# Patient Record
Sex: MALE | Race: WHITE | NOT HISPANIC OR LATINO | Employment: OTHER
[De-identification: names, ages, dates, MRNs, and addresses within clinical notes are randomized per-mention and may not be internally consistent; named-entity substitution may affect disease eponyms.]

---

## 2017-04-18 ENCOUNTER — RECORDS - HEALTHEAST (OUTPATIENT)
Dept: ADMINISTRATIVE | Facility: OTHER | Age: 72
End: 2017-04-18

## 2017-06-06 ENCOUNTER — OFFICE VISIT - HEALTHEAST (OUTPATIENT)
Dept: FAMILY MEDICINE | Facility: CLINIC | Age: 72
End: 2017-06-06

## 2017-06-06 DIAGNOSIS — E11.9 DIET-CONTROLLED TYPE 2 DIABETES MELLITUS (H): ICD-10-CM

## 2017-06-06 DIAGNOSIS — R47.01: ICD-10-CM

## 2017-06-06 DIAGNOSIS — N18.6 ESRD (END STAGE RENAL DISEASE) ON DIALYSIS (H): ICD-10-CM

## 2017-06-06 DIAGNOSIS — Z99.2 ESRD (END STAGE RENAL DISEASE) ON DIALYSIS (H): ICD-10-CM

## 2017-06-06 DIAGNOSIS — M17.32 POST-TRAUMATIC OSTEOARTHRITIS OF LEFT KNEE: ICD-10-CM

## 2017-06-06 DIAGNOSIS — Z01.818 PREOP GENERAL PHYSICAL EXAM: ICD-10-CM

## 2017-06-06 LAB
ATRIAL RATE - MUSE: 74 BPM
DIASTOLIC BLOOD PRESSURE - MUSE: NORMAL MMHG
HBA1C MFR BLD: 5.8 % (ref 3.5–6.1)
INTERPRETATION ECG - MUSE: NORMAL
P AXIS - MUSE: 68 DEGREES
PR INTERVAL - MUSE: 180 MS
QRS DURATION - MUSE: 92 MS
QT - MUSE: 430 MS
QTC - MUSE: 477 MS
R AXIS - MUSE: 27 DEGREES
SYSTOLIC BLOOD PRESSURE - MUSE: NORMAL MMHG
T AXIS - MUSE: 67 DEGREES
VENTRICULAR RATE- MUSE: 74 BPM

## 2017-06-06 ASSESSMENT — MIFFLIN-ST. JEOR: SCORE: 1653.46

## 2017-06-07 ENCOUNTER — COMMUNICATION - HEALTHEAST (OUTPATIENT)
Dept: FAMILY MEDICINE | Facility: CLINIC | Age: 72
End: 2017-06-07

## 2017-06-08 ENCOUNTER — HOSPITAL ENCOUNTER (OUTPATIENT)
Dept: CT IMAGING | Facility: CLINIC | Age: 72
Discharge: HOME | End: 2017-06-08
Attending: FAMILY MEDICINE

## 2017-06-08 ENCOUNTER — HOSPITAL ENCOUNTER (OUTPATIENT)
Dept: ULTRASOUND IMAGING | Facility: CLINIC | Age: 72
Discharge: HOME | End: 2017-06-08
Attending: FAMILY MEDICINE

## 2017-06-08 DIAGNOSIS — R47.01: ICD-10-CM

## 2017-06-13 ENCOUNTER — COMMUNICATION - HEALTHEAST (OUTPATIENT)
Dept: FAMILY MEDICINE | Facility: CLINIC | Age: 72
End: 2017-06-13

## 2017-06-14 ASSESSMENT — MIFFLIN-ST. JEOR: SCORE: 1651.64

## 2017-06-16 ENCOUNTER — OFFICE VISIT - HEALTHEAST (OUTPATIENT)
Dept: FAMILY MEDICINE | Facility: CLINIC | Age: 72
End: 2017-06-16

## 2017-06-16 ENCOUNTER — COMMUNICATION - HEALTHEAST (OUTPATIENT)
Dept: SCHEDULING | Facility: CLINIC | Age: 72
End: 2017-06-16

## 2017-06-16 DIAGNOSIS — R07.0 THROAT PAIN: ICD-10-CM

## 2017-06-28 ENCOUNTER — HOME CARE/HOSPICE - HEALTHEAST (OUTPATIENT)
Dept: HOME HEALTH SERVICES | Facility: HOME HEALTH | Age: 72
End: 2017-06-28

## 2017-06-28 ENCOUNTER — SURGERY - HEALTHEAST (OUTPATIENT)
Dept: SURGERY | Facility: CLINIC | Age: 72
End: 2017-06-28

## 2017-06-28 ENCOUNTER — ANESTHESIA - HEALTHEAST (OUTPATIENT)
Dept: SURGERY | Facility: CLINIC | Age: 72
End: 2017-06-28

## 2017-06-28 ASSESSMENT — MIFFLIN-ST. JEOR: SCORE: 1651.64

## 2017-07-01 ASSESSMENT — MIFFLIN-ST. JEOR
SCORE: 1657.13
SCORE: 1656.75

## 2017-07-02 ASSESSMENT — MIFFLIN-ST. JEOR: SCORE: 1645.29

## 2017-07-04 ENCOUNTER — HOME CARE/HOSPICE - HEALTHEAST (OUTPATIENT)
Dept: HOME HEALTH SERVICES | Facility: HOME HEALTH | Age: 72
End: 2017-07-04

## 2017-07-05 ENCOUNTER — HOME CARE/HOSPICE - HEALTHEAST (OUTPATIENT)
Dept: HOME HEALTH SERVICES | Facility: HOME HEALTH | Age: 72
End: 2017-07-05

## 2017-07-06 ENCOUNTER — COMMUNICATION - HEALTHEAST (OUTPATIENT)
Dept: SCHEDULING | Facility: CLINIC | Age: 72
End: 2017-07-06

## 2017-07-06 ENCOUNTER — HOME CARE/HOSPICE - HEALTHEAST (OUTPATIENT)
Dept: HOME HEALTH SERVICES | Facility: HOME HEALTH | Age: 72
End: 2017-07-06

## 2017-07-06 ENCOUNTER — OFFICE VISIT - HEALTHEAST (OUTPATIENT)
Dept: INTERNAL MEDICINE | Facility: CLINIC | Age: 72
End: 2017-07-06

## 2017-07-06 DIAGNOSIS — Z96.659 S/P KNEE REPLACEMENT: ICD-10-CM

## 2017-07-06 DIAGNOSIS — N18.6 ESRD (END STAGE RENAL DISEASE) (H): ICD-10-CM

## 2017-07-06 DIAGNOSIS — R11.0 NAUSEA: ICD-10-CM

## 2017-07-06 DIAGNOSIS — D64.9 POSTOPERATIVE ANEMIA: ICD-10-CM

## 2017-07-08 ENCOUNTER — HOME CARE/HOSPICE - HEALTHEAST (OUTPATIENT)
Dept: HOME HEALTH SERVICES | Facility: HOME HEALTH | Age: 72
End: 2017-07-08

## 2017-07-11 ENCOUNTER — RECORDS - HEALTHEAST (OUTPATIENT)
Dept: ADMINISTRATIVE | Facility: OTHER | Age: 72
End: 2017-07-11

## 2017-07-11 ENCOUNTER — HOME CARE/HOSPICE - HEALTHEAST (OUTPATIENT)
Dept: HOME HEALTH SERVICES | Facility: HOME HEALTH | Age: 72
End: 2017-07-11

## 2017-07-13 ENCOUNTER — HOME CARE/HOSPICE - HEALTHEAST (OUTPATIENT)
Dept: HOME HEALTH SERVICES | Facility: HOME HEALTH | Age: 72
End: 2017-07-13

## 2017-07-15 ENCOUNTER — HOME CARE/HOSPICE - HEALTHEAST (OUTPATIENT)
Dept: HOME HEALTH SERVICES | Facility: HOME HEALTH | Age: 72
End: 2017-07-15

## 2017-07-17 ENCOUNTER — RECORDS - HEALTHEAST (OUTPATIENT)
Dept: ADMINISTRATIVE | Facility: OTHER | Age: 72
End: 2017-07-17

## 2017-07-19 ENCOUNTER — INFUSION - HEALTHEAST (OUTPATIENT)
Dept: INFUSION THERAPY | Facility: CLINIC | Age: 72
End: 2017-07-19

## 2017-07-19 DIAGNOSIS — N18.4 CHRONIC KIDNEY DISEASE, STAGE IV (SEVERE) (H): ICD-10-CM

## 2017-07-19 DIAGNOSIS — D64.9 ANEMIA: ICD-10-CM

## 2017-07-20 ENCOUNTER — INFUSION - HEALTHEAST (OUTPATIENT)
Dept: INFUSION THERAPY | Facility: CLINIC | Age: 72
End: 2017-07-20

## 2017-07-20 DIAGNOSIS — D64.9 ANEMIA: ICD-10-CM

## 2017-07-20 DIAGNOSIS — N18.4 CHRONIC KIDNEY DISEASE, STAGE IV (SEVERE) (H): ICD-10-CM

## 2017-07-21 LAB
BLD PROD TYP BPU: NORMAL
BLOOD EXPIRATION DATE: NORMAL
BLOOD TYPE: 5100
CODING SYSTEM: NORMAL
COMPONENT (HISTORICAL CONVERSION): NORMAL
CROSSMATCH: NORMAL
ISSUE DATE AND TIME: NORMAL
STATUS (HISTORICAL CONVERSION): NORMAL
UNIT ABO/RH (HISTORICAL CONVERSION): NORMAL
UNIT NUMBER: NORMAL

## 2017-08-15 ENCOUNTER — RECORDS - HEALTHEAST (OUTPATIENT)
Dept: ADMINISTRATIVE | Facility: OTHER | Age: 72
End: 2017-08-15

## 2017-10-10 ENCOUNTER — RECORDS - HEALTHEAST (OUTPATIENT)
Dept: ADMINISTRATIVE | Facility: OTHER | Age: 72
End: 2017-10-10

## 2018-01-23 ENCOUNTER — RECORDS - HEALTHEAST (OUTPATIENT)
Dept: ADMINISTRATIVE | Facility: OTHER | Age: 73
End: 2018-01-23

## 2018-02-01 ENCOUNTER — RECORDS - HEALTHEAST (OUTPATIENT)
Dept: ADMINISTRATIVE | Facility: OTHER | Age: 73
End: 2018-02-01

## 2018-02-26 ENCOUNTER — OFFICE VISIT - HEALTHEAST (OUTPATIENT)
Dept: FAMILY MEDICINE | Facility: CLINIC | Age: 73
End: 2018-02-26

## 2018-02-26 DIAGNOSIS — J32.9 SINUSITIS: ICD-10-CM

## 2018-02-26 RX ORDER — CINACALCET 30 MG/1
60 TABLET, FILM COATED ORAL DAILY
Status: SHIPPED | COMMUNITY
Start: 2018-02-26 | End: 2023-07-27

## 2018-02-26 RX ORDER — SEVELAMER CARBONATE 800 MG/1
3200 TABLET, FILM COATED ORAL
Status: SHIPPED | COMMUNITY
Start: 2018-01-09

## 2018-03-14 ENCOUNTER — COMMUNICATION - HEALTHEAST (OUTPATIENT)
Dept: FAMILY MEDICINE | Facility: CLINIC | Age: 73
End: 2018-03-14

## 2018-03-14 DIAGNOSIS — Z00.00 HEALTHCARE MAINTENANCE: ICD-10-CM

## 2018-03-15 ENCOUNTER — AMBULATORY - HEALTHEAST (OUTPATIENT)
Dept: NURSING | Facility: CLINIC | Age: 73
End: 2018-03-15

## 2018-03-15 DIAGNOSIS — Z23 NEED FOR TD VACCINE: ICD-10-CM

## 2018-04-19 ENCOUNTER — RECORDS - HEALTHEAST (OUTPATIENT)
Dept: ADMINISTRATIVE | Facility: OTHER | Age: 73
End: 2018-04-19

## 2018-05-11 ENCOUNTER — OFFICE VISIT - HEALTHEAST (OUTPATIENT)
Dept: FAMILY MEDICINE | Facility: CLINIC | Age: 73
End: 2018-05-11

## 2018-05-11 ENCOUNTER — COMMUNICATION - HEALTHEAST (OUTPATIENT)
Dept: SCHEDULING | Facility: CLINIC | Age: 73
End: 2018-05-11

## 2018-05-11 DIAGNOSIS — J30.9 ALLERGIC RHINITIS: ICD-10-CM

## 2018-05-11 DIAGNOSIS — R05.9 COUGH: ICD-10-CM

## 2018-06-01 ENCOUNTER — OFFICE VISIT - HEALTHEAST (OUTPATIENT)
Dept: FAMILY MEDICINE | Facility: CLINIC | Age: 73
End: 2018-06-01

## 2018-06-01 ENCOUNTER — COMMUNICATION - HEALTHEAST (OUTPATIENT)
Dept: SCHEDULING | Facility: CLINIC | Age: 73
End: 2018-06-01

## 2018-06-01 DIAGNOSIS — R05.9 COUGH: ICD-10-CM

## 2018-06-05 ENCOUNTER — RECORDS - HEALTHEAST (OUTPATIENT)
Dept: ADMINISTRATIVE | Facility: OTHER | Age: 73
End: 2018-06-05

## 2018-09-11 ENCOUNTER — OFFICE VISIT - HEALTHEAST (OUTPATIENT)
Dept: FAMILY MEDICINE | Facility: CLINIC | Age: 73
End: 2018-09-11

## 2018-09-11 DIAGNOSIS — N18.6 END STAGE RENAL FAILURE ON DIALYSIS (H): ICD-10-CM

## 2018-09-11 DIAGNOSIS — E11.8 TYPE 2 DIABETES MELLITUS WITH COMPLICATION (H): ICD-10-CM

## 2018-09-11 DIAGNOSIS — R41.3 MEMORY DEFICITS: ICD-10-CM

## 2018-09-11 DIAGNOSIS — N25.81 HYPERPARATHYROIDISM, SECONDARY RENAL (H): ICD-10-CM

## 2018-09-11 DIAGNOSIS — D69.6 THROMBOCYTOPENIA (H): ICD-10-CM

## 2018-09-11 DIAGNOSIS — Z99.2 END STAGE RENAL FAILURE ON DIALYSIS (H): ICD-10-CM

## 2018-09-11 ASSESSMENT — MIFFLIN-ST. JEOR: SCORE: 1611.5

## 2018-09-25 ENCOUNTER — RECORDS - HEALTHEAST (OUTPATIENT)
Dept: ADMINISTRATIVE | Facility: OTHER | Age: 73
End: 2018-09-25

## 2018-11-06 ENCOUNTER — OFFICE VISIT - HEALTHEAST (OUTPATIENT)
Dept: FAMILY MEDICINE | Facility: CLINIC | Age: 73
End: 2018-11-06

## 2018-11-06 DIAGNOSIS — I45.9 SKIPPED HEART BEATS: ICD-10-CM

## 2018-11-06 DIAGNOSIS — R05.9 COUGH: ICD-10-CM

## 2019-01-03 ENCOUNTER — RECORDS - HEALTHEAST (OUTPATIENT)
Dept: ADMINISTRATIVE | Facility: OTHER | Age: 74
End: 2019-01-03

## 2019-03-12 ENCOUNTER — OFFICE VISIT - HEALTHEAST (OUTPATIENT)
Dept: FAMILY MEDICINE | Facility: CLINIC | Age: 74
End: 2019-03-12

## 2019-03-12 DIAGNOSIS — R05.9 COUGH: ICD-10-CM

## 2019-03-12 DIAGNOSIS — R06.2 WHEEZING: ICD-10-CM

## 2019-04-01 ENCOUNTER — COMMUNICATION - HEALTHEAST (OUTPATIENT)
Dept: FAMILY MEDICINE | Facility: CLINIC | Age: 74
End: 2019-04-01

## 2019-04-02 ENCOUNTER — OFFICE VISIT - HEALTHEAST (OUTPATIENT)
Dept: FAMILY MEDICINE | Facility: CLINIC | Age: 74
End: 2019-04-02

## 2019-04-02 DIAGNOSIS — R05.9 COUGH: ICD-10-CM

## 2019-04-02 DIAGNOSIS — N18.6 END STAGE RENAL FAILURE ON DIALYSIS (H): ICD-10-CM

## 2019-04-02 DIAGNOSIS — Z99.2 END STAGE RENAL FAILURE ON DIALYSIS (H): ICD-10-CM

## 2019-04-02 DIAGNOSIS — C64.9 RENAL CELL CARCINOMA, UNSPECIFIED LATERALITY (H): ICD-10-CM

## 2019-04-02 DIAGNOSIS — I77.0 A-V FISTULA (H): ICD-10-CM

## 2019-04-25 ENCOUNTER — RECORDS - HEALTHEAST (OUTPATIENT)
Dept: ADMINISTRATIVE | Facility: OTHER | Age: 74
End: 2019-04-25

## 2019-04-25 LAB — RETINOPATHY: NEGATIVE

## 2019-04-29 ENCOUNTER — COMMUNICATION - HEALTHEAST (OUTPATIENT)
Dept: SCHEDULING | Facility: CLINIC | Age: 74
End: 2019-04-29

## 2019-04-29 DIAGNOSIS — R07.9 CHEST PAIN, UNSPECIFIED TYPE: ICD-10-CM

## 2019-04-30 ENCOUNTER — OFFICE VISIT - HEALTHEAST (OUTPATIENT)
Dept: FAMILY MEDICINE | Facility: CLINIC | Age: 74
End: 2019-04-30

## 2019-04-30 DIAGNOSIS — R07.9 CHEST PAIN, UNSPECIFIED TYPE: ICD-10-CM

## 2019-04-30 DIAGNOSIS — Z00.00 ROUTINE GENERAL MEDICAL EXAMINATION AT A HEALTH CARE FACILITY: ICD-10-CM

## 2019-04-30 DIAGNOSIS — R91.8 PULMONARY NODULES: ICD-10-CM

## 2019-04-30 DIAGNOSIS — N18.6 END STAGE RENAL FAILURE ON DIALYSIS (H): ICD-10-CM

## 2019-04-30 DIAGNOSIS — E04.1 THYROID NODULE: ICD-10-CM

## 2019-04-30 DIAGNOSIS — N25.81 HYPERPARATHYROIDISM, SECONDARY RENAL (H): ICD-10-CM

## 2019-04-30 DIAGNOSIS — E21.3 HYPERPARATHYROIDISM (H): ICD-10-CM

## 2019-04-30 DIAGNOSIS — Z13.220 ENCOUNTER FOR SCREENING FOR LIPOID DISORDERS: ICD-10-CM

## 2019-04-30 DIAGNOSIS — Z12.5 SCREENING FOR MALIGNANT NEOPLASM OF PROSTATE: ICD-10-CM

## 2019-04-30 DIAGNOSIS — Z12.11 ENCOUNTER FOR SCREENING FOR MALIGNANT NEOPLASM OF COLON: ICD-10-CM

## 2019-04-30 DIAGNOSIS — Z99.2 END STAGE RENAL FAILURE ON DIALYSIS (H): ICD-10-CM

## 2019-04-30 DIAGNOSIS — E11.9 DIET-CONTROLLED TYPE 2 DIABETES MELLITUS (H): ICD-10-CM

## 2019-04-30 DIAGNOSIS — Z12.11 SCREEN FOR COLON CANCER: ICD-10-CM

## 2019-04-30 DIAGNOSIS — D69.6 THROMBOCYTOPENIA (H): ICD-10-CM

## 2019-04-30 LAB
ALBUMIN SERPL-MCNC: 3.6 G/DL (ref 3.5–5)
ALP SERPL-CCNC: 69 U/L (ref 45–120)
ALT SERPL W P-5'-P-CCNC: 17 U/L (ref 0–45)
ANION GAP SERPL CALCULATED.3IONS-SCNC: 12 MMOL/L (ref 5–18)
AST SERPL W P-5'-P-CCNC: 18 U/L (ref 0–40)
BILIRUB SERPL-MCNC: 0.7 MG/DL (ref 0–1)
BUN SERPL-MCNC: 34 MG/DL (ref 8–28)
CALCIUM SERPL-MCNC: 9.3 MG/DL (ref 8.5–10.5)
CHLORIDE BLD-SCNC: 105 MMOL/L (ref 98–107)
CHOLEST SERPL-MCNC: 89 MG/DL
CO2 SERPL-SCNC: 24 MMOL/L (ref 22–31)
CREAT SERPL-MCNC: 5.12 MG/DL (ref 0.7–1.3)
ERYTHROCYTE [DISTWIDTH] IN BLOOD BY AUTOMATED COUNT: 13.6 % (ref 11–14.5)
FASTING STATUS PATIENT QL REPORTED: YES
GFR SERPL CREATININE-BSD FRML MDRD: 11 ML/MIN/1.73M2
GLUCOSE BLD-MCNC: 100 MG/DL (ref 70–125)
HBA1C MFR BLD: 5.3 % (ref 3.5–6.1)
HCT VFR BLD AUTO: 34.9 % (ref 40–54)
HDLC SERPL-MCNC: 39 MG/DL
HGB BLD-MCNC: 11.6 G/DL (ref 14–18)
LDLC SERPL CALC-MCNC: 18 MG/DL
MCH RBC QN AUTO: 31.2 PG (ref 27–34)
MCHC RBC AUTO-ENTMCNC: 33.3 G/DL (ref 32–36)
MCV RBC AUTO: 94 FL (ref 80–100)
PLATELET # BLD AUTO: 126 THOU/UL (ref 140–440)
PMV BLD AUTO: 7.4 FL (ref 7–10)
POTASSIUM BLD-SCNC: 4.7 MMOL/L (ref 3.5–5)
PROT SERPL-MCNC: 6.2 G/DL (ref 6–8)
PSA SERPL-MCNC: 1.3 NG/ML (ref 0–6.5)
RBC # BLD AUTO: 3.72 MILL/UL (ref 4.4–6.2)
SODIUM SERPL-SCNC: 141 MMOL/L (ref 136–145)
TRIGL SERPL-MCNC: 159 MG/DL
WBC: 6 THOU/UL (ref 4–11)

## 2019-04-30 RX ORDER — NITROGLYCERIN 0.4 MG/1
0.4 TABLET SUBLINGUAL EVERY 5 MIN PRN
Qty: 50 TABLET | Refills: 3 | Status: SHIPPED | OUTPATIENT
Start: 2019-04-30 | End: 2022-07-29

## 2019-05-02 ENCOUNTER — HOSPITAL ENCOUNTER (OUTPATIENT)
Dept: CT IMAGING | Facility: CLINIC | Age: 74
Discharge: HOME | End: 2019-05-02
Attending: FAMILY MEDICINE

## 2019-05-02 ENCOUNTER — RECORDS - HEALTHEAST (OUTPATIENT)
Dept: BONE DENSITY | Facility: CLINIC | Age: 74
End: 2019-05-02

## 2019-05-02 ENCOUNTER — RECORDS - HEALTHEAST (OUTPATIENT)
Dept: ADMINISTRATIVE | Facility: OTHER | Age: 74
End: 2019-05-02

## 2019-05-02 ENCOUNTER — HOSPITAL ENCOUNTER (OUTPATIENT)
Dept: ULTRASOUND IMAGING | Facility: CLINIC | Age: 74
Discharge: HOME | End: 2019-05-02
Attending: FAMILY MEDICINE

## 2019-05-02 DIAGNOSIS — E21.3 HYPERPARATHYROIDISM, UNSPECIFIED (H): ICD-10-CM

## 2019-05-02 DIAGNOSIS — E04.1 THYROID NODULE: ICD-10-CM

## 2019-05-02 DIAGNOSIS — R91.8 PULMONARY NODULES: ICD-10-CM

## 2019-05-06 ENCOUNTER — COMMUNICATION - HEALTHEAST (OUTPATIENT)
Dept: FAMILY MEDICINE | Facility: CLINIC | Age: 74
End: 2019-05-06

## 2019-05-07 ENCOUNTER — COMMUNICATION - HEALTHEAST (OUTPATIENT)
Dept: FAMILY MEDICINE | Facility: CLINIC | Age: 74
End: 2019-05-07

## 2019-05-14 ENCOUNTER — OFFICE VISIT - HEALTHEAST (OUTPATIENT)
Dept: FAMILY MEDICINE | Facility: CLINIC | Age: 74
End: 2019-05-14

## 2019-05-14 DIAGNOSIS — Z99.2 END STAGE RENAL FAILURE ON DIALYSIS (H): ICD-10-CM

## 2019-05-14 DIAGNOSIS — M81.0 SENILE OSTEOPOROSIS: ICD-10-CM

## 2019-05-14 DIAGNOSIS — I25.10 CORONARY ARTERY CALCIFICATION SEEN ON CAT SCAN: ICD-10-CM

## 2019-05-14 DIAGNOSIS — R91.8 PULMONARY NODULES: ICD-10-CM

## 2019-05-14 DIAGNOSIS — N18.6 END STAGE RENAL FAILURE ON DIALYSIS (H): ICD-10-CM

## 2019-05-23 ENCOUNTER — OFFICE VISIT - HEALTHEAST (OUTPATIENT)
Dept: CARDIOLOGY | Facility: CLINIC | Age: 74
End: 2019-05-23

## 2019-05-23 DIAGNOSIS — Z99.2 ESRD (END STAGE RENAL DISEASE) ON DIALYSIS (H): ICD-10-CM

## 2019-05-23 DIAGNOSIS — R00.2 INTERMITTENT PALPITATIONS: ICD-10-CM

## 2019-05-23 DIAGNOSIS — I25.10 CORONARY ARTERY DISEASE INVOLVING NATIVE CORONARY ARTERY OF NATIVE HEART, ANGINA PRESENCE UNSPECIFIED: ICD-10-CM

## 2019-05-23 DIAGNOSIS — I10 BENIGN ESSENTIAL HYPERTENSION: ICD-10-CM

## 2019-05-23 DIAGNOSIS — N18.6 ESRD (END STAGE RENAL DISEASE) ON DIALYSIS (H): ICD-10-CM

## 2019-05-23 RX ORDER — POLYETHYLENE GLYCOL 3350 17 G/17G
17 POWDER, FOR SOLUTION ORAL DAILY PRN
Status: SHIPPED | COMMUNITY
Start: 2019-05-23

## 2019-05-23 ASSESSMENT — MIFFLIN-ST. JEOR: SCORE: 1625.56

## 2019-05-25 LAB
ATRIAL RATE - MUSE: 72 BPM
DIASTOLIC BLOOD PRESSURE - MUSE: NORMAL MMHG
INTERPRETATION ECG - MUSE: NORMAL
P AXIS - MUSE: 70 DEGREES
PR INTERVAL - MUSE: 184 MS
QRS DURATION - MUSE: 94 MS
QT - MUSE: 434 MS
QTC - MUSE: 475 MS
R AXIS - MUSE: 13 DEGREES
SYSTOLIC BLOOD PRESSURE - MUSE: NORMAL MMHG
T AXIS - MUSE: 72 DEGREES
VENTRICULAR RATE- MUSE: 72 BPM

## 2019-06-11 ENCOUNTER — HOSPITAL ENCOUNTER (OUTPATIENT)
Dept: CARDIOLOGY | Facility: CLINIC | Age: 74
Discharge: HOME | End: 2019-06-11
Attending: INTERNAL MEDICINE

## 2019-06-11 DIAGNOSIS — R00.2 INTERMITTENT PALPITATIONS: ICD-10-CM

## 2019-06-11 DIAGNOSIS — I25.10 CORONARY ARTERY DISEASE INVOLVING NATIVE CORONARY ARTERY OF NATIVE HEART, ANGINA PRESENCE UNSPECIFIED: ICD-10-CM

## 2019-06-11 LAB
AORTIC ROOT: 3.3 CM
AORTIC VALVE MEAN VELOCITY: 145 CM/S
AR DECEL SLOPE: 2770 MM/S2
AR PEAK VELOCITY: 471 CM/S
AV DIMENSIONLESS INDEX VTI: 0.6
AV MEAN GRADIENT: 9 MMHG
AV PEAK GRADIENT: 14.9 MMHG
AV REGURGITANT PEAK GRADIENT: 88.7 MMHG
AV REGURGITATION PRESSURE HALF TIME: 471 MS
AV VALVE AREA: 2.7 CM2
AV VELOCITY RATIO: 0.5
BSA FOR ECHO PROCEDURE: 2.07 M2
CV BLOOD PRESSURE: ABNORMAL MMHG
CV ECHO HEIGHT: 70.5 IN
CV ECHO WEIGHT: 191 LBS
DOP CALC AO PEAK VEL: 193 CM/S
DOP CALC AO VTI: 42.1 CM
DOP CALC LVOT AREA: 4.52 CM2
DOP CALC LVOT DIAMETER: 2.4 CM
DOP CALC LVOT PEAK VEL: 100 CM/S
DOP CALC LVOT STROKE VOLUME: 111.7 CM3
DOP CALCLVOT PEAK VEL VTI: 24.7 CM
EJECTION FRACTION: 59 % (ref 55–75)
FRACTIONAL SHORTENING: 36 % (ref 28–44)
INTERVENTRICULAR SEPTUM IN END DIASTOLE: 1.08 CM (ref 0.6–1)
IVS/PW RATIO: 1
LA AREA 1: 27.4 CM2
LA AREA 2: 24.9 CM2
LEFT ATRIUM LENGTH: 6.6 CM
LEFT ATRIUM SIZE: 5 CM
LEFT ATRIUM TO AORTIC ROOT RATIO: 1.52 NO UNITS
LEFT ATRIUM VOLUME INDEX: 42.4 ML/M2
LEFT ATRIUM VOLUME: 87.9 ML
LEFT VENTRICLE CARDIAC INDEX: 3.8 L/MIN/M2
LEFT VENTRICLE CARDIAC OUTPUT: 7.8 L/MIN
LEFT VENTRICLE DIASTOLIC VOLUME INDEX: 84.5 CM3/M2 (ref 34–74)
LEFT VENTRICLE DIASTOLIC VOLUME: 175 CM3 (ref 62–150)
LEFT VENTRICLE HEART RATE: 70 BPM
LEFT VENTRICLE MASS INDEX: 146.9 G/M2
LEFT VENTRICLE SYSTOLIC VOLUME INDEX: 34.8 CM3/M2 (ref 11–31)
LEFT VENTRICLE SYSTOLIC VOLUME: 72.1 CM3 (ref 21–61)
LEFT VENTRICULAR INTERNAL DIMENSION IN DIASTOLE: 6.42 CM (ref 4.2–5.8)
LEFT VENTRICULAR INTERNAL DIMENSION IN SYSTOLE: 4.11 CM (ref 2.5–4)
LEFT VENTRICULAR MASS: 304.1 G
LEFT VENTRICULAR OUTFLOW TRACT MEAN GRADIENT: 2 MMHG
LEFT VENTRICULAR OUTFLOW TRACT MEAN VELOCITY: 72.5 CM/S
LEFT VENTRICULAR OUTFLOW TRACT PEAK GRADIENT: 4 MMHG
LEFT VENTRICULAR POSTERIOR WALL IN END DIASTOLE: 1.07 CM (ref 0.6–1)
LV STROKE VOLUME INDEX: 54 ML/M2
MITRAL VALVE E/A RATIO: 0.9
MV AVERAGE E/E' RATIO: 10.3 CM/S
MV DECELERATION TIME: 204 MS
MV E'TISSUE VEL-LAT: 7.54 CM/S
MV E'TISSUE VEL-MED: 5.03 CM/S
MV LATERAL E/E' RATIO: 8.6
MV MEDIAL E/E' RATIO: 12.8
MV PEAK A VELOCITY: 74.2 CM/S
MV PEAK E VELOCITY: 64.5 CM/S
NUC REST DIASTOLIC VOLUME INDEX: 3056 LBS
NUC REST SYSTOLIC VOLUME INDEX: 70.5 IN
RIGHT VENTRICULAR INTERNAL DIMENSION IN DYSTOLE: 2.77 CM
TRICUSPID VALVE ANULAR PLANE SYSTOLIC EXCURSION: 2.2 CM

## 2019-06-11 ASSESSMENT — MIFFLIN-ST. JEOR: SCORE: 1625.56

## 2019-08-21 ENCOUNTER — COMMUNICATION - HEALTHEAST (OUTPATIENT)
Dept: FAMILY MEDICINE | Facility: CLINIC | Age: 74
End: 2019-08-21

## 2019-08-21 DIAGNOSIS — E11.9 DIET-CONTROLLED TYPE 2 DIABETES MELLITUS (H): ICD-10-CM

## 2019-08-27 ENCOUNTER — COMMUNICATION - HEALTHEAST (OUTPATIENT)
Dept: ADMINISTRATIVE | Facility: CLINIC | Age: 74
End: 2019-08-27

## 2019-09-10 ENCOUNTER — RECORDS - HEALTHEAST (OUTPATIENT)
Dept: ADMINISTRATIVE | Facility: OTHER | Age: 74
End: 2019-09-10

## 2019-11-21 ENCOUNTER — OFFICE VISIT - HEALTHEAST (OUTPATIENT)
Dept: CARDIOLOGY | Facility: CLINIC | Age: 74
End: 2019-11-21

## 2019-11-21 DIAGNOSIS — I10 BENIGN ESSENTIAL HYPERTENSION: ICD-10-CM

## 2019-11-21 DIAGNOSIS — R00.2 INTERMITTENT PALPITATIONS: ICD-10-CM

## 2019-11-21 DIAGNOSIS — I25.10 CORONARY ARTERY DISEASE INVOLVING NATIVE CORONARY ARTERY OF NATIVE HEART WITHOUT ANGINA PECTORIS: ICD-10-CM

## 2019-11-21 DIAGNOSIS — Z99.2 ESRD (END STAGE RENAL DISEASE) ON DIALYSIS (H): ICD-10-CM

## 2019-11-21 DIAGNOSIS — N18.6 ESRD (END STAGE RENAL DISEASE) ON DIALYSIS (H): ICD-10-CM

## 2019-11-21 ASSESSMENT — MIFFLIN-ST. JEOR: SCORE: 1611.95

## 2020-02-20 ENCOUNTER — RECORDS - HEALTHEAST (OUTPATIENT)
Dept: ADMINISTRATIVE | Facility: OTHER | Age: 75
End: 2020-02-20

## 2020-05-19 ENCOUNTER — OFFICE VISIT - HEALTHEAST (OUTPATIENT)
Dept: FAMILY MEDICINE | Facility: CLINIC | Age: 75
End: 2020-05-19

## 2020-05-19 DIAGNOSIS — H93.8X1 PLUGGED FEELING IN EAR, RIGHT: ICD-10-CM

## 2020-06-26 ENCOUNTER — COMMUNICATION - HEALTHEAST (OUTPATIENT)
Dept: CARDIOLOGY | Facility: CLINIC | Age: 75
End: 2020-06-26

## 2020-07-02 ENCOUNTER — RECORDS - HEALTHEAST (OUTPATIENT)
Dept: HEALTH INFORMATION MANAGEMENT | Facility: CLINIC | Age: 75
End: 2020-07-02

## 2020-08-26 ENCOUNTER — COMMUNICATION - HEALTHEAST (OUTPATIENT)
Dept: FAMILY MEDICINE | Facility: CLINIC | Age: 75
End: 2020-08-26

## 2020-08-26 DIAGNOSIS — E11.9 DIET-CONTROLLED TYPE 2 DIABETES MELLITUS (H): ICD-10-CM

## 2020-08-27 ENCOUNTER — RECORDS - HEALTHEAST (OUTPATIENT)
Dept: ADMINISTRATIVE | Facility: OTHER | Age: 75
End: 2020-08-27

## 2020-08-27 LAB — RETINOPATHY: NORMAL

## 2020-08-30 RX ORDER — SIMVASTATIN 40 MG
40 TABLET ORAL AT BEDTIME
Qty: 90 TABLET | Refills: 3 | Status: SHIPPED | OUTPATIENT
Start: 2020-08-30 | End: 2021-09-01

## 2020-09-02 ENCOUNTER — RECORDS - HEALTHEAST (OUTPATIENT)
Dept: HEALTH INFORMATION MANAGEMENT | Facility: CLINIC | Age: 75
End: 2020-09-02

## 2021-01-19 ENCOUNTER — OFFICE VISIT - HEALTHEAST (OUTPATIENT)
Dept: FAMILY MEDICINE | Facility: CLINIC | Age: 76
End: 2021-01-19

## 2021-01-19 DIAGNOSIS — H35.30 MACULAR DEGENERATION (SENILE) OF RETINA: ICD-10-CM

## 2021-01-19 DIAGNOSIS — B35.1 ONYCHOMYCOSIS DUE TO DERMATOPHYTE: ICD-10-CM

## 2021-01-19 DIAGNOSIS — L60.0 INGROWING NAIL: ICD-10-CM

## 2021-01-28 ENCOUNTER — OFFICE VISIT - HEALTHEAST (OUTPATIENT)
Dept: CARDIOLOGY | Facility: CLINIC | Age: 76
End: 2021-01-28

## 2021-01-28 DIAGNOSIS — I25.10 CORONARY ARTERY DISEASE INVOLVING NATIVE CORONARY ARTERY OF NATIVE HEART WITHOUT ANGINA PECTORIS: ICD-10-CM

## 2021-01-28 DIAGNOSIS — R00.2 INTERMITTENT PALPITATIONS: ICD-10-CM

## 2021-01-28 DIAGNOSIS — I10 BENIGN ESSENTIAL HYPERTENSION: ICD-10-CM

## 2021-01-28 DIAGNOSIS — N18.6 ESRD (END STAGE RENAL DISEASE) ON DIALYSIS (H): ICD-10-CM

## 2021-01-28 DIAGNOSIS — Z99.2 ESRD (END STAGE RENAL DISEASE) ON DIALYSIS (H): ICD-10-CM

## 2021-01-28 ASSESSMENT — MIFFLIN-ST. JEOR: SCORE: 1607.42

## 2021-03-04 ENCOUNTER — COMMUNICATION - HEALTHEAST (OUTPATIENT)
Dept: ADMINISTRATIVE | Facility: CLINIC | Age: 76
End: 2021-03-04

## 2021-03-04 DIAGNOSIS — L60.0 INGROWING TOENAIL OF LEFT FOOT: ICD-10-CM

## 2021-03-30 ENCOUNTER — OFFICE VISIT - HEALTHEAST (OUTPATIENT)
Dept: PODIATRY | Facility: CLINIC | Age: 76
End: 2021-03-30

## 2021-03-30 DIAGNOSIS — B35.3 TINEA PEDIS OF BOTH FEET: ICD-10-CM

## 2021-03-30 DIAGNOSIS — B35.1 NAIL FUNGUS: ICD-10-CM

## 2021-03-30 DIAGNOSIS — L60.2 ONYCHAUXIS: ICD-10-CM

## 2021-03-30 DIAGNOSIS — L60.0 INGROWN TOENAIL: ICD-10-CM

## 2021-03-30 RX ORDER — ASCORBIC ACID, THIAMINE, RIBOFLAVIN, NIACINAMIDE, PYRIDOXINE, FOLIC ACID, COBALAMIN, BIOTIN, PANTOTHENIC ACID 100; 1.5; 1.7; 20; 10; 1; 6; 300; 1 MG/1; MG/1; MG/1; MG/1; MG/1; MG/1; UG/1; UG/1; MG/1
1 TABLET, COATED ORAL DAILY
Status: SHIPPED | COMMUNITY
Start: 2021-02-17

## 2021-03-30 RX ORDER — CLOTRIMAZOLE AND BETAMETHASONE DIPROPIONATE 10; .64 MG/G; MG/G
CREAM TOPICAL 2 TIMES DAILY
Qty: 45 G | Refills: 0 | Status: SHIPPED | OUTPATIENT
Start: 2021-03-30 | End: 2022-05-03

## 2021-03-30 ASSESSMENT — MIFFLIN-ST. JEOR: SCORE: 1551.85

## 2021-04-20 ENCOUNTER — OFFICE VISIT - HEALTHEAST (OUTPATIENT)
Dept: FAMILY MEDICINE | Facility: CLINIC | Age: 76
End: 2021-04-20

## 2021-04-20 ENCOUNTER — COMMUNICATION - HEALTHEAST (OUTPATIENT)
Dept: SCHEDULING | Facility: CLINIC | Age: 76
End: 2021-04-20

## 2021-04-20 DIAGNOSIS — E11.8 TYPE 2 DIABETES MELLITUS WITH COMPLICATION (H): ICD-10-CM

## 2021-04-20 DIAGNOSIS — E04.1 NONTOXIC UNINODULAR GOITER: ICD-10-CM

## 2021-04-20 DIAGNOSIS — D69.6 THROMBOCYTOPENIA (H): ICD-10-CM

## 2021-04-20 DIAGNOSIS — N18.6 END STAGE RENAL FAILURE ON DIALYSIS (H): ICD-10-CM

## 2021-04-20 DIAGNOSIS — R63.0 LOSS OF APPETITE: ICD-10-CM

## 2021-04-20 DIAGNOSIS — N25.81 HYPERPARATHYROIDISM, SECONDARY RENAL (H): ICD-10-CM

## 2021-04-20 DIAGNOSIS — Z13.220 ENCOUNTER FOR SCREENING FOR LIPOID DISORDERS: ICD-10-CM

## 2021-04-20 DIAGNOSIS — Z99.2 END STAGE RENAL FAILURE ON DIALYSIS (H): ICD-10-CM

## 2021-04-20 DIAGNOSIS — Z00.00 ROUTINE GENERAL MEDICAL EXAMINATION AT A HEALTH CARE FACILITY: ICD-10-CM

## 2021-04-20 DIAGNOSIS — Z12.5 SCREENING FOR MALIGNANT NEOPLASM OF PROSTATE: ICD-10-CM

## 2021-04-20 DIAGNOSIS — I77.0 A-V FISTULA (H): ICD-10-CM

## 2021-04-20 DIAGNOSIS — E21.3 HYPERPARATHYROIDISM (H): ICD-10-CM

## 2021-04-20 DIAGNOSIS — C64.9 RENAL CELL CARCINOMA, UNSPECIFIED LATERALITY (H): ICD-10-CM

## 2021-04-20 LAB
ALBUMIN SERPL-MCNC: 3.5 G/DL (ref 3.5–5)
ALP SERPL-CCNC: 63 U/L (ref 45–120)
ALT SERPL W P-5'-P-CCNC: <9 U/L (ref 0–45)
ANION GAP SERPL CALCULATED.3IONS-SCNC: 15 MMOL/L (ref 5–18)
AST SERPL W P-5'-P-CCNC: 10 U/L (ref 0–40)
BILIRUB SERPL-MCNC: 0.7 MG/DL (ref 0–1)
BUN SERPL-MCNC: 41 MG/DL (ref 8–28)
CALCIUM SERPL-MCNC: 8.7 MG/DL (ref 8.5–10.5)
CHLORIDE BLD-SCNC: 109 MMOL/L (ref 98–107)
CHOLEST SERPL-MCNC: 89 MG/DL
CO2 SERPL-SCNC: 17 MMOL/L (ref 22–31)
CREAT SERPL-MCNC: 6.11 MG/DL (ref 0.7–1.3)
FASTING STATUS PATIENT QL REPORTED: YES
GFR SERPL CREATININE-BSD FRML MDRD: 9 ML/MIN/1.73M2
GLUCOSE BLD-MCNC: 97 MG/DL (ref 70–125)
HBA1C MFR BLD: 5.1 %
HDLC SERPL-MCNC: 36 MG/DL
LDLC SERPL CALC-MCNC: 25 MG/DL
POTASSIUM BLD-SCNC: 4.6 MMOL/L (ref 3.5–5)
PROT SERPL-MCNC: 6.1 G/DL (ref 6–8)
PSA SERPL-MCNC: 1.8 NG/ML (ref 0–6.5)
PTH-INTACT SERPL-MCNC: 391 PG/ML (ref 10–86)
SODIUM SERPL-SCNC: 141 MMOL/L (ref 136–145)
T4 FREE SERPL-MCNC: 0.8 NG/DL (ref 0.7–1.8)
TRIGL SERPL-MCNC: 139 MG/DL
TSH SERPL DL<=0.005 MIU/L-ACNC: 1.8 UIU/ML (ref 0.3–5)

## 2021-04-20 RX ORDER — MIRTAZAPINE 7.5 MG/1
7.5 TABLET, FILM COATED ORAL AT BEDTIME
Qty: 30 TABLET | Refills: 2 | Status: SHIPPED | OUTPATIENT
Start: 2021-04-20 | End: 2023-04-27

## 2021-04-20 ASSESSMENT — MIFFLIN-ST. JEOR: SCORE: 1573.17

## 2021-04-20 NOTE — ASSESSMENT & PLAN NOTE
He has had a history of diabetes mellitus type 2 and he continues to be normal last A1c was 5.4% in 2019.  We will continue to follow-up no medications at this point.

## 2021-04-21 ENCOUNTER — COMMUNICATION - HEALTHEAST (OUTPATIENT)
Dept: FAMILY MEDICINE | Facility: CLINIC | Age: 76
End: 2021-04-21

## 2021-05-06 ENCOUNTER — OFFICE VISIT - HEALTHEAST (OUTPATIENT)
Dept: PODIATRY | Facility: CLINIC | Age: 76
End: 2021-05-06

## 2021-05-06 DIAGNOSIS — B35.3 TINEA PEDIS OF BOTH FEET: ICD-10-CM

## 2021-05-06 ASSESSMENT — MIFFLIN-ST. JEOR: SCORE: 1570

## 2021-05-25 ENCOUNTER — RECORDS - HEALTHEAST (OUTPATIENT)
Dept: ADMINISTRATIVE | Facility: CLINIC | Age: 76
End: 2021-05-25

## 2021-05-27 VITALS
BODY MASS INDEX: 27.25 KG/M2 | OXYGEN SATURATION: 95 % | HEART RATE: 74 BPM | HEIGHT: 69 IN | DIASTOLIC BLOOD PRESSURE: 68 MMHG | WEIGHT: 184 LBS | SYSTOLIC BLOOD PRESSURE: 110 MMHG

## 2021-05-27 NOTE — PROGRESS NOTES
Assessment/Plan:        Diagnoses and all orders for this visit:    1. Cough  - predniSONE (DELTASONE) 20 MG tablet; Take 40 mg by mouth daily.  Dispense: 10 tablet; Refill: 0  - amoxicillin (AMOXIL) 875 MG tablet; Take 1 tablet (875 mg total) by mouth 2 (two) times a day for 10 days.  Dispense: 20 tablet; Refill: 0  - guaiFENesin ER (MUCINEX) 600 mg 12 hr tablet; Take 1 tablet (600 mg total) by mouth 2 (two) times a day.  Dispense: 30 tablet; Refill: 0    2. A-V fistula (H)    3. Renal cell carcinoma, unspecified laterality (H)    4. End stage renal failure on dialysis (H)  He continues with his dialysis.      Discussed with him and his wife the potential cause of these are prolonged cough.  He does not appear to have any major findings, I did review the chest x-ray with them again.  I do think that he does have some postnasal drip causing some irritation to the lungs.  On discussing some wheezing though minimal.  We discussed use of prednisone as well as continuing to do over-the-counter medication including guaifenesin.  They are given a prescription for amoxicillin to use if within the next 4 to 5 days there is no improvement or he gets worse.  Can also call to inform us if there is any other questions.    Subjective:    Patient ID: García Kitchen is a 73 y.o. male.    He has been seen in the past with concerns of cough.  He has also been treated with antibiotics for sinus infection.  He did get a chest x-ray on his last clinic visit the cough.  Chest x-ray was normal.  He comes in today with concerns of continuing coughing.  He noted that generally he feels that there has been some improvement.  But he does continue to cough especially in the evening also at night.  He was coughing to the extent that he started having pain to the lower chest wall and abdomen.  He noted that the nurses his dialysis unit when they were examining him with that he had some gurgling noise in his chest.  Because of that he is coming  to be evaluated.  He noted no chest pains, he has had no shortness of breath.  There is no swelling to his lower extremity.  He noted some wheeze but that is not consistent.  He has had no fevers and no chills.  He is also having some pain noted on the right abdomen which is not sure what is causing that.  Denied any nausea vomiting and he does have normal bowel movement.        The following portions of the patient's history were reviewed and updated as appropriate: allergies, current medications, past family history, past medical history, past social history, past surgical history and problem list.    Review of Systems   Constitutional: Negative for appetite change, chills and fever.   HENT: Positive for congestion and postnasal drip. Negative for ear pain, rhinorrhea, sinus pressure, sinus pain and sore throat.    Eyes: Positive for redness.   Respiratory: Positive for cough, chest tightness and wheezing.    Cardiovascular: Negative for chest pain.   Skin: Negative for rash.   Neurological: Positive for headaches. Negative for dizziness.     Vitals:    04/02/19 1059   BP: 134/60   Pulse: 76   Resp: 16   Weight: 192 lb 3 oz (87.2 kg)             Objective:    Physical Exam   Constitutional: He appears well-developed and well-nourished. No distress.   He is afebrile to touch and not pale.   HENT:   Right Ear: Tympanic membrane normal.   Left Ear: Tympanic membrane normal.   Nose: Rhinorrhea present. Right sinus exhibits no maxillary sinus tenderness and no frontal sinus tenderness. Left sinus exhibits no maxillary sinus tenderness and no frontal sinus tenderness.   Mouth/Throat: No posterior oropharyngeal edema or posterior oropharyngeal erythema.   There is some postnasal drip.   Cardiovascular: Normal rate and regular rhythm.   Pulmonary/Chest: Effort normal and breath sounds normal.   Lymphadenopathy:     He has no cervical adenopathy.   Neurological: He is alert.

## 2021-05-27 NOTE — TELEPHONE ENCOUNTER
Patient was seen in clinic on 3/12/19 with Dr. Tan and follow up plan was to return in 4 weeks for follow up.   I spoke with the patients wife and she states that the patient is having increase in upset stomach. Not having shortness of breath or fever. Temp has been taking at dialysis.   Patient did take all of the antibiotic prescribed on 3/12/19.   Appt scheduled with Dr. Tan on 4/2/2019 @11:00 a.m. To follow up.  Dr. Tan are you ok with this plan? Or should additional questions be asked?  Niharika Bertrand LPN

## 2021-05-27 NOTE — TELEPHONE ENCOUNTER
Who is calling:   Spouse  Reason for Call:   Patient still has the cough not as bad but is still producing phlegm clear to yellow, and when at Dialysis they are hearing gurgling in the lungs quite a bit  Date of last appointment with primary care:  3/12/2019  Okay to leave a detailed message: Yes

## 2021-05-28 NOTE — TELEPHONE ENCOUNTER
Reason for Disposition    All other patients with chest pain    Protocols used: CHEST PAIN-A-OH

## 2021-05-28 NOTE — TELEPHONE ENCOUNTER
Patient's wife was notified. Patient has not been seen by Cardiology in the past. The prescription for Nitro was written by a previous physician. Can you please place referral to Cardiology and I will have Specialty Scheduling work on getting the patient in as soon as possible.   Niharika Bertrand, DULCEN

## 2021-05-28 NOTE — TELEPHONE ENCOUNTER
----- Message from Trevin Tan MD sent at 5/6/2019  8:22 PM CDT -----  Please inform the patient that the ultrasound of the thyroid showed that did not do that he had on the left thyroid in the past is no longer seen.But he does have some cysts that is incidental finding meaning that are not causing any trouble but was seen because we did the ultrasound this will also watch at this time.  Also the pulmonary CT scan showed that the lung nodule is stable and otherwise he has not changed from previous.  But it also shows that there is coronary(heart blood vessel) calcifications.  This could be leading to the chest pains that he have been having, I do think that seeing the cardiologist is necessary.  The DEXA bone scan did show that there is osteoporosis which may cause bone breakage.  I think that coming into the clinic to discuss all of these will not be a better idea if you want to do that.  Let me know what you think.

## 2021-05-28 NOTE — TELEPHONE ENCOUNTER
Patient's wife(consent to communicate on file) notified of results.   Maribel Horan, [facility] CMT
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
## 2021-05-28 NOTE — TELEPHONE ENCOUNTER
Line busy x 2      Unable to leave message     Please relay results to pt  Results letters have also been mailed    Thank you  Pita Jane, CMA

## 2021-05-28 NOTE — TELEPHONE ENCOUNTER
Subjective:   Samantha Francis Wife calling.   dialysis and having frequent chest pains.  Nurse from dialysis say she needs to see pcp for nitro.  No current chest pain.  Sometimes quick and gone, and happens every 4-5 days.    Patient requesting Nitro to be called to pharmacy.    ALso transferred to scheduling for physical since it is due. IT was scheduled for tomorrow morning.    Pharmacy confirmed.      Shannan Macdonald, RN, [facility] Nurse Triage/Med Refills RN      "friction rub.    No murmur heard.   No systolic murmur is present    No diastolic murmur is present   Pulses:       Carotid pulses are 2+ on the right side, and 2+ on the left side.       Radial pulses are 2+ on the right side, and 2+ on the left side.        Dorsalis pedis pulses are 2+ on the right side, and 2+ on the left side.        Posterior tibial pulses are 2+ on the right side, and 2+ on the left side.   Pulmonary/Chest: Effort normal and breath sounds normal. No respiratory distress. She has no wheezes. She has no rales.   Abdominal: Soft. Bowel sounds are normal. She exhibits no distension and no mass. There is no tenderness. There is no rebound and no guarding.   Musculoskeletal: She exhibits no edema.   Neurological: She is alert and oriented to person, place, and time. No cranial nerve deficit.   Skin: Skin is warm and dry. She is not diaphoretic. No pallor.   Psychiatric: She has a normal mood and affect. Her behavior is normal. Judgment and thought content normal.   Nursing note and vitals reviewed.      Assessment:     1. ST elevation myocardial infarction (STEMI), unspecified artery (CMS-HCC)  EKG    [facility] Treadmill Stress   2. Obstructive sleep apnea syndrome  OVERNIGHT PULSE OXIMETRY   3. Coronary artery spasm (CMS-[facility])  Echocardiogram Comp w/o Cont   4. SOB (shortness of breath)  Echocardiogram Comp w/o Cont   5. Essential hypertension     6. Methamphetamine abuse     7. Hyperthyroidism     8. NSTEMI (non-ST elevated myocardial infarction) (CMS-[facility])     9. Obesity (BMI 30-39.9)     10. Ventricular fibrillation (CMS-[facility])     11. Tobacco abuse         Medical Decision Making:  Today's Assessment / Status / Plan:     47-year-old female with previous methamphetamine abuse now in remission for greater than one year on tobacco. I did advise her to please quit smoking tobacco. Additionally we will get an echocardiogram and a treadmill stress test to look for causes of her exertional shortness breath. We  

## 2021-05-28 NOTE — TELEPHONE ENCOUNTER
It does not appear that he has had a prescription for Nitro before.As such ,I think he will need to be seen and have a referral to see Cadiologist to determine the cause and if he needs to be on Nitro.
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
## 2021-05-29 ENCOUNTER — COMMUNICATION - HEALTHEAST (OUTPATIENT)
Dept: SCHEDULING | Facility: CLINIC | Age: 76
End: 2021-05-29

## 2021-05-29 NOTE — PATIENT INSTRUCTIONS - HE
Below is a list of instructions we discussed today in clinic:   1. Schedule an echocardiogram (sonogram of your heart)  2. Schedule a 30-day heart rhythm monitor to find the cause of your heart 'vibrating'.  3. Continue all medications as prescribed.  4. Follow up with me in about 6 months or sooner if needed.    You should receive a phone call from this office informing you of test or procedure results within 3 business days of the test being performed.  If you do not hear from our office with the test results within 1 week please do not hesitate to call asking for these results.     It was a pleasure to meet with you today in clinic.  Please do not hesitate to call the [facility] clinic with any questions or concerns at [phone].    Sincerely,     Jimmy Cross MD  Non-invasive Cardiology  [facility]

## 2021-05-30 ENCOUNTER — RECORDS - HEALTHEAST (OUTPATIENT)
Dept: ADMINISTRATIVE | Facility: CLINIC | Age: 76
End: 2021-05-30

## 2021-05-31 ENCOUNTER — RECORDS - HEALTHEAST (OUTPATIENT)
Dept: ADMINISTRATIVE | Facility: CLINIC | Age: 76
End: 2021-05-31

## 2021-05-31 VITALS — WEIGHT: 194.8 LBS | BODY MASS INDEX: 27.17 KG/M2

## 2021-05-31 VITALS — WEIGHT: 195 LBS | BODY MASS INDEX: 27.2 KG/M2

## 2021-05-31 VITALS — HEIGHT: 71 IN | BODY MASS INDEX: 27.1 KG/M2 | WEIGHT: 193.6 LBS

## 2021-05-31 VITALS — WEIGHT: 195.4 LBS | HEIGHT: 71 IN | BODY MASS INDEX: 27.35 KG/M2

## 2021-05-31 NOTE — TELEPHONE ENCOUNTER
Refill Request  Did you contact pharmacy: No  Medication name:   Requested Prescriptions     Pending Prescriptions Disp Refills     simvastatin (ZOCOR) 40 MG tablet  0     Sig: Take 1 tablet (40 mg total) by mouth at bedtime.     Who prescribed the medication: Provider Historical  Pharmacy Name and Location: [facility]   Is patient out of medication: Yes  Patient notified refills processed in 72 hours:  yes  Okay to leave a detailed message: no  Patient is requesting for 90 days supply.

## 2021-05-31 NOTE — TELEPHONE ENCOUNTER
RN cannot approve Refill Request    RN can NOT refill this medication historical medication requested.       Simran Bowden, [facility] Triage/Med Refill 8/22/2019    Requested Prescriptions   Pending Prescriptions Disp Refills     simvastatin (ZOCOR) 40 MG tablet  0     Sig: Take 1 tablet (40 mg total) by mouth at bedtime.       Statins Refill Protocol (Hmg CoA Reductase Inhibitors) Passed - 8/21/2019  3:42 PM        Passed - PCP or prescribing provider visit in past 12 months      Last office visit with prescriber/PCP: 5/14/2019 Trevni Tan MD OR same dept: 5/14/2019 Trevin Tan MD OR same specialty: 5/14/2019 Trevin Tan MD  Last physical: 4/30/2019 Last MTM visit: Visit date not found   Next visit within 3 mo: Visit date not found  Next physical within 3 mo: Visit date not found  Prescriber OR PCP: Trevin Tan MD  Last diagnosis associated with med order: There are no diagnoses linked to this encounter.  If protocol passes may refill for 12 months if within 3 months of last provider visit (or a total of 15 months).

## 2021-06-01 ENCOUNTER — RECORDS - HEALTHEAST (OUTPATIENT)
Dept: ADMINISTRATIVE | Facility: CLINIC | Age: 76
End: 2021-06-01

## 2021-06-01 VITALS — BODY MASS INDEX: 26.26 KG/M2 | WEIGHT: 188.3 LBS

## 2021-06-01 VITALS — WEIGHT: 189.5 LBS | BODY MASS INDEX: 26.43 KG/M2

## 2021-06-01 VITALS — WEIGHT: 188.9 LBS | BODY MASS INDEX: 26.35 KG/M2

## 2021-06-02 ENCOUNTER — COMMUNICATION - HEALTHEAST (OUTPATIENT)
Dept: PEDIATRICS | Facility: CLINIC | Age: 76
End: 2021-06-02

## 2021-06-02 ENCOUNTER — RECORDS - HEALTHEAST (OUTPATIENT)
Dept: ADMINISTRATIVE | Facility: CLINIC | Age: 76
End: 2021-06-02

## 2021-06-02 VITALS — WEIGHT: 194.31 LBS | BODY MASS INDEX: 27.51 KG/M2

## 2021-06-02 VITALS — WEIGHT: 192.19 LBS | BODY MASS INDEX: 27.21 KG/M2

## 2021-06-02 VITALS — HEIGHT: 70 IN | BODY MASS INDEX: 26.92 KG/M2 | WEIGHT: 188 LBS

## 2021-06-02 VITALS — WEIGHT: 194 LBS | BODY MASS INDEX: 27.46 KG/M2

## 2021-06-02 DIAGNOSIS — R31.0 GROSS HEMATURIA: ICD-10-CM

## 2021-06-03 ENCOUNTER — AMBULATORY - HEALTHEAST (OUTPATIENT)
Dept: LAB | Facility: CLINIC | Age: 76
End: 2021-06-03

## 2021-06-03 VITALS — BODY MASS INDEX: 26.74 KG/M2 | WEIGHT: 191 LBS | HEIGHT: 71 IN

## 2021-06-03 VITALS — BODY MASS INDEX: 26.8 KG/M2 | WEIGHT: 189.31 LBS

## 2021-06-03 VITALS — BODY MASS INDEX: 26.78 KG/M2 | WEIGHT: 189.19 LBS

## 2021-06-03 VITALS — BODY MASS INDEX: 26.74 KG/M2 | HEIGHT: 71 IN | WEIGHT: 191 LBS

## 2021-06-03 DIAGNOSIS — R31.0 GROSS HEMATURIA: ICD-10-CM

## 2021-06-03 LAB
ALBUMIN UR-MCNC: ABNORMAL G/DL
APPEARANCE UR: CLEAR
BILIRUB UR QL STRIP: NEGATIVE
COLOR UR AUTO: YELLOW
GLUCOSE UR STRIP-MCNC: NEGATIVE MG/DL
HGB UR QL STRIP: ABNORMAL
KETONES UR STRIP-MCNC: NEGATIVE MG/DL
LEUKOCYTE ESTERASE UR QL STRIP: ABNORMAL
NITRATE UR QL: NEGATIVE
PH UR STRIP: 7 [PH] (ref 5–8)
RBC #/AREA URNS AUTO: ABNORMAL HPF
SP GR UR STRIP: 1.02 (ref 1–1.03)
SQUAMOUS #/AREA URNS AUTO: ABNORMAL LPF
UROBILINOGEN UR STRIP-ACNC: ABNORMAL
WBC #/AREA URNS AUTO: ABNORMAL HPF

## 2021-06-03 NOTE — PATIENT INSTRUCTIONS - HE
Below is a list of instructions we discussed today in clinic:   1. Continue your medications as prescribed and without changes today.  2. Continue regular exercise. The more the better  3. If you develop any exertional chest discomfort or change in your tolerance to exercise, give me a call.  4. Follow up in 1 year or sooner if needed.       It was a pleasure to meet with you today in clinic.  Please do not hesitate to call the [facility] clinic with any questions or concerns at [phone].    Sincerely,     Jimmy Cross MD  Non-invasive Cardiology  [facility]

## 2021-06-04 ENCOUNTER — COMMUNICATION - HEALTHEAST (OUTPATIENT)
Dept: ADMINISTRATIVE | Facility: CLINIC | Age: 76
End: 2021-06-04

## 2021-06-04 VITALS
DIASTOLIC BLOOD PRESSURE: 72 MMHG | WEIGHT: 188.44 LBS | BODY MASS INDEX: 26.66 KG/M2 | OXYGEN SATURATION: 98 % | HEART RATE: 70 BPM | SYSTOLIC BLOOD PRESSURE: 124 MMHG

## 2021-06-04 VITALS
RESPIRATION RATE: 16 BRPM | DIASTOLIC BLOOD PRESSURE: 60 MMHG | SYSTOLIC BLOOD PRESSURE: 134 MMHG | HEART RATE: 72 BPM | WEIGHT: 188 LBS | BODY MASS INDEX: 26.32 KG/M2 | HEIGHT: 71 IN

## 2021-06-04 LAB — BACTERIA SPEC CULT: NO GROWTH

## 2021-06-05 VITALS
HEART RATE: 67 BPM | DIASTOLIC BLOOD PRESSURE: 58 MMHG | SYSTOLIC BLOOD PRESSURE: 130 MMHG | OXYGEN SATURATION: 100 % | TEMPERATURE: 97.9 F | HEIGHT: 69 IN | BODY MASS INDEX: 26.66 KG/M2 | WEIGHT: 180 LBS

## 2021-06-05 VITALS
DIASTOLIC BLOOD PRESSURE: 60 MMHG | HEART RATE: 72 BPM | HEIGHT: 69 IN | BODY MASS INDEX: 27.36 KG/M2 | WEIGHT: 184.7 LBS | SYSTOLIC BLOOD PRESSURE: 138 MMHG

## 2021-06-05 VITALS
HEIGHT: 71 IN | WEIGHT: 187 LBS | HEART RATE: 64 BPM | SYSTOLIC BLOOD PRESSURE: 136 MMHG | BODY MASS INDEX: 26.18 KG/M2 | DIASTOLIC BLOOD PRESSURE: 56 MMHG | RESPIRATION RATE: 16 BRPM

## 2021-06-05 VITALS
BODY MASS INDEX: 26.74 KG/M2 | DIASTOLIC BLOOD PRESSURE: 60 MMHG | SYSTOLIC BLOOD PRESSURE: 138 MMHG | WEIGHT: 189 LBS | HEART RATE: 72 BPM
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
## 2021-06-09 NOTE — TELEPHONE ENCOUNTER
PC to patient in follow-up. Patient has returned from dialysis. Patient was outside in the yard, and came in to take the call. No shortness of breath or wheezing upon answering the phone.  Pt denies feeling any skipped beats or fluttering in his chest today.  Has felt some fluttering at times during the week. Pt states that he feels his usual tiredness after HD, nothing more than usual. Denies chest pain or tightness. Pt denies LE edema. Denies any shortness of breath or breathing changes. Denies dizziness or lightheadedness.Pt does agree that if the nurse hadn't told him that his heart beat was skipping, he wouldn't have know. No new medications.   Discussion with patient to monitor. Monitor for any dizziness, lightheadedness or feeling faint. Call with any further concerns to cardiology. Pt verbalized understanding. Informed patient will forward to [facility]. Last OV 11/2019, with follow-up in 1 year. Return call will be made with any follow-up recommendations. Dr. Cross any recommendations? OSBALDO,RN

## 2021-06-09 NOTE — TELEPHONE ENCOUNTER
PC to HD nurse. Dialysis nurse reports that when she listened/auscultated his heart tones noticed that his heartbeat was skipping consistently every 6th beat. Pt denies feeling it, or being aware of it. He is not on a tele monitor/heart monitor @ HD. Oxygen/Crit level while on HD run 99.2%. HR and B/P stable. B/P during the run today, is a little higher since he is a little worried with her informing the patient of this finding. Pt has 1 hour left on the HD run. Denies any symptoms or issues. HD runs every M/W/F have been uneventful. Pt informed the nurse that he has been feeling fine, no issues. Informed nurse to reassure the patient. Will call after 3:00 today when he is home post-HD run.  Nurse appreciative of the return call. Will inform the patient, and make aware or return call. OSBALDORn

## 2021-06-09 NOTE — TELEPHONE ENCOUNTER
Notes reviewed. Skipped beats every sixth beat, likely PACs or PVCs from electrolyte shifts during HD. Pt apparently asymptomatic. No new recommendations. Agree with monitoring for new symptoms.  DASH

## 2021-06-09 NOTE — TELEPHONE ENCOUNTER
Spoke with both patient and wife via phone and review of discussion from provider. Both verbalized understanding and will return call to the clinic if new symptoms or concerns. OSBALDO,Rn

## 2021-06-09 NOTE — TELEPHONE ENCOUNTER
----- Message from Lillian Anaya sent at 6/26/2020 12:33 PM CDT -----  Regarding: DASH pt  Caller: Kirsten (spouse)    Primary cardiologist: Dr. Cross    Detailed reason for call: Kirsten states García is at kidney dialysis right now and the nurse there called her, she is concerned about missed / skipping heart beats when monitoring patient. You can call back Kirsten or call dialysis RN Cat at  nbr [phone] for further info.    Best phone number: [phone]     Best time to contact: Today    Ok to leave a detailed message? Yes    Device? No

## 2021-06-11 NOTE — PROGRESS NOTES
Assessment/Plan:        1. Postoperative anemia  HM2(CBC w/o Differential)    Comprehensive Metabolic Panel    Lipase    Amylase   2. S/P knee replacement  HM2(CBC w/o Differential)    Comprehensive Metabolic Panel    Lipase    Amylase   3. Nausea  HM2(CBC w/o Differential)    Comprehensive Metabolic Panel    Lipase    Amylase   4. ESRD (end stage renal disease)       1.  Multifactorial nausea and decreased appetite, secondary to recent surgery and end-stage renal disease.  Patient denies any other associated symptoms.  He did have significant blood loss during the surgery and I will repeat his blood work.  I will recheck liver and pancreas tests.  he will take ranitidine once a day only because of the renal disease, he will use Zofran more frequently to see if it can help with nausea.  he will discuss everything with dialysis center tomorrow to see if anything is related to dialysis treatment.  He will stay away from narcotics which can make him nauseous and try to manage the pain with Tylenol.  He will have a small frequent meals and able by some high-calorie drinks as Ensure.  He will check with the dialysis center if these high-calorie drinks are okay to be taken during dialysis treatment.  He will see his primary care provider next week for the close follow-up.    This note has been dictated using voice recognition software. Any grammatical or context distortions are unintentional and inherent to the software.      Return in about 1 week (around 7/13/2017) for Recheck.    There are no Patient Instructions on file for this visit.        Subjective:    García Kitchen is a 72 y.o. male  here for    Chief Complaint   Patient presents with     Nausea     hemoglobin check, post knee surgery. having trouble eating. dialysis pt     72-year-old male, patient of Dr. Tan, with a history of type 2 diabetes diet-controlled, hypertension and end-stage renal disease on dialysis, presenting today with symptoms of nausea and  decreased appetite since he had left knee replacement 1 week ago and acute blood loss anemia secondary to the surgery.  He is still very weak but denies any fever chills Reiger chest pain shortness of breath.  He is rating the pain in his knee as one when sitting and just slightly worse when he is walking and moving but not to the point that he took oxycodone in the last few days.  He mostly takes Tylenol as needed.  She did try to take Zofran few times for nausea but not on a regular basis.  He reports some gagging and belching with the small food intake but no vomiting, hematemesis, blood in the stool.  He denies any abdominal pain or any other associated symptoms except nausea and decreased appetite.    Social History     Social History     Marital status:      Spouse name: N/A     Number of children: N/A     Years of education: N/A     Occupational History     Not on file.     Social History Main Topics     Smoking status: Former Smoker     Smokeless tobacco: Never Used     Alcohol use No     Drug use: No     Sexual activity: Not on file     Other Topics Concern     Not on file     Social History Narrative       Family History   Problem Relation Age of Onset     Cancer Mother      Adenocarcinoma Of The Large Intestine      Cancer Father      Adenocarcinoma Of The Large Intestine      Review of Systems:     A 12 point comprehensive review of systems was negative except as noted in HPI.            Objective:    Physical Exam   /50  Pulse 70  Wt 194 lb 12.8 oz (88.4 kg)  BMI 27.17 kg/m2    Constitutional: oriented to person, place, and time, appears well-nourished. No distress.   HENT:   Head: Normocephalic.   Mouth/Throat: Oropharynx is clear and moist.   Neck: Neck supple.   Cardiovascular: Normal rate, regular rhythm and normal heart sounds.    Pulmonary/Chest: Effort normal and breath sounds normal.  Abdominal: Soft. No tenderness   Musculoskeletal:Left knee swollen, but not red, surgical wound  closed, no drainage, healing well.  Neurological: alert and oriented to person, place, and time.  Psychiatric:  normal mood and affect.    Patient Active Problem List   Diagnosis     Inguinal Hernia     Osteoarthritis Of The Hip     Nontoxic Solitary Thyroid Nodule     Hypokalemia     Microalbuminuria     Hyperparathyroidism, secondary renal     Skin Neoplasm Of Uncertain Behavior     Thrombocytopenia     Acute Gout     Normochromic, Normocytic Anemia     Allergic Rhinitis     Nephrolithiasis     Spleen Enlargement     Squamous Cell Carcinoma In Situ     Essential hypertension with goal blood pressure less than 140/90     Chronic Kidney Disease, Stage 4     End stage renal failure on dialysis     Diabetes mellitus type 2 in nonobese     Renal cell carcinoma     A-V fistula     Vitamin D deficiency     Nasal septal deviation     Inguinal hernia without mention of obstruction or gangrene, recurrent unilateral or unspecified     S/P total knee replacement using cement, left     Arthritis of knee     Neurocardiogenic syncope     Acute blood loss as cause of postoperative anemia       Current Outpatient Prescriptions on File Prior to Visit   Medication Sig Dispense Refill     acetaminophen (TYLENOL) 500 MG tablet Take 2 tablets (1,000 mg total) by mouth 3 (three) times a day for 10 days. 30 tablet 0     aspirin 325 MG EC tablet Take 1 tablet (325 mg total) by mouth 2 (two) times a day. 80 tablet 0     calcium acetate (PHOSLO) 667 mg capsule Take 2,001-2,668 mg by mouth 3 (three) times a day with meals. 3 to 4 tabs po tid with meals and snacks       carvedilol (COREG) 25 MG tablet Take 1 tablet by mouth 2 times a day at 6:00 am and 4:00 pm.       docusate sodium (COLACE) 100 MG capsule Take 1 capsule (100 mg total) by mouth 2 (two) times a day as needed for constipation. 20 capsule 1     EPOETIN JESUS (PROCRIT INJ) Inject as directed. With dialysis       lidocaine-prilocaine (EMLA) cream Apply 1 application topically as  needed. Use at dialysis 3x per week       oxyCODONE (ROXICODONE) 5 MG immediate release tablet Take 1-2 tablets (5-10 mg total) by mouth every 4 (four) hours as needed for pain. 80 tablet 0     simvastatin (ZOCOR) 40 MG tablet Take 40 mg by mouth bedtime.       No current facility-administered medications on file prior to visit.                Silvio Marcus  7/6/2017

## 2021-06-11 NOTE — ANESTHESIA PROCEDURE NOTES
Peripheral Block    Patient location during procedure: pre-op  Start time: 6/28/2017 9:41 AM  End time: 6/28/2017 9:45 AM  post-op analgesia per surgeon order as noted in medical record  Staffing:  Performing  Anesthesiologist: FIDEL JIN  Preanesthetic Checklist  Completed: patient identified, site marked, risks, benefits, and alternatives discussed, timeout performed, consent obtained, airway assessed, oxygen available, suction available, emergency drugs available and hand hygiene performed  Peripheral Block  Block type: saphenous, adductor canal block  Prep: ChloraPrep and x2, sterile gloves, hat and mask  Patient position: supine  Patient monitoring: cardiac monitor, continuous pulse oximetry, heart rate and blood pressure  Laterality: left  Injection technique: ultrasound guided    Ultrasound used to visualize needle placement in proximity to nerve being blocked: yes   Permanent ultrasound image captured for medical record    Needle  Needle type: short-bevel   Needle gauge: 20G  Needle length: 6 in  no peripheral nerve catheter placed  Assessment  Injection assessment: no difficulty with injection, negative aspiration for heme, no paresthesia on injection and incremental injection

## 2021-06-11 NOTE — ANESTHESIA CARE TRANSFER NOTE
Last vitals:   Vitals:    06/28/17 1258   BP: 138/63   Pulse: 78   Resp: 14   Temp: 36.4  C (97.6  F)   SpO2: 99%     Patient's level of consciousness is awake  Spontaneous respirations: yes  Maintains airway independently: yes  Dentition unchanged: yes  Oropharynx: oropharynx clear of all foreign objects    QCDR Measures:  ASA# 20 - Surgical Safety Checklist: ASA20A - Safety Checks Done  PQRS# 430 - Adult PONV Prevention: 4558F - Pt received => 2 anti-emetic agents (different classes) preop & intraop  ASA# 8 - Peds PONV Prevention: NA - Not pediatric patient, not GA or 2 or more risk factors NOT present  PQRS# 424 - Marisela-op Temp Management: 4559F - At least one body temp DOCUMENTED => 35.5C or 95.9F within required timeframe  PQRS# 426 - PACU Transfer Protocol: - Transfer of care checklist used  ASA# 14 - Acute Post-op Pain: ASA14B - Patient did NOT experience pain >= 7 out of 10.Report given.  Vital signs stable.  Denies pain/discomfort, a/o x 3.  Care transferred to RN
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
## 2021-06-11 NOTE — ANESTHESIA PROCEDURE NOTES
Peripheral Block    Patient location during procedure: pre-op  Start time: 6/28/2017 9:36 AM  End time: 6/28/2017 9:40 AM  post-op analgesia per surgeon order as noted in medical record  Staffing:  Performing  Anesthesiologist: FIDEL JIN  Preanesthetic Checklist  Completed: patient identified, site marked, risks, benefits, and alternatives discussed, timeout performed, consent obtained, airway assessed, oxygen available, suction available, emergency drugs available and hand hygiene performed  Peripheral Block  Block type: sciatic, anterior  Prep: ChloraPrep and x2, sterile gloves, hat and mask  Patient position: supine  Patient monitoring: cardiac monitor, continuous pulse oximetry, heart rate and blood pressure  Laterality: left  Injection technique: ultrasound guided    Ultrasound used to visualize needle placement in proximity to nerve being blocked: yes   Permanent ultrasound image captured for medical record    Needle  Needle type: short-bevel   Needle gauge: 20G  Needle length: 6 in  no peripheral nerve catheter placed  Assessment  Injection assessment: no difficulty with injection, negative aspiration for heme, no paresthesia on injection and incremental injection

## 2021-06-11 NOTE — ANESTHESIA PREPROCEDURE EVALUATION
Anesthesia Evaluation      Patient summary reviewed   No history of anesthetic complications     Airway   Mallampati: II  Neck ROM: full   Pulmonary - negative ROS and normal exam                          Cardiovascular - normal exam  (+) hypertension well controlled, ,     ECG reviewed  Rhythm: regular  Rate: normal,         Neuro/Psych - negative ROS     Endo/Other    (+) diabetes mellitus type 2 well controlled,      GI/Hepatic/Renal    (+)   chronic renal disease ESRD and dialysis, last dialysis date: 6/27/2017,           Dental - normal exam                        Anesthesia Plan  Planned anesthetic: spinal and peripheral nerve block    ASA 3     Anesthetic plan and risks discussed with: patient    Post-op plan: routine recovery

## 2021-06-11 NOTE — TELEPHONE ENCOUNTER
Refill Approved    Rx renewed per Medication Renewal Policy. Medication was last renewed on 8/22/19, last OV 5/19/20.    Yasmine Byrd, [facility] Triage/Med Refill 8/30/2020     Requested Prescriptions   Pending Prescriptions Disp Refills     simvastatin (ZOCOR) 40 MG tablet [Pharmacy Med Name: SIMVASTATIN TABS 40MG] 90 tablet 3     Sig: TAKE 1 TABLET AT BEDTIME       Statins Refill Protocol (Hmg CoA Reductase Inhibitors) Passed - 8/26/2020 11:10 AM        Passed - PCP or prescribing provider visit in past 12 months      Last office visit with prescriber/PCP: Visit date not found OR same dept: Visit date not found OR same specialty: 5/19/2020 Nataly Montemayor, Hamzah Morocho MD  Last physical: Visit date not found Last MTM visit: Visit date not found   Next visit within 3 mo: Visit date not found  Next physical within 3 mo: Visit date not found  Prescriber OR PCP: Viktor Carvajal MD  Last diagnosis associated with med order: 1. Diet-controlled type 2 diabetes mellitus (H)  - simvastatin (ZOCOR) 40 MG tablet [Pharmacy Med Name: SIMVASTATIN TABS 40MG]; TAKE 1 TABLET AT BEDTIME  Dispense: 90 tablet; Refill: 3    If protocol passes may refill for 12 months if within 3 months of last provider visit (or a total of 15 months).

## 2021-06-11 NOTE — ANESTHESIA PROCEDURE NOTES
Spinal Block    Patient location during procedure: OR  Start time: 6/28/2017 11:10 AM  End time: 6/28/2017 11:14 AM  Reason for block: primary anesthetic    Staffing:  Performing  Anesthesiologist: FIDEL JIN    Preanesthetic Checklist  Completed: patient identified, risks, benefits, and alternatives discussed, timeout performed, consent obtained, airway assessed, oxygen available, suction available, emergency drugs available and hand hygiene performed  Spinal Block  Patient position: sitting  Prep: ChloraPrep and x2, sterile gloves and drape, hat and mask  Patient monitoring: blood pressure, continuous pulse ox, cardiac monitor and heart rate  Approach: midline  Location: L4-5  Injection technique: single-shot  Needle type: pencil-tip   Needle gauge: 24 G

## 2021-06-11 NOTE — PROGRESS NOTES
Chief Complaint   Patient presents with     possible strep     2x days. Hurt when eating.        HPI    Patient is here for 2 days of mild sore throat, worsened with swallowing. No fever, cough, nasal symptoms, known sick contacts.     ROS: Pertinent ROS noted in HPI.     Allergies   Allergen Reactions     Codeine Nausea And Vomiting     Lisinopril Cough       Patient Active Problem List   Diagnosis     Inguinal Hernia     Osteoarthritis Of The Hip     Nontoxic Solitary Thyroid Nodule     Hypokalemia     Microalbuminuria     Hyperparathyroidism, secondary renal     Skin Neoplasm Of Uncertain Behavior     Thrombocytopenia     Acute Gout     Normochromic, Normocytic Anemia     Allergic Rhinitis     Nephrolithiasis     Spleen Enlargement     Squamous Cell Carcinoma In Situ     Essential Hypertension     Chronic Kidney Disease, Stage 4     ESRD (end stage renal disease) on dialysis     Diabetes mellitus, type II     Renal cell carcinoma     A-V fistula     Vitamin D deficiency     Nasal septal deviation     Inguinal hernia without mention of obstruction or gangrene, recurrent unilateral or unspecified       Family History   Problem Relation Age of Onset     Cancer Mother      Adenocarcinoma Of The Large Intestine      Cancer Father      Adenocarcinoma Of The Large Intestine        Social History     Social History     Marital status:      Spouse name: N/A     Number of children: N/A     Years of education: N/A     Occupational History     Not on file.     Social History Main Topics     Smoking status: Former Smoker     Smokeless tobacco: Never Used     Alcohol use No     Drug use: No     Sexual activity: Not on file     Other Topics Concern     Not on file     Social History Narrative         Objective:    Vitals:    06/16/17 1750   BP: 134/60   Pulse: 87   Resp: 20   Temp: 98.7  F (37.1  C)   SpO2: 98%       Gen:NAD  Throat:mild tonsillar erythema without edema nor exudates.  Ears: Normal TMs and canals  Neck:  No adenopathy   CV: RRR, no M, R, G  Pulm: CTAB    Recent Results (from the past 24 hour(s))   Rapid Strep A Screen-Throat   Result Value Ref Range    Rapid Strep A Antigen No Group A Strep detected, presumptive negative No Group A Strep detected, presumptive negative         Throat pain  -     Rapid Strep A Screen-Throat  -     Group A Strep, RNA Direct Detection, Throat      Supportive cares and f/u as directed.

## 2021-06-11 NOTE — ANESTHESIA POSTPROCEDURE EVALUATION
Patient: García Kitchen  LEFT TOTAL KNEE ARTHROPLASTY  Anesthesia type: spinal    Patient location: PACU  Last vitals:   Vitals:    06/28/17 1258   BP: 138/63   Pulse: 78   Resp: 14   Temp: 36.4  C (97.6  F)   SpO2: 99%     Post vital signs: stable  Level of consciousness: awake and responds to simple questions  Post-anesthesia pain: pain controlled  Post-anesthesia nausea and vomiting: no  Pulmonary: unassisted, return to baseline  Cardiovascular: stable and blood pressure at baseline  Hydration: adequate  Anesthetic events: no    QCDR Measures:  ASA# 11 - Marisela-op Cardiac Arrest: ASA11B - Patient did NOT experience unanticipated cardiac arrest  ASA# 12 - Marisela-op Mortality Rate: ASA12B - Patient did NOT die  ASA# 13 - PACU Re-Intubation Rate: ASA13B - Patient did NOT require a new airway mgmt  ASA# 10 - Composite Anes Safety: ASA10A - No serious adverse event  ASA# 38 - New Corneal Injury: ASA38A - No new exposure keratitis or corneal abrasion in PACU    Additional Notes:

## 2021-06-12 NOTE — PROGRESS NOTES
Pt admitted per pedis for a blood transfusion after his knee replacement. C/o sob extreme fatigue, weakness and loss of apetite. Is receiving epo at dialysis but states no relief or improvement in sx. IV started without difficulty and blood transfusing without reaction thus far. Pt received full unit of blood and pts color improved and already feeling a little more energetic Instructions given and pt then dcd home with wife.
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
## 2021-06-15 NOTE — TELEPHONE ENCOUNTER
Patient's wife Kirsten requesting a referral to Podiatry.  Kirsten states this was discussed at office visit on 1/19/2021     Ok to leave detailed message.      Myla Mena

## 2021-06-16 PROBLEM — H35.30 MACULAR DEGENERATION (SENILE) OF RETINA: Status: ACTIVE | Noted: 2021-01-19

## 2021-06-16 PROBLEM — Z96.652 S/P TOTAL KNEE REPLACEMENT USING CEMENT, LEFT: Status: ACTIVE | Noted: 2017-06-28

## 2021-06-16 NOTE — PROGRESS NOTES
Assessment/Plan:        Diagnoses and all orders for this visit:    Sinusitis  -     amoxicillin (AMOXIL) 875 MG tablet; Take 1 tablet (875 mg total) by mouth 2 (two) times a day for 10 days.  Dispense: 20 tablet; Refill: 0  -     benzonatate (TESSALON) 200 MG capsule; Take 1 capsule (200 mg total) by mouth 3 (three) times a day as needed for cough.  Dispense: 15 capsule; Refill: 0    Other orders  -     sevelamer carbonate (RENVELA) 800 mg tablet;   -     cinacalcet (SENSIPAR) 30 MG tablet; Take 30 mg by mouth daily.    Will treat him with antibiotics at this time.  Also given a prescription for benzonatate for cough.  I did explain to him and his wife that the possible cause of the cough is sinus drainage and infection.  And he does have postnasal drip.  He will also use some symptom control, and will let us know if there is any other concerns.    Subjective:    Patient ID: García Kitchen is a 72 y.o. male.    Cough   This is a new (Started with sore throat and congestion to the sinuses.Has lingered on .) problem. The current episode started 1 to 4 weeks ago. The problem occurs constantly (cough is said to be worse at night.as well as worsening  wheezing.). The problem has been gradually worsening. Associated symptoms include chills, congestion and coughing. Pertinent negatives include no abdominal pain, anorexia, chest pain, fatigue, fever, headaches, numbness or vomiting. The symptoms are aggravated by coughing and exertion. He has tried acetaminophen for the symptoms. The treatment provided mild relief.       The following portions of the patient's history were reviewed and updated as appropriate: allergies, current medications, past family history, past medical history, past social history, past surgical history and problem list.    Review of Systems   Constitutional: Positive for chills. Negative for activity change, appetite change, fatigue and fever.   HENT: Positive for congestion, postnasal drip,  rhinorrhea and voice change. Negative for ear pain, sinus pain and sinus pressure.    Eyes: Negative.    Respiratory: Positive for cough and wheezing. Negative for choking and chest tightness.    Cardiovascular: Negative for chest pain.   Gastrointestinal: Negative for abdominal pain, anorexia and vomiting.   Neurological: Negative for numbness and headaches.     Vitals:    02/26/18 1642   BP: 138/60   Patient Site: Left Arm   Patient Position: Sitting   Cuff Size: Adult Regular   Pulse: 80   Temp: 98.5  F (36.9  C)   TempSrc: Oral   SpO2: 98%   Weight: 188 lb 4.8 oz (85.4 kg)             Objective:    Physical Exam   Constitutional: He is oriented to person, place, and time. He appears well-developed and well-nourished.   HENT:   Right Ear: Tympanic membrane is not erythematous. A middle ear effusion is present.   Left Ear: Tympanic membrane normal. Tympanic membrane is not erythematous.  No middle ear effusion.   Nose: Rhinorrhea present. Right sinus exhibits no maxillary sinus tenderness and no frontal sinus tenderness. Left sinus exhibits no maxillary sinus tenderness and no frontal sinus tenderness.   Mouth/Throat: No posterior oropharyngeal edema or posterior oropharyngeal erythema.   Post nasal drip.   Eyes: Pupils are equal, round, and reactive to light.   Neck: Normal range of motion. Neck supple.   Cardiovascular: Normal rate and regular rhythm.    Pulmonary/Chest: Effort normal and breath sounds normal.   Neurological: He is alert and oriented to person, place, and time.   Psychiatric: He has a normal mood and affect.
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
## 2021-06-16 NOTE — TELEPHONE ENCOUNTER
Sravani from [facility] lab is calling.    Critical lab value:  Creatinine of 6.11. Last creatinine on file was 04/30/2019 and was 5.12.  Seen today for Physical. End stage renal failure on dialysis. Renal cell carcinoma. Type 2 diabetic.    6:41pm-Page to on call physician, Dr Amaral.  6:43pm-Call back from Dr Amaral. He is aware. Will forward to Dr Tan. No follow up needed as he is on dialysis and had treatment yesterday.      Reason for Disposition    Lab or radiology calling with CRITICAL test results    Additional Information    Negative: Lab calling with strep throat test results and triager can call in prescription    Negative: Lab calling with urinalysis test results and triager can call in prescription    Negative: Medication questions    Negative: ED call to PCP    Negative: Physician call to PCP    Negative: Call about patient who is currently hospitalized    Protocols used: PCP CALL - NO TRIAGE-A-    Sharon Miranda RN   [facility] Nurse Advisor  04/20/21 at 6:35 PM

## 2021-06-16 NOTE — TELEPHONE ENCOUNTER
"4-21-21  Wife called back ( payam gave me verbal permission) I relayed:  \"inform patient that his labs showed normal level of total cholesterol and LDL cholesterol.  There is high creatinine which appears not to be concerning because he does his dialysis.  The PTH level is slightly improved compared to a long time ago that he had it done.  The thyroid function was normal.  His PSA was also normal and his A1c level normal as well. \"    Wife would  Like the results mailed to pt to see the actual numbers:  [id number] [facility] [id number]  [email or web address]   "
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
## 2021-06-16 NOTE — TELEPHONE ENCOUNTER
----- Message from Trevin Tan MD sent at 4/20/2021  7:54 PM CDT -----  Please inform patient that his labs showed normal level of total cholesterol and LDL cholesterol.  There is high creatinine which appears not to be concerning because he does his dialysis.  The PTH level is slightly improved compared to a long time ago that he had it done.  The thyroid function was normal.  His PSA was also normal and his A1c level normal as well.  Please let me know if there is any questions.
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
## 2021-06-18 NOTE — PROGRESS NOTES
Assessment/Plan:        Diagnoses and all orders for this visit:    Cough  -     XR Chest 2 Views    Other orders  -     azithromycin (ZITHROMAX Z-GONZALEZ) 250 MG tablet; Take 2 tablets (500 mg) on  Day 1,  followed by 1 tablet (250 mg) once daily on Days 2 through 5.  Dispense: 6 tablet; Refill: 0        Chest x-ray was done.  Initial read by me was not showing any major concerning changes.  Will send that to radiology for final read.  Will initiate him on Z-Gonzalez.  Discussed current medication use.  Also consider him on Zantac 150 mg daily for 2 weeks.  Recheck with PCP if symptoms are persistent despite treatment.  They were agreeable with the plans.  Subjective:    Patient ID: García Kitchen is a 72 y.o. male.    HPI    García is here with his wife.  He has been dealing with cough for around 3-4 months.  Productive of clear to yellowish secretions.  More noticeable at night when he lays down.  Denies any fever, hemoptysis.  No shortness of breath.  No recent unexplained weight loss.  Tried loratadine, Flonase.  He was also treated with 2 courses of amoxicillin.  He noted benefit with the last treatment and was good for a few days.  Has CKD and does hemodialysis 3 days a week.  Denies any leg swellings.  Blood pressures have been good while he has been doing his dialysis.    He has occasional spicy foods.  Half cup of coffee in the morning.  Ice tea, diet soda.  Occasional tomato based products.  No alcohol.    Review of Systems  As above otherwise negative.          Objective:    Physical Exam  /60 (Patient Site: Right Arm, Patient Position: Sitting, Cuff Size: Adult Regular)  Pulse 75  Wt 189 lb 8 oz (86 kg)  SpO2 97%  BMI 26.43 kg/m2    Vital signs noted above. AAO ×3.  HEENT no nasal discharge, moist oral mucosa.Ears:TMs intact, no fluid collection. Neck: Supple neck, nonpalpable cervical lymph nodes.  Lungs: Clear to auscultation bilateral.  Heart: S1-S2 regular rate and rhythm, no murmurs were noted.   Abdomen: Flabby, soft with bowel sounds and nontender.  Extremities: No edema, pulses were full and equal.

## 2021-06-18 NOTE — PATIENT INSTRUCTIONS - HE
Patient Instructions by Trevin Tan MD at 4/20/2021 10:40 AM     Author: Trevin Tan MD Service: -- Author Type: Physician    Filed: 4/20/2021 11:10 AM Encounter Date: 4/20/2021 Status: Signed    : Trevin Tan MD (Physician)         Patient Education     Your Health Risk Assessment indicates you feel you are not in good physical health.    A healthy lifestyle helps keep the body fit and the mind alert. It helps protect you from disease, helps you fight disease, and helps prevent chronic disease (disease that doesn't go away) from getting worse. This is important as you get older and begin to notice twinges in muscles and joints and a decline in the strength and stamina you once took for granted. A healthy lifestyle includes good healthcare, good nutrition, weight control, recreation, and regular exercise. Avoid harmful substances and do what you can to keep safe. Another part of a healthy lifestyle is stay mentally active and socially involved.    Good healthcare     Have a wellness visit every year.     If you have new symptoms, let us know right away. Don't wait until the next checkup.     Take medicines exactly as prescribed and keep your medicines in a safe place. Tell us if your medicine causes problems.   Healthy diet and weight control     Eat 3 or 4 small, nutritious, low-fat, high-fiber meals a day. Include a variety of fruits, vegetables, and whole-grain foods.     Make sure you get enough calcium in your diet. Calcium, vitamin D, and exercise help prevent osteoporosis (bone thinning).     If you live alone, try eating with others when you can. That way you get a good meal and have company while you eat it.     Try to keep a healthy weight. If you eat more calories than your body uses for energy, it will be stored as fat and you will gain weight.     Recreation   Recreation is not limited to sports and team events. It includes any activity that  provides relaxation, interest, enjoyment, and exercise. Recreation provides an outlet for physical, mental, and social energy. It can give a sense of worth and achievement. It can help you stay healthy.       Patient Education     Exercise for a Healthier Heart  You may wonder how you can improve the health of your heart. If youre thinking about exercise, youre on the right track. You dont need to become an athlete, but you do need a certain amount of brisk exercise to help strengthen your heart. If you have been diagnosed with a heart condition, your doctor may recommend exercise to help stabilize your condition. To help make exercise a habit, choose safe, fun activities.       Be sure to check with your health care provider before starting an exercise program.    Why exercise?  Exercising regularly offers many healthy rewards. It can help you do all of the following:    Improve your blood cholesterol levels to help prevent further heart trouble    Lower your blood pressure to help prevent a stroke or heart attack    Control diabetes, or reduce your risk of getting this disease    Improve your heart and lung function    Reach and maintain a healthy weight    Make your muscles stronger and more limber so you can stay active    Prevent falls and fractures by slowing the loss of bone mass (osteoporosis)    Manage stress better  Exercise tips  Ease into your routine. Set small goals. Then build on them.  Exercise on most days. Aim for a total of 150 or more minutes of moderate to  vigorous intensity activity each week. Consider 40 minutes, 3 to 4 times a week. For best results, activity should last for 40 minutes on average. It is OK to work up to the 40 minute period over time. Examples of moderate-intensity activity is walking one mile in 15 minutes or 30 to 45 minutes of yard work.  Step up your daily activity level. Along with your exercise program, try being more active throughout the day. Walk instead of drive.  Do more household tasks or yard work.  Choose one or more activities you enjoy. Walking is one of the easiest things you can do. You can also try swimming, riding a bike, or taking an exercise class.  Stop exercising and call your doctor if you:    Have chest pain or feel dizzy or lightheaded    Feel burning, tightness, pressure, or heaviness in your chest, neck, shoulders, back, or arms    Have unusual shortness of breath    Have increased joint or muscle pain    Have palpitations or an irregular heartbeat      3569-3632 [employer]. [address] [place] [address]. All rights reserved. This information is not intended as a substitute for professional medical care. Always follow your healthcare professional's instructions.         Patient Education   Understanding [employer] MyPlate  The USDA (US Department of Agriculture) has guidelines to help you make healthy food choices. These are called MyPlate. MyPlate shows the food groups that make up healthy meals using the image of a place setting. Before you eat, think about the healthiest choices for what to put onto your plate or into your cup or bowl. To learn more about building a healthy plate, visit www.choosemyplate.gov.       The Food Groups    Fruits: Any fruit or 100% fruit juice counts as part of the Fruit Group. Fruits may be fresh, canned, frozen, or dried, and may be whole, cut-up, or pureed. Make half your plate fruits and vegetables.    Vegetables: Any vegetable or 100% vegetable juice counts as a member of the Vegetable Group. Vegetables may be fresh, frozen, canned, or dried. They can be served raw or cooked and may be whole, cut-up, or mashed. Make half your plate fruits and vegetables.     Grains: All foods made from grains are part of the Grains Group. These include wheat, rice, oats, cornmeal, and barley such as bread, pasta, oatmeal, cereal, tortillas, and grits. Grains should be no more than a quarter of your plate. At least  half of your grains should be whole grains.    Protein: This group includes meat, poultry, seafood, beans and peas, eggs, processed soy products (like tofu), nuts (including nut butters), and seeds. Make protein choices no more than a quarter of your plate. Meat and poultry choices should be lean or low fat.    Dairy: All fluid milk products and foods made from milk that contain calcium, like yogurt and cheese are part of the Dairy Group. (Foods that have little calcium, such as cream, butter, and cream cheese, are not part of the group.) Most dairy choices should be low-fat or fat-free.    Oils: These are fats that are liquid at room temperature. They include canola, corn, olive, soybean, and sunflower oil. Foods that are mainly oil include mayonnaise, certain salad dressings, and soft margarines. You should have only 5 to 7 teaspoons of oils a day. You probably already get this much from the food you eat.  Use [employer] to Help Build Your Meals  The SuperTracker can help you plan and track your meals and activity. You can look up individual foods to see or compare their nutritional value. You can get guidelines for what and how much you should eat. You can compare your food choices. And you can assess personal physical activities and see ways you can improve. Go to www.[employer].gov/supertracker/.    8423-9167 [employer]. [address] [place] [address], [place] [address]. All rights reserved. This information is not intended as a substitute for professional medical care. Always follow your healthcare professional's instructions.           Patient Education   Instrumental Activities of Daily Living  Your Health Risk Assessment indicates you have difficulties with instrumental activities of daily living which include laundry, housekeeping, banking, shopping, using the telephone, food preparation, transportation, or taking your own medications. Please make a follow up appointment for us to address this  issue in more detail.    [facility] has resources available on the following website: [email or web address]     Also, here is a local agency that provides help with meals and other assistance:   [facility] Line: 586.181.7953     Patient Education   Signs of Hearing Loss  Hearing loss is a problem shared by many people. In fact, it is one of the most common health conditions, particularly as people age. Most people over age 65 have some hearing loss, and by age 80, almost everyone does. Because hearing loss usually occurs slowly over the years, you may not realize your hearing ability has gotten worse.       Have your hearing checked  Contact your [facility] care provider if you:    Have to strain to hear normal conversation.    Have to watch other peoples faces very carefully to follow what theyre saying.    Need to ask people to repeat what theyve said.    Often misunderstand what people are saying.    Turn the volume of the television or radio up so high that others complain.    Feel that people are mumbling when theyre talking to you.    Find that the effort to hear leaves you feeling tired and irritated.    Notice, when using the phone, that you hear better with 1 ear than the other.    6862-4115 [employer]. [address] [place] [address], [place] [address]. All rights reserved. This information is not intended as a substitute for professional medical care. Always follow your healthcare professional's instructions.         Patient Education   Understanding Advance Care Planning  Advance care planning is the process of deciding ones own future medical care. It helps ensure that if you cant speak for yourself, your wishes can still be carried out. The plan is a series of legal documents that note a persons wishes. The documents vary by state. Advance care planning may be done when a person has a serious illness that is expected to get worse. It may be done before major surgery. And it  can help you and your family be prepared in case of a major illness or injury. Advance care planning helps with making decisions at these times.       A health care proxy is a person who acts as the voice of a patient when the patient cant speak for himself or herself. The name of this role varies by state. It may be called a Durable Medical Power of  or Durable Power of  for Healthcare. It may be called an agent, surrogate, or advocate. Or it may be called a representative or decision maker. It is an official duty that is identified by a legal document. The document also varies by state.    Why Is Advance Care Planning Important?  If a person communicates their healthcare wishes:    They will be given medical care that matches their values and goals.    Their family members will not be forced to make decisions in a crisis with no guidance.  Creating a Plan  Making an advance care plan is often done in 3 steps:    Thinking about ones wishes. To create an advance care plan, you should think about what kind of medical treatment you would want if you lose the ability to communicate. Are there any situations in which you would refuse or stop treatment? Are there therapies you would want or not want? And whom do you want to make decisions for you? There are many places to learn more about how to plan for your care. Ask your doctor or  for resources.    Picking a health care proxy. This means choosing a trusted person to speak for you only when you cant speak for yourself. When you cannot make medical decisions, your proxy makes sure the instructions in your advance care plan are followed. A proxy does not make decisions based on his or her own opinions. They must put aside those opinions and values if needed, and carry out your wishes.    Filling out the legal documents. There are several kinds of legal documents for advance care planning. Each one tells health care providers your wishes. The  documents may vary by state. They must be signed and may need to be witnessed or notarized. You can cancel or change them whenever you wish. Depending on your state, the documents may include a Healthcare Proxy form, Living Will, Durable Medical Power of , Advance Directive, or others.  The Familys Role  The best help a family can give is to support their loved ones wishes. Open and honest communication is vital. Family should express any concerns they have about the patients choices while the patient can still make decisions.    1230-4418 [employer]. [address] [place] [address]. All rights reserved. This information is not intended as a substitute for professional medical care. Always follow your healthcare professional's instructions.         Also, [facility] [employer] [place] offers a free, downloadable health care directive that allows you to share your treatment choices and personal preferences if you cannot communicate your wishes. It also allows you to appoint another person (called a health care agent) to make health care decisions if you are unable to do so. You can download an advance directive by going here: [email or web address]     Patient Education   Personalized Prevention Plan  You are due for the preventive services outlined below.  Your care team is available to assist you in scheduling these services.  If you have already completed any of these items, please share that information with your care team to update in your medical record.  Health Maintenance Due   Topic Date Due   ? HEPATITIS C SCREENING  Never done   ? DIABETIC FOOT EXAM  Never done   ? COVID-19 Vaccine (1) Never done   ? HYPERTENSION FOLLOW-UP  Never done   ? ZOSTER VACCINES (2 of 3) 08/24/2007   ? MICROALBUMIN  06/26/2013   ? A1C  10/30/2019   ? BMP  04/30/2020   ? LIPID  04/30/2020   ? INFLUENZA VACCINE RULE BASED (1) 08/01/2020

## 2021-06-18 NOTE — PATIENT INSTRUCTIONS - HE
Patient Instructions by Jimmy Cross MD at 1/28/2021 11:30 AM     Author: Jimmy Cross MD Service: -- Author Type: Physician    Filed: 1/28/2021 11:53 AM Encounter Date: 1/28/2021 Status: Signed    : Jimmy Cross MD (Physician)       Below is a list of instructions we discussed today in clinic:   1. Continue your medications as prescribed and without changes today.  2. Continue regular exercise. The more the better  3. If you develop any exertional chest discomfort or change in your tolerance to exercise, give me a call.  4. Follow up in 1 year or sooner if needed.       It was a pleasure to meet with you today in clinic.  Please do not hesitate to call the [facility] clinic with any questions or concerns at [phone].    Sincerely,

## 2021-06-18 NOTE — PROGRESS NOTES
Assessment/Plan:        Diagnoses and all orders for this visit:    Cough  -     amoxicillin (AMOXIL) 875 MG tablet; Take 1 tablet (875 mg total) by mouth 2 (two) times a day for 10 days.  Dispense: 20 tablet; Refill: 0    Allergic rhinitis   This appears to be a mixture of having sinus infection which is viral at this point as well as having allergic rhinitis.  We discussed management of this at this point and he is going to get over-the-counter Flonase as well as allergy medications.  He will use that consistently and if by 4 - 5 days there is no improvement he can then go ahead and feel the antibiotics to take it.  I encouraged him to call and let us know if there is no improvement.    Subjective:    Patient ID: García Kitchen is a 72 y.o. male.    Cough   This is a new (Incidious onset of cough that is productive going on for the past 2 weeks or more.Yellowish/green phlegm.,some gurgling /wheezing on laying down .) problem. The current episode started 1 to 4 weeks ago. The problem occurs constantly. Associated symptoms include chills, congestion and coughing. Pertinent negatives include no abdominal pain, anorexia, chest pain, fatigue, fever, headaches, nausea, sore throat or vomiting. The symptoms are aggravated by coughing. Treatments tried: Treated for Sinusitis about 2 months ago with antibiotic.Felt better but thinks he may no have gotten fully well. Has history of allergy.Not taking anything at this time.        The following portions of the patient's history were reviewed and updated as appropriate: allergies, current medications, past family history, past medical history, past social history, past surgical history and problem list.    Review of Systems   Constitutional: Positive for chills. Negative for fatigue and fever.   HENT: Positive for congestion and postnasal drip. Negative for rhinorrhea, sinus pain and sore throat.    Respiratory: Positive for cough. Negative for chest tightness.     Cardiovascular: Negative for chest pain.   Gastrointestinal: Negative for abdominal pain, anorexia, nausea and vomiting.   Neurological: Negative for headaches.     Vitals:    05/11/18 1602   BP: 130/52   Patient Site: Right Arm   Patient Position: Sitting   Cuff Size: Adult Large   Pulse: 76   Temp: 98.9  F (37.2  C)   TempSrc: Oral   SpO2: 98%   Weight: 188 lb 14.4 oz (85.7 kg)             Objective:    Physical Exam   Constitutional: He appears well-developed and well-nourished.   HENT:   Right Ear: A middle ear effusion is present.   Left Ear: Tympanic membrane normal.  No middle ear effusion.   Nose: Right sinus exhibits no maxillary sinus tenderness and no frontal sinus tenderness. Left sinus exhibits no maxillary sinus tenderness and no frontal sinus tenderness.   Mouth/Throat: No oropharyngeal exudate, posterior oropharyngeal edema or posterior oropharyngeal erythema.   Neck: Normal range of motion.   Cardiovascular: Normal rate and regular rhythm.    Pulmonary/Chest: Effort normal and breath sounds normal.   Neurological: He is alert.

## 2021-06-19 NOTE — LETTER
Letter by Trevin Tan MD at      Author: Trevin Tan MD Service: -- Author Type: --    Filed:  Encounter Date: 5/6/2019 Status: (Other)         García Kitchen  [address] [place]  [facility] [address]             May 6, 2019         Dear Mr. Kitchen,    Below are the results from your recent visit:    Resulted Orders   CT Chest Without Contrast    Narrative    EXAM: CT CHEST WO CONTRAST  LOCATION: [facility]  DATE/TIME: 5/2/2019 3:47 PM    INDICATION: Follow-up pulmonary nodule.  COMPARISON: 10/3/2014.  TECHNIQUE: Helical images were obtained through the chest. Multiplanar reformats were obtained. Dose reduction techniques were used.  CONTRAST: None.    FINDINGS:   LUNGS AND PLEURA: Stable tiny nodules along the anterior right lower lobe (series 2, image 59). No other nodularity. Minimal scarring. Negative pleural spaces.    MEDIASTINUM: No adenopathy. Pulmonary trunk enlargement measuring 3.7 cm. Nonaneurysmal aorta. Mild cardiomegaly. Severe LAD predominant coronary calcifications.    LIMITED UPPER ABDOMEN: Stable tiny hepatic hypodensity. Right nephrectomy. 13 mm hyperdensity in the left renal upper pole, previously 16 mm (series 2, image 32); this may represent a hyperdense cyst. Stable left adrenal thickening. Fluid and debris   distended stomach.    MUSCULOSKELETAL: Hypertrophic degenerative changes spine.      Impression    CONCLUSION:     1.  Stable small pulmonary nodules.    2.  Pulmonary trunk enlargement; correlate for possibility pulmonary hypertension.     3.  Severe coronary calcifications.    4.  Other findings in the report.               Please inform the patient that the ultrasound of the thyroid showed that did not do that he had on the left thyroid in the past is no longer seen.But he does have some cysts that is incidental finding meaning that are not causing any trouble but was seen because we did the ultrasound this will also watch at this time.  Also  the pulmonary CT scan showed that the lung nodule is stable and otherwise he has not changed from previous.  But it also shows that there is coronary(heart blood vessel) calcifications.  This could be leading to the chest pains that he have been having, I do think that seeing the cardiologist is necessary.  The DEXA bone scan did show that there is osteoporosis which may cause bone breakage.  I think that coming into the clinic to discuss all of these will not be a better idea if you want to do that.  Let me know what you think.      Please call with questions or contact us using [employer].    Sincerely,        Electronically signed by Trevin Tan MD

## 2021-06-19 NOTE — LETTER
Letter by Trevin Tan MD at      Author: Trevin Tan MD Service: -- Author Type: --    Filed:  Encounter Date: 5/6/2019 Status: (Other)         García Kitchen  [address] [place]  [facility] [address]             May 6, 2019         Dear Mr. Kitchen,    Below are the results from your recent visit:    Resulted Orders   US Thyroid    Narrative    EXAM: US THYROID  LOCATION: [facility]  DATE/TIME: 5/2/2019 3:45 PM    INDICATION: Thyroid nodule.  COMPARISON: 2/16/2016.  TECHNIQUE: Routine.    FINDINGS:    RIGHT thyroid lobe measures 3.1 x 1.7 x 1.1 cm. Stable 3 mm incidental right thyroid cystic nodule.      LEFT thyroid lobe measures 2.5 x 1.5 x 0.8 cm. Prior dominant left lower thyroid nodule not definitively seen. A couple incidental appearing 2 to 3 mm left thyroid low echogenicity nodules or cysts cysts are noted.     Isthmus measures 2 mm. No additional findings.    No cervical lymphadenopathy.      Impression    CONCLUSION:    1.  Prior dominant left lower thyroid nodule not definitively seen.     2.  A couple incidental appearing 2 to 3 mm thyroid cysts are noted.             Please inform the patient that the ultrasound of the thyroid showed that did not do that he had on the left thyroid in the past is no longer seen.But he does have some cysts that is incidental finding meaning that are not causing any trouble but was seen because we did the ultrasound this will also watch at this time.  Also the pulmonary CT scan showed that the lung nodule is stable and otherwise he has not changed from previous.  But it also shows that there is coronary(heart blood vessel) calcifications.  This could be leading to the chest pains that he have been having, I do think that seeing the cardiologist is necessary.  The DEXA bone scan did show that there is osteoporosis which may cause bone breakage.  I think that coming into the clinic to discuss all of these will not be a better idea if  you want to do that.  Let me know what you think.    Please call with questions or contact us using [employer].    Sincerely,        Electronically signed by Trevin Tan MD

## 2021-06-19 NOTE — LETTER
Letter by Jimmy Cross MD at      Author: Jimmy Cross MD Service: -- Author Type: --    Filed:  Encounter Date: 8/27/2019 Status: (Other)         García Kitchen  [address] [facility] [address]      August 27, 2019      Dear García,    This letter is to remind you that you will be due for your follow up appointment with Dr. Jimmy Cross  . To help ensure you are in the best health possible, a regular follow-up with your cardiologist is essential.     Please call our Patient Scheduling Line at [phone] to schedule your appointment at your earliest convenience.  If you have recently scheduled an appointment, please disregard this letter.    We look forward to seeing you again. As always, we are available at the number  above for any questions or concerns you may have.      Sincerely,     The Physicians and Staff of [facility]
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
## 2021-06-24 NOTE — PROGRESS NOTES
Assessment/Plan:        Diagnoses and all orders for this visit:    Cough  -     XR Chest 2 Views  -     azithromycin (ZITHROMAX) 500 MG tablet; Take 1 tablet (500 mg total) by mouth daily for 5 days.  Dispense: 5 tablet; Refill: 0  -     benzonatate (TESSALON PERLES) 100 MG capsule; Take 1 capsule (100 mg total) by mouth every 6 (six) hours as needed for cough.  Dispense: 30 capsule; Refill: 0  Mild improvement in reduced breath sounds on both sides with DuoNeb.  Because of the greater than right him get a chest x-ray.  To my read have a chest x-ray the not show any changes from the previous one that he has had.  We will await radiologist read.  In the meantime we will have him started on medication as noted above.  Wheezing  -     ipratropium-albuterol 0.5-2.5 mg/3 mL nebulizer solution 3 mL (DUO-NEB)  -     albuterol (PROAIR HFA;PROVENTIL HFA;VENTOLIN HFA) 90 mcg/actuation inhaler; Inhale 2 puffs every 6 (six) hours as needed for wheezing.  Dispense: 1 each; Refill: 0  2 I did not objectively hear any wheezing at this time but he did note wheezing when laying down.  I did give him prescription for albuterol which he can use consistently 4 times a day for the next 5 days and stop.  I have encouraged him to follow-up with me within the next 1month for reevaluation of the cough.        Subjective:    Patient ID: García Kitchen is a 73 y.o. male.    Cough has been going on for about 4 weeks now.  Noted no fevers or chills.  Noted a worsening cough especially in the evening.  There is phlegm production of whitish phlegm.  He has had intermittent cough about a year ago and has had to be seen on multiple occasions for cough.  He does not have any shortness of breath, noted some wheezing especially on laying down at night.  He is a patient with end-stage renal disease who is on dialysis.  Cough is been noted even on his dialysis run.      Cough   This is a new problem. The current episode started 1 to 4 weeks ago (3  weeks). The problem occurs constantly. The problem has been gradually worsening. Associated symptoms include coughing. Pertinent negatives include no abdominal pain, chest pain, chills, congestion, fever, headaches, nausea or sore throat. The symptoms are aggravated by coughing. He has tried nothing for the symptoms.       The following portions of the patient's history were reviewed and updated as appropriate: allergies, current medications, past family history, past medical history, past social history, past surgical history and problem list.    Review of Systems   Constitutional: Negative for chills and fever.   HENT: Positive for postnasal drip and rhinorrhea. Negative for congestion, ear pain, sinus pressure, sinus pain and sore throat.    Respiratory: Positive for cough and wheezing. Negative for chest tightness.    Cardiovascular: Negative for chest pain.   Gastrointestinal: Negative for abdominal pain and nausea.   Neurological: Negative for headaches.   Hematological: Negative for adenopathy.     Vitals:    03/12/19 1051   BP: 148/68   Pulse: 71   Temp: 97.7  F (36.5  C)   TempSrc: Oral   SpO2: 98%   Weight: 194 lb 5 oz (88.1 kg)             Objective:    Physical Exam   Constitutional: He appears well-developed and well-nourished. No distress.   He is afebrile to touch and not pale.  In no obvious distress.   HENT:   Right Ear: Tympanic membrane normal.   Left Ear: Tympanic membrane normal.   Nose: Right sinus exhibits no maxillary sinus tenderness and no frontal sinus tenderness. Left sinus exhibits no maxillary sinus tenderness and no frontal sinus tenderness.   Mouth/Throat: Posterior oropharyngeal edema present. No oropharyngeal exudate or posterior oropharyngeal erythema.   Neck: Neck supple.   Cardiovascular: Normal rate and regular rhythm.   Pulmonary/Chest: Effort normal. He has decreased breath sounds. He has no wheezes. He has no rhonchi.   Mildly reduced breath sounds noted on the lower lung  zones, was improved with DuoNeb.   Lymphadenopathy:     He has no cervical adenopathy.   Neurological: He is alert.

## 2021-06-25 NOTE — TELEPHONE ENCOUNTER
Reason for Call:  Request for results:    Name of test or procedure: Urinalysis    Date of test of procedure: 6/4/2021    Location of the test or procedure: [facility]    OK to leave the result message on voice mail or with a family member? Yes    Phone number Patient can be reached at:   Cell number on file:    Telephone Information:   Mobile [phone]       Call taken on 6/4/2021 at 4:01 PM by Myla Mena

## 2021-06-25 NOTE — TELEPHONE ENCOUNTER
Patient's wife returned call and was given providers message.  She would like to know if there was any blood in his urine.  She states that the blood in his urine was their main concern and wonders if there is anything they need to do about that if there is.      Ok to leave detailed message.      Myla Mena

## 2021-06-25 NOTE — TELEPHONE ENCOUNTER
Triage call.  Wife calling.  CTC on file.     Patient just voided and it appears if there appears to be blood in his urine. No clots.  Does not void a lot (dialysis patient).  Able to see blood in the toilet but the water in the toilet is not completely red.  No pain.      Per protocol, to see P[facility] within 24 hours[facility] so [facility] service at 1220. Answering service will page Dr. Pearce.      Received phone call from Dr. Pearce. Per Dr. Pearce: Patient should stay hydrated and monitor for any more blood, fever or chills or symptoms of a UTI. To call back or be seen at urgent care if any symptoms noted.    Phone call to patient. Spoke to patient's wife and informed her of provider's recommendation. Wife verbalized understanding and agrees to plan.  Wife stated that patient has dialysis on Monday, advised to inform dialysis staff of the hematuria at the appointment.     Avril Arreola RN 05/29/21 2:34 PM  [facility] Nurse Advisor    Reason for Disposition    Blood in urine  (Exception: could be normal menstrual bleeding)    Additional Information    Negative: Shock suspected (e.g., cold/pale/clammy skin, too weak to stand, low BP, rapid pulse)    Negative: Sounds like a life-threatening emergency to the triager    Negative: Recent back or abdominal injury    Negative: Recent genital injury    Negative: [1] Unable to urinate (or only a few drops) > 4 hours AND [2] bladder feels very full (e.g., palpable bladder or strong urge to urinate)    Negative: Passing pure blood or large blood clots (i.e., size > a dime) (Exception: yolanda or small strands)    Negative: Fever > 100.4 F (38.0 C)    Negative: Patient sounds very sick or weak to the triager    Negative: Known sickle cell disease    Negative: Taking Coumadin (warfarin) or other strong blood thinner, or known bleeding disorder (e.g., thrombocytopenia)    Negative: Side (flank) or back pain present    Negative: Pain or burning  with passing urine    Negative: [1] Pink or red-colored urine and likely from food (beets, rhubarb, red food dye) AND [2] lasts > 24 hours after stopping food    Protocols used: URINE - BLOOD IN-A-AH    COVID 19 Nurse Triage Plan/Patient Instructions    Please be aware that novel coronavirus (COVID-19) may be circulating in the community. If you develop symptoms such as fever, cough, or SOB or if you have concerns about the presence of another infection including coronavirus (COVID-19), please contact your health care provider or visit  [email or web address].    Disposition/Instructions    Home care recommended. Follow home care protocol based instructions.    Thank you for taking steps to prevent the spread of this virus.  o Limit your contact with others.  o Wear a simple mask to cover your cough.  o Wash your hands well and often.    Resources    [facility]: About COVID-19: [email or web address]    CDC: What to Do If You're Sick: www.cdc.gov/coronavirus/2019-ncov/about/steps-when-sick.html    CDC: Ending Home Isolation: www.cdc.gov/coronavirus/2019-ncov/hcp/disposition-in-home-patients.html     CDC: Caring for Someone: www.cdc.gov/coronavirus/2019-ncov/if-you-are-sick/care-for-someone.html     [facility]: Interim Guidance for Hospital Discharge to Home: www.health.[facility].[place]/diseases/coronavirus/hcp/hospdischarge.pdf    [facility] clinical trials (COVID-19 research studies): clinicalaffairs.[facility]-clinical-trials     Below are the COVID-19 hotlines at the [place] Department of Health ([facility]). Interpreters are available.   o For health questions: Call 806-668-9019 or 1-551.664.7882 (7 a.m. to 7 p.m.)  o For questions about schools and childcare: Call 297-350-0100 or 1-223.539.2478 (7 a.m. to 7 p.m.)

## 2021-06-25 NOTE — TELEPHONE ENCOUNTER
Please inform the patient that the urinalysis appeared to have some evidence of infection to the urine/bladder but the culture did not grow any bacteria.  So technically he does not really have any infection but if he is having symptoms like increased urination of some pain and difficulty, I will encourage him to let us know so that we can consider treating it with antibiotics.

## 2021-06-25 NOTE — TELEPHONE ENCOUNTER
If the wife is able to come down to the clinic to get a bottle for the sample that is fine.  But if she is unable to come into the clinic to  a bottle and I will encourage him to be seen for evaluation.  Please let me know if she is able to make it to the clinic so that I can put in an order for the container.

## 2021-06-25 NOTE — TELEPHONE ENCOUNTER
Patient wife calling and stated that García had blood in urine all day on Saturday. Subsided after Saturday and hasn't noticed any blood since. Patient doesn't have any pain or discomfort when going to the bathroom. Wife mentioned patient is on dialysis and doesn't void often. If patient needs to be seen she is wondering if they would get a container to try to collect a sample before hand. Please advise.    Julienne CORTÉS CMA ([facility])
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
## 2021-08-06 NOTE — PATIENT INSTRUCTIONS - HE
Patient Instructions by Trevin Tan MD at 4/30/2019  8:40 AM     Author: Trevin Tan MD Service: -- Author Type: Physician    Filed: 4/30/2019  9:11 AM Encounter Date: 4/30/2019 Status: Signed    : Trevin Tan MD (Physician)         Patient Education     Your Health Risk Assessment indicates you feel you are not in good physical health.    A healthy lifestyle helps keep the body fit and the mind alert. It helps protect you from disease, helps you fight disease, and helps prevent chronic disease (disease that doesn't go away) from getting worse. This is important as you get older and begin to notice twinges in muscles and joints and a decline in the strength and stamina you once took for granted. A healthy lifestyle includes good healthcare, good nutrition, weight control, recreation, and regular exercise. Avoid harmful substances and do what you can to keep safe. Another part of a healthy lifestyle is stay mentally active and socially involved.    Good healthcare     Have a wellness visit every year.     If you have new symptoms, let us know right away. Don't wait until the next checkup.     Take medicines exactly as prescribed and keep your medicines in a safe place. Tell us if your medicine causes problems.   Healthy diet and weight control     Eat 3 or 4 small, nutritious, low-fat, high-fiber meals a day. Include a variety of fruits, vegetables, and whole-grain foods.     Make sure you get enough calcium in your diet. Calcium, vitamin D, and exercise help prevent osteoporosis (bone thinning).     If you live alone, try eating with others when you can. That way you get a good meal and have company while you eat it.     Try to keep a healthy weight. If you eat more calories than your body uses for energy, it will be stored as fat and you will gain weight.     Recreation   Recreation is not limited to sports and team events. It includes any activity that  provides relaxation, interest, enjoyment, and exercise. Recreation provides an outlet for physical, mental, and social energy. It can give a sense of worth and achievement. It can help you stay healthy.       Patient Education   Understanding USDA MyPlate  The USDA (US Department of Agriculture) has guidelines to help you make healthy food choices. These are called MyPlate. MyPlate shows the food groups that make up healthy meals using the image of a place setting. Before you eat, think about the healthiest choices for what to put onto your plate or into your cup or bowl. To learn more about building a healthy plate, visit www.choosemyplate.gov.       The Food Groups    Fruits: Any fruit or 100% fruit juice counts as part of the Fruit Group. Fruits may be fresh, canned, frozen, or dried, and may be whole, cut-up, or pureed. Make half your plate fruits and vegetables.    Vegetables: Any vegetable or 100% vegetable juice counts as a member of the Vegetable Group. Vegetables may be fresh, frozen, canned, or dried. They can be served raw or cooked and may be whole, cut-up, or mashed. Make half your plate fruits and vegetables.     Grains: All foods made from grains are part of the Grains Group. These include wheat, rice, oats, cornmeal, and barley such as bread, pasta, oatmeal, cereal, tortillas, and grits. Grains should be no more than a quarter of your plate. At least half of your grains should be whole grains.    Protein: This group includes meat, poultry, seafood, beans and peas, eggs, processed soy products (like tofu), nuts (including nut butters), and seeds. Make protein choices no more than a quarter of your plate. Meat and poultry choices should be lean or low fat.    Dairy: All fluid milk products and foods made from milk that contain calcium, like yogurt and cheese are part of the Dairy Group. (Foods that have little calcium, such as cream, butter, and cream cheese, are not part of the group.) Most dairy  choices should be low-fat or fat-free.    Oils: These are fats that are liquid at room temperature. They include canola, corn, olive, soybean, and sunflower oil. Foods that are mainly oil include mayonnaise, certain salad dressings, and soft margarines. You should have only 5 to 7 teaspoons of oils a day. You probably already get this much from the food you eat.  Use U[employer] to Help Build Your Meals  The SuperTracker can help you plan and track your meals and activity. You can look up individual foods to see or compare their nutritional value. You can get guidelines for what and how much you should eat. You can compare your food choices. And you can assess personal physical activities and see ways you can improve. Go to www.12Return.gov/GreenCage Securitycker/.    4214-1687 The [employer]. [address] [place] [address]. All rights reserved. This information is not intended as a substitute for professional medical care. Always follow your healthcare professional's instructions.           Patient Education   Instrumental Activities of Daily Living  Your Health Risk Assessment indicates you have difficulties with instrumental activities of daily living which include laundry, housekeeping, banking, shopping, using the telephone, food preparation, transportation, or taking your own medications. Please make a follow up appointment for us to address this issue in more detail.    [place] has resources available on the following website: [email or web address]     Also, here is a local agency that provides help with meals and other assistance:   [facility] Line: [phone]     Patient Education   Signs of Hearing Loss  Hearing loss is a problem shared by many people. In fact, it is one of the most common health conditions, particularly as people age. Most people over age 65 have some hearing loss, and by age 80, almost everyone does. Because hearing loss usually occurs  slowly over the years, you may not realize your hearing ability has gotten worse.       Have your hearing checked  Contact your [facility] care provider if you:    Have to strain to hear normal conversation.    Have to watch other peoples faces very carefully to follow what theyre saying.    Need to ask people to repeat what theyve said.    Often misunderstand what people are saying.    Turn the volume of the television or radio up so high that others complain.    Feel that people are mumbling when theyre talking to you.    Find that the effort to hear leaves you feeling tired and irritated.    Notice, when using the phone, that you hear better with 1 ear than the other.    9090-5244 The [employer]. [address] [place] [address], [place] [address]. All rights reserved. This information is not intended as a substitute for professional medical care. Always follow your healthcare professional's instructions.         Patient Education   Your Health Risk Assessment indicates you feel you are not in good emotional health.    Recreation   Recreation is not limited to sports and team events. It includes any activity that provides relaxation, interest, enjoyment, and exercise. Recreation provides an outlet for physical, mental, and social energy. It can give a sense of worth and achievement. It can help you stay healthy.    Mental Exercise and Social Involvement  Mental and emotional health is as important as physical health. Keep in touch with friends and family. Stay as active as possible. Continue to learn and challenge yourself.   Things you can do to stay mentally active are:    Learn something new, like a foreign language or musical instrument.     Play SCRABBLE or do crossword puzzles. If you cannot find people to play these games with you at home, you can play them with others on your computer through the Internet.     Join a games club--anything from card games to chess or checkers or lawn bowling.     Start a new  hobby.     Go back to school.     Volunteer.     Read.     Keep up with world events.       Patient Education   Understanding Advance Care Planning  Advance care planning is the process of deciding ones own future medical care. It helps ensure that if you cant speak for yourself, your wishes can still be carried out. The plan is a series of legal documents that note a persons wishes. The documents vary by state. Advance care planning may be done when a person has a serious illness that is expected to get worse. It may be done before major surgery. And it can help you and your family be prepared in case of a major illness or injury. Advance care planning helps with making decisions at these times.       A health care proxy is a person who acts as the voice of a patient when the patient cant speak for himself or herself. The name of this role varies by state. It may be called a Durable Medical Power of  or Durable Power of  for Healthcare. It may be called an agent, surrogate, or advocate. Or it may be called a representative or decision maker. It is an official duty that is identified by a legal document. The document also varies by state.    Why Is Advance Care Planning Important?  If a person communicates their healthcare wishes:    They will be given medical care that matches their values and goals.    Their family members will not be forced to make decisions in a crisis with no guidance.  Creating a Plan  Making an advance care plan is often done in 3 steps:    Thinking about ones wishes. To create an advance care plan, you should think about what kind of medical treatment you would want if you lose the ability to communicate. Are there any situations in which you would refuse or stop treatment? Are there therapies you would want or not want? And whom do you want to make decisions for you? There are many places to learn more about how to plan for your care. Ask your doctor or  for  resources.    Picking a health care proxy. This means choosing a trusted person to speak for you only when you cant speak for yourself. When you cannot make medical decisions, your proxy makes sure the instructions in your advance care plan are followed. A proxy does not make decisions based on his or her own opinions. They must put aside those opinions and values if needed, and carry out your wishes.    Filling out the legal documents. There are several kinds of legal documents for advance care planning. Each one tells health care providers your wishes. The documents may vary by state. They must be signed and may need to be witnessed or notarized. You can cancel or change them whenever you wish. Depending on your state, the documents may include a Healthcare Proxy form, Living Will, Durable Medical Power of , Advance Directive, or others.  The Familys Role  The best help a family can give is to support their loved ones wishes. Open and honest communication is vital. Family should express any concerns they have about the patients choices while the patient can still make decisions.    7564-9220 The [employer]. [address] [place] [address]. All rights reserved. This information is not intended as a substitute for professional medical care. Always follow your healthcare professional's instructions.         Also, [facility] offers a free, downloadable health care directive that allows you to share your treatment choices and personal preferences if you cannot communicate your wishes. It also allows you to appoint another person (called a health care agent) to make health care decisions if you are unable to do so. You can download an advance directive by going here: [email or web address]     Patient Education   Personalized Prevention Plan  You are due for the preventive services outlined below.  Your care team is available to assist you in  scheduling these services.  If you have already completed any of these items, please share that information with your care team to update in your medical record.  Health Maintenance   Topic Date Due   ? DIABETES FOOT EXAM  06/19/1955   ? HYPERTENSION FOLLOW-UP  06/19/1965   ? ADVANCE DIRECTIVES DISCUSSED WITH PATIENT  06/19/1965   ? ZOSTER VACCINES (2 of 3) 08/24/2007   ? DIABETES URINE MICROALBUMIN  06/26/2013   ? DIABETES OPHTHALMOLOGY EXAM  12/19/2015   ? DIABETES FOLLOW-UP  12/23/2015   ? DIABETES HEMOGLOBIN A1C  12/06/2017   ? INFLUENZA VACCINE RULE BASED (1) 08/01/2018   ? FALL RISK ASSESSMENT  02/26/2019   ? COLONOSCOPY  06/30/2019   ? TD 18+ HE  03/15/2028   ? PNEUMOCOCCAL POLYSACCHARIDE VACCINE AGE 65 AND OVER  Completed   ? PNEUMOCOCCAL CONJUGATE VACCINE FOR ADULTS (PCV13 OR PREVNAR)  Completed

## 2021-08-31 DIAGNOSIS — E11.9 DIET-CONTROLLED TYPE 2 DIABETES MELLITUS (H): ICD-10-CM

## 2021-09-01 RX ORDER — SIMVASTATIN 40 MG
40 TABLET ORAL AT BEDTIME
Qty: 90 TABLET | Refills: 2 | Status: SHIPPED | OUTPATIENT
Start: 2021-09-01 | End: 2022-05-03

## 2021-09-01 NOTE — TELEPHONE ENCOUNTER
"Last Written Prescription Date:  8/30/20  Last Fill Quantity: 90,  # refills: 3   Last office visit provider:  4/20/21     Requested Prescriptions   Pending Prescriptions Disp Refills     simvastatin (ZOCOR) 40 MG tablet [Pharmacy Med Name: SIMVASTATIN TABS 40MG] 90 tablet 3     Sig: TAKE 1 TABLET AT BEDTIME       Statins Protocol Passed - 8/31/2021  5:31 PM        Passed - LDL on file in past 12 months     Recent Labs   Lab Test 04/20/21  1153   LDL 25             Passed - No abnormal creatine kinase in past 12 months     No lab results found.             Passed - Recent (12 mo) or future (30 days) visit within the authorizing provider's specialty     Patient has had an office visit with the authorizing provider or a provider within the authorizing providers department within the previous 12 mos or has a future within next 30 days. See \"Patient Info\" tab in inbasket, or \"Choose Columns\" in Meds & Orders section of the refill encounter.              Passed - Medication is active on med list        Passed - Patient is age 18 or older             Kirill Law RN 09/01/21 12:58 PM  "

## 2021-09-02 ENCOUNTER — TRANSFERRED RECORDS (OUTPATIENT)
Dept: HEALTH INFORMATION MANAGEMENT | Facility: CLINIC | Age: 76
End: 2021-09-02

## 2021-12-07 ENCOUNTER — OFFICE VISIT (OUTPATIENT)
Dept: PODIATRY | Facility: CLINIC | Age: 76
End: 2021-12-07
Payer: COMMERCIAL

## 2021-12-07 VITALS — HEART RATE: 76 BPM | HEIGHT: 69 IN | BODY MASS INDEX: 27.55 KG/M2 | OXYGEN SATURATION: 99 % | WEIGHT: 186 LBS

## 2021-12-07 DIAGNOSIS — L60.2 ONYCHAUXIS: ICD-10-CM

## 2021-12-07 DIAGNOSIS — B35.1 NAIL FUNGUS: Primary | ICD-10-CM

## 2021-12-07 PROCEDURE — 11721 DEBRIDE NAIL 6 OR MORE: CPT | Mod: Q8 | Performed by: PODIATRIST

## 2021-12-07 PROCEDURE — 99212 OFFICE O/P EST SF 10 MIN: CPT | Mod: 25 | Performed by: PODIATRIST

## 2021-12-07 ASSESSMENT — MIFFLIN-ST. JEOR: SCORE: 1574.07

## 2021-12-07 ASSESSMENT — PAIN SCALES - GENERAL: PAINLEVEL: MILD PAIN (2)
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
## 2022-02-09 NOTE — PATIENT INSTRUCTIONS
You may return after 9 weeks.  Please call closer to that time to schedule.    Call to schedule an appointment with the podiatrist if your in-grown nail becomes painful or there are signs of an infection such as redness, swelling, or drainage.

## 2022-02-10 ENCOUNTER — ALLIED HEALTH/NURSE VISIT (OUTPATIENT)
Dept: PODIATRY | Facility: CLINIC | Age: 77
End: 2022-02-10
Payer: COMMERCIAL

## 2022-02-10 VITALS
TEMPERATURE: 97.5 F | RESPIRATION RATE: 16 BRPM | SYSTOLIC BLOOD PRESSURE: 128 MMHG | DIASTOLIC BLOOD PRESSURE: 66 MMHG | HEART RATE: 76 BPM

## 2022-02-10 DIAGNOSIS — L60.2 ONYCHAUXIS: ICD-10-CM

## 2022-02-10 DIAGNOSIS — E11.8 TYPE 2 DIABETES MELLITUS WITH COMPLICATION (H): ICD-10-CM

## 2022-02-10 DIAGNOSIS — B35.1 NAIL FUNGUS: Primary | ICD-10-CM

## 2022-02-10 PROCEDURE — 99207 PR NO CHARGE NURSE ONLY: CPT

## 2022-02-10 ASSESSMENT — PAIN SCALES - GENERAL: PAINLEVEL: NO PAIN (0)

## 2022-02-10 NOTE — PROGRESS NOTES
[facility] Podiatry  -  Nurse Visit        Chief Complaint: Patient presents to clinic for assessment and debridement of their toenails.          ICD-10-CM    1. Nail fungus  B35.1    2. Onychauxis  L60.2    3. Type 2 diabetes mellitus with complication (H)  E11.8          Allergies:   Allergies   Allergen Reactions     Codeine Nausea and Vomiting     Lisinopril Cough       Medications:   Current Outpatient Medications:      aspirin 81 MG EC tablet, [ASPIRIN 81 MG EC TABLET] Take 81 mg by mouth., Disp: , Rfl:      B Complex-C-Folic Acid (DIALYVITE PO), Dialyvite 100 mg-1 mg tablet  TAKE 1 TABLET BY MOUTH ONCE A DAY, Disp: , Rfl:      carvedilol (COREG) 25 MG tablet, [CARVEDILOL (COREG) 25 MG TABLET] Take 1 tablet by mouth 2 times a day at 6:00 am and 4:00 pm., Disp: , Rfl:      cholecalciferol, vitamin D3, (VITAJOY DAILY D) 1,000 unit Chew, [CHOLECALCIFEROL, VITAMIN D3, (VITAJOY DAILY D) 1,000 UNIT CHEW] Chew 2,000 Units daily. , Disp: , Rfl:      cinacalcet (SENSIPAR) 30 MG tablet, [CINACALCET (SENSIPAR) 30 MG TABLET] Take 60 mg by mouth daily.       , Disp: , Rfl:      clotrimazole-betamethasone (LOTRISONE) cream, [CLOTRIMAZOLE-BETAMETHASONE (LOTRISONE) CREAM] Apply topically 2 (two) times a day., Disp: 45 g, Rfl: 0     DIALYVITE 100-1 mg Tab, Take 1 tablet by mouth daily , Disp: , Rfl:      EPOETIN JESUS (PROCRIT INJ), , Disp: , Rfl:      lidocaine-prilocaine (EMLA) cream, Apply 1 Application topically as needed , Disp: , Rfl:      mirtazapine (REMERON) 7.5 MG tablet, [MIRTAZAPINE (REMERON) 7.5 MG TABLET] Take 1 tablet (7.5 mg total) by mouth at bedtime., Disp: 30 tablet, Rfl: 2     nitroglycerin (NITROSTAT) 0.4 MG SL tablet, [NITROGLYCERIN (NITROSTAT) 0.4 MG SL TABLET] Place 1 tablet (0.4 mg total) under the tongue every 5 (five) minutes as needed for chest pain., Disp: 50 tablet, Rfl: 3     polyethylene glycol (MIRALAX) 17 gram packet, Take 17 g by mouth daily , Disp: , Rfl:      sevelamer carbonate  (RENVELA) 800 mg tablet, [SEVELAMER CARBONATE (RENVELA) 800 MG TABLET] Take 1,600-3,200 mg by mouth 3 (three) times a day with meals. , Disp: , Rfl:      simvastatin (ZOCOR) 40 MG tablet, Take 1 tablet (40 mg) by mouth At Bedtime, Disp: 90 tablet, Rfl: 2     terbinafine HCL (LAMISIL) 1 % cream, [TERBINAFINE HCL (LAMISIL) 1 % CREAM] Apply daily, Disp: 30 g, Rfl: 0     vit C/E/zinc ox/sloane/lut/zeax (ICAPS AREDS2 ORAL), Take 1 capsule by mouth 2 times daily , Disp: , Rfl:     Vital Signs: /66   Pulse 76   Temp 97.5  F (36.4  C)   Resp 16         Treatment:  -I discussed with García Kitchen   -I debrided nails 1-10 in length and thickness            Assessment:   Vascular:   Right lower extremity: Dorsalis pedis pulse is Palpable and Not palpable. Posterior tibial pulse is Palpable.  Skin temperature is cool to touch.  Edema is present  Left lower extremity: Dorsalis pedis pulse is Palpable and Not palpable. Posterior tibial pulse is Palpable.  Skin temperature is cool to touch. Edema is present  History of claudication: No    Dermatologic:  Nails 1-10  yellow, thick, loosening of nail plate and elongated. Trophic changes noted including absence of hair growth, nail thickening and shiny appearance of skin.     Neurologic: All epicritic and proprioceptive sensations are grossly intact bilaterally.  Paresthesia is present.  Burning sensation is not present.    Musculoskeletal: Contracted digits noted none.  History of toe/foot amputation: No.      OBJECTIVE:  Appearance: alert, well appearing, and in no distress, oriented to person, place, and time and normal appearing weight.        Plan:  Patient will follow up as needed for future nail and foot care needs.              Offloading applied: None on the bilateral .      Provider notified regarding concerns: No          Lindsay Valle RN, BSN, CFCN

## 2022-02-17 PROBLEM — I25.10 CORONARY ARTERY DISEASE INVOLVING NATIVE CORONARY ARTERY OF NATIVE HEART: Status: ACTIVE | Noted: 2019-05-23

## 2022-03-22 ENCOUNTER — OFFICE VISIT (OUTPATIENT)
Dept: CARDIOLOGY | Facility: CLINIC | Age: 77
End: 2022-03-22
Payer: COMMERCIAL

## 2022-03-22 VITALS
BODY MASS INDEX: 27.59 KG/M2 | HEIGHT: 69 IN | DIASTOLIC BLOOD PRESSURE: 60 MMHG | WEIGHT: 186.3 LBS | HEART RATE: 72 BPM | RESPIRATION RATE: 12 BRPM | SYSTOLIC BLOOD PRESSURE: 140 MMHG

## 2022-03-22 DIAGNOSIS — I25.10 CORONARY ARTERY DISEASE INVOLVING NATIVE CORONARY ARTERY OF NATIVE HEART WITHOUT ANGINA PECTORIS: Primary | ICD-10-CM

## 2022-03-22 DIAGNOSIS — N18.6 ESRD (END STAGE RENAL DISEASE) ON DIALYSIS (H): ICD-10-CM

## 2022-03-22 DIAGNOSIS — I10 BENIGN ESSENTIAL HYPERTENSION: ICD-10-CM

## 2022-03-22 DIAGNOSIS — Z99.2 ESRD (END STAGE RENAL DISEASE) ON DIALYSIS (H): ICD-10-CM

## 2022-03-22 DIAGNOSIS — R00.2 INTERMITTENT PALPITATIONS: ICD-10-CM

## 2022-03-22 PROCEDURE — 99214 OFFICE O/P EST MOD 30 MIN: CPT | Performed by: INTERNAL MEDICINE
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
## 2022-03-22 NOTE — PATIENT INSTRUCTIONS
It was a pleasure to meet with you today.      Below is a summary of your visit.   1. Continue your medications without changes.  2. Try to be active and get regular exercise  3. Follow up with me in 1 year or sooner if needed.       Please do not hesitate to call the [facility] clinic with any questions or concerns at [phone]. You can also reach my nurse, Kirstin, during normal business hours at [phone].    Sincerely,

## 2022-04-01 ENCOUNTER — TELEPHONE (OUTPATIENT)
Dept: FAMILY MEDICINE | Facility: CLINIC | Age: 77
End: 2022-04-01
Payer: COMMERCIAL

## 2022-04-01 DIAGNOSIS — R31.9 HEMATURIA, UNSPECIFIED TYPE: Primary | ICD-10-CM

## 2022-04-01 NOTE — TELEPHONE ENCOUNTER
Reason for Call: Request for an order or referral:    Order or referral being requested: Order    Date needed: as soon as possible    Has the patient been seen by the PCP for this problem? Not Applicable    Additional comments: Kirsten pt's wife calling to ask for a UTI order. Yesterday pt had blood in urine and pt cannot urinate on demand due to dialysis - wondering if she can get an order put in and come  a urine specimen container. Please advise.    Phone number Patient can be reached at:  Home number on file [phone] (home)    Best Time:  any    Can we leave a detailed message on this number?  YES    Call taken on 4/1/2022 at 10:53 AM by Montana Mauro

## 2022-04-02 LAB
ALBUMIN UR-MCNC: 200 MG/DL
APPEARANCE UR: ABNORMAL
BILIRUB UR QL STRIP: NEGATIVE
COLOR UR AUTO: YELLOW
GLUCOSE UR STRIP-MCNC: NEGATIVE MG/DL
HGB UR QL STRIP: ABNORMAL
KETONES UR STRIP-MCNC: NEGATIVE MG/DL
LEUKOCYTE ESTERASE UR QL STRIP: ABNORMAL
NITRATE UR QL: NEGATIVE
PH UR STRIP: 8 [PH] (ref 5–7)
RBC URINE: >182 /HPF
SP GR UR STRIP: 1.01 (ref 1–1.03)
UROBILINOGEN UR STRIP-MCNC: <2 MG/DL
WBC URINE: 73 /HPF

## 2022-04-02 PROCEDURE — 87086 URINE CULTURE/COLONY COUNT: CPT | Performed by: FAMILY MEDICINE

## 2022-04-02 PROCEDURE — 81001 URINALYSIS AUTO W/SCOPE: CPT | Performed by: FAMILY MEDICINE

## 2022-04-03 ENCOUNTER — NURSE TRIAGE (OUTPATIENT)
Dept: NURSING | Facility: CLINIC | Age: 77
End: 2022-04-03
Payer: COMMERCIAL

## 2022-04-03 NOTE — TELEPHONE ENCOUNTER
Patient is calling and states that he dropped off a urine sample yesterday and today is calling for lab results.  FNA relayed that urine culture is still in process.    COVID 19 Nurse Triage Plan/Patient Instructions    Please be aware that novel coronavirus (COVID-19) may be circulating in the community. If you develop symptoms such as fever, cough, or SOB or if you have concerns about the presence of another infection including coronavirus (COVID-19), please contact your health care provider or visit [email or web address].     Disposition/Instructions    Home care recommended. Follow home care protocol based instructions.    Thank you for taking steps to prevent the spread of this virus.  o Limit your contact with others.  o Wear a simple mask to cover your cough.  o Wash your hands well and often.    Resources    [facility]: About COVID-19: [email or web address]    CDC: What to Do If You're Sick: www.cdc.gov/coronavirus/2019-ncov/about/steps-when-sick.html    CDC: Ending Home Isolation: www.cdc.gov/coronavirus/2019-ncov/hcp/disposition-in-home-patients.html     CDC: Caring for Someone: www.cdc.gov/coronavirus/2019-ncov/if-you-are-sick/care-for-someone.html     [facility]: Interim Guidance for Hospital Discharge to Home: www.health.[facility].[place]/diseases/coronavirus/hcp/hospdischarge.pdf    [facility] clinical trials (COVID-19 research studies): clinicalaffairs.[facility]-clinical-trials     Below are the COVID-19 hotlines at the [facility] ([facility]). Interpreters are available.   o For health questions: Call 999-073-7941 or 1-615.518.8955 (7 a.m. to 7 p.m.)  o For questions about schools and childcare: Call 239-903-2494 or 1-739.806.6795 (7 a.m. to 7 p.m.)

## 2022-04-04 DIAGNOSIS — R31.9 HEMATURIA, UNSPECIFIED TYPE: Primary | ICD-10-CM

## 2022-04-04 LAB — BACTERIA UR CULT: NORMAL

## 2022-04-04 RX ORDER — CEPHALEXIN 500 MG/1
500 CAPSULE ORAL 2 TIMES DAILY
Qty: 20 CAPSULE | Refills: 0 | Status: SHIPPED | OUTPATIENT
Start: 2022-04-04 | End: 2022-05-03

## 2022-04-06 ENCOUNTER — TELEPHONE (OUTPATIENT)
Dept: CARDIOLOGY | Facility: CLINIC | Age: 77
End: 2022-04-06
Payer: COMMERCIAL

## 2022-04-06 NOTE — TELEPHONE ENCOUNTER
[place] Community [place] reviewed.  Patients recent blood pressure is outside of guideline parameters.  Called pt to review, no answer.  Left voicemail message asking patient to check their blood pressure using a home blood pressure cuff or by going to a [facility].  Patient instructed to then call [phone] ([facility]) and leave a message with their name, date of birth, and blood pressure reading that was completed within the last 24 hours and where it was completed.  Will await call back for further review.

## 2022-04-07 ENCOUNTER — TRANSFERRED RECORDS (OUTPATIENT)
Dept: HEALTH INFORMATION MANAGEMENT | Facility: CLINIC | Age: 77
End: 2022-04-07
Payer: COMMERCIAL

## 2022-04-13 ENCOUNTER — TELEPHONE (OUTPATIENT)
Dept: FAMILY MEDICINE | Facility: CLINIC | Age: 77
End: 2022-04-13
Payer: COMMERCIAL

## 2022-04-13 PROBLEM — E11.8 TYPE 2 DIABETES MELLITUS WITH COMPLICATION (H): Status: ACTIVE | Noted: 2021-04-20

## 2022-04-13 PROBLEM — N28.1 ACQUIRED CYSTIC KIDNEY DISEASE: Status: ACTIVE | Noted: 2018-02-01

## 2022-04-13 NOTE — TELEPHONE ENCOUNTER
Incoming call from wife asking if patient will need to leave a urine sample day of preop on 05/03/22? Difficult for patient to do this as he has a cath?     Patient recently seen for Hematuria and wife is dropping another sample off this Thursday or Friday to check on that.     If Cecille thinking one might be good to have, surgery is on 05/06/22 & pre op 05/03/22 so there is a couple days in between.     Cecille - if wanting patient to leave a urine sample day of preop please let [facility] staff know and we will call patient's wife Kirsten back.

## 2022-04-13 NOTE — TELEPHONE ENCOUNTER
Ok to drop the urine as schedule will follow up on result might not need UA at preop visit all depend on Follow up urine test    Anuja Oden MD 4/13/2022 2:37 PM   [facility].  [phone]

## 2022-04-13 NOTE — TELEPHONE ENCOUNTER
LM for Kirsten that Cecille said okay to drop off a urine for a follow up.  May need to do a repeat at the Preop depending on the results. MATHEUS MATTHEWS on 4/13/2022 at 4:45 PM

## 2022-04-18 ENCOUNTER — LAB (OUTPATIENT)
Dept: LAB | Facility: CLINIC | Age: 77
End: 2022-04-18
Payer: COMMERCIAL

## 2022-04-18 DIAGNOSIS — R31.9 HEMATURIA, UNSPECIFIED TYPE: ICD-10-CM

## 2022-04-18 LAB
ALBUMIN UR-MCNC: 100 MG/DL
APPEARANCE UR: CLEAR
BACTERIA #/AREA URNS HPF: ABNORMAL /HPF
BILIRUB UR QL STRIP: NEGATIVE
COLOR UR AUTO: YELLOW
GLUCOSE UR STRIP-MCNC: NEGATIVE MG/DL
HGB UR QL STRIP: ABNORMAL
KETONES UR STRIP-MCNC: NEGATIVE MG/DL
LEUKOCYTE ESTERASE UR QL STRIP: ABNORMAL
NITRATE UR QL: NEGATIVE
PH UR STRIP: 7 [PH] (ref 5–8)
RBC #/AREA URNS AUTO: ABNORMAL /HPF
SP GR UR STRIP: 1.02 (ref 1–1.03)
SQUAMOUS #/AREA URNS AUTO: ABNORMAL /LPF
UROBILINOGEN UR STRIP-ACNC: 0.2 E.U./DL
WBC #/AREA URNS AUTO: ABNORMAL /HPF

## 2022-04-18 PROCEDURE — 81001 URINALYSIS AUTO W/SCOPE: CPT

## 2022-04-18 NOTE — TELEPHONE ENCOUNTER
Patient returned call and left voicemail message with update blood pressure reading.      Last Blood Pressure: 140/60  Last Heart Rate: 72  Date: 3/22/22  Location: [facility]    Blood Pressure reported on 4/6/22: 138/63  Location: Home BP    Patient reported blood pressure updated in Epic. Blood pressure falls within [place] Community Measures guidelines.  Patient will follow up as previously advised.

## 2022-04-19 ENCOUNTER — TELEPHONE (OUTPATIENT)
Dept: FAMILY MEDICINE | Facility: CLINIC | Age: 77
End: 2022-04-19
Payer: COMMERCIAL

## 2022-04-19 NOTE — TELEPHONE ENCOUNTER
Reason for Call:  Request for results:    Name of test or procedure: UA    Date of test of procedure: 04/18/22    Location of the test or procedure: [facility]    OK to leave the result message on voice mail or with a family member? Not Applicable    Phone number Patient can be reached at:  Home number on file [phone] (home)    Additional comments: Pt has been having a sore throat th last couple of days. COVID test is negative. Should they see someone or go [facility]? Please advise.    Call taken on 4/19/2022 at 11:06 AM by [place] Mauro

## 2022-04-19 NOTE — TELEPHONE ENCOUNTER
NORTH for Kirsten to call back.  Please inform the patient that so far the urine that was done for possible infection infection as well as hematuria currently does not show a lot of blood in the urine compared to 2 weeks ago and there was just a few bacteria.  At this point it does not look like he has an infection in the bladder.  Regarding the sore throat I will have him seen at the urgent care for testing and evaluation for that.  Please let me know if there is any further questions.  MATHEUS MATTHEWS on 4/19/2022 at 4:55 PM

## 2022-04-21 ENCOUNTER — TRANSFERRED RECORDS (OUTPATIENT)
Dept: HEALTH INFORMATION MANAGEMENT | Facility: CLINIC | Age: 77
End: 2022-04-21
Payer: COMMERCIAL

## 2022-05-03 ENCOUNTER — TELEPHONE (OUTPATIENT)
Dept: NURSING | Facility: CLINIC | Age: 77
End: 2022-05-03

## 2022-05-03 ENCOUNTER — OFFICE VISIT (OUTPATIENT)
Dept: FAMILY MEDICINE | Facility: CLINIC | Age: 77
End: 2022-05-03
Payer: COMMERCIAL

## 2022-05-03 VITALS
SYSTOLIC BLOOD PRESSURE: 134 MMHG | TEMPERATURE: 97.7 F | BODY MASS INDEX: 27.39 KG/M2 | HEART RATE: 72 BPM | HEIGHT: 69 IN | WEIGHT: 184.9 LBS | RESPIRATION RATE: 16 BRPM | OXYGEN SATURATION: 98 % | DIASTOLIC BLOOD PRESSURE: 54 MMHG

## 2022-05-03 DIAGNOSIS — I25.10 CORONARY ARTERY DISEASE INVOLVING NATIVE CORONARY ARTERY OF NATIVE HEART, UNSPECIFIED WHETHER ANGINA PRESENT: ICD-10-CM

## 2022-05-03 DIAGNOSIS — Z87.898 HISTORY OF PREDIABETES: ICD-10-CM

## 2022-05-03 DIAGNOSIS — Z01.818 PREOP EXAMINATION: Primary | ICD-10-CM

## 2022-05-03 DIAGNOSIS — Z11.59 NEED FOR HEPATITIS C SCREENING TEST: ICD-10-CM

## 2022-05-03 DIAGNOSIS — I77.0 A-V FISTULA (H): Chronic | ICD-10-CM

## 2022-05-03 DIAGNOSIS — N18.4 CHRONIC KIDNEY DISEASE, STAGE IV (SEVERE) (H): ICD-10-CM

## 2022-05-03 DIAGNOSIS — Z13.220 SCREENING FOR HYPERLIPIDEMIA: ICD-10-CM

## 2022-05-03 DIAGNOSIS — I10 ESSENTIAL HYPERTENSION WITH GOAL BLOOD PRESSURE LESS THAN 140/90: Chronic | ICD-10-CM

## 2022-05-03 DIAGNOSIS — H61.23 EXCESSIVE CERUMEN IN BOTH EAR CANALS: ICD-10-CM

## 2022-05-03 DIAGNOSIS — Z99.2 END STAGE RENAL FAILURE ON DIALYSIS (H): Chronic | ICD-10-CM

## 2022-05-03 DIAGNOSIS — Z85.528 HISTORY OF PRIMARY MALIGNANT NEOPLASM OF KIDNEY: ICD-10-CM

## 2022-05-03 DIAGNOSIS — N18.6 END STAGE RENAL FAILURE ON DIALYSIS (H): Chronic | ICD-10-CM

## 2022-05-03 DIAGNOSIS — N25.81 HYPERPARATHYROIDISM, SECONDARY RENAL (H): Chronic | ICD-10-CM

## 2022-05-03 PROBLEM — E11.8 TYPE 2 DIABETES MELLITUS WITH COMPLICATION (H): Status: RESOLVED | Noted: 2021-04-20 | Resolved: 2022-05-03

## 2022-05-03 LAB
ANION GAP SERPL CALCULATED.3IONS-SCNC: 14 MMOL/L (ref 5–18)
ATRIAL RATE - MUSE: 71 BPM
BUN SERPL-MCNC: 37 MG/DL (ref 8–28)
CALCIUM SERPL-MCNC: 9.7 MG/DL (ref 8.5–10.5)
CHLORIDE BLD-SCNC: 104 MMOL/L (ref 98–107)
CHOLEST SERPL-MCNC: 87 MG/DL
CO2 SERPL-SCNC: 21 MMOL/L (ref 22–31)
CREAT SERPL-MCNC: 6.18 MG/DL (ref 0.7–1.3)
DIASTOLIC BLOOD PRESSURE - MUSE: NORMAL MMHG
FASTING STATUS PATIENT QL REPORTED: ABNORMAL
GFR SERPL CREATININE-BSD FRML MDRD: 9 ML/MIN/1.73M2
GLUCOSE BLD-MCNC: 139 MG/DL (ref 70–125)
HBA1C MFR BLD: 5.4 % (ref 0–5.6)
HDLC SERPL-MCNC: 34 MG/DL
HGB BLD-MCNC: 9.9 G/DL (ref 13.3–17.7)
INTERPRETATION ECG - MUSE: NORMAL
LDLC SERPL CALC-MCNC: <5 MG/DL
P AXIS - MUSE: 64 DEGREES
PHOSPHATE SERPL-MCNC: 4 MG/DL (ref 2.5–4.5)
POTASSIUM BLD-SCNC: 5.1 MMOL/L (ref 3.5–5)
PR INTERVAL - MUSE: 198 MS
QRS DURATION - MUSE: 92 MS
QT - MUSE: 416 MS
QTC - MUSE: 452 MS
R AXIS - MUSE: 0 DEGREES
SODIUM SERPL-SCNC: 139 MMOL/L (ref 136–145)
SYSTOLIC BLOOD PRESSURE - MUSE: NORMAL MMHG
T AXIS - MUSE: 66 DEGREES
TRIGL SERPL-MCNC: 245 MG/DL
VENTRICULAR RATE- MUSE: 71 BPM

## 2022-05-03 PROCEDURE — 85018 HEMOGLOBIN: CPT | Performed by: FAMILY MEDICINE

## 2022-05-03 PROCEDURE — 84100 ASSAY OF PHOSPHORUS: CPT | Performed by: FAMILY MEDICINE

## 2022-05-03 PROCEDURE — 99214 OFFICE O/P EST MOD 30 MIN: CPT | Mod: 25 | Performed by: FAMILY MEDICINE

## 2022-05-03 PROCEDURE — 36415 COLL VENOUS BLD VENIPUNCTURE: CPT | Performed by: FAMILY MEDICINE

## 2022-05-03 PROCEDURE — U0003 INFECTIOUS AGENT DETECTION BY NUCLEIC ACID (DNA OR RNA); SEVERE ACUTE RESPIRATORY SYNDROME CORONAVIRUS 2 (SARS-COV-2) (CORONAVIRUS DISEASE [COVID-19]), AMPLIFIED PROBE TECHNIQUE, MAKING USE OF HIGH THROUGHPUT TECHNOLOGIES AS DESCRIBED BY CMS-2020-01-R: HCPCS | Performed by: FAMILY MEDICINE

## 2022-05-03 PROCEDURE — U0005 INFEC AGEN DETEC AMPLI PROBE: HCPCS | Performed by: FAMILY MEDICINE

## 2022-05-03 PROCEDURE — 83036 HEMOGLOBIN GLYCOSYLATED A1C: CPT | Performed by: FAMILY MEDICINE

## 2022-05-03 PROCEDURE — 80061 LIPID PANEL: CPT | Performed by: FAMILY MEDICINE

## 2022-05-03 PROCEDURE — 90714 TD VACC NO PRESV 7 YRS+ IM: CPT | Performed by: FAMILY MEDICINE

## 2022-05-03 PROCEDURE — 90471 IMMUNIZATION ADMIN: CPT | Performed by: FAMILY MEDICINE

## 2022-05-03 PROCEDURE — 80048 BASIC METABOLIC PNL TOTAL CA: CPT | Performed by: FAMILY MEDICINE

## 2022-05-03 PROCEDURE — 93010 ELECTROCARDIOGRAM REPORT: CPT | Performed by: GENERAL ACUTE CARE HOSPITAL

## 2022-05-03 PROCEDURE — 93005 ELECTROCARDIOGRAM TRACING: CPT | Performed by: FAMILY MEDICINE

## 2022-05-03 PROCEDURE — 86803 HEPATITIS C AB TEST: CPT | Performed by: FAMILY MEDICINE

## 2022-05-03 RX ORDER — CHOLECALCIFEROL (VITAMIN D3) 50 MCG
1 TABLET ORAL DAILY
COMMUNITY

## 2022-05-03 RX ORDER — SIMVASTATIN 40 MG
40 TABLET ORAL AT BEDTIME
Qty: 90 TABLET | Refills: 3 | Status: SHIPPED | OUTPATIENT
Start: 2022-05-03 | End: 2023-05-17
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
## 2022-05-04 LAB
HCV AB SERPL QL IA: NONREACTIVE
SARS-COV-2 RNA RESP QL NAA+PROBE: NEGATIVE

## 2022-05-04 NOTE — TELEPHONE ENCOUNTER
Critical Lab: High creatine 6.18.      Reviewed chart and patient is on dialysis.  Was tested today for a pre-op evaluation for removal of Kidney stones.      Called to on-call Dr. Adhikari provider via answering service at 7:53 pm.     No action needed at this time.    Rosemarie Le RN on 5/3/2022 at 7:54 PM

## 2022-05-11 NOTE — TELEPHONE ENCOUNTER
A representative from [facility] called to confirm where the patient' is getting their dialysis. I provided KEON Sandoval [phone]) with the name, [facility] and their direct number

## 2022-05-26 ENCOUNTER — OFFICE VISIT (OUTPATIENT)
Dept: PODIATRY | Facility: CLINIC | Age: 77
End: 2022-05-26
Payer: COMMERCIAL

## 2022-05-26 VITALS — HEIGHT: 69 IN | OXYGEN SATURATION: 99 % | BODY MASS INDEX: 27.25 KG/M2 | WEIGHT: 184 LBS | HEART RATE: 78 BPM

## 2022-05-26 DIAGNOSIS — L60.2 ONYCHAUXIS: ICD-10-CM

## 2022-05-26 DIAGNOSIS — B35.1 NAIL FUNGUS: Primary | ICD-10-CM

## 2022-05-26 PROCEDURE — 11721 DEBRIDE NAIL 6 OR MORE: CPT | Mod: Q8 | Performed by: PODIATRIST

## 2022-05-26 ASSESSMENT — PAIN SCALES - GENERAL: PAINLEVEL: MODERATE PAIN (4)
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
## 2022-06-25 NOTE — TELEPHONE ENCOUNTER
Just get a busy signal when calling patient's number. Will try again later,   ex smoker    lives with the daughter

## 2022-07-28 ENCOUNTER — TELEPHONE (OUTPATIENT)
Dept: FAMILY MEDICINE | Facility: CLINIC | Age: 77
End: 2022-07-28

## 2022-07-28 DIAGNOSIS — R07.9 CHEST PAIN, UNSPECIFIED TYPE: ICD-10-CM

## 2022-07-28 NOTE — TELEPHONE ENCOUNTER
Reason for Call:  Medication or medication refill: nitroglycerin (NITROSTAT) 0.4 MG SL tablet    Do you use a [facility]? NO  Name of the pharmacy and phone number for the current request:  [facility] pharmacy [address] [place] [address] in [place], [phone]    Name of the medication requested:     nitroglycerin (NITROSTAT) 0.4 MG SL tablet 50 tablet 3 4/30/2019  No   Sig - Route: [NITROGLYCERIN (NITROSTAT) 0.4 MG SL TABLET] Place 1 tablet (0.4 mg total) under the tongue every 5 (five) minutes as needed for chest pain. - Sublingual     Can we leave a detailed message on this number? YES    Phone number patient can be reached at: Home number on file [phone] (home)    Best Time: ANY    Call taken on 7/28/2022 at 9:08 AM by Comfort Doran

## 2022-07-29 ENCOUNTER — TELEPHONE (OUTPATIENT)
Dept: FAMILY MEDICINE | Facility: CLINIC | Age: 77
End: 2022-07-29

## 2022-07-29 RX ORDER — NITROGLYCERIN 0.4 MG/1
0.4 TABLET SUBLINGUAL EVERY 5 MIN PRN
Qty: 50 TABLET | Refills: 3 | Status: SHIPPED | OUTPATIENT
Start: 2022-07-29 | End: 2023-07-27

## 2022-08-30 ENCOUNTER — APPOINTMENT (OUTPATIENT)
Dept: RADIOLOGY | Facility: CLINIC | Age: 77
End: 2022-08-30
Attending: EMERGENCY MEDICINE
Payer: COMMERCIAL

## 2022-08-30 ENCOUNTER — HOSPITAL ENCOUNTER (EMERGENCY)
Facility: CLINIC | Age: 77
Discharge: HOME | End: 2022-08-30
Attending: EMERGENCY MEDICINE | Admitting: EMERGENCY MEDICINE
Payer: COMMERCIAL

## 2022-08-30 VITALS
SYSTOLIC BLOOD PRESSURE: 120 MMHG | TEMPERATURE: 97.9 F | DIASTOLIC BLOOD PRESSURE: 56 MMHG | BODY MASS INDEX: 26.57 KG/M2 | HEART RATE: 74 BPM | RESPIRATION RATE: 17 BRPM | WEIGHT: 179.9 LBS | OXYGEN SATURATION: 100 %

## 2022-08-30 DIAGNOSIS — S93.601A FOOT SPRAIN, RIGHT, INITIAL ENCOUNTER: ICD-10-CM

## 2022-08-30 PROCEDURE — 73630 X-RAY EXAM OF FOOT: CPT | Mod: RT

## 2022-08-30 PROCEDURE — 250N000013 HC RX MED GY IP 250 OP 250 PS 637: Performed by: EMERGENCY MEDICINE

## 2022-08-30 PROCEDURE — 99283 EMERGENCY DEPT VISIT LOW MDM: CPT

## 2022-08-30 RX ORDER — HYDROMORPHONE HYDROCHLORIDE 2 MG/1
2 TABLET ORAL ONCE
Status: DISCONTINUED | OUTPATIENT
Start: 2022-08-30 | End: 2022-08-30

## 2022-08-30 RX ORDER — ACETAMINOPHEN 325 MG/1
975 TABLET ORAL ONCE
Status: COMPLETED | OUTPATIENT
Start: 2022-08-30 | End: 2022-08-30

## 2022-08-30 RX ADMIN — ACETAMINOPHEN 975 MG: 325 TABLET ORAL at 06:12

## 2022-08-30 ASSESSMENT — ACTIVITIES OF DAILY LIVING (ADL): ADLS_ACUITY_SCORE: 35

## 2022-08-30 NOTE — ED TRIAGE NOTES
pt was at dialyses yesterday got up upon completion of his run and fell. Denies hitting head, neck nor back. States only pain is in right foot. Uncertain if right foot is swelling. Denies dizziness currently.
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
## 2022-08-30 NOTE — DISCHARGE INSTRUCTIONS
Rest, ice, and elevate over the next couple days  Tylenol 650 mg every 4 hours as needed for pain  Can walk on your foot as tolerated  Return to the emergency department or clinic for worsening problems or concerns

## 2022-09-16 ENCOUNTER — TRANSFERRED RECORDS (OUTPATIENT)
Dept: HEALTH INFORMATION MANAGEMENT | Facility: CLINIC | Age: 77
End: 2022-09-16

## 2022-09-16 LAB — RETINOPATHY: NEGATIVE

## 2022-09-27 ENCOUNTER — OFFICE VISIT (OUTPATIENT)
Dept: FAMILY MEDICINE | Facility: CLINIC | Age: 77
End: 2022-09-27
Payer: COMMERCIAL

## 2022-09-27 VITALS
BODY MASS INDEX: 25.19 KG/M2 | WEIGHT: 186 LBS | HEART RATE: 76 BPM | DIASTOLIC BLOOD PRESSURE: 66 MMHG | OXYGEN SATURATION: 98 % | SYSTOLIC BLOOD PRESSURE: 113 MMHG | HEIGHT: 72 IN

## 2022-09-27 DIAGNOSIS — R55 NEUROCARDIOGENIC SYNCOPE: ICD-10-CM

## 2022-09-27 DIAGNOSIS — Z99.2 END STAGE RENAL FAILURE ON DIALYSIS (H): ICD-10-CM

## 2022-09-27 DIAGNOSIS — I10 ESSENTIAL HYPERTENSION WITH GOAL BLOOD PRESSURE LESS THAN 140/90: ICD-10-CM

## 2022-09-27 DIAGNOSIS — N18.6 END STAGE RENAL FAILURE ON DIALYSIS (H): ICD-10-CM

## 2022-09-27 DIAGNOSIS — R05.3 CHRONIC COUGH: ICD-10-CM

## 2022-09-27 DIAGNOSIS — Z85.528 HISTORY OF PRIMARY MALIGNANT NEOPLASM OF KIDNEY: ICD-10-CM

## 2022-09-27 DIAGNOSIS — Z00.00 MEDICARE ANNUAL WELLNESS VISIT, SUBSEQUENT: Primary | ICD-10-CM

## 2022-09-27 PROCEDURE — G0439 PPPS, SUBSEQ VISIT: HCPCS | Performed by: NURSE PRACTITIONER

## 2022-09-27 PROCEDURE — 99214 OFFICE O/P EST MOD 30 MIN: CPT | Mod: 25 | Performed by: NURSE PRACTITIONER

## 2022-09-27 RX ORDER — BENZONATATE 100 MG/1
100 CAPSULE ORAL 3 TIMES DAILY PRN
Qty: 30 CAPSULE | Refills: 1 | Status: SHIPPED | OUTPATIENT
Start: 2022-09-27 | End: 2023-04-27

## 2022-09-27 ASSESSMENT — ENCOUNTER SYMPTOMS
WEAKNESS: 1
MYALGIAS: 0
ARTHRALGIAS: 0
NERVOUS/ANXIOUS: 0
APNEA: 0
JOINT SWELLING: 0
DIARRHEA: 0
FREQUENCY: 0
SINUS PAIN: 0
ABDOMINAL PAIN: 0
HEADACHES: 0
PALPITATIONS: 0
DIZZINESS: 1
STRIDOR: 0
DYSURIA: 0
WHEEZING: 0
AGITATION: 0
SLEEP DISTURBANCE: 0
VOICE CHANGE: 0
CONSTIPATION: 0
SINUS PRESSURE: 0
CHEST TIGHTNESS: 0
UNEXPECTED WEIGHT CHANGE: 0
CHILLS: 0
SPEECH DIFFICULTY: 0
ACTIVITY CHANGE: 0
EYE PAIN: 0
PARESTHESIAS: 0
SHORTNESS OF BREATH: 0
POLYDIPSIA: 0
RHINORRHEA: 0
NAUSEA: 0
CHOKING: 0
SORE THROAT: 0
FEVER: 0
COUGH: 1
HEMATOCHEZIA: 0
HEMATURIA: 0
HEARTBURN: 0

## 2022-09-27 ASSESSMENT — ACTIVITIES OF DAILY LIVING (ADL): CURRENT_FUNCTION: MEDICATION ADMINISTRATION REQUIRES ASSISTANCE

## 2022-09-27 NOTE — PROGRESS NOTES
"SUBJECTIVE:   García is a 77 year old who presents for medicare wellness visit    -He is accompanied with his wife today.  His wife has been noticing some short-term memory loss but he still can drive short distances without any difficulty.  He fell this past summer but has had no recurrences.  It appears that he tripped over an object in his way.  His foot feels better.  He has chronic kidney disease and follows with nephrology.  He gets dialysis 3 times a week.  History of gout but no active flares for many years.  He used to smoke a pipe and quit over 20 years ago.  He sometimes has a cough with phlegm noted more in the morning but it can also occur sporadically throughout the day.  He has a history of allergies.  He denied any increase in sinus congestion or runny nose.  He denied any shortness of breath or acid reflux.  He denied coughing up any blood.  He had a CT scan prior for pulmonary nodules that appear to be stable in 2019.  He is taking Mucinex and that seems to be helping but would like to try something else.  He denied any ear pain or sore throat.  He denied that food is getting stuck in his throat.  He eats about twice a day and enjoys eating breakfast.  He is working on increasing protein in his diet.  He has a hard time with meat and tends to gag on it.  He denied any weight loss or abdominal pains.     Patient has been advised of split billing requirements and indicates understanding: Yes  Are you in the first 12 months of your Medicare coverage?  No    Healthy Habits:     In general, how would you rate your overall health?  Fair    Frequency of exercise:  1 day/week    Duration of exercise:  Less than 15 minutes    Do you usually eat at least 4 servings of fruit and vegetables a day, include whole grains    & fiber and avoid regularly eating high fat or \"junk\" foods?  No    Taking medications regularly:  Yes    Medication side effects:  None    Ability to successfully perform activities of daily " living:  Medication administration requires assistance    Home Safety:  Lack of grab bars in the bathroom    Hearing Impairment:  No hearing concerns    In the past 6 months, have you been bothered by leaking of urine?  No    In general, how would you rate your overall mental or emotional health?  Fair      PHQ-2 Total Score: 0    Additional concerns today:  Yes    Do you feel safe in your environment? Yes    Have you ever done Advance Care Planning? (For example, a Health Directive, POLST, or a discussion with a medical provider or your loved ones about your wishes): No, advance care planning information given to patient to review.  Patient declined advance care planning discussion at this time.      Fall risk  Fallen 2 or more times in the past year?: No  Any fall with injury in the past year?: No  click delete button to remove this line now  Cognitive Screening   1) Repeat 3 items (Leader, Season, Table)      2) Clock draw:   NORMAL  3) 3 item recall:   Recalls NO objects   Results: 0 items recalled: PROBABLE COGNITIVE IMPAIRMENT, **INFORM PROVIDER**    Do you have sleep apnea, excessive snoring or daytime drowsiness?: no    Reviewed and updated as needed this visit by clinical staff   Tobacco  Allergies  Meds                Reviewed and updated as needed this visit by Provider                   Social History     Tobacco Use     Smoking status: Former Smoker     Smokeless tobacco: Never Used   Substance Use Topics     Alcohol use: No     If you drink alcohol do you typically have >3 drinks per day or >7 drinks per week? No    Alcohol Use 9/27/2022   Prescreen: >3 drinks/day or >7 drinks/week? Not Applicable   Prescreen: >3 drinks/day or >7 drinks/week? -       Current providers sharing in care for this patient include: Patient Care Team:  Rosemarie Hooker APRN CNP as PCP - General (Family Medicine)  Jimmy Cross MD as Assigned Heart and Vascular Provider  Narayan Mix DPM as Assigned Musculoskeletal  Provider  Anuja Oden MD as Assigned PCP    The following health maintenance items are reviewed in Epic and correct as of today:  Health Maintenance   Topic Date Due     EYE EXAM  08/27/2021     BMP  08/03/2022     INFLUENZA VACCINE (1) 09/01/2022     ZOSTER IMMUNIZATION (2 of 2) 07/26/2022     HEMOGLOBIN  11/03/2022     A1C  11/03/2022     LIPID  05/03/2023     DIABETIC FOOT EXAM  05/03/2023     ANNUAL REVIEW OF HM ORDERS  05/03/2023     MEDICARE ANNUAL WELLNESS VISIT  09/27/2023     FALL RISK ASSESSMENT  09/27/2023     ADVANCE CARE PLANNING  09/27/2027     COLORECTAL CANCER SCREENING  09/10/2029     DTAP/TDAP/TD IMMUNIZATION (2 - Td or Tdap) 05/03/2032     PARATHYROID  Completed     PHOSPHORUS  Completed     HEPATITIS C SCREENING  Completed     PHQ-2 (once per calendar year)  Completed     Pneumococcal Vaccine: 65+ Years  Completed     URINALYSIS  Completed     ALK PHOS  Completed     COVID-19 Vaccine  Completed     IPV IMMUNIZATION  Aged Out     MENINGITIS IMMUNIZATION  Aged Out     MICROALBUMIN  Discontinued     LUNG CANCER SCREENING  Discontinued     AORTIC ANEURYSM SCREENING (SYSTEM ASSIGNED)  Discontinued     Labs reviewed in EPIC  Pneumonia Vaccine:For adults with an immunocompromising condition, cerebrospinal fluid leak, or cochlear implant, administer 1 dose of PCV13 first then give 1 dose of PPSV23 at least 1 year later. Anyone who received any doses of PPSV23 before age 65 should receive 1 final dose of the vaccine at age 65 or older. Administer this last dose at least 5 years after the prior PPSV23 dose.        Review of Systems   Constitutional: Negative for activity change, chills, fever and unexpected weight change.   HENT: Negative for congestion, ear pain, hearing loss, mouth sores, rhinorrhea, sinus pressure, sinus pain, sneezing, sore throat and voice change.    Eyes: Negative for pain and visual disturbance.   Respiratory: Positive for cough. Negative for apnea, choking, chest tightness,  "shortness of breath, wheezing and stridor.    Cardiovascular: Negative for chest pain, palpitations and peripheral edema.   Gastrointestinal: Negative for abdominal pain, constipation, diarrhea, heartburn, hematochezia and nausea.   Endocrine: Negative for polydipsia and polyuria.   Genitourinary: Negative for dysuria, frequency, genital sores, hematuria, penile discharge and urgency.   Musculoskeletal: Negative for arthralgias, joint swelling and myalgias.   Skin: Negative for rash.   Neurological: Positive for dizziness and weakness. Negative for syncope, speech difficulty, headaches and paresthesias.        Short memory lapses. Some days are better than others. Can drive short distances without getting loss, no car accidents. One fall this summer.    Psychiatric/Behavioral: Negative for agitation, mood changes, sleep disturbance and suicidal ideas. The patient is not nervous/anxious.        OBJECTIVE:   /66   Pulse 76   Ht 1.816 m (5' 11.5\")   Wt 84.4 kg (186 lb)   SpO2 98%   BMI 25.58 kg/m   Estimated body mass index is 25.58 kg/m  as calculated from the following:    Height as of this encounter: 1.816 m (5' 11.5\").    Weight as of this encounter: 84.4 kg (186 lb).  Physical Exam  Vitals and nursing note reviewed.   Constitutional:       Appearance: Normal appearance. He is normal weight.   HENT:      Right Ear: Tympanic membrane normal. There is no impacted cerumen.      Left Ear: Tympanic membrane normal. There is no impacted cerumen.      Mouth/Throat:      Mouth: Mucous membranes are moist.   Cardiovascular:      Rate and Rhythm: Normal rate and regular rhythm.      Pulses: Normal pulses.   Pulmonary:      Effort: Pulmonary effort is normal.      Breath sounds: Normal breath sounds.   Musculoskeletal:      Right lower leg: No edema.      Left lower leg: No edema.   Skin:     General: Skin is warm and dry.      Capillary Refill: Capillary refill takes less than 2 seconds.   Neurological:      " "Mental Status: He is alert and oriented to person, place, and time.      Motor: Motor function is intact.      Comments: Very Minor shuffling noted. Appropriate conversation, mild short memory loss noted when forgetting medicare words, or no confusion notd. Follows commands well.    Psychiatric:         Mood and Affect: Mood normal.         Behavior: Behavior normal.         Thought Content: Thought content normal.         Judgment: Judgment normal.       Labs reviewed in Epic    ASSESSMENT / PLAN:       ICD-10-CM    1. Medicare annual wellness visit, subsequent  Z00.00    2. Neurocardiogenic syncope  R55    3. Chronic cough  R05.3 benzonatate (TESSALON) 100 MG capsule   4. End stage renal failure on dialysis (H)  N18.6     Z99.2    5. Essential hypertension with goal blood pressure less than 140/90  I10    6. History of primary malignant neoplasm of kidney  Z85.528      -I reviewed his labs and his medical history with his wife today.  -He will continue to follow-up with nephrologist.  They have been managing his kidney function test and hemoglobins.   -Blood pressure is stable today.  I reviewed his weights with his wife and they appear stable.  They will continue to work on eating a healthy diet.  -No new falls since this past summer.  Fall prevention discussed.  -Chronic cough with phlegm noted can be worse in the morning.  No warning signs to indicate imaging at this time.  I reviewed prior imaging with family today.  Can try Tessalon Perles.     Patient has been advised of split billing requirements and indicates understanding: Yes    COUNSELING:  Reviewed preventive health counseling, as reflected in patient instructions    Estimated body mass index is 25.58 kg/m  as calculated from the following:    Height as of this encounter: 1.816 m (5' 11.5\").    Weight as of this encounter: 84.4 kg (186 lb).        He reports that he has quit smoking. He has never used smokeless tobacco.      Appropriate preventive " services were discussed with this patient, including applicable screening as appropriate for cardiovascular disease, diabetes, osteopenia/osteoporosis, and glaucoma.  As appropriate for age/gender, discussed screening for colorectal cancer, prostate cancer, breast cancer, and cervical cancer. Checklist reviewing preventive services available has been given to the patient.    Reviewed patients plan of care and provided an AVS. The Complex Care Plan (for patients with higher acuity and needing more deliberate coordination of services) for García meets the Care Plan requirement. This Care Plan has been established and reviewed with the Patient and spouse.    Counseling Resources:  ATP IV Guidelines  Pooled Cohorts Equation Calculator  Breast Cancer Risk Calculator  Breast Cancer: Medication to Reduce Risk  FRAX Risk Assessment  ICSI Preventive Guidelines  Dietary Guidelines for Americans, 2010  USDA's MyPlate  ASA Prophylaxis  Lung CA Screening    [facility] [place]    Identified Health Risks:

## 2022-12-29 ENCOUNTER — TRANSFERRED RECORDS (OUTPATIENT)
Dept: HEALTH INFORMATION MANAGEMENT | Facility: CLINIC | Age: 77
End: 2022-12-29

## 2023-01-03 ENCOUNTER — APPOINTMENT (OUTPATIENT)
Dept: ULTRASOUND IMAGING | Facility: CLINIC | Age: 78
End: 2023-01-03
Attending: EMERGENCY MEDICINE
Payer: COMMERCIAL

## 2023-01-03 ENCOUNTER — HOSPITAL ENCOUNTER (EMERGENCY)
Facility: CLINIC | Age: 78
Discharge: HOME | End: 2023-01-03
Attending: EMERGENCY MEDICINE | Admitting: EMERGENCY MEDICINE
Payer: COMMERCIAL

## 2023-01-03 VITALS
HEIGHT: 69 IN | HEART RATE: 72 BPM | WEIGHT: 186 LBS | RESPIRATION RATE: 18 BRPM | DIASTOLIC BLOOD PRESSURE: 65 MMHG | BODY MASS INDEX: 27.55 KG/M2 | OXYGEN SATURATION: 97 % | TEMPERATURE: 97.1 F | SYSTOLIC BLOOD PRESSURE: 146 MMHG

## 2023-01-03 DIAGNOSIS — M79.89 LEFT ARM SWELLING: ICD-10-CM

## 2023-01-03 PROCEDURE — 99284 EMERGENCY DEPT VISIT MOD MDM: CPT | Mod: 25

## 2023-01-03 PROCEDURE — 93990 DOPPLER FLOW TESTING: CPT

## 2023-01-03 PROCEDURE — 99285 EMERGENCY DEPT VISIT HI MDM: CPT | Mod: 25

## 2023-01-03 ASSESSMENT — ACTIVITIES OF DAILY LIVING (ADL)
ADLS_ACUITY_SCORE: 35
ADLS_ACUITY_SCORE: 35

## 2023-01-03 NOTE — ED TRIAGE NOTES
Patient had dialysis yesterday, last night onset of upper arm down to hand swelling on left from fistula site.  He states no problems with accessing/deacessing and no pain.       Triage Assessment     Row Name 01/03/23 1030       Triage Assessment (Adult)    Airway WDL WDL       Respiratory WDL    Respiratory WDL WDL       Skin Circulation/Temperature WDL    Skin Circulation/Temperature WDL WDL       Cardiac WDL    Cardiac WDL WDL       Peripheral/Neurovascular WDL    Peripheral Neurovascular WDL WDL       Cognitive/Neuro/Behavioral WDL    Cognitive/Neuro/Behavioral WDL WDL
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
## 2023-02-07 ENCOUNTER — HOSPITAL ENCOUNTER (OUTPATIENT)
Dept: CT IMAGING | Facility: HOSPITAL | Age: 78
Discharge: HOME | End: 2023-02-07
Attending: INTERNAL MEDICINE | Admitting: INTERNAL MEDICINE
Payer: COMMERCIAL

## 2023-02-07 DIAGNOSIS — R22.32 ARM MASS, LEFT: ICD-10-CM

## 2023-02-07 DIAGNOSIS — R22.2 CHEST MASS: ICD-10-CM

## 2023-02-07 PROCEDURE — 71260 CT THORAX DX C+: CPT

## 2023-02-07 PROCEDURE — 250N000011 HC RX IP 250 OP 636: Performed by: INTERNAL MEDICINE

## 2023-02-07 RX ORDER — IOPAMIDOL 755 MG/ML
75 INJECTION, SOLUTION INTRAVASCULAR ONCE
Status: COMPLETED | OUTPATIENT
Start: 2023-02-07 | End: 2023-02-07

## 2023-02-07 RX ADMIN — IOPAMIDOL 75 ML: 755 INJECTION, SOLUTION INTRAVENOUS at 19:31

## 2023-03-08 ENCOUNTER — HOSPITAL ENCOUNTER (EMERGENCY)
Facility: CLINIC | Age: 78
Discharge: HOME | End: 2023-03-08
Attending: STUDENT IN AN ORGANIZED HEALTH CARE EDUCATION/TRAINING PROGRAM | Admitting: STUDENT IN AN ORGANIZED HEALTH CARE EDUCATION/TRAINING PROGRAM
Payer: COMMERCIAL

## 2023-03-08 VITALS
HEART RATE: 78 BPM | SYSTOLIC BLOOD PRESSURE: 153 MMHG | TEMPERATURE: 98.2 F | RESPIRATION RATE: 18 BRPM | DIASTOLIC BLOOD PRESSURE: 77 MMHG | OXYGEN SATURATION: 97 %

## 2023-03-08 DIAGNOSIS — T82.838A: ICD-10-CM

## 2023-03-08 PROCEDURE — 99283 EMERGENCY DEPT VISIT LOW MDM: CPT

## 2023-03-08 ASSESSMENT — ACTIVITIES OF DAILY LIVING (ADL)
ADLS_ACUITY_SCORE: 35
ADLS_ACUITY_SCORE: 35

## 2023-03-08 NOTE — ED TRIAGE NOTES
Patient arrived via EMS. Patient was at dialysis finished his dialysis run without complication but when the nurse de-accessed the fistula she couldn't stop the bleeding with normal pressure gauze. Clamp applied and patient sent here to be evaluated.       Triage Assessment     Row Name 03/08/23 2474       Triage Assessment (Adult)    Airway WDL WDL       Respiratory WDL    Respiratory WDL WDL       Skin Circulation/Temperature WDL    Skin Circulation/Temperature WDL WDL       Cardiac WDL    Cardiac WDL WDL       Peripheral/Neurovascular WDL    Peripheral Neurovascular WDL WDL       Cognitive/Neuro/Behavioral WDL    Cognitive/Neuro/Behavioral WDL WDL
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
## 2023-03-09 NOTE — DISCHARGE INSTRUCTIONS
We spoke with your Nephrology team, they will contact you regarding timing of your fistulagram. Please return to the ED if your bleeding starts again, or any signs of infection.

## 2023-04-27 ENCOUNTER — ANCILLARY PROCEDURE (OUTPATIENT)
Dept: GENERAL RADIOLOGY | Facility: CLINIC | Age: 78
End: 2023-04-27
Payer: COMMERCIAL

## 2023-04-27 ENCOUNTER — OFFICE VISIT (OUTPATIENT)
Dept: FAMILY MEDICINE | Facility: CLINIC | Age: 78
End: 2023-04-27
Payer: COMMERCIAL

## 2023-04-27 VITALS
OXYGEN SATURATION: 100 % | DIASTOLIC BLOOD PRESSURE: 63 MMHG | HEART RATE: 77 BPM | BODY MASS INDEX: 27.03 KG/M2 | WEIGHT: 182.5 LBS | HEIGHT: 69 IN | SYSTOLIC BLOOD PRESSURE: 138 MMHG | RESPIRATION RATE: 16 BRPM

## 2023-04-27 DIAGNOSIS — R05.3 CHRONIC COUGH: Primary | ICD-10-CM

## 2023-04-27 DIAGNOSIS — R05.3 CHRONIC COUGH: ICD-10-CM

## 2023-04-27 DIAGNOSIS — H93.8X3 EAR FULLNESS, BILATERAL: ICD-10-CM

## 2023-04-27 PROCEDURE — 99213 OFFICE O/P EST LOW 20 MIN: CPT

## 2023-04-27 PROCEDURE — 71046 X-RAY EXAM CHEST 2 VIEWS: CPT | Mod: TC | Performed by: RADIOLOGY

## 2023-04-27 RX ORDER — FAMOTIDINE 10 MG
10 TABLET ORAL EVERY OTHER DAY
Qty: 30 TABLET | Refills: 0 | Status: SHIPPED | OUTPATIENT
Start: 2023-04-27 | End: 2023-06-26

## 2023-04-27 NOTE — PROGRESS NOTES
Assessment & Plan   Problem List Items Addressed This Visit        Nervous and Auditory    Ear fullness, bilateral     No evidence of excessive cerumen on exam.  Discused that with his at home remedies, it could be that he thinned out any cerumen to a point where it was able to be expelled from the ear naturally.  Additionally, it could be some underlying eustachian tube dysfunction.  They can try over-the-counter Flonase or for 1 to 2 weeks and monitor for improvement.            Respiratory    Chronic cough - Primary     Today, we discussed the various etiologies behind a chronic cough.  I did review the previous providers documentation, and I am in agreement that there are no red flag symptoms on exam today.  He has no blood in his sputum, no shortness of breath.  It is unchanged over the last couple of years.  He did review his recent echo as well.  In the absence of any shortness of breath or peripheral edema, I am less suspicious of a cardiac etiology, although was considered.  With no preceding viral infection, less likely a postinfectious cough.  A chest x-ray was obtained today to evaluate for any hyperinflation of the lungs potential COPD versus emphysema, but this was normal.  He also had a chest CT done with no significant findings, which is encouraging.  At this point, we will trial treatment for reflux as a potential cause behind his cough.  He is on dialysis with a GFR of 9, so we will dose his medication appropriately.  We will trial famotidine 10 mg every other day.  They will do this over the next 4 to 6 weeks and follow-up if not having improvement.  They are needing to establish care with a new provider at the [facility], as his previous primary care provider left, I discussed with them that they would need to schedule an establish care appointment with one of the providers.         Relevant Medications    famotidine (PEPCID) 10 MG tablet    Other Relevant Orders    XR Chest 2 Views  (Completed)      KARLI Leal CNP  [facility]    Brandee Mariano is a 77 year old, presenting for the following health issues:  Symptoms (Ear pain and wax build up more on the left than right, coughing out mucus)        4/27/2023     2:43 PM   Additional Questions   Roomed by Hernán Che MA   Accompanied by Self         4/27/2023     2:43 PM   Patient Reported Additional Medications   Patient reports taking the following new medications None     History is obtained from patient and his wife.  They are being seen today to discuss bilateral ear symptoms.  He reports a sensation of the ears being plugged.  Was initially having pain, the but this is no longer happening.  They initially tried over-the-counter drops to with no success.  She then tried olive oil to the ears, which he states relieved his pain, but they did not see any wax come out of the ears.  He will feel plugged today.    Veena, they would like to discuss García's chronic cough.  This has been present for a number of years.  Has been evaluated by couple different providers, with no real answers, which is why they are bringing it up today.  At 1 point, he was told to take Mucinex, which she does report is helpful.  They have also tried lemon juice which was not helpful.  The cough has not changed in severity or frequency.  Is productive with clear phlegm.  Denies any discolored phlegm.  He has no shortness of breath or difficulty breathing.  No swelling in his ankles, fevers or other infectious symptoms..  Does have a significant cardiac history, but had an echo in 2019 that was thought significant findings.  Unable to really identify any triggers, but believes that maybe it is worse after eating.  Reports that they did just have a chest CT done at [place]'s emergency room, although I cannot see these results.  Has a history of COPD and denies any history of significant viral infection.    History of Present Illness  "      Reason for visit:  Cough with phelgm and clocked up ears    He eats 2-3 servings of fruits and vegetables daily.He consumes 1 sweetened beverage(s) daily.He exercises with enough effort to increase his heart rate 9 or less minutes per day.  He exercises with enough effort to increase his heart rate 3 or less days per week.   He is taking medications regularly.     Review of Systems         Objective    /63 (BP Location: Right arm, Patient Position: Sitting, Cuff Size: Adult Regular)   Pulse 77   Resp 16   Ht 1.753 m (5' 9\")   Wt 82.8 kg (182 lb 8 oz)   SpO2 100%   BMI 26.95 kg/m    Body mass index is 26.95 kg/m .     Physical Exam  Vitals and nursing note reviewed.   Constitutional:       General: He is not in acute distress.     Appearance: Normal appearance.   HENT:      Right Ear: Tympanic membrane, ear canal and external ear normal.      Left Ear: Tympanic membrane, ear canal and external ear normal.      Ears:      Comments: Mild amount of cerumen bilaterally; non obsctructing  Cardiovascular:      Rate and Rhythm: Normal rate and regular rhythm.      Pulses: Normal pulses.      Heart sounds: Normal heart sounds.   Pulmonary:      Effort: Pulmonary effort is normal. No respiratory distress.      Breath sounds: No wheezing.      Comments: Intermittent cough appreciated  Neurological:      Mental Status: He is alert.      CXR - Reviewed and interpreted by me Normal- no infiltrates, effusions, pneumothoraces, cardiomegaly or masses              " 08-Feb-2017

## 2023-04-27 NOTE — ASSESSMENT & PLAN NOTE
No evidence of excessive cerumen on exam.  Discused that with his at home remedies, it could be that he thinned out any cerumen to a point where it was able to be expelled from the ear naturally.  Additionally, it could be some underlying eustachian tube dysfunction.  They can try over-the-counter Flonase or for 1 to 2 weeks and monitor for improvement.  
Today, we discussed the various etiologies behind a chronic cough.  I did review the previous providers documentation, and I am in agreement that there are no red flag symptoms on exam today.  He has no blood in his sputum, no shortness of breath.  It is unchanged over the last couple of years.  He did review his recent echo as well.  In the absence of any shortness of breath or peripheral edema, I am less suspicious of a cardiac etiology, although was considered.  With no preceding viral infection, less likely a postinfectious cough.  A chest x-ray was obtained today to evaluate for any hyperinflation of the lungs potential COPD versus emphysema, but this was normal.  He also had a chest CT done with no significant findings, which is encouraging.  At this point, we will trial treatment for reflux as a potential cause behind his cough.  He is on dialysis with a GFR of 9, so we will dose his medication appropriately.  We will trial famotidine 10 mg every other day.  They will do this over the next 4 to 6 weeks and follow-up if not having improvement.  They are needing to establish care with a new provider at the [facility], as his previous primary care provider left, I discussed with them that they would need to schedule an establish care appointment with one of the providers.  
Never smoker

## 2023-04-28 ENCOUNTER — TELEPHONE (OUTPATIENT)
Dept: FAMILY MEDICINE | Facility: CLINIC | Age: 78
End: 2023-04-28
Payer: COMMERCIAL

## 2023-04-28 NOTE — TELEPHONE ENCOUNTER
Message relayed to García.    ----- Message from KARLI Leal CNP sent at 4/27/2023  3:51 PM CDT -----  Please call patient and let him know that lungs look clear. Thank you!   KARLI Leal CNP on 4/27/2023 at 3:51 PM

## 2023-05-16 DIAGNOSIS — Z87.898 HISTORY OF PREDIABETES: ICD-10-CM

## 2023-05-17 RX ORDER — SIMVASTATIN 40 MG
40 TABLET ORAL AT BEDTIME
Qty: 90 TABLET | Refills: 0 | Status: SHIPPED | OUTPATIENT
Start: 2023-05-17 | End: 2023-07-27

## 2023-05-17 NOTE — TELEPHONE ENCOUNTER
"Routing refill request to provider for review/approval because:  Labs not current:  LDL    Last Written Prescription Date:  5/3/22  Last Fill Quantity: 90,  # refills: 3   Last office visit provider:  4/27/23     Requested Prescriptions   Pending Prescriptions Disp Refills     simvastatin (ZOCOR) 40 MG tablet [Pharmacy Med Name: SIMVASTATIN TABS 40MG] 90 tablet 3     Sig: TAKE 1 TABLET AT BEDTIME       Statins Protocol Failed - 5/17/2023  3:50 PM        Failed - LDL on file in past 12 months     Recent Labs   Lab Test 05/03/22  1243   LDL <5             Failed - Recent (12 mo) or future (30 days) visit within the authorizing provider's specialty     Patient has had an office visit with the authorizing provider or a provider within the authorizing providers department within the previous 12 mos or has a future within next 30 days. See \"Patient Info\" tab in inbasket, or \"Choose Columns\" in Meds & Orders section of the refill encounter.              Passed - No abnormal creatine kinase in past 12 months     No lab results found.             Passed - Medication is active on med list        Passed - Patient is age 18 or older             Kirill Law RN 05/17/23 3:51 PM  "

## 2023-06-14 ENCOUNTER — TELEPHONE (OUTPATIENT)
Dept: FAMILY MEDICINE | Facility: CLINIC | Age: 78
End: 2023-06-14
Payer: COMMERCIAL

## 2023-06-14 NOTE — TELEPHONE ENCOUNTER
Patient is going to go to walk-in care tomorrow for re-evaluation.  They will also be speaking to the [facility] to hopefully establish-care with a Doctor closer to home.

## 2023-06-14 NOTE — TELEPHONE ENCOUNTER
General Call    Reason for Call:  Chronic cough and Phlegm  Prescribed famotidine (PEPCID) 10 MG tablet      What are your questions or concerns:  Patient's phlegm has now turned yellow and the medication hasn't been helping to reduce phlegm or chronic cough like they were hoping. He is wondering if they can increase medication dosage if that will help or if there is an alt med he can take instead. These continuing issues are making his nights very sleepless.   They are also wondering if the yellow phlegm is a sign of infection if theres something else they should be doing about that as well.    Declined triage, would like a response asap. Please have covering address.    Please advise.    Date of last appointment with provider: 04/27/23    Okay to leave a detailed message?: Yes at Cell number on file:    Telephone Information:   Mobile [phone]

## 2023-06-14 NOTE — TELEPHONE ENCOUNTER
Per notes on 4/27  Patient should follow up if no improvement  Appointment is needed.  He needs est care appointment.  Please call and schedule

## 2023-06-15 ENCOUNTER — OFFICE VISIT (OUTPATIENT)
Dept: FAMILY MEDICINE | Facility: CLINIC | Age: 78
End: 2023-06-15
Payer: COMMERCIAL

## 2023-06-15 VITALS
HEART RATE: 73 BPM | WEIGHT: 180.5 LBS | TEMPERATURE: 97.4 F | HEIGHT: 69 IN | BODY MASS INDEX: 26.73 KG/M2 | DIASTOLIC BLOOD PRESSURE: 64 MMHG | OXYGEN SATURATION: 96 % | RESPIRATION RATE: 15 BRPM | SYSTOLIC BLOOD PRESSURE: 134 MMHG

## 2023-06-15 DIAGNOSIS — I25.10 CORONARY ARTERY DISEASE INVOLVING NATIVE CORONARY ARTERY OF NATIVE HEART WITHOUT ANGINA PECTORIS: ICD-10-CM

## 2023-06-15 DIAGNOSIS — N18.4 CHRONIC KIDNEY DISEASE, STAGE IV (SEVERE) (H): ICD-10-CM

## 2023-06-15 DIAGNOSIS — L60.8 TOENAIL DEFORMITY: ICD-10-CM

## 2023-06-15 DIAGNOSIS — E11.22 TYPE 2 DIABETES MELLITUS WITH CHRONIC KIDNEY DISEASE ON CHRONIC DIALYSIS, WITHOUT LONG-TERM CURRENT USE OF INSULIN (H): ICD-10-CM

## 2023-06-15 DIAGNOSIS — R05.3 CHRONIC COUGH: Primary | ICD-10-CM

## 2023-06-15 DIAGNOSIS — Z99.2 TYPE 2 DIABETES MELLITUS WITH CHRONIC KIDNEY DISEASE ON CHRONIC DIALYSIS, WITHOUT LONG-TERM CURRENT USE OF INSULIN (H): ICD-10-CM

## 2023-06-15 DIAGNOSIS — N18.6 TYPE 2 DIABETES MELLITUS WITH CHRONIC KIDNEY DISEASE ON CHRONIC DIALYSIS, WITHOUT LONG-TERM CURRENT USE OF INSULIN (H): ICD-10-CM

## 2023-06-15 LAB
ANION GAP SERPL CALCULATED.3IONS-SCNC: 15 MMOL/L (ref 7–15)
BUN SERPL-MCNC: 42.8 MG/DL (ref 8–23)
CALCIUM SERPL-MCNC: 9.9 MG/DL (ref 8.8–10.2)
CHLORIDE SERPL-SCNC: 101 MMOL/L (ref 98–107)
CHOLEST SERPL-MCNC: 94 MG/DL
CREAT SERPL-MCNC: 6.24 MG/DL (ref 0.67–1.17)
DEPRECATED HCO3 PLAS-SCNC: 24 MMOL/L (ref 22–29)
GFR SERPL CREATININE-BSD FRML MDRD: 9 ML/MIN/1.73M2
GLUCOSE SERPL-MCNC: 128 MG/DL (ref 70–99)
HBA1C MFR BLD: 5.4 % (ref 0–5.6)
HDLC SERPL-MCNC: 42 MG/DL
HGB BLD-MCNC: 10.3 G/DL (ref 13.3–17.7)
LDLC SERPL CALC-MCNC: 18 MG/DL
NONHDLC SERPL-MCNC: 52 MG/DL
POTASSIUM SERPL-SCNC: 5.2 MMOL/L (ref 3.4–5.3)
SODIUM SERPL-SCNC: 140 MMOL/L (ref 136–145)
TRIGL SERPL-MCNC: 169 MG/DL

## 2023-06-15 PROCEDURE — 85018 HEMOGLOBIN: CPT | Performed by: NURSE PRACTITIONER

## 2023-06-15 PROCEDURE — 80061 LIPID PANEL: CPT | Performed by: NURSE PRACTITIONER

## 2023-06-15 PROCEDURE — 99214 OFFICE O/P EST MOD 30 MIN: CPT | Performed by: NURSE PRACTITIONER

## 2023-06-15 PROCEDURE — 36415 COLL VENOUS BLD VENIPUNCTURE: CPT | Performed by: NURSE PRACTITIONER

## 2023-06-15 PROCEDURE — 83036 HEMOGLOBIN GLYCOSYLATED A1C: CPT | Performed by: NURSE PRACTITIONER

## 2023-06-15 PROCEDURE — 80048 BASIC METABOLIC PNL TOTAL CA: CPT | Performed by: NURSE PRACTITIONER

## 2023-06-15 RX ORDER — FAMOTIDINE 20 MG/1
20 TABLET, FILM COATED ORAL EVERY OTHER DAY
Qty: 15 TABLET | Refills: 1 | Status: SHIPPED | OUTPATIENT
Start: 2023-06-15 | End: 2023-08-14

## 2023-06-15 NOTE — PROGRESS NOTES
"  Assessment & Plan     Chronic cough  Patient on very low dose due to kidney function. Will increase dosage to max dose for patient's kidney function. Extra phlegm may still be related to reflux but inadequately treated due to necessity of such a low dose  If still not improving will place referral to ENT for further evaluation.   Does not seem to be infection   - famotidine (PEPCID) 20 MG tablet; Take 1 tablet (20 mg) by mouth every other day for 60 days    Toenail deformity  Patient has issues cutting nails on own. May benefit from routine visits from podiatry and having them look at feet. He did cut toe last time he was cutting nails-does not seem infected today, but could have poor outcome if gets infection from cutting skin.   - Orthopedic  Referral; Future    Chronic kidney disease, stage IV (severe) (H)  Routine monitoring  - BASIC METABOLIC PANEL; Future  - Hemoglobin; Future      Coronary artery disease involving native coronary artery of native heart without angina pectoris    - Lipid panel reflex to direct LDL Non-fasting; Future    Type 2 diabetes mellitus with chronic kidney disease on chronic dialysis, without long-term current use of insulin (H)  Patient reports has not been elevated for several years. Was able to get A1c under control with diet.  Lab Results   Component Value Date    A1C 5.4 06/15/2023    A1C 5.4 05/03/2022    A1C 5.1 04/20/2021    A1C 5.3 04/30/2019    A1C 5.8 06/06/2017       - HEMOGLOBIN A1C; Future               BMI:   Estimated body mass index is 26.64 kg/m  as calculated from the following:    Height as of this encounter: 1.753 m (5' 9.02\").    Weight as of this encounter: 81.9 kg (180 lb 8 oz).           KARLI LI CNP  [facility]    Brandee Mariano is a 77 year old, presenting for the following health issues:  Chronic Cough (Cough since April, pt states coughing up \"milky yellow\" mucus)        6/15/2023     1:07 PM " "  Additional Questions   Roomed by Lisa W   Accompanied by Wife, Kirsten     History of Present Illness       Reason for visit:  Long term cough lits of plemgh now milky yellow  and sometimes dry cough  Also quick look at toe that i cut while doing toenails    He eats 2-3 servings of fruits and vegetables daily.He consumes 0 sweetened beverage(s) daily.He exercises with enough effort to increase his heart rate 9 or less minutes per day.  He exercises with enough effort to increase his heart rate 3 or less days per week.   He is taking medications regularly.     Gets \"phlegm\" a lot has been ongoing for a year. Have not found anything to explain cause. Chest xray about 2 months ago was normal. Also had normal Chest CT. When seen about 2 months ago was started on famotidine 10 mg every other day due to kidney function. Patient has not seen much improvement with this.  Dialysis 3 days a week. Was taking mucus relief pill.             Review of Systems         Objective    /64 (BP Location: Right arm, Patient Position: Sitting, Cuff Size: Adult Regular)   Pulse 73   Temp 97.4  F (36.3  C) (Oral)   Resp 15   Ht 1.753 m (5' 9.02\")   Wt 81.9 kg (180 lb 8 oz)   SpO2 96%   BMI 26.64 kg/m    Body mass index is 26.64 kg/m .  Physical Exam  Constitutional:       Appearance: Normal appearance.   Cardiovascular:      Rate and Rhythm: Normal rate and regular rhythm.   Pulmonary:      Effort: Pulmonary effort is normal.      Breath sounds: Normal breath sounds.   Skin:     Comments: Left 4th toe-scab present- no warmth, drainage, or erythema. Non tender to touch.    Neurological:      General: No focal deficit present.      Mental Status: He is alert and oriented to person, place, and time.                            "

## 2023-06-16 ENCOUNTER — TELEPHONE (OUTPATIENT)
Dept: FAMILY MEDICINE | Facility: CLINIC | Age: 78
End: 2023-06-16
Payer: COMMERCIAL

## 2023-06-16 NOTE — TELEPHONE ENCOUNTER
----- Message from KARLI hKan CNP sent at 6/16/2023  9:57 AM CDT -----  Please call patient-Electrolytes are normal. Kidney function is elevated as expected. No significant changes.

## 2023-07-27 ENCOUNTER — OFFICE VISIT (OUTPATIENT)
Dept: FAMILY MEDICINE | Facility: CLINIC | Age: 78
End: 2023-07-27
Payer: COMMERCIAL

## 2023-07-27 VITALS
HEIGHT: 69 IN | OXYGEN SATURATION: 98 % | TEMPERATURE: 98 F | BODY MASS INDEX: 26.5 KG/M2 | HEART RATE: 77 BPM | SYSTOLIC BLOOD PRESSURE: 130 MMHG | DIASTOLIC BLOOD PRESSURE: 70 MMHG | WEIGHT: 178.9 LBS

## 2023-07-27 DIAGNOSIS — N25.81 HYPERPARATHYROIDISM, SECONDARY RENAL (H): ICD-10-CM

## 2023-07-27 DIAGNOSIS — E78.5 HYPERLIPIDEMIA LDL GOAL <70: ICD-10-CM

## 2023-07-27 DIAGNOSIS — I77.0 A-V FISTULA (H): ICD-10-CM

## 2023-07-27 DIAGNOSIS — M62.81 GENERALIZED MUSCLE WEAKNESS: ICD-10-CM

## 2023-07-27 DIAGNOSIS — C64.1 RENAL CELL CARCINOMA OF RIGHT KIDNEY (H): ICD-10-CM

## 2023-07-27 DIAGNOSIS — N18.6 END STAGE RENAL FAILURE ON DIALYSIS (H): Chronic | ICD-10-CM

## 2023-07-27 DIAGNOSIS — R41.3 MEMORY LOSS: ICD-10-CM

## 2023-07-27 DIAGNOSIS — Z99.2 END STAGE RENAL FAILURE ON DIALYSIS (H): Chronic | ICD-10-CM

## 2023-07-27 DIAGNOSIS — Z00.00 MEDICARE ANNUAL WELLNESS VISIT, SUBSEQUENT: Primary | ICD-10-CM

## 2023-07-27 DIAGNOSIS — D69.6 THROMBOCYTOPENIA (H): ICD-10-CM

## 2023-07-27 DIAGNOSIS — J30.1 SEASONAL ALLERGIC RHINITIS DUE TO POLLEN: ICD-10-CM

## 2023-07-27 LAB
CHOLEST SERPL-MCNC: 94 MG/DL
HDLC SERPL-MCNC: 32 MG/DL
LDLC SERPL CALC-MCNC: <4 MG/DL
NONHDLC SERPL-MCNC: 62 MG/DL
TRIGL SERPL-MCNC: 337 MG/DL

## 2023-07-27 PROCEDURE — 80061 LIPID PANEL: CPT | Performed by: FAMILY MEDICINE

## 2023-07-27 PROCEDURE — 99214 OFFICE O/P EST MOD 30 MIN: CPT | Mod: 25 | Performed by: FAMILY MEDICINE

## 2023-07-27 PROCEDURE — 36415 COLL VENOUS BLD VENIPUNCTURE: CPT | Performed by: FAMILY MEDICINE

## 2023-07-27 PROCEDURE — G0439 PPPS, SUBSEQ VISIT: HCPCS | Performed by: FAMILY MEDICINE

## 2023-07-27 RX ORDER — PREDNISONE 20 MG/1
TABLET ORAL
COMMUNITY
End: 2023-07-27

## 2023-07-27 RX ORDER — CINACALCET 60 MG/1
1 TABLET, FILM COATED ORAL DAILY
COMMUNITY
Start: 2023-07-07

## 2023-07-27 RX ORDER — SIMVASTATIN 40 MG
40 TABLET ORAL AT BEDTIME
Qty: 90 TABLET | Refills: 3 | Status: SHIPPED | OUTPATIENT
Start: 2023-07-27 | End: 2024-10-04

## 2023-07-27 ASSESSMENT — ENCOUNTER SYMPTOMS
NAUSEA: 0
FEVER: 0
HEMATOCHEZIA: 0
ABDOMINAL PAIN: 0
MYALGIAS: 0
PALPITATIONS: 0
HEARTBURN: 0
SHORTNESS OF BREATH: 0
SORE THROAT: 0
PARESTHESIAS: 0
EYE PAIN: 0
DYSURIA: 0
FREQUENCY: 0
DIARRHEA: 0
CONSTIPATION: 0
ARTHRALGIAS: 0
COUGH: 1
NERVOUS/ANXIOUS: 0
CHILLS: 0
HEADACHES: 0
DIZZINESS: 1
HEMATURIA: 0
JOINT SWELLING: 0
WEAKNESS: 1

## 2023-07-27 ASSESSMENT — PAIN SCALES - GENERAL: PAINLEVEL: NO PAIN (0)

## 2023-07-27 ASSESSMENT — ACTIVITIES OF DAILY LIVING (ADL): CURRENT_FUNCTION: MEDICATION ADMINISTRATION REQUIRES ASSISTANCE

## 2023-07-27 NOTE — PROGRESS NOTES
"SUBJECTIVE:   García is a 78 year old who presents for Preventive Visit.      7/27/2023     2:31 PM   Additional Questions   Accompanied by Wife-Kirsten       Are you in the first 12 months of your Medicare coverage?  No    Healthy Habits:     In general, how would you rate your overall health?  Fair    Frequency of exercise:  None    Do you usually eat at least 4 servings of fruit and vegetables a day, include whole grains    & fiber and avoid regularly eating high fat or \"junk\" foods?  No    Taking medications regularly:  Yes    Medication side effects:  Other    Ability to successfully perform activities of daily living:  Medication administration requires assistance    Home Safety:  Lack of grab bars in the bathroom    Hearing Impairment:  Need to ask people to speak up or repeat themselves    In the past 6 months, have you been bothered by leaking of urine?  No    In general, how would you rate your overall mental or emotional health?  Good    Additional concerns today:  No      End-stage renal failure: Patient has been on hemodialysis since about 2014.  He is being followed by nephrologist.    Hyperlipidemia: Patient is being prescribed simvastatin 40 mg, which has been managed by his primary care office in the past.    Seasonal allergies: Patient has phlegm in the back of his throat for last few months, initially was more significant and yellow in color.  He was recommended Pepcid every other day, which has been helpful.  He is asking if additional treatment can be done.    Memory loss: Patient has seen a neurologist in the past for memory loss.  His short-term memory is declining per wife.  Wife is helping him with ADLs and driving.    Generalized muscle weakness: Patient has difficulty sometimes with prolonged walking.  He was given a handicap placard previously and needs a renewal this fall.      Have you ever done Advance Care Planning? (For example, a Health Directive, POLST, or a discussion with a medical " provider or your loved ones about your wishes): Yes, advance care planning is on file.    Fall risk  Fallen 2 or more times in the past year?: No  Any fall with injury in the past year?: No  Cognitive Screening   1) Repeat 3 items (Leader, Season, Table)    2) Clock draw: NORMAL  3) 3 item recall: Recalls NO objects   Results: 0 items recalled: PROBABLE COGNITIVE IMPAIRMENT, **INFORM PROVIDER**    Mini-CogTM Copyright SINCERE Miller. Licensed by the author for use in [facility] [place]; reprinted with permission (ludwig@[facility]). All rights reserved.      Do you have sleep apnea, excessive snoring or daytime drowsiness? : yes    Reviewed and updated as needed this visit by clinical staff   Tobacco  Allergies  Meds              Reviewed and updated as needed this visit by Provider                 Social History     Tobacco Use    Smoking status: Former    Smokeless tobacco: Never    Tobacco comments:     6/15/23-Quit smoking over 30 years.    Substance Use Topics    Alcohol use: No             7/27/2023     2:26 PM   Alcohol Use   Prescreen: >3 drinks/day or >7 drinks/week? Not Applicable          No data to display              Do you have a current opioid prescription? No  Do you use any other controlled substances or medications that are not prescribed by a provider? None              Current providers sharing in care for this patient include:   Patient Care Team:  Riki Martinez MD as PCP - General (Family Medicine)  Jimmy Cross MD as Assigned Heart and Vascular Provider  Rosemarie Hooker APRN CNP as Assigned PCP  Narayan Mix DPM as Assigned Surgical Provider    The following health maintenance items are reviewed in Epic and correct as of today:  Health Maintenance   Topic Date Due    HEPATITIS B IMMUNIZATION (14 of 14 - Risk Dialysis 4-dose series) 06/19/2021    DIABETIC FOOT EXAM  05/03/2023    ANNUAL REVIEW OF HM ORDERS  05/03/2023    INFLUENZA VACCINE (1) 09/01/2023    BMP  09/15/2023    EYE EXAM   "09/16/2023    MEDICARE ANNUAL WELLNESS VISIT  09/27/2023    A1C  12/15/2023    HEMOGLOBIN  12/15/2023    LIPID  06/15/2024    FALL RISK ASSESSMENT  07/27/2024    ADVANCE CARE PLANNING  09/27/2027    COLORECTAL CANCER SCREENING  09/10/2029    DTAP/TDAP/TD IMMUNIZATION (6 - Td or Tdap) 05/03/2032    PARATHYROID  Completed    PHOSPHORUS  Completed    HEPATITIS C SCREENING  Completed    PHQ-2 (once per calendar year)  Completed    Pneumococcal Vaccine: 65+ Years  Completed    URINALYSIS  Completed    ALK PHOS  Completed    ZOSTER IMMUNIZATION  Completed    COVID-19 Vaccine  Completed    IPV IMMUNIZATION  Aged Out    MENINGITIS IMMUNIZATION  Aged Out    MICROALBUMIN  Discontinued    LUNG CANCER SCREENING  Discontinued               Review of Systems   Constitutional:  Negative for chills and fever.   HENT:  Negative for congestion, ear pain, hearing loss and sore throat.    Eyes:  Negative for pain and visual disturbance.   Respiratory:  Positive for cough. Negative for shortness of breath.    Cardiovascular:  Negative for chest pain, palpitations and peripheral edema.   Gastrointestinal:  Negative for abdominal pain, constipation, diarrhea, heartburn, hematochezia and nausea.   Genitourinary:  Negative for dysuria, frequency, genital sores, hematuria, impotence and urgency.   Musculoskeletal:  Negative for arthralgias, joint swelling and myalgias.   Skin:  Negative for rash.   Neurological:  Positive for dizziness and weakness. Negative for headaches and paresthesias.   Psychiatric/Behavioral:  Negative for mood changes. The patient is not nervous/anxious.          OBJECTIVE:   Ht 1.753 m (5' 9.02\")   Wt 81.1 kg (178 lb 14.4 oz)   BMI 26.41 kg/m   Estimated body mass index is 26.41 kg/m  as calculated from the following:    Height as of this encounter: 1.753 m (5' 9.02\").    Weight as of this encounter: 81.1 kg (178 lb 14.4 oz).  Physical Exam  GENERAL: healthy, alert and no distress  HENT: nose and mouth without " "ulcers or lesions, oropharynx clear, and oral mucous membranes moist  RESP: lungs clear to auscultation - no rales, rhonchi or wheezes  CV: regular rate and rhythm, normal S1 S2, no S3 or S4, no murmur, click or rub, no peripheral edema and peripheral pulses strong  MS: no gross musculoskeletal defects noted, no edema    Diagnostic Test Results:  Labs reviewed in Epic    ASSESSMENT / PLAN:   1. Medicare annual wellness visit, subsequent  Advised healthy lifestyle.    2. End stage renal failure on dialysis (H)  Started dialysis in 2014.  Continue following with nephrologist.    3. Renal cell carcinoma of right kidney (H)  Status post right nephrectomy in 2007.    4. A-V fistula (H)  Being used for dialysis without problems currently.  Continue following with nephrologist and vascular surgeon.    5. Hyperparathyroidism, secondary renal (H)  Stable.  Continue following with nephrologist.    6. Thrombocytopenia (H)  Stable, mild and secondary to renal failure.  Continue following with dialysis.    7. Hyperlipidemia LDL goal <70  Refill simvastatin 40 mg daily.  Check labs and notify with results.  - Lipid Profile; Future  - Lipid Profile    8. Seasonal allergic rhinitis due to pollen  New problem. Trial of Flonase.    9. Memory loss  Patient and wife declined referral to neurology at this time.  Wife will monitor patient and assist as needed.  Wife is doing most of the driving now.    10. Generalized muscle weakness  Secondary to dialysis.  Handicap placard form completed.          Patient has been advised of split billing requirements and indicates understanding: Yes      COUNSELING:  Reviewed preventive health counseling, as reflected in patient instructions       Regular exercise       Healthy diet/nutrition      BMI:   Estimated body mass index is 26.41 kg/m  as calculated from the following:    Height as of this encounter: 1.753 m (5' 9.02\").    Weight as of this encounter: 81.1 kg (178 lb 14.4 oz).         He " reports that he has quit smoking. He has never used smokeless tobacco.      Appropriate preventive services were discussed with this patient, including applicable screening as appropriate for cardiovascular disease, diabetes, osteopenia/osteoporosis, and glaucoma.  As appropriate for age/gender, discussed screening for colorectal cancer, prostate cancer, breast cancer, and cervical cancer. Checklist reviewing preventive services available has been given to the patient.    Reviewed patients plan of care and provided an AVS. The Complex Care Plan (for patients with higher acuity and needing more deliberate coordination of services) for García meets the Care Plan requirement. This Care Plan has been established and reviewed with the Patient and spouse.          Riki Martinez MD  [facility]    Identified Health Risks:  I have reviewed Opioid Use Disorder and Substance Use Disorder risk factors and made any needed referrals. The patient was provided with suggestions to help him develop a healthy physical lifestyle.  He is at risk for lack of exercise and has been provided with information to increase physical activity for the benefit of his well-being.  The patient was counseled and encouraged to consider modifying their diet and eating habits. He was provided with information on recommended healthy diet options.  The patient was provided with written information regarding signs of hearing loss.

## 2023-07-27 NOTE — PATIENT INSTRUCTIONS
Patient Education   Personalized Prevention Plan  You are due for the preventive services outlined below.  Your care team is available to assist you in scheduling these services.  If you have already completed any of these items, please share that information with your care team to update in your medical record.  Health Maintenance Due   Topic Date Due     Hepatitis B Vaccine (14 of 14 - Risk Dialysis 4-dose series) 06/19/2021     Diabetic Foot Exam  05/03/2023     Your Health Risk Assessment indicates you feel you are not in good health    A healthy lifestyle helps keep the body fit and the mind alert. It helps protect you from disease, helps you fight disease, and helps prevent chronic disease (disease that doesn't go away) from getting worse. This is important as you get older and begin to notice twinges in muscles and joints and a decline in the strength and stamina you once took for granted. A healthy lifestyle includes good healthcare, good nutrition, weight control, recreation, and regular exercise. Avoid harmful substances and do what you can to keep safe. Another part of a healthy lifestyle is stay mentally active and socially involved.    Good healthcare   Have a wellness visit every year.   If you have new symptoms, let us know right away. Don't wait until the next checkup.   Take medicines exactly as prescribed and keep your medicines in a safe place. Tell us if your medicine causes problems.   Healthy diet and weight control   Eat 3 or 4 small, nutritious, low-fat, high-fiber meals a day. Include a variety of fruits, vegetables, and whole-grain foods.   Make sure you get enough calcium in your diet. Calcium, vitamin D, and exercise help prevent osteoporosis (bone thinning).   If you live alone, try eating with others when you can. That way you get a good meal and have company while you eat it.   Try to keep a healthy weight. If you eat more calories than your body uses for energy, it will be stored as  "fat and you will gain weight.     Recreation   Recreation is not limited to sports and team events. It includes any activity that provides relaxation, interest, enjoyment, and exercise. Recreation provides an outlet for physical, mental, and social energy. It can give a sense of worth and achievement. It can help you stay healthy.    Mental Exercise and Social Involvement  Mental and emotional health is as important as physical health. Keep in touch with friends and family. Stay as active as possible. Continue to learn and challenge yourself.   Things you can do to stay mentally active are:  Learn something new, like a foreign language or musical instrument.   Play SCRABBLE or do crossword puzzles. If you cannot find people to play these games with you at home, you can play them with others on your computer through the Internet.   Join a games club--anything from card games to chess or checkers or lawn bowling.   Start a new hobby.   Go back to school.   Volunteer.   Read.   Keep up with world events.  Learning About Being Physically Active  What is physical activity?     Being physically active means doing any kind of activity that gets your body moving.  The types of physical activity that can help you get fit and stay healthy include:  Aerobic or \"cardio\" activities. These make your heart beat faster and make you breathe harder, such as brisk walking, riding a bike, or running. They strengthen your heart and lungs and build up your endurance.  Strength training activities. These make your muscles work against, or \"resist,\" something. Examples include lifting weights or doing push-ups. These activities help tone and strengthen your muscles and bones.  Stretches. These let you move your joints and muscles through their full range of motion. Stretching helps you be more flexible.  Reaching a balance between these three types of physical activity is important because each one contributes to your overall " "fitness.  What are the benefits of being active?  Being active is one of the best things you can do for your health. It helps you to:  Feel stronger and have more energy to do all the things you like to do.  Focus better at school or work.  Feel, think, and sleep better.  Reach and stay at a healthy weight.  Lose fat and build lean muscle.  Lower your risk for serious health problems, including diabetes, heart attack, high blood pressure, and some cancers.  Keep your heart, lungs, bones, muscles, and joints strong and healthy.  How can you make being active part of your life?  Start slowly. Make it your long-term goal to get at least 30 minutes of exercise on most days of the week. Walking is a good choice. You also may want to do other activities, such as running, swimming, cycling, or playing tennis or team sports.  Pick activities that you like--ones that make your heart beat faster, your muscles stronger, and your muscles and joints more flexible. If you find more than one thing you like doing, do them all. You don't have to do the same thing every day.  Get your heart pumping every day. Any activity that makes your heart beat faster and keeps it at that rate for a while counts.  Here are some great ways to get your heart beating faster:  Go for a brisk walk, run, or bike ride.  Go for a hike or swim.  Go in-line skating.  Play a game of touch football, basketball, or soccer.  Ride a bike.  Play tennis or racquetball.  Climb stairs.  Even some household chores can be aerobic--just do them at a faster pace. Vacuuming, raking or mowing the lawn, sweeping the garage, and washing and waxing the car all can help get your heart rate up.  Strengthen your muscles during the week. You don't have to lift heavy weights or grow big, bulky muscles to get stronger. Doing a few simple activities that make your muscles work against, or \"resist,\" something can help you get stronger.  For example, you can:  Do push-ups or " "sit-ups, which use your own body weight as resistance.  Lift weights or dumbbells or use stretch bands at home or in a gym or community center.  Stretch your muscles often. Stretching will help you as you become more active. It can help you stay flexible, loosen tight muscles, and avoid injury. It can also help improve your balance and posture and can be a great way to relax.  Be sure to stretch the muscles you'll be using when you work out. It's best to warm your muscles slightly before you stretch them. Walk or do some other light aerobic activity for a few minutes, and then start stretching.  When you stretch your muscles:  Do it slowly. Stretching is not about going fast or making sudden movements.  Don't push or bounce during a stretch.  Hold each stretch for at least 15 to 30 seconds, if you can. You should feel a stretch in the muscle, but not pain.  Breathe out as you do the stretch. Then breathe in as you hold the stretch. Don't hold your breath.  If you're worried about how more activity might affect your health, have a checkup before you start. Follow any special advice your doctor gives you for getting a smart start.  Where can you learn more?  Go to https://www.[employer].net/patiented  Enter W332 in the search box to learn more about \"Learning About Being Physically Active.\"  Current as of: October 10, 2022               Content Version: 13.7    2538-1940 [employer].   Care instructions adapted under license by your healthcare professional. If you have questions about a medical condition or this instruction, always ask your healthcare professional. [employer] disclaims any warranty or liability for your use of this information.      Learning About Dietary Guidelines  What are the Dietary Guidelines for Americans?     Dietary Guidelines for Americans provide tips for eating well and staying healthy. This helps reduce the risk for long-term (chronic) diseases.  These " guidelines recommend that you:  Eat and drink the right amount for you. The U.[place] government's food guide is called MyPlate. It can help you make your own well-balanced eating plan.  Try to balance your eating with your activity. This helps you stay at a healthy weight.  Drink alcohol in moderation, if at all.  Limit foods high in salt, saturated fat, trans fat, and added sugar.  These guidelines are from the U.S. Department of Agriculture and the U.S. Department of Health and Human Services. They are updated every 5 years.  What is MyPlate?  MyPlate is the U.[place] government's food guide. It can help you make your own well-balanced eating plan. A balanced eating plan means that you eat enough, but not too much, and that your food gives you the nutrients you need to stay healthy.  MyPlate focuses on eating plenty of whole grains, fruits, and vegetables, and on limiting fat and sugar. It is available online at www.ChooseMyPlate.gov.  How can you get started?  If you're trying to eat healthier, you can slowly change your eating habits over time. You don't have to make big changes all at once. Start by adding one or two healthy foods to your meals each day.  Grains  Choose whole-grain breads and cereals and whole-wheat pasta and whole-grain crackers.  Vegetables  Eat a variety of vegetables every day. They have lots of nutrients and are part of a heart-healthy diet.  Fruits  Eat a variety of fruits every day. Fruits contain lots of nutrients. Choose fresh fruit instead of fruit juice.  Protein foods  Choose fish and lean poultry more often. Eat red meat and fried meats less often. Dried beans, tofu, and nuts are also good sources of protein.  Dairy  Choose low-fat or fat-free products from this food group. If you have problems digesting milk, try eating cheese or yogurt instead.  Fats and oils  Limit fats and oils if you're trying to cut calories. Choose healthy fats when you cook. These include canola oil and olive  "oil.  Where can you learn more?  Go to https://www.[employer].net/patiented  Enter D676 in the search box to learn more about \"Learning About Dietary Guidelines.\"  Current as of: March 1, 2023               Content Version: 13.7    7119-1606 [employer].   Care instructions adapted under license by your healthcare professional. If you have questions about a medical condition or this instruction, always ask your healthcare professional. [employer] disclaims any warranty or liability for your use of this information.      Activities of Daily Living    Your Health Risk Assessment indicates you have difficulties with activities of daily living such as housework, bathing, preparing meals, taking medication, etc. Please make a follow up appointment for us to address this issue in more detail.  Hearing Loss: Care Instructions  Overview     Hearing loss is a sudden or slow decrease in how well you hear. It can range from slight to profound. Permanent hearing loss can occur with aging. It also can happen when you are exposed long-term to loud noise. Examples include listening to loud music, riding motorcycles, or being around other loud machines.  Hearing loss can affect your work and home life. It can make you feel lonely or depressed. You may feel that you have lost your independence. But hearing aids and other devices can help you hear better and feel connected to others.  Follow-up care is a key part of your treatment and safety. Be sure to make and go to all appointments, and call your doctor if you are having problems. It's also a good idea to know your test results and keep a list of the medicines you take.  How can you care for yourself at home?  Avoid loud noises whenever possible. This helps keep your hearing from getting worse.  Always wear hearing protection around loud noises.  Wear a hearing aid as directed.  A professional can help you pick a hearing aid that will work best for " "you.  You can also get hearing aids over the counter for mild to moderate hearing loss.  Have hearing tests as your doctor suggests. They can show whether your hearing has changed. Your hearing aid may need to be adjusted.  Use other devices as needed. These may include:  Telephone amplifiers and hearing aids that can connect to a television, stereo, radio, or microphone.  Devices that use lights or vibrations. These alert you to the doorbell, a ringing telephone, or a baby monitor.  Television closed-captioning. This shows the words at the bottom of the screen. Most new TVs can do this.  TTY (text telephone). This lets you type messages back and forth on the telephone instead of talking or listening. These devices are also called TDD. When messages are typed on the keyboard, they are sent over the phone line to a receiving TTY. The message is shown on a monitor.  Use text messaging, social media, and email if it is hard for you to communicate by telephone.  Try to learn a listening technique called speechreading. It is not lipreading. You pay attention to people's gestures, expressions, posture, and tone of voice. These clues can help you understand what a person is saying. Face the person you are talking to, and have them face you. Make sure the lighting is good. You need to see the other person's face clearly.  Think about counseling if you need help to adjust to your hearing loss.  When should you call for help?  Watch closely for changes in your health, and be sure to contact your doctor if:    You think your hearing is getting worse.     You have new symptoms, such as dizziness or nausea.   Where can you learn more?  Go to https://www.[employer].net/patiented  Enter R798 in the search box to learn more about \"Hearing Loss: Care Instructions.\"  Current as of: March 1, 2023               Content Version: 13.7    9190-7394 [employer].   Care instructions adapted under license by your healthcare " professional. If you have questions about a medical condition or this instruction, always ask your healthcare professional. [employer] disclaims any warranty or liability for your use of this information.

## 2023-07-28 ENCOUNTER — TELEPHONE (OUTPATIENT)
Dept: FAMILY MEDICINE | Facility: CLINIC | Age: 78
End: 2023-07-28
Payer: COMMERCIAL

## 2023-07-28 NOTE — TELEPHONE ENCOUNTER
RN received a call from [facility].  [facility] calling to confirm that muscle weakness is noted on chart.  RN verified that yesterday visit addresses muscle weakness, and provider is aware.

## 2023-07-28 NOTE — TELEPHONE ENCOUNTER
----- Message from Riki Martinez MD sent at 7/28/2023 11:01 AM CDT -----  Please notify patient/wife that LDL or bad cholesterol is well controlled.  His triglycerides are elevated, which could be elevated if he was not truly fasting for 10 hours.  Triglycerides are made up of fat and sugar, we would advise him to watch fatty food and sugary food and try to exercise as much as possible.    Riki Martinez MD

## 2023-07-28 NOTE — TELEPHONE ENCOUNTER
LM for patient stating we are trying to connect regarding lab results    Upon patient call back, OKAY to relay results/message per provider

## 2023-08-21 ENCOUNTER — TELEPHONE (OUTPATIENT)
Dept: CARDIOLOGY | Facility: CLINIC | Age: 78
End: 2023-08-21
Payer: COMMERCIAL

## 2023-08-21 ENCOUNTER — NURSE TRIAGE (OUTPATIENT)
Dept: NURSING | Facility: CLINIC | Age: 78
End: 2023-08-21
Payer: COMMERCIAL

## 2023-08-21 DIAGNOSIS — I25.10 CORONARY ARTERY DISEASE INVOLVING NATIVE CORONARY ARTERY OF NATIVE HEART WITHOUT ANGINA PECTORIS: Primary | ICD-10-CM

## 2023-08-21 DIAGNOSIS — R47.81 SLURRED SPEECH: Primary | ICD-10-CM

## 2023-08-21 NOTE — TELEPHONE ENCOUNTER
RN called García's wife Kirsten.  RN educated Kirsten on sign's and symptoms to be aware of for a STROKE as requested by Dr. Martinez.   RN informed Kirsten that Dr. Martinez was placing an order for neurology.    Kirsten verbalized understanding of the care instructions.  RN encouraged her that no matter how minor the symptoms could be she is one of the people who knows García best.  So it would be best for him to be seen and evaluated in a ER if this happens again.     VÍCTOR DiazN, RN, PHN, PED-BC, CPEN  [facility]  08/21/23

## 2023-08-21 NOTE — TELEPHONE ENCOUNTER
Health Call Center    Phone Message    May a detailed message be left on voicemail: yes     Reason for Call: Other: Kirsten called requesting to speak with Dr. Cross or a member of García's care team regarding some upcoming appts Kirsten would like García to have. She does not want to schedule them until she speaks with someone though. Please reach out to Kirsten to schedule. Thank you!     Action Taken: Other: Cardiology    Travel Screening: Not Applicable    Thank you!  [facility]

## 2023-08-21 NOTE — TELEPHONE ENCOUNTER
Kirsten states that for the last week García has been confused.  Thursday had dialysis and felt going in and out during the run.  Friday alright.  Kirsten states that García did not go to the hospital.  Kirsten states that Cardiology thought it was a neurology situation.  Today Kirsten denies symptoms currently. García is feeling fine today.   Kirsten does not feel that García needs to go to ER now.  Kirsten is requesting to speak with Riki Constantino regarding a referral to Neurology.  Clinic please phone wife Kirsten within one hour.  Kirsten is aware of message from Cardiology.      Nurse Triage SBAR    Is this a 2nd Level Triage? YES, LICENSED PRACTITIONER REVIEW IS REQUIRED    Situation: Last week change in condition, slurred speech, increased fatigue and confusion.    Background: Patient had dialysis on Thursday August 17 th and states that García was going in and out at dialysis.  Patient did not faint.    Assessment: Wife contacted Cardiology today and was instructed to go to call 911/ED.  Wife denies symptoms currently and does not feel that he needs to go to ER.  Kirsten is requesting to speak with PCP/Clinic about a referral.    Protocol Recommended Disposition:   Callback By PCP Within 1 Hour    Recommendation: Clinic please phone patient back in one hour.       Routed to provider    Does the patient meet one of the following criteria for ADS visit consideration? 16+ years old, with an [facility] PCP     TIP  Providers, please consider if this condition is appropriate for management at one of our Acute and Diagnostic Services sites.     If patient is a good candidate, please use dotphrase <dot>triageresponse and select Refer to ADS to document.        Reason for Disposition   Nursing judgment    Protocols used: Information Only Call - No Triage-A-OH

## 2023-08-21 NOTE — TELEPHONE ENCOUNTER
Reviewed RN note and cardiology note. Patient had slurred speech, fatigue and confusion when receiving dialysis 4 days ago, which resolved spontaneously and have not recurred.    Please notify wife that I have placed a neurology referral.  If he experiences similar symptoms in the future, he should go to the emergency department for possible stroke.  Strokes are time sensitive and can be potentially reversed if he received treatment within the first few hours of symptoms starting.    Riki Martinez MD

## 2023-08-21 NOTE — TELEPHONE ENCOUNTER
Spoke with spouse regarding call back request. Spouse described acute changes lat week during dialysis. Slurred speech, increase fatigue and confusion. Pt did not report these findings while at dialysis, did not present to ED/urgent care. Had a follow up recently with PCP but had not informed of the recent event. No symptoms currently , has been on dialysis for 9 years. Education provided to both spouse and Pt on signs/symptoms of a stroke or acute changes. To notify dialysis nurse while having a dialysis run of any changes in symptoms. Pt will contact PCP for follow up appt. Will contact 911/present to ED.  Due for follow up with Dr. Cross. Spouse and Pt expressed understanding and transferred to scheduling-EDY

## 2023-08-21 NOTE — TELEPHONE ENCOUNTER
Provider Response to 2nd Level Triage Request    I have {triage review response:[id number]}. My recommendation is:  {Triage visit type:[id number]}

## 2023-08-23 ENCOUNTER — TELEPHONE (OUTPATIENT)
Dept: FAMILY MEDICINE | Facility: CLINIC | Age: 78
End: 2023-08-23
Payer: COMMERCIAL

## 2023-08-23 DIAGNOSIS — I25.10 CORONARY ARTERY DISEASE INVOLVING NATIVE CORONARY ARTERY OF NATIVE HEART WITHOUT ANGINA PECTORIS: Primary | ICD-10-CM

## 2023-08-23 NOTE — TELEPHONE ENCOUNTER
Medication Question or Refill    Contacts         Type Contact Phone/Fax    08/23/2023 10:14 AM CDT Phone (Incoming) Yossi Kitchenise (Emergency Contact) [phone]            What medication are you calling about (include dose and sig)?: Nitroglycerin 0.4 mg tabs.     Disp Refills Start End GABRIELA   nitroGLYcerin (NITROSTAT) 0.4 MG sublingual tablet (Discontinued) 50 tablet 3 7/29/2022 7/27/2023 No   Sig - Route: Place 1 tablet (0.4 mg) under the tongue every 5 minutes as needed - Sublingual       Preferred Pharmacy:   [facility] [address] [place] [address]  [address] [facility] [address]  Phone: [phone] Fax: [phone]          Controlled Substance Agreement on file:   CSA -- Patient Level:    CSA: None found at the patient level.       Who prescribed the medication?: Dr. Davis Bowling    Do you need a refill? Yes- the bottle the patient has is near the expiration date    When did you use the medication last? Last night, 8.22.23      Patient offered an appointment? No    Do you have any questions or concerns?  No      Okay to leave a detailed message?: Yes at Home number on file [phone] (home)

## 2023-08-24 RX ORDER — NITROGLYCERIN 0.4 MG/1
TABLET SUBLINGUAL
Qty: 50 TABLET | Refills: 0 | Status: ON HOLD | OUTPATIENT
Start: 2023-08-24 | End: 2024-04-18

## 2023-10-30 ENCOUNTER — OFFICE VISIT (OUTPATIENT)
Dept: FAMILY MEDICINE | Facility: CLINIC | Age: 78
End: 2023-10-30
Payer: COMMERCIAL

## 2023-10-30 ENCOUNTER — ANCILLARY PROCEDURE (OUTPATIENT)
Dept: GENERAL RADIOLOGY | Facility: CLINIC | Age: 78
End: 2023-10-30
Attending: PHYSICIAN ASSISTANT
Payer: COMMERCIAL

## 2023-10-30 VITALS
WEIGHT: 179.1 LBS | SYSTOLIC BLOOD PRESSURE: 152 MMHG | RESPIRATION RATE: 16 BRPM | HEART RATE: 83 BPM | BODY MASS INDEX: 26.44 KG/M2 | TEMPERATURE: 98.7 F | DIASTOLIC BLOOD PRESSURE: 76 MMHG | OXYGEN SATURATION: 97 %

## 2023-10-30 DIAGNOSIS — R05.1 ACUTE COUGH: ICD-10-CM

## 2023-10-30 DIAGNOSIS — R05.3 CHRONIC COUGH: ICD-10-CM

## 2023-10-30 DIAGNOSIS — J18.9 PNEUMONIA OF RIGHT LOWER LOBE DUE TO INFECTIOUS ORGANISM: Primary | ICD-10-CM

## 2023-10-30 DIAGNOSIS — Z99.2 END STAGE RENAL FAILURE ON DIALYSIS (H): Chronic | ICD-10-CM

## 2023-10-30 DIAGNOSIS — N18.6 END STAGE RENAL FAILURE ON DIALYSIS (H): Chronic | ICD-10-CM

## 2023-10-30 PROCEDURE — 99215 OFFICE O/P EST HI 40 MIN: CPT | Performed by: PHYSICIAN ASSISTANT

## 2023-10-30 PROCEDURE — 71046 X-RAY EXAM CHEST 2 VIEWS: CPT | Mod: TC | Performed by: RADIOLOGY

## 2023-10-30 RX ORDER — DOXYCYCLINE 100 MG/1
100 CAPSULE ORAL 2 TIMES DAILY
Qty: 20 CAPSULE | Refills: 0 | Status: SHIPPED | OUTPATIENT
Start: 2023-10-30 | End: 2023-11-09

## 2023-10-30 RX ORDER — AMOXICILLIN AND CLAVULANATE POTASSIUM 500; 125 MG/1; MG/1
1 TABLET, FILM COATED ORAL 2 TIMES DAILY
Qty: 20 TABLET | Refills: 0 | Status: SHIPPED | OUTPATIENT
Start: 2023-10-30 | End: 2023-11-09

## 2023-10-30 ASSESSMENT — ENCOUNTER SYMPTOMS
APPETITE CHANGE: 1
SHORTNESS OF BREATH: 0
FEVER: 0
COUGH: 1

## 2023-10-30 NOTE — PROGRESS NOTES
Patient presents with:  Cough: Green sticky mucus, last couple of weeks cough has gotten worse, not eating much, having some dizziness, coughing mostly at night, no chest pain or SOB    CDM: With chronic cough experiencing exacerbation of decreased appetite and low energy.  He is also a dialysis patient.  Chest x-ray shows suspected community-acquired pneumonia.  Given patient's renal dysfunction he would require dual antibiotic management.  Doxycycline does not require any dose adjustment with dialysis, but amoxicillin is 30 to 40% dialyzed, so I recommend that he take the Augmentin shortly after completing dialysis.  This may not be the answer to his cough given the chronicity of it.  If he is continuing to cough I recommend that he have close follow-up with primary care.  Patient takes an iron supplement while he is at dialysis, and I recommend that he skip it for the next 10 days to not interfere with the doxycycline absorption.  Patient was informed that if his chronic cough is persisting over the next 2 weeks that a second chest x-ray may be helpful.  He will continue with Flonase and Pepcid every other day to help manage potential GERD.        ICD-10-CM    1. Pneumonia of right lower lobe due to infectious organism  J18.9 doxycycline hyclate (VIBRAMYCIN) 100 MG capsule     amoxicillin-clavulanate (AUGMENTIN) 500-125 MG tablet      2. Chronic cough  R05.3 XR Chest 2 Views      3. End stage renal failure on dialysis (H)  N18.6     Z99.2           Patient Instructions   Begin taking Augmentin and Doxycycline for treatment of suspected pneumonia. On days of dialysis try to take you Augmentin right after finishing dialysis. No need to adjust timing of Doxycycline dose.   Avoid taking any vitamins, magnesium, calcium, or iron supplements while you are taking doxycycline.  Please follow-up with your primary care provider if your having new or worsening symptoms.    HPI:  García Kitchen is a 78 year old male with  past medical history of type 2 diabetes on dialysis who presents today complaining of cough has been going on for multiple months, but worse over the past 2 weeks.  Patient's cough is productive with green mucus.  Having some dizziness, decreased appetite.  Patient is still able to tolerate p.o. fluids and has a fluid restriction due to his end-stage renal failure.  He has previously been evaluated for this chronic cough and had a normal chest x-ray at the end of April.  He has been on Pepcid and Flonase prescribed by his PCP.  He denies any recent fevers.    History obtained from the patient.    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  2017-06: S/P total knee replacement using cement, left  2016-01: History of primary malignant neoplasm of kidney  2015-06: Nasal septal deviation  2014-07: End stage renal failure on dialysis (H)  2014-07: Diet-controlled type 2 diabetes mellitus (H)  2014-07: Renal cell carcinoma (H)  2014-07: A-V fistula (H24)  2014-07: Vitamin D deficiency  Inguinal hernia  Osteoarthritis Of The Hip  Nontoxic Solitary Thyroid Nodule  Hypokalemia  Microalbuminuria  Hyperparathyroidism, secondary renal (H24)  Skin Neoplasm Of Uncertain Behavior  Thrombocytopenia (H24)  Acute Gout  Normochromic, Normocytic Anemia  Allergic Rhinitis  Nephrolithiasis  Spleen enlargement  Squamous Cell Carcinoma In Situ  Essential hypertension with goal blood pressure less than 140/90  Chronic Kidney Disease, Stage 4  Inguinal hernia without mention of obstruction or gangrene, recurrent   unilateral or unspecified  Arthritis of knee  Neurocardiogenic syncope  Acute blood  loss as cause of postoperative anemia      Past Medical History:   Diagnosis Date    A-V fistula (H24) 7/16/2014    Placed November 2013     Anemia, unspecified     Created by Conversion     Calculus of kidney     Created by Conversion     Cancer (H)     Renal Cell Carcinoma s/p resection    Carcinoma in situ, site unspecified     Created by Conversion     Diabetes mellitus (H)     ESRD (end stage renal disease) on dialysis (H) 7/16/2014    Currently in transplant work-up     History of anesthesia complications     urinary retention which required catheterization    History of transfusion     Hyperparathyroidism, secondary renal (H24)     Created by Conversion     Inguinal hernia     recurrent right     Nontoxic uninodular goiter     Created by Conversion     Renal cell carcinoma (H) 7/16/2014    S/p right nephrectomy 9/2007     Skin cancer     squamous    Splenomegaly     Created by Conversion [facility] Annotation: Jul 22 2008 12:19PM - Woody Shaffer: mild, noted on  CT     Thrombocytopenia, unspecified (H24)     Created by Conversion     Unspecified essential hypertension     Created by Conversion     Vertigo        Social History     Tobacco Use    Smoking status: Former    Smokeless tobacco: Never    Tobacco comments:     6/15/23-Quit smoking over 30 years.    Substance Use Topics    Alcohol use: No       Review of Systems   Constitutional:  Positive for appetite change (Decreased). Negative for fever.   HENT:  Positive for congestion.    Respiratory:  Positive for cough. Negative for shortness of breath.    Cardiovascular:  Negative for chest pain.       Vitals:    10/30/23 1743   BP: (!) 152/76   Pulse: 83   Resp: 16   Temp: 98.7  F (37.1  C)   TempSrc: Oral   SpO2: 97%   Weight: 81.2 kg (179 lb 1.6 oz)       Physical Exam  Vitals and nursing note reviewed.   Constitutional:       General: He is not in acute distress.     Appearance: He is not toxic-appearing or diaphoretic.   HENT:      Head: Normocephalic  and atraumatic.      Right Ear: External ear normal.      Left Ear: External ear normal.   Eyes:      Conjunctiva/sclera: Conjunctivae normal.   Cardiovascular:      Rate and Rhythm: Normal rate and regular rhythm.      Heart sounds: No murmur heard.  Pulmonary:      Effort: Pulmonary effort is normal. No respiratory distress.      Breath sounds: No stridor. No wheezing, rhonchi or rales.   Neurological:      Mental Status: He is alert.   Psychiatric:         Mood and Affect: Mood normal.         Behavior: Behavior normal.         Thought Content: Thought content normal.         Judgment: Judgment normal.         Results:  Results for orders placed or performed in visit on 10/30/23   XR Chest 2 Views     Status: None    Narrative    EXAM: XR CHEST 2 VIEWS  LOCATION: [facility]  DATE: 10/30/2023    INDICATION: chronic cough. Worse x 2 weeks. On dialysis.  COMPARISON: 04/27/2023      Impression    IMPRESSION: Patchy opacities in the right lung base may represent atelectasis or developing infection. Left lung is clear. No effusions. Heart size is normal. No acute osseous findings.       At the end of the encounter, I discussed results, diagnosis, medications. Discussed red flags for immediate return to clinic/ER, as well as indications for follow up if no improvement. Patient understood and agreed to plan. Patient was stable for discharge.    40 minutes spent on the date of the encounter doing chart review, history and examination, documentation, and further activities as noted.

## 2023-10-31 ENCOUNTER — OFFICE VISIT (OUTPATIENT)
Dept: CARDIOLOGY | Facility: CLINIC | Age: 78
End: 2023-10-31
Payer: COMMERCIAL

## 2023-10-31 VITALS
OXYGEN SATURATION: 100 % | DIASTOLIC BLOOD PRESSURE: 54 MMHG | WEIGHT: 180 LBS | SYSTOLIC BLOOD PRESSURE: 124 MMHG | RESPIRATION RATE: 16 BRPM | BODY MASS INDEX: 26.57 KG/M2 | HEART RATE: 73 BPM

## 2023-10-31 DIAGNOSIS — I25.10 CORONARY ARTERY DISEASE INVOLVING NATIVE CORONARY ARTERY OF NATIVE HEART WITHOUT ANGINA PECTORIS: Primary | ICD-10-CM

## 2023-10-31 DIAGNOSIS — R01.1 HEART MURMUR: ICD-10-CM

## 2023-10-31 DIAGNOSIS — I10 BENIGN ESSENTIAL HYPERTENSION: ICD-10-CM

## 2023-10-31 DIAGNOSIS — Z99.2 ESRD (END STAGE RENAL DISEASE) ON DIALYSIS (H): ICD-10-CM

## 2023-10-31 DIAGNOSIS — N18.6 ESRD (END STAGE RENAL DISEASE) ON DIALYSIS (H): ICD-10-CM

## 2023-10-31 PROCEDURE — 99214 OFFICE O/P EST MOD 30 MIN: CPT | Performed by: INTERNAL MEDICINE

## 2023-10-31 RX ORDER — KETOCONAZOLE 20 MG/G
CREAM TOPICAL 2 TIMES DAILY PRN
COMMUNITY
Start: 2023-09-12 | End: 2024-09-23

## 2023-10-31 NOTE — PATIENT INSTRUCTIONS
Begin taking Augmentin and Doxycycline for treatment of suspected pneumonia. On days of dialysis try to take you Augmentin right after finishing dialysis. No need to adjust timing of Doxycycline dose.   Avoid taking any vitamins, magnesium, calcium, or iron supplements while you are taking doxycycline.  Please follow-up with your primary care provider if your having new or worsening symptoms.
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
## 2023-10-31 NOTE — PATIENT INSTRUCTIONS
It was a pleasure to meet with you today.      Below is a summary of your visit.   Schedule an echocardiogram to assess your heart function.  Continue your medications without changes  Follow up in a year or sooner if needed.    We will call you to inform you of your test or procedure results within 3 business days of the test being performed.  If you do not hear from our office with the test results within 1 week please do not hesitate to call asking for these results.     Please do not hesitate to call the [facility] with any questions or concerns at [phone].     Sincerely,
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
## 2023-11-14 ENCOUNTER — HOSPITAL ENCOUNTER (OUTPATIENT)
Dept: CARDIOLOGY | Facility: CLINIC | Age: 78
Discharge: HOME | End: 2023-11-14
Attending: INTERNAL MEDICINE | Admitting: INTERNAL MEDICINE
Payer: COMMERCIAL

## 2023-11-14 DIAGNOSIS — I25.10 CORONARY ARTERY DISEASE INVOLVING NATIVE CORONARY ARTERY OF NATIVE HEART WITHOUT ANGINA PECTORIS: ICD-10-CM

## 2023-11-14 DIAGNOSIS — N18.6 ESRD (END STAGE RENAL DISEASE) ON DIALYSIS (H): ICD-10-CM

## 2023-11-14 DIAGNOSIS — R01.1 HEART MURMUR: ICD-10-CM

## 2023-11-14 DIAGNOSIS — Z99.2 ESRD (END STAGE RENAL DISEASE) ON DIALYSIS (H): ICD-10-CM

## 2023-11-14 LAB — LVEF ECHO: NORMAL

## 2023-11-14 PROCEDURE — 93306 TTE W/DOPPLER COMPLETE: CPT

## 2023-11-14 PROCEDURE — 93306 TTE W/DOPPLER COMPLETE: CPT | Mod: 26 | Performed by: INTERNAL MEDICINE

## 2023-11-15 NOTE — RESULT ENCOUNTER NOTE
I personally reviewed echo images. Overall echo shows mild/moderate aortic and mitral regurgitation. LVEF around 50% which is similar to prior. Incidentally he is noted to be in afib, which is a new diagnosis and could explain his reported fatigue.   Please arrange an appointment with Kathryn to discuss this new diagnosis and likely initiate on OAC.  Thank you  DASH

## 2023-11-21 DIAGNOSIS — I48.91 A-FIB (H): Primary | ICD-10-CM
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
## 2023-11-28 ENCOUNTER — OFFICE VISIT (OUTPATIENT)
Dept: CARDIOLOGY | Facility: CLINIC | Age: 78
End: 2023-11-28
Payer: COMMERCIAL

## 2023-11-28 VITALS
HEIGHT: 69 IN | HEART RATE: 69 BPM | DIASTOLIC BLOOD PRESSURE: 52 MMHG | SYSTOLIC BLOOD PRESSURE: 130 MMHG | BODY MASS INDEX: 26.51 KG/M2 | WEIGHT: 179 LBS | RESPIRATION RATE: 16 BRPM

## 2023-11-28 DIAGNOSIS — M62.81 GENERALIZED MUSCLE WEAKNESS: ICD-10-CM

## 2023-11-28 DIAGNOSIS — I25.119 CORONARY ARTERY DISEASE INVOLVING NATIVE CORONARY ARTERY OF NATIVE HEART WITH ANGINA PECTORIS (H): ICD-10-CM

## 2023-11-28 DIAGNOSIS — Z99.2 END STAGE RENAL FAILURE ON DIALYSIS (H): Chronic | ICD-10-CM

## 2023-11-28 DIAGNOSIS — E78.5 HYPERLIPIDEMIA LDL GOAL <70: ICD-10-CM

## 2023-11-28 DIAGNOSIS — I48.91 ATRIAL FIBRILLATION, UNSPECIFIED TYPE (H): Primary | ICD-10-CM

## 2023-11-28 DIAGNOSIS — I50.22 HEART FAILURE WITH MILDLY REDUCED EJECTION FRACTION (H): ICD-10-CM

## 2023-11-28 DIAGNOSIS — I10 ESSENTIAL HYPERTENSION WITH GOAL BLOOD PRESSURE LESS THAN 140/90: Chronic | ICD-10-CM

## 2023-11-28 DIAGNOSIS — N18.6 END STAGE RENAL FAILURE ON DIALYSIS (H): Chronic | ICD-10-CM

## 2023-11-28 DIAGNOSIS — R53.83 FATIGUE, UNSPECIFIED TYPE: ICD-10-CM

## 2023-11-28 PROCEDURE — 99214 OFFICE O/P EST MOD 30 MIN: CPT
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
## 2023-11-28 NOTE — PATIENT INSTRUCTIONS
It was a pleasure taking part in your care today:    - STOP daily aspirin  - Start Eliquis 5 mg twice daily  - If medication is too expensive, talk to your insurance about coverage for Xarelto  - Schedule stress test   - Schedule visit with EP    Please call the [facility] clinic with any questions or concerns at [phone].     Kathryn Marques PA-C

## 2023-12-05 ENCOUNTER — HOSPITAL ENCOUNTER (OUTPATIENT)
Dept: CARDIOLOGY | Facility: CLINIC | Age: 78
Discharge: HOME | End: 2023-12-05
Payer: COMMERCIAL

## 2023-12-05 ENCOUNTER — HOSPITAL ENCOUNTER (OUTPATIENT)
Dept: NUCLEAR MEDICINE | Facility: CLINIC | Age: 78
Discharge: HOME | End: 2023-12-05
Payer: COMMERCIAL

## 2023-12-05 DIAGNOSIS — I48.91 ATRIAL FIBRILLATION, UNSPECIFIED TYPE (H): ICD-10-CM

## 2023-12-05 DIAGNOSIS — M62.81 GENERALIZED MUSCLE WEAKNESS: ICD-10-CM

## 2023-12-05 DIAGNOSIS — R53.83 FATIGUE, UNSPECIFIED TYPE: ICD-10-CM

## 2023-12-05 LAB
CV BLOOD PRESSURE: 60 MMHG
CV STRESS CURRENT BP HE: NORMAL
CV STRESS CURRENT HR HE: 68
CV STRESS CURRENT HR HE: 70
CV STRESS CURRENT HR HE: 71
CV STRESS CURRENT HR HE: 72
CV STRESS CURRENT HR HE: 73
CV STRESS CURRENT HR HE: 74
CV STRESS CURRENT HR HE: 74
CV STRESS CURRENT HR HE: 75
CV STRESS CURRENT HR HE: 76
CV STRESS CURRENT HR HE: 77
CV STRESS CURRENT HR HE: 78
CV STRESS CURRENT HR HE: 79
CV STRESS CURRENT HR HE: 82
CV STRESS DEVIATION TIME HE: NORMAL
CV STRESS ECHO PERCENT HR HE: NORMAL
CV STRESS EXERCISE STAGE HE: NORMAL
CV STRESS FINAL RESTING BP HE: NORMAL
CV STRESS FINAL RESTING HR HE: 70
CV STRESS MAX HR HE: 82
CV STRESS MAX TREADMILL GRADE HE: 0
CV STRESS MAX TREADMILL SPEED HE: 0
CV STRESS PEAK DIA BP HE: NORMAL
CV STRESS PEAK SYS BP HE: NORMAL
CV STRESS PHASE HE: NORMAL
CV STRESS PROTOCOL HE: NORMAL
CV STRESS RESTING PT POSITION HE: NORMAL
CV STRESS ST DEVIATION AMOUNT HE: NORMAL
CV STRESS ST DEVIATION ELEVATION HE: NORMAL
CV STRESS ST EVELATION AMOUNT HE: NORMAL
CV STRESS TEST TYPE HE: NORMAL
CV STRESS TOTAL STAGE TIME MIN 1 HE: NORMAL
RATE PRESSURE PRODUCT: 9840
STRESS ECHO BASELINE DIASTOLIC HE: 57
STRESS ECHO BASELINE HR: 66
STRESS ECHO BASELINE SYSTOLIC BP: 129
STRESS ECHO CALCULATED PERCENT HR: 57 %
STRESS ECHO LAST STRESS DIASTOLIC BP: 50
STRESS ECHO LAST STRESS HR: 76
STRESS ECHO LAST STRESS SYSTOLIC BP: 120
STRESS ECHO TARGET HR: 144

## 2023-12-05 PROCEDURE — 93016 CV STRESS TEST SUPVJ ONLY: CPT | Performed by: INTERNAL MEDICINE

## 2023-12-05 PROCEDURE — 78452 HT MUSCLE IMAGE SPECT MULT: CPT | Mod: 26 | Performed by: INTERNAL MEDICINE

## 2023-12-05 PROCEDURE — 343N000001 HC RX 343

## 2023-12-05 PROCEDURE — 93018 CV STRESS TEST I&R ONLY: CPT | Performed by: INTERNAL MEDICINE

## 2023-12-05 PROCEDURE — 250N000011 HC RX IP 250 OP 636

## 2023-12-05 PROCEDURE — 93017 CV STRESS TEST TRACING ONLY: CPT

## 2023-12-05 PROCEDURE — A9500 TC99M SESTAMIBI: HCPCS

## 2023-12-05 PROCEDURE — 78452 HT MUSCLE IMAGE SPECT MULT: CPT

## 2023-12-05 RX ORDER — CAFFEINE 200 MG
200 TABLET ORAL
Status: DISCONTINUED | OUTPATIENT
Start: 2023-12-05 | End: 2023-12-05 | Stop reason: HOSPADM

## 2023-12-05 RX ORDER — REGADENOSON 0.08 MG/ML
0.4 INJECTION, SOLUTION INTRAVENOUS ONCE
Status: COMPLETED | OUTPATIENT
Start: 2023-12-05 | End: 2023-12-05

## 2023-12-05 RX ORDER — ALBUTEROL SULFATE 0.83 MG/ML
2.5 SOLUTION RESPIRATORY (INHALATION)
Status: DISCONTINUED | OUTPATIENT
Start: 2023-12-05 | End: 2023-12-05 | Stop reason: HOSPADM

## 2023-12-05 RX ORDER — CAFFEINE CITRATE 20 MG/ML
60 SOLUTION INTRAVENOUS
Status: DISCONTINUED | OUTPATIENT
Start: 2023-12-05 | End: 2023-12-05 | Stop reason: HOSPADM

## 2023-12-05 RX ORDER — AMINOPHYLLINE 25 MG/ML
50 INJECTION, SOLUTION INTRAVENOUS
Status: DISCONTINUED | OUTPATIENT
Start: 2023-12-05 | End: 2023-12-05 | Stop reason: HOSPADM

## 2023-12-05 RX ADMIN — Medication 32.7 MILLICURIE: at 08:45

## 2023-12-05 RX ADMIN — REGADENOSON 0.4 MG: 0.08 INJECTION, SOLUTION INTRAVENOUS at 08:38

## 2023-12-05 RX ADMIN — Medication 8.7 MILLICURIE: at 07:51

## 2023-12-05 RX ADMIN — AMINOPHYLLINE 50 MG: 25 INJECTION, SOLUTION INTRAVENOUS at 08:48

## 2023-12-08 ENCOUNTER — DOCUMENTATION ONLY (OUTPATIENT)
Dept: CARDIOLOGY | Facility: CLINIC | Age: 78
End: 2023-12-08
Payer: COMMERCIAL

## 2023-12-08 ENCOUNTER — TELEPHONE (OUTPATIENT)
Dept: CARDIOLOGY | Facility: CLINIC | Age: 78
End: 2023-12-08
Payer: COMMERCIAL

## 2023-12-08 ENCOUNTER — PREP FOR PROCEDURE (OUTPATIENT)
Dept: CARDIOLOGY | Facility: CLINIC | Age: 78
End: 2023-12-08
Payer: COMMERCIAL

## 2023-12-08 DIAGNOSIS — R06.00 DYSPNEA: ICD-10-CM

## 2023-12-08 DIAGNOSIS — R94.39 ABNORMAL STRESS TEST: ICD-10-CM

## 2023-12-08 DIAGNOSIS — R53.83 FATIGUE, UNSPECIFIED TYPE: ICD-10-CM

## 2023-12-08 DIAGNOSIS — I25.119 CORONARY ARTERY DISEASE INVOLVING NATIVE CORONARY ARTERY OF NATIVE HEART WITH ANGINA PECTORIS (H): Primary | ICD-10-CM

## 2023-12-08 DIAGNOSIS — R06.09 DYSPNEA ON EXERTION: ICD-10-CM

## 2023-12-08 RX ORDER — LIDOCAINE 40 MG/G
CREAM TOPICAL
Status: CANCELLED | OUTPATIENT
Start: 2023-12-08

## 2023-12-08 RX ORDER — FENTANYL CITRATE 50 UG/ML
25 INJECTION, SOLUTION INTRAMUSCULAR; INTRAVENOUS
Status: CANCELLED | OUTPATIENT
Start: 2023-12-19

## 2023-12-08 RX ORDER — ASPIRIN 325 MG
325 TABLET ORAL ONCE
Status: CANCELLED | OUTPATIENT
Start: 2023-12-19 | End: 2023-12-08

## 2023-12-08 RX ORDER — SODIUM CHLORIDE 9 MG/ML
INJECTION, SOLUTION INTRAVENOUS CONTINUOUS
Status: CANCELLED | OUTPATIENT
Start: 2023-12-19

## 2023-12-08 RX ORDER — ASPIRIN 81 MG/1
243 TABLET, CHEWABLE ORAL ONCE
Status: CANCELLED | OUTPATIENT
Start: 2023-12-19

## 2023-12-08 NOTE — PROGRESS NOTES
Spoke with spouse Kirsten regarding education and Shanda Gamest messages previously sent. Spouse indicated she and Pt would review and will contact the clinic with any additional concerns. Informed spouse writer will be out of the office next week_KC      From: Fady Mix  Sent: 12/8/2023   3:20 PM CST  To: Angy Jane  Subject: RE:  pt                                        Case type: CA POSS PCI    Procedure Physician(s): DAVID    Procedure Date and Patient Arrival Time: Tuesday 12/19, with arrival time of 700    H&P: Completed on 11/28    Pre-Procedure Lab Appt: ON ADMISSION  - Please place lab orders if you haven't already!    Alerts/Important Info: dialysis patient m,w,f. Can only go Tuesday/Thursday. On anticoag, renal protocol, independent but sometimes uses a walker.  hernia mesh and tkr.        THANKS!  SYLVIA   ----- Message -----  From: Angy Jane  Sent: 12/8/2023  10:43 AM CST  To: [facility] Procedure  Pool - [facility]  Subject:  pt                                            Case request: cor poss pci  Ordering provider: DARI  H/p: 11/28   Alerts: dialysis patient m,w,f. Can only go Tuesday/Thursday. On anticoag, renal protocol, independent but sometimes uses a walker.  hernia mesh and tkr.    Labs on admission, please call spouse Kirsten for scheduling    Thank you,    Angy Kitchen  [address] [address]  [phone] (home)     PCP:  Riki Martinez  H&P completed by:  DARI 11/28  Admit date 12/19 Arrival time:  0700  Anticoagulation:  apixaban (ELIQUIS)  Previous PCI: No  Bypass Grafts: No  Renal Issues: Yes  Diabetic?: No  Device?:   Type:    Ambulation status: independent but sometimes uses a walker    Reason for Visit:  Telephone call to discuss pre-procedure education in preparation for: Coronary Angiogram with Possible PCI    Procedure Prep:  EKG results obtained, dated: To be completed on day of cath lab procedure  Hemogram results obtained: To be completed on day of  cath lab procedure  Basic Metabolic Panel results obtained: To be completed on day of cath lab procedure  Pertinent cardiac test results:     Patient Education    Your arrival time is 0700.  Location is [facility] - [address] [place], [place] [id number] - Main Entrance of the Hospital  Please plan on being at the hospital all day.  At any time, emergencies and/or urgent cases may come up which could delay the start of your procedure.    COVID Testing Instructions  *Mandatory COVID Testing: ALL Patients will need to complete a COVID test no sooner than 4 days prior to their procedure (regardless of vaccination status).    To schedule COVID testing Please call [phone]  If you want to complete this at an outside facility please call them to find out if they will have the results within the appropriate time frame and their fax number.  You will need to provide us with that information so we can send the order.  The facility completing the test will need to fax the results to [phone]  If you are running into and issues please call us.     Pre-procedure instructions  Take your temperature in the morning prior to coming in.  If your temperature is 100 F please call [facility] [phone] and notify them.  If you do not have access to a thermometer at home, please come in for testing.  If you are running a temperature your procedure may be rescheduled.  Patient instructed to not Eat or Drink after midnight.  Patient instructed to shower the evening before or the morning of the procedure.  Patient instructed to arrange for transportation home following procedure from a responsible family member or friend. No driving for at least 24 hours.  Patient instructed to have a responsible adult with them for 24 hours post-procedure.  Post-procedure follow up process.  Conscious sedation discussed.      Pre-procedure medication instructions.  Continue medications as scheduled, with a small amount  of water on the day of the procedure unless indicated. (NO Diabetic Medications or Blood Thinners)  Pt instructed not to consume Alcohol, Tobacco, Caffeine, or Carbonated beverages 12 hours prior to procedure.  Patient instructed to take 325 mg of Aspirin the morning of procedure: Yes  Other medication: instructed to only take Carvedilol 25 mg a.m. of the procedure.                          Anticoagulation Medication Instructions     apixaban (ELIQUIS) - Hold 48 hours prior to procedure    Patient states understanding of procedure and agrees to proceed.    *PATIENTS RECORDS AVAILABLE IN [facility] UNLESS OTHERWISE INDICATED*      Patient Active Problem List   Diagnosis    Osteoarthritis Of The Hip    Nontoxic Solitary Thyroid Nodule    Microalbuminuria    Hyperparathyroidism, secondary renal (H24)    Skin Neoplasm Of Uncertain Behavior    Thrombocytopenia (H24)    Acute Gout    Normochromic, Normocytic Anemia    Allergic Rhinitis    Nephrolithiasis    Spleen enlargement    Squamous Cell Carcinoma In Situ    Essential hypertension with goal blood pressure less than 140/90    End stage renal failure on dialysis (H)    Renal cell carcinoma (H)    A-V fistula (H24)    Vitamin D deficiency    Nasal septal deviation    Inguinal hernia without mention of obstruction or gangrene, recurrent unilateral or unspecified    S/P total knee replacement using cement, left    Arthritis of knee    Neurocardiogenic syncope    Coronary artery disease involving native coronary artery of native heart, angina presence unspecified    Macular degeneration (senile) of retina    Acquired cystic kidney disease    History of primary malignant neoplasm of kidney    Chronic cough    Ear fullness, bilateral    Hyperlipidemia LDL goal <70    Memory loss       Current Outpatient Medications   Medication Sig Dispense Refill    apixaban ANTICOAGULANT (ELIQUIS) 5 MG tablet Take 1 tablet (5 mg) by mouth 2 times daily 60 tablet 11    carvedilol (COREG) 25 MG  tablet [CARVEDILOL (COREG) 25 MG TABLET] Take 1 tablet by mouth 2 times a day at 6:00 am and 4:00 pm.      cinacalcet (SENSIPAR) 60 MG tablet Take 1 tablet by mouth daily at 2 pm      DIALYVITE 100-1 mg Tab Take 1 tablet by mouth daily       EPOETIN JESUS (PROCRIT INJ)       ketoconazole (NIZORAL) 2 % external cream APPLY CREAM TOPICALLY TO AFFECTED AREA TWICE DAILY      lidocaine-prilocaine (EMLA) cream Apply 1 Application topically as needed       nitroGLYcerin (NITROSTAT) 0.4 MG sublingual tablet For chest pain place 1 tablet under the tongue every 5 minutes for 3 doses. If symptoms persist 5 minutes after 1st dose call 911. 50 tablet 0    polyethylene glycol (MIRALAX) 17 gram packet Take 17 g by mouth daily       sevelamer carbonate (RENVELA) 800 mg tablet [SEVELAMER CARBONATE (RENVELA) 800 MG TABLET] Take 1,600-3,200 mg by mouth 3 (three) times a day with meals.       simvastatin (ZOCOR) 40 MG tablet Take 1 tablet (40 mg) by mouth At Bedtime 90 tablet 3    vitamin D3 (CHOLECALCIFEROL) 50 mcg (2000 units) tablet Vitamin D3   2000iu 1/day         Allergies   Allergen Reactions    Codeine Nausea and Vomiting     Other Reaction(s): Not available    Lisinopril Cough     Other Reaction(s): Not available       Angy Jane on 12/8/2023 at 3:32 PM

## 2023-12-08 NOTE — TELEPHONE ENCOUNTER
[facility] Call Center    Phone Message    May a detailed message be left on voicemail: yes     Reason for Call: Other: Patient's spouse Kirsten called wanting to speak with nurse Angy. Kirsten wanted to know if coronary angiogram procedure patient is having will help with their afib or just for coronary artery disease. Please call Kirsten back to further discuss.     Action Taken: Other: Cardiology    Travel Screening: Not Applicable    Thank you!  [facility]

## 2023-12-14 ENCOUNTER — TRANSFERRED RECORDS (OUTPATIENT)
Dept: HEALTH INFORMATION MANAGEMENT | Facility: CLINIC | Age: 78
End: 2023-12-14
Payer: COMMERCIAL

## 2023-12-14 LAB — RETINOPATHY: NEGATIVE

## 2023-12-19 ENCOUNTER — HOSPITAL ENCOUNTER (OUTPATIENT)
Facility: HOSPITAL | Age: 78
Discharge: HOME | End: 2023-12-20
Attending: INTERNAL MEDICINE | Admitting: INTERNAL MEDICINE
Payer: COMMERCIAL

## 2023-12-19 DIAGNOSIS — Z95.5 STATUS POST INSERTION OF DRUG-ELUTING STENT INTO LEFT ANTERIOR DESCENDING (LAD) ARTERY FOR CORONARY ARTERY DISEASE: Primary | ICD-10-CM

## 2023-12-19 DIAGNOSIS — R06.00 DYSPNEA: ICD-10-CM

## 2023-12-19 DIAGNOSIS — I25.119 CORONARY ARTERY DISEASE INVOLVING NATIVE CORONARY ARTERY OF NATIVE HEART WITH ANGINA PECTORIS (H): ICD-10-CM

## 2023-12-19 DIAGNOSIS — R06.09 DYSPNEA ON EXERTION: ICD-10-CM

## 2023-12-19 DIAGNOSIS — R53.83 FATIGUE, UNSPECIFIED TYPE: ICD-10-CM

## 2023-12-19 DIAGNOSIS — R94.39 ABNORMAL STRESS TEST: ICD-10-CM

## 2023-12-19 PROBLEM — Z98.890 STATUS POST CORONARY ANGIOGRAM: Status: ACTIVE | Noted: 2023-12-19

## 2023-12-19 LAB
ABO/RH(D): NORMAL
ACT BLD: 255 SECONDS (ref 74–150)
ACT BLD: 255 SECONDS (ref 74–150)
ANION GAP SERPL CALCULATED.3IONS-SCNC: 14 MMOL/L (ref 7–15)
ANTIBODY SCREEN: NEGATIVE
ATRIAL RATE - MUSE: 326 BPM
ATRIAL RATE - MUSE: 81 BPM
BUN SERPL-MCNC: 47.8 MG/DL (ref 8–23)
CALCIUM SERPL-MCNC: 10 MG/DL (ref 8.8–10.2)
CHLORIDE SERPL-SCNC: 102 MMOL/L (ref 98–107)
CHOLEST SERPL-MCNC: 91 MG/DL
CREAT SERPL-MCNC: 5.94 MG/DL (ref 0.67–1.17)
DEPRECATED HCO3 PLAS-SCNC: 25 MMOL/L (ref 22–29)
DIASTOLIC BLOOD PRESSURE - MUSE: NORMAL MMHG
DIASTOLIC BLOOD PRESSURE - MUSE: NORMAL MMHG
EGFRCR SERPLBLD CKD-EPI 2021: 9 ML/MIN/1.73M2
ERYTHROCYTE [DISTWIDTH] IN BLOOD BY AUTOMATED COUNT: 15.7 % (ref 10–15)
FASTING STATUS PATIENT QL REPORTED: YES
GLUCOSE SERPL-MCNC: 107 MG/DL (ref 70–99)
HCT VFR BLD AUTO: 32.9 % (ref 40–53)
HDLC SERPL-MCNC: 40 MG/DL
HGB BLD-MCNC: 10.6 G/DL (ref 13.3–17.7)
HOLD SPECIMEN: NORMAL
INTERPRETATION ECG - MUSE: NORMAL
INTERPRETATION ECG - MUSE: NORMAL
LDLC SERPL CALC-MCNC: 26 MG/DL
MCH RBC QN AUTO: 33.9 PG (ref 26.5–33)
MCHC RBC AUTO-ENTMCNC: 32.2 G/DL (ref 31.5–36.5)
MCV RBC AUTO: 105 FL (ref 78–100)
NONHDLC SERPL-MCNC: 51 MG/DL
P AXIS - MUSE: NORMAL DEGREES
P AXIS - MUSE: NORMAL DEGREES
PLATELET # BLD AUTO: 122 10E3/UL (ref 150–450)
POTASSIUM SERPL-SCNC: 4.9 MMOL/L (ref 3.4–5.3)
PR INTERVAL - MUSE: NORMAL MS
PR INTERVAL - MUSE: NORMAL MS
QRS DURATION - MUSE: 94 MS
QRS DURATION - MUSE: 98 MS
QT - MUSE: 414 MS
QT - MUSE: 438 MS
QTC - MUSE: 468 MS
QTC - MUSE: 469 MS
R AXIS - MUSE: -5 DEGREES
R AXIS - MUSE: -5 DEGREES
RBC # BLD AUTO: 3.13 10E6/UL (ref 4.4–5.9)
SODIUM SERPL-SCNC: 141 MMOL/L (ref 135–145)
SPECIMEN EXPIRATION DATE: NORMAL
SYSTOLIC BLOOD PRESSURE - MUSE: NORMAL MMHG
SYSTOLIC BLOOD PRESSURE - MUSE: NORMAL MMHG
T AXIS - MUSE: 72 DEGREES
T AXIS - MUSE: 74 DEGREES
TRIGL SERPL-MCNC: 126 MG/DL
VENTRICULAR RATE- MUSE: 69 BPM
VENTRICULAR RATE- MUSE: 77 BPM
WBC # BLD AUTO: 5.9 10E3/UL (ref 4–11)

## 2023-12-19 PROCEDURE — 258N000003 HC RX IP 258 OP 636

## 2023-12-19 PROCEDURE — 93458 L HRT ARTERY/VENTRICLE ANGIO: CPT | Performed by: INTERNAL MEDICINE

## 2023-12-19 PROCEDURE — C1769 GUIDE WIRE: HCPCS | Performed by: INTERNAL MEDICINE

## 2023-12-19 PROCEDURE — 92978 ENDOLUMINL IVUS OCT C 1ST: CPT | Mod: LD | Performed by: INTERNAL MEDICINE

## 2023-12-19 PROCEDURE — 250N000009 HC RX 250: Performed by: INTERNAL MEDICINE

## 2023-12-19 PROCEDURE — 99152 MOD SED SAME PHYS/QHP 5/>YRS: CPT | Performed by: INTERNAL MEDICINE

## 2023-12-19 PROCEDURE — 99153 MOD SED SAME PHYS/QHP EA: CPT | Performed by: INTERNAL MEDICINE

## 2023-12-19 PROCEDURE — 85347 COAGULATION TIME ACTIVATED: CPT

## 2023-12-19 PROCEDURE — C1725 CATH, TRANSLUMIN NON-LASER: HCPCS | Performed by: INTERNAL MEDICINE

## 2023-12-19 PROCEDURE — C9602 PERC D-E COR STENT ATHER S: HCPCS | Performed by: INTERNAL MEDICINE

## 2023-12-19 PROCEDURE — 250N000013 HC RX MED GY IP 250 OP 250 PS 637: Performed by: INTERNAL MEDICINE

## 2023-12-19 PROCEDURE — C1753 CATH, INTRAVAS ULTRASOUND: HCPCS | Performed by: INTERNAL MEDICINE

## 2023-12-19 PROCEDURE — 92933 PRQ TRLML C ATHRC ST ANGIOP1: CPT | Mod: LD | Performed by: INTERNAL MEDICINE

## 2023-12-19 PROCEDURE — C1894 INTRO/SHEATH, NON-LASER: HCPCS | Performed by: INTERNAL MEDICINE

## 2023-12-19 PROCEDURE — 250N000011 HC RX IP 250 OP 636: Mod: JZ | Performed by: NURSE PRACTITIONER

## 2023-12-19 PROCEDURE — 255N000002 HC RX 255 OP 636: Performed by: INTERNAL MEDICINE

## 2023-12-19 PROCEDURE — 0715T HC PRQ TRLUML CORONARY LITHOTRIPSY: CPT | Performed by: INTERNAL MEDICINE

## 2023-12-19 PROCEDURE — 250N000011 HC RX IP 250 OP 636: Performed by: INTERNAL MEDICINE

## 2023-12-19 PROCEDURE — 0715T PR PERCUTANEOUS TRANSLUMINAL CORONARY LITHOTRIPSY: CPT | Performed by: INTERNAL MEDICINE

## 2023-12-19 PROCEDURE — 92979 ENDOLUMINL IVUS OCT C EA: CPT | Performed by: INTERNAL MEDICINE

## 2023-12-19 PROCEDURE — 93005 ELECTROCARDIOGRAM TRACING: CPT

## 2023-12-19 PROCEDURE — 250N000013 HC RX MED GY IP 250 OP 250 PS 637: Performed by: NURSE PRACTITIONER

## 2023-12-19 PROCEDURE — C1874 STENT, COATED/COV W/DEL SYS: HCPCS | Performed by: INTERNAL MEDICINE

## 2023-12-19 PROCEDURE — 86900 BLOOD TYPING SEROLOGIC ABO: CPT

## 2023-12-19 PROCEDURE — 36415 COLL VENOUS BLD VENIPUNCTURE: CPT

## 2023-12-19 PROCEDURE — 93010 ELECTROCARDIOGRAM REPORT: CPT | Performed by: INTERNAL MEDICINE

## 2023-12-19 PROCEDURE — 92978 ENDOLUMINL IVUS OCT C 1ST: CPT | Mod: 26 | Performed by: INTERNAL MEDICINE

## 2023-12-19 PROCEDURE — C1724 CATH, TRANS ATHEREC,ROTATION: HCPCS | Performed by: INTERNAL MEDICINE

## 2023-12-19 PROCEDURE — 93458 L HRT ARTERY/VENTRICLE ANGIO: CPT | Mod: 26 | Performed by: INTERNAL MEDICINE

## 2023-12-19 PROCEDURE — 272N000001 HC OR GENERAL SUPPLY STERILE: Performed by: INTERNAL MEDICINE

## 2023-12-19 PROCEDURE — C1887 CATHETER, GUIDING: HCPCS | Performed by: INTERNAL MEDICINE

## 2023-12-19 PROCEDURE — 85027 COMPLETE CBC AUTOMATED: CPT

## 2023-12-19 PROCEDURE — 250N000009 HC RX 250: Performed by: NURSE PRACTITIONER

## 2023-12-19 PROCEDURE — 80061 LIPID PANEL: CPT

## 2023-12-19 PROCEDURE — 99222 1ST HOSP IP/OBS MODERATE 55: CPT | Performed by: INTERNAL MEDICINE

## 2023-12-19 PROCEDURE — 999N000054 HC STATISTIC EKG NON-CHARGEABLE

## 2023-12-19 PROCEDURE — 82374 ASSAY BLOOD CARBON DIOXIDE: CPT

## 2023-12-19 DEVICE — STENT CORONARY DES SYNERGY XD MR US 3.50X24MM H7493941824350: Type: IMPLANTABLE DEVICE | Status: FUNCTIONAL

## 2023-12-19 RX ORDER — SEVELAMER HYDROCHLORIDE 800 MG/1
1600 TABLET, FILM COATED ORAL PRN
Status: ON HOLD | COMMUNITY
End: 2023-12-19

## 2023-12-19 RX ORDER — IODIXANOL 320 MG/ML
INJECTION, SOLUTION INTRAVASCULAR
Status: DISCONTINUED | OUTPATIENT
Start: 2023-12-19 | End: 2023-12-19 | Stop reason: HOSPADM

## 2023-12-19 RX ORDER — ONDANSETRON 2 MG/ML
4 INJECTION INTRAMUSCULAR; INTRAVENOUS EVERY 6 HOURS PRN
Status: DISCONTINUED | OUTPATIENT
Start: 2023-12-19 | End: 2023-12-20 | Stop reason: HOSPADM

## 2023-12-19 RX ORDER — SEVELAMER CARBONATE 800 MG/1
1600 TABLET, FILM COATED ORAL PRN
Status: ON HOLD | COMMUNITY
End: 2024-06-20

## 2023-12-19 RX ORDER — OXYCODONE HYDROCHLORIDE 5 MG/1
10 TABLET ORAL EVERY 4 HOURS PRN
Status: DISCONTINUED | OUTPATIENT
Start: 2023-12-19 | End: 2023-12-20 | Stop reason: HOSPADM

## 2023-12-19 RX ORDER — ACETAMINOPHEN 325 MG/1
650 TABLET ORAL EVERY 4 HOURS PRN
Status: DISCONTINUED | OUTPATIENT
Start: 2023-12-19 | End: 2023-12-20 | Stop reason: HOSPADM

## 2023-12-19 RX ORDER — ASPIRIN 81 MG/1
243 TABLET, CHEWABLE ORAL ONCE
Status: DISCONTINUED | OUTPATIENT
Start: 2023-12-19 | End: 2023-12-19 | Stop reason: HOSPADM

## 2023-12-19 RX ORDER — HEPARIN SODIUM 1000 [USP'U]/ML
INJECTION, SOLUTION INTRAVENOUS; SUBCUTANEOUS
Status: DISCONTINUED | OUTPATIENT
Start: 2023-12-19 | End: 2023-12-19 | Stop reason: HOSPADM

## 2023-12-19 RX ORDER — CLOPIDOGREL BISULFATE 75 MG/1
TABLET ORAL
Status: DISCONTINUED | OUTPATIENT
Start: 2023-12-19 | End: 2023-12-19 | Stop reason: HOSPADM

## 2023-12-19 RX ORDER — SIMVASTATIN 40 MG
40 TABLET ORAL AT BEDTIME
Status: DISCONTINUED | OUTPATIENT
Start: 2023-12-19 | End: 2023-12-20 | Stop reason: HOSPADM

## 2023-12-19 RX ORDER — CLOPIDOGREL BISULFATE 75 MG/1
75 TABLET ORAL DAILY
Status: DISCONTINUED | OUTPATIENT
Start: 2023-12-20 | End: 2023-12-20 | Stop reason: HOSPADM

## 2023-12-19 RX ORDER — CINACALCET 30 MG/1
60 TABLET, FILM COATED ORAL DAILY
Status: DISCONTINUED | OUTPATIENT
Start: 2023-12-19 | End: 2023-12-20 | Stop reason: HOSPADM

## 2023-12-19 RX ORDER — DIAZEPAM 5 MG
5 TABLET ORAL ONCE
Status: COMPLETED | OUTPATIENT
Start: 2023-12-19 | End: 2023-12-19

## 2023-12-19 RX ORDER — OXYCODONE HYDROCHLORIDE 5 MG/1
5 TABLET ORAL EVERY 4 HOURS PRN
Status: DISCONTINUED | OUTPATIENT
Start: 2023-12-19 | End: 2023-12-20 | Stop reason: HOSPADM

## 2023-12-19 RX ORDER — NALOXONE HYDROCHLORIDE 0.4 MG/ML
0.4 INJECTION, SOLUTION INTRAMUSCULAR; INTRAVENOUS; SUBCUTANEOUS
Status: ACTIVE | OUTPATIENT
Start: 2023-12-19 | End: 2023-12-19

## 2023-12-19 RX ORDER — CLOPIDOGREL BISULFATE 75 MG/1
75 TABLET ORAL DAILY
Qty: 90 TABLET | Refills: 3 | Status: SHIPPED | OUTPATIENT
Start: 2023-12-20

## 2023-12-19 RX ORDER — ASPIRIN 325 MG
325 TABLET ORAL ONCE
Status: DISCONTINUED | OUTPATIENT
Start: 2023-12-19 | End: 2023-12-19 | Stop reason: HOSPADM

## 2023-12-19 RX ORDER — CARVEDILOL 25 MG/1
25 TABLET ORAL 2 TIMES DAILY WITH MEALS
Status: DISCONTINUED | OUTPATIENT
Start: 2023-12-19 | End: 2023-12-20 | Stop reason: HOSPADM

## 2023-12-19 RX ORDER — NALOXONE HYDROCHLORIDE 0.4 MG/ML
0.2 INJECTION, SOLUTION INTRAMUSCULAR; INTRAVENOUS; SUBCUTANEOUS
Status: ACTIVE | OUTPATIENT
Start: 2023-12-19 | End: 2023-12-19

## 2023-12-19 RX ORDER — SEVELAMER CARBONATE 800 MG/1
3200 TABLET, FILM COATED ORAL
Status: DISCONTINUED | OUTPATIENT
Start: 2023-12-19 | End: 2023-12-20 | Stop reason: HOSPADM

## 2023-12-19 RX ORDER — FLUMAZENIL 0.1 MG/ML
0.2 INJECTION, SOLUTION INTRAVENOUS
Status: ACTIVE | OUTPATIENT
Start: 2023-12-19 | End: 2023-12-19

## 2023-12-19 RX ORDER — LIDOCAINE 40 MG/G
CREAM TOPICAL
Status: DISCONTINUED | OUTPATIENT
Start: 2023-12-19 | End: 2023-12-19 | Stop reason: HOSPADM

## 2023-12-19 RX ORDER — FENTANYL CITRATE 50 UG/ML
25 INJECTION, SOLUTION INTRAMUSCULAR; INTRAVENOUS
Status: DISCONTINUED | OUTPATIENT
Start: 2023-12-19 | End: 2023-12-19 | Stop reason: HOSPADM

## 2023-12-19 RX ORDER — ATROPINE SULFATE 0.1 MG/ML
0.5 INJECTION INTRAVENOUS
Status: ACTIVE | OUTPATIENT
Start: 2023-12-19 | End: 2023-12-19

## 2023-12-19 RX ORDER — SODIUM CHLORIDE 9 MG/ML
INJECTION, SOLUTION INTRAVENOUS CONTINUOUS
Status: ACTIVE | OUTPATIENT
Start: 2023-12-19 | End: 2023-12-19

## 2023-12-19 RX ORDER — FENTANYL CITRATE 50 UG/ML
25 INJECTION, SOLUTION INTRAMUSCULAR; INTRAVENOUS
Status: DISCONTINUED | OUTPATIENT
Start: 2023-12-19 | End: 2023-12-19

## 2023-12-19 RX ORDER — NITROGLYCERIN 0.4 MG/1
0.4 TABLET SUBLINGUAL EVERY 5 MIN PRN
Status: DISCONTINUED | OUTPATIENT
Start: 2023-12-19 | End: 2023-12-20 | Stop reason: HOSPADM

## 2023-12-19 RX ORDER — HYDRALAZINE HYDROCHLORIDE 20 MG/ML
10 INJECTION INTRAMUSCULAR; INTRAVENOUS EVERY 4 HOURS PRN
Status: DISCONTINUED | OUTPATIENT
Start: 2023-12-19 | End: 2023-12-20 | Stop reason: HOSPADM

## 2023-12-19 RX ORDER — POLYETHYLENE GLYCOL 3350 17 G/17G
17 POWDER, FOR SOLUTION ORAL DAILY
Status: DISCONTINUED | OUTPATIENT
Start: 2023-12-19 | End: 2023-12-20 | Stop reason: HOSPADM

## 2023-12-19 RX ORDER — FENTANYL CITRATE 50 UG/ML
INJECTION, SOLUTION INTRAMUSCULAR; INTRAVENOUS
Status: DISCONTINUED | OUTPATIENT
Start: 2023-12-19 | End: 2023-12-19 | Stop reason: HOSPADM

## 2023-12-19 RX ORDER — ONDANSETRON 4 MG/1
4 TABLET, ORALLY DISINTEGRATING ORAL EVERY 6 HOURS PRN
Status: DISCONTINUED | OUTPATIENT
Start: 2023-12-19 | End: 2023-12-20 | Stop reason: HOSPADM

## 2023-12-19 RX ORDER — METOPROLOL TARTRATE 1 MG/ML
5 INJECTION, SOLUTION INTRAVENOUS
Status: DISCONTINUED | OUTPATIENT
Start: 2023-12-19 | End: 2023-12-20 | Stop reason: HOSPADM

## 2023-12-19 RX ORDER — SODIUM CHLORIDE 9 MG/ML
INJECTION, SOLUTION INTRAVENOUS CONTINUOUS
Status: DISCONTINUED | OUTPATIENT
Start: 2023-12-19 | End: 2023-12-19 | Stop reason: HOSPADM

## 2023-12-19 RX ADMIN — SODIUM CHLORIDE: 9 INJECTION, SOLUTION INTRAVENOUS at 08:02

## 2023-12-19 RX ADMIN — DIAZEPAM 5 MG: 5 TABLET ORAL at 08:43

## 2023-12-19 RX ADMIN — CINACALCET 60 MG: 30 TABLET ORAL at 18:41

## 2023-12-19 RX ADMIN — METOPROLOL TARTRATE 5 MG: 5 INJECTION INTRAVENOUS at 14:30

## 2023-12-19 RX ADMIN — CARVEDILOL 25 MG: 25 TABLET, FILM COATED ORAL at 18:41

## 2023-12-19 RX ADMIN — HYDRALAZINE HYDROCHLORIDE 10 MG: 20 INJECTION INTRAMUSCULAR; INTRAVENOUS at 13:58

## 2023-12-19 RX ADMIN — SIMVASTATIN 40 MG: 40 TABLET, FILM COATED ORAL at 21:46

## 2023-12-19 RX ADMIN — SEVELAMER CARBONATE 3200 MG: 800 TABLET, FILM COATED ORAL at 18:35

## 2023-12-19 ASSESSMENT — ACTIVITIES OF DAILY LIVING (ADL)
ADLS_ACUITY_SCORE: 23
ADLS_ACUITY_SCORE: 36
ADLS_ACUITY_SCORE: 36
ADLS_ACUITY_SCORE: 35
ADLS_ACUITY_SCORE: 36
ADLS_ACUITY_SCORE: 23

## 2023-12-19 ASSESSMENT — EJECTION FRACTION: EF_VALUE: .24

## 2023-12-19 ASSESSMENT — COLUMBIA-SUICIDE SEVERITY RATING SCALE - C-SSRS
2. HAVE YOU ACTUALLY HAD ANY THOUGHTS OF KILLING YOURSELF IN THE PAST MONTH?: NO
1. IN THE PAST MONTH, HAVE YOU WISHED YOU WERE DEAD OR WISHED YOU COULD GO TO SLEEP AND NOT WAKE UP?: NO
6. HAVE YOU EVER DONE ANYTHING, STARTED TO DO ANYTHING, OR PREPARED TO DO ANYTHING TO END YOUR LIFE?: NO

## 2023-12-19 NOTE — PROGRESS NOTES
Brief CV Progress note:    Patient underwent LAD PCI with TEENA x1. Per Dr. Funes, continue Plavix, OK to resume NOAC, no ASA post PCI. OK to discharge later today if remains stable. BP transiently elevated in the immediate post operative period although BP readings are being taken from the lower extremities due to hx of ESRD with JANIA fistula and Rt TR band. Will continue to monitor. PRN Hydralazine ordered for continued sustained SBP >150mmHg within 3 hours of sheath removal.

## 2023-12-19 NOTE — PROGRESS NOTES
Pt had hematoma and Tr band displacement.  Cath Lab RN Maria SORIANO called and adjusted the TR Band. Reverse barbeau done and was good. Cms intact. TR band has 15 ml of air. TR band deflation will restart at 1530.

## 2023-12-19 NOTE — PRE-PROCEDURE
GENERAL PRE-PROCEDURE:   Procedure:  Coronary angiogram with possible PCI  Date/Time:  12/19/2023 7:50 AM    Written consent obtained?: Yes    Risks and benefits: Risks, benefits and alternatives were discussed    Consent given by:  Patient  Patient states understanding of procedure being performed: Yes    Patient's understanding of procedure matches consent: Yes    Procedure consent matches procedure scheduled: Yes    Expected level of sedation:  Moderate  Appropriately NPO:  Yes  ASA Class:  3 (weakness/fatigue, CAD on CT scan, newly detected AF; on NOAC, valvular heart disease, HFmrEF, ESRD; on HD, DM Type II)  Mallampati  :  Grade 4- soft palate obscured by base of tongue  Lungs:  Lungs clear with good breath sounds bilaterally  Heart:  Systolic murmur  History & Physical reviewed:  History and physical reviewed and updates made (see comment)  H&P Comments:  Clinically Significant Risk Factors Present on Admission    Cardiovascular : weakness/fatigue, CAD on CT scan, newly detected AF; on NOAC, HFmrEF, valvular heart disease; moderate-severe mitral insufficiency, mild-moderate aortic insufficiency, DM Type II    Fluid & Electrolyte Disorders : Not present on admission    Gastroenterology : Not present on admission    Hematology/Oncology : Not present on admission    Nephrology : ESRD; on HD    Neurology : Not present on admission    Pulmonology : Not present on admission    Systemic : Memory impairment, advanced age        Statement of review:  I have reviewed the lab findings, diagnostic data, medications, and the plan for sedation

## 2023-12-19 NOTE — Clinical Note
Max pressure = 12 wallace. Total duration = 14 seconds.     Max pressure = 14 wallace. Total duration = 14 seconds.    Balloon reinflated a second time: Max pressure = 14 wallace. Total duration = 14 seconds.  Balloon reinflated a third time: Max pressure = 14 wallace. Total duration = 10 seconds.

## 2023-12-19 NOTE — DISCHARGE INSTRUCTIONS

## 2023-12-19 NOTE — PHARMACY-ADMISSION MEDICATION HISTORY
Pharmacist Admission Medication History    Admission medication history is complete. The information provided in this note is only as accurate as the sources available at the time of the update.    Information Source(s): Hospital records and CareEverywhere/SureScripts via N/A     Allergies reviewed with patient and updates made in EHR: no    Medication History Completed By: Lisa Davis [facility] 12/19/2023 5:02 PM    PTA Med List   Medication Sig Last Dose    apixaban ANTICOAGULANT (ELIQUIS) 5 MG tablet Take 1 tablet (5 mg) by mouth 2 times daily 12/16/2023 at pm    carvedilol (COREG) 25 MG tablet [CARVEDILOL (COREG) 25 MG TABLET] Take 1 tablet by mouth 2 times a day at 6:00 am and 4:00 pm. 12/19/2023 at am    cinacalcet (SENSIPAR) 60 MG tablet Take 1 tablet by mouth daily at 2 pm 12/18/2023 at pm    [START ON 12/20/2023] clopidogrel (PLAVIX) 75 MG tablet Take 1 tablet (75 mg) by mouth daily     DIALYVITE 100-1 mg Tab Take 1 tablet by mouth daily  12/18/2023 at am    ketoconazole (NIZORAL) 2 % external cream Apply topically 2 times daily as needed for itching Past Week at unknown    lidocaine-prilocaine (EMLA) cream Apply 1 Application topically as needed  Past Week at unknown    nitroGLYcerin (NITROSTAT) 0.4 MG sublingual tablet For chest pain place 1 tablet under the tongue every 5 minutes for 3 doses. If symptoms persist 5 minutes after 1st dose call 911. Unknown at unknown    polyethylene glycol (MIRALAX) 17 gram packet Take 17 g by mouth daily as needed for constipation Past Week at unknown    sevelamer carbonate (RENVELA) 800 MG tablet Take 1,600 mg by mouth as needed (with snacks) Past Week at unknown    sevelamer carbonate (RENVELA) 800 mg tablet Take 3,200 mg by mouth 3 times daily (with meals) 12/18/2023 at pm    simvastatin (ZOCOR) 40 MG tablet Take 1 tablet (40 mg) by mouth At Bedtime 12/18/2023 at pm    vitamin D3 (CHOLECALCIFEROL) 50 mcg (2000 units) tablet Take 1 tablet by mouth daily 12/18/2023 at am  59

## 2023-12-20 VITALS
OXYGEN SATURATION: 98 % | HEIGHT: 69 IN | DIASTOLIC BLOOD PRESSURE: 74 MMHG | BODY MASS INDEX: 26.22 KG/M2 | WEIGHT: 177 LBS | RESPIRATION RATE: 20 BRPM | HEART RATE: 74 BPM | SYSTOLIC BLOOD PRESSURE: 170 MMHG | TEMPERATURE: 97.6 F

## 2023-12-20 DIAGNOSIS — Z95.5 S/P CORONARY ARTERY STENT PLACEMENT: Primary | ICD-10-CM

## 2023-12-20 LAB
ATRIAL RATE - MUSE: 416 BPM
DIASTOLIC BLOOD PRESSURE - MUSE: NORMAL MMHG
INTERPRETATION ECG - MUSE: NORMAL
P AXIS - MUSE: NORMAL DEGREES
PR INTERVAL - MUSE: NORMAL MS
QRS DURATION - MUSE: 94 MS
QT - MUSE: 408 MS
QTC - MUSE: 459 MS
R AXIS - MUSE: -1 DEGREES
SYSTOLIC BLOOD PRESSURE - MUSE: NORMAL MMHG
T AXIS - MUSE: 86 DEGREES
VENTRICULAR RATE- MUSE: 76 BPM

## 2023-12-20 PROCEDURE — 999N000054 HC STATISTIC EKG NON-CHARGEABLE

## 2023-12-20 PROCEDURE — 93005 ELECTROCARDIOGRAM TRACING: CPT

## 2023-12-20 PROCEDURE — 90935 HEMODIALYSIS ONE EVALUATION: CPT

## 2023-12-20 PROCEDURE — 93010 ELECTROCARDIOGRAM REPORT: CPT | Performed by: INTERNAL MEDICINE

## 2023-12-20 PROCEDURE — 99214 OFFICE O/P EST MOD 30 MIN: CPT | Performed by: NURSE PRACTITIONER

## 2023-12-20 PROCEDURE — 99232 SBSQ HOSP IP/OBS MODERATE 35: CPT | Performed by: HOSPITALIST

## 2023-12-20 PROCEDURE — 250N000013 HC RX MED GY IP 250 OP 250 PS 637: Performed by: NURSE PRACTITIONER

## 2023-12-20 RX ADMIN — CARVEDILOL 25 MG: 25 TABLET, FILM COATED ORAL at 08:22

## 2023-12-20 RX ADMIN — SEVELAMER CARBONATE 3200 MG: 800 TABLET, FILM COATED ORAL at 08:22

## 2023-12-20 RX ADMIN — APIXABAN 5 MG: 5 TABLET, FILM COATED ORAL at 08:22

## 2023-12-20 RX ADMIN — POLYETHYLENE GLYCOL 3350 17 G: 17 POWDER, FOR SOLUTION ORAL at 08:21

## 2023-12-20 RX ADMIN — CLOPIDOGREL BISULFATE 75 MG: 75 TABLET ORAL at 08:21

## 2023-12-20 ASSESSMENT — ACTIVITIES OF DAILY LIVING (ADL)
DEPENDENT_IADLS:: CLEANING;COOKING;LAUNDRY;SHOPPING;MEAL PREPARATION;TRANSPORTATION
ADLS_ACUITY_SCORE: 23

## 2023-12-20 NOTE — PROGRESS NOTES
Discharge paperwork reviewed with Pt and family. No questions noted. They know how to contact clinic about upcoming appointments and where to  new meds. Tele and IV removed. All belongings returned to the patient

## 2023-12-20 NOTE — CONSULTS
NEPHROLOGY CONSULTATION - [facility]        REASON FOR CONSULTATION: ESRD    HISTORY OF PRESENT ILLNESS: 77 yo male with ESRD on dialysis MWF at [facility], HTN, DM2, CAD admit for cor angiogram, done yesterday with LAD  PCI and TEENA x 1.  He had bleeding from access site post procedure.  Denies any sob or chest pain today.  Is awaiting dialysis here.    REVIEW OF SYSTEMS:Comprehensive review of systems obtained and otherwise negative.    Past Medical History:   Diagnosis Date    A-V fistula (H24) 7/16/2014    Placed November 2013     Anemia, unspecified     Created by Conversion     Calculus of kidney     Created by Conversion     Cancer (H)     Renal Cell Carcinoma s/p resection    Carcinoma in situ, site unspecified     Created by Conversion     Diabetes mellitus (H)     ESRD (end stage renal disease) on dialysis (H) 7/16/2014    Currently in transplant work-up     History of anesthesia complications     urinary retention which required catheterization    History of transfusion     Hyperparathyroidism, secondary renal (H24)     Created by Conversion     Inguinal hernia     recurrent right     Nontoxic uninodular goiter     Created by Conversion     Renal cell carcinoma (H) 7/16/2014    S/p right nephrectomy 9/2007     Skin cancer     squamous    Splenomegaly     Created by Conversion [facility] Annotation: Jul 22 2008 12:19PM - Woody Shaffer: mild, noted on  CT     Thrombocytopenia, unspecified (H24)     Created by Conversion     Unspecified essential hypertension     Created by Conversion     Vertigo        Social History     Socioeconomic History    Marital status:      Spouse name: Not on file    Number of children: Not on file    Years of education: Not on file    Highest education level: Not on file   Occupational History    Not on file   Tobacco Use    Smoking status: Former    Smokeless tobacco: Never    Tobacco comments:     6/15/23-Quit smoking over 30 years.    Vaping Use  "   Vaping Use: Never used   Substance and Sexual Activity    Alcohol use: No    Drug use: No    Sexual activity: Not Currently     Partners: Female   Other Topics Concern    Not on file   Social History Narrative    Not on file     Social Determinants of Health     Financial Resource Strain: Not on file   Food Insecurity: Not on file   Transportation Needs: Not on file   Physical Activity: Not on file   Stress: Not on file   Social Connections: Not on file   Interpersonal Safety: Not on file   Housing Stability: Not on file       Family History   Problem Relation Age of Onset    Cancer Mother         Adenocarcinoma Of The Large Intestine     Cancer Father         Adenocarcinoma Of The Large Intestine        Allergies   Allergen Reactions    Codeine Nausea and Vomiting     Other Reaction(s): Not available    Lisinopril Cough     Other Reaction(s): Not available       MEDICATIONS:   carvedilol  25 mg Oral BID w/meals    cinacalcet  60 mg Oral Daily    clopidogrel  75 mg Oral Daily    polyethylene glycol  17 g Oral Daily    sevelamer carbonate  3,200 mg Oral TID w/meals    simvastatin  40 mg Oral At Bedtime         PHYSICAL EXAM    BP (!) 144/65   Pulse 70   Temp 98.4  F (36.9  C) (Oral)   Resp 20   Ht 1.753 m (5' 9\")   Wt 80.3 kg (177 lb)   SpO2 96%   BMI 26.14 kg/m          Gen: NAD  HEENT: No icterus, NC/AT  CARDIOVASCULAR: RR, no rub,  trace edema  PULMONARY: CTA ant No cyanosis  GASTROINTESTINAL: soft, NT/ND  MSK: Diffuse muscle atrophy, warm  NEURO: Alert, no gross focal findings  PSYCHIATRIC: Normal affect, answers questions appropriately  SKIN: Pale, no jaundice, no rash.  Left arm avf aneurysmal  LABORATORIES  Lab Results   Component Value Date    WBC 5.9 12/19/2023    HGB 10.6 (L) 12/19/2023    HCT 32.9 (L) 12/19/2023     (L) 12/19/2023    CHOL 91 12/19/2023    TRIG 126 12/19/2023    HDL 40 12/19/2023    ALT <9 04/20/2021    AST 10 04/20/2021     12/19/2023    BUN 47.8 (H) 12/19/2023    " CO2 25 12/19/2023    TSH 1.80 04/20/2021    PSA 1.8 04/20/2021        ASSESSMENT:   77 yo male with ESRD on dialysis MWF at [facility], HTN, DM2, CAD admit for cor angiogram, done yesterday with LAD  PCI and TEENA x 1    PLAN:  ESRD - usual dialysis MWF at [facility] with left arm avf, see outpt orders below  -dialysis today  -ok for discharge after dialysis if otherwise stable  2. CAD - now s/p cor angio with PCI and TEENA to LAD on 12/19  3. HTN - ok control  -no change in meds    Dialyzer: 160NRe Optiflux   Na: 138 mEq/L   Bicarb: 27 mEq/L   Dialysate: 2.0 K, 2.5 Ca, 1.0 Mg, 100 Dextrose ()   Dialysate/Machine Temp (prescribed): 36 C   Dialysate/Machine Temp (actual): 36 C   BFR (prescribed): 450   BFR (actual): 450   Prescribed time: 03:15   EDW: 81.5 kg   Access Type: Active (In Use):AVFistula-Standard/Left Upper Arm     Cindy Jean MD  [facility]  [phone]

## 2023-12-20 NOTE — PLAN OF CARE
PRIMARY DIAGNOSIS: TR BAND RECOVERY   OUTPATIENT/OBSERVATION GOALS TO BE MET BEFORE DISCHARGE:  TR band status: removed  Radial pulse and CMS (circulation, motion, sensation) are WDL: Yes  Bleeding or hematoma present at site: Yes. Pressure Applied      Activity restriction education reviewed with patient: Yes. Completed bedrest  Stable vital signs:  Yes, BP elevated at first, scheduled meds given.  ADLs back to baseline:  Yes  Activity and level of assistance: Up with standby assistance.  Interpretation of rhythm per telemetry tech: Normal sinus  and Atrial fibrillation - controlled     Discharge Planner Nurse   Safe discharge environment identified: No  Barriers to discharge: Yes. Monitoring for Bleeding after Coronary Angiogram.           Entered by: Heike Rodrigez RN 12/19/2023 7:28 PM     Please review provider order for any additional goals.   Nurse to notify provider when observation goals have been met and patient is ready for discharge.

## 2023-12-20 NOTE — PROGRESS NOTES
RENAL   Pt of Dr Caballero.  Consult redirected to [facility].  Leslie Harvey MD  [facility]  [phone]

## 2023-12-20 NOTE — PLAN OF CARE
PRIMARY DIAGNOSIS: TR BAND RECOVERY   OUTPATIENT/OBSERVATION GOALS TO BE MET BEFORE DISCHARGE:  TR band status: removed  Radial pulse and CMS (circulation, motion, sensation) are WDL: Yes  Bleeding or hematoma present at site: No      Activity restriction education reviewed with patient: Yes. Arm board for an hour after TR band removal. Pt verbalized understanding of right wrist restrictions.  Stable vital signs:  Yes. BP was elevated when BP cuff was placed on right calf. Recent -140's when taken on right upper Arm.   ADLs back to baseline:  Yes  Activity and level of assistance: Up with standby assistance.  Interpretation of rhythm per telemetry tech: Normal sinus  and Atrial fibrillation - controlled, BBB. PVC's.     Discharge Planner Nurse   Safe discharge environment identified: No  Barriers to discharge: Yes, monitoring post Coronary Angiogram with stent placed.       Entered by: Heike Rodrigez RN 12/19/2023 10:32 PM     Please review provider order for any additional goals.   Nurse to notify provider when observation goals have been met and patient is ready for discharge.Goal Outcome Evaluation:    Pt had skin tear over a bruise on his right wrist closed to the TR band which happened in [facility]. Pt has minor oozing upon arrival to the room, wound was dressed with non adherent dressings/dry gauze. Bleeding stopped. Provider updated if pt will need WOC consult.    Right radial access site has no bleeding, hematoma resolved with pressure application. Bruising is present from thumb/palm of right hand up to wrist and forearm. Surrounding area is soft.     Pt is A/O x3, disoriented to time. Anuric, ambulated to the bathroom when bedrest is over. Pt had a BM.     Left Arm AVF intact. Nephrology consulted as requested by pt and wife regarding concern for Hemodialysis.

## 2023-12-20 NOTE — PROGRESS NOTES
"[facility]    Medicine Progress Note - Hospitalist Service    Date of Admission:  12/19/2023    Assessment & Plan     García Kitchen is a 78 year old male with a medical history significant for several medical comorbidities including a history of coronary artery disease, renal cell carcinoma status post resection, type II DM, end-stage renal disease on dialysis, hyperparathyroidism, chronic anemia and other medical comorbidities who was admitted for LAD PCI with TEENA x 1.     Coronary artery disease-status post LAD PCI with TEENA x 1         Continue Plavix, statins. Also on DOAC. No asa.       Hypertension-elevated        Continue coreg,     3.   History of renal cell carcinoma status post right kidney nephrectomy     4.   End-stage renal disease-on hemodialysis - HD on MWF, will be dialyzed prior to discharge today       5.   Osteoarthritis of the hip-pain control as needed  Rest of patient's medical problems management remains unchanged        Diet: Low Saturated Fat Na <2400 mg  Diet    DVT Prophylaxis: DOAC  Modi Catheter: Not present  Lines: None     Cardiac Monitoring: ACTIVE order. Indication: Post- PCI/Angiogram (24 hours)      Clinically Significant Risk Factors Present on Admission               # Drug Induced Coagulation Defect: home medication list includes an anticoagulant medication  # Thrombocytopenia: Lowest platelets = 122 in last 2 days, will monitor for bleeding   # Hypertension: Noted on problem list      # Overweight: Estimated body mass index is 26.14 kg/m  as calculated from the following:    Height as of this encounter: 1.753 m (5' 9\").    Weight as of this encounter: 80.3 kg (177 lb).       # Financial/Environmental Concerns: none         Disposition Plan      Expected Discharge Date: 12/20/2023,  3:00 PM    Destination: home with family  Discharge Comments: After HD            Sofie Olivera MD  Hospitalist Service  [facility]  Securely " message with Abdiel (more info)  Text page via [facility] Paging/Directory   ______________________________________________________________________    Interval History   .  Patient is new to me. Chart reviewed and events noted.  Patient seen and examined.   No new symptoms. No chest pain, SOB, dizziness. Will be dialyzed today and potential home after.    Physical Exam   Vital Signs: Temp: 97.6  F (36.4  C) Temp src: Oral BP: (!) 170/74 Pulse: 74   Resp: 20 SpO2: 98 % O2 Device: None (Room air)    Weight: 177 lbs 0 oz    General: Pleasant elderly male in NAD  HEENT:EOMI, AT,NC  CVS:RRR, no edema  RS:CTAB  Abd: Soft, NT,ND  Neurology:Grossly normal  Psy:Approrpiate affect      Medical Decision Making       >35 MINUTES SPENT BY ME on the date of service doing chart review, history, exam, documentation & further activities per the note.      Plan d/w patient, cardiology team.    Data

## 2023-12-20 NOTE — PROGRESS NOTES
Care Management Discharge Note    Discharge Date: 12/20/2023       Discharge Disposition: Dialysis Services, Home    Discharge Services: None    Discharge DME: None    Discharge Transportation: family or friend will provide    Private pay costs discussed: Not applicable    Does the patient's insurance plan have a 3 day qualifying hospital stay waiver?  Yes     Which insurance plan 3 day waiver is available? Alternative insurance waiver    Will the waiver be used for post-acute placement? No    PAS Confirmation Code:  NA  Patient/family educated on Medicare website which has current facility and service quality ratings: no    Education Provided on the Discharge Plan: Yes per team  Persons Notified of Discharge Plans: Patient per team  Patient/Family in Agreement with the Plan: yes    Handoff Referral Completed: Yes    Additional Information:  Patient discharge home. Has outpatient dialysis at [facility]. Will leave after dialysis today. Family will transport.     Kayy Velarde RN

## 2023-12-20 NOTE — DISCHARGE SUMMARY
[facility]  CARDIOLOGY DISCHARGE SUMMARY     Primary Care Physician: Riki Martinez  Admission Date: 12/19/2023   Discharge Provider: Jennifer Ventura CNP Discharge Date: 12/20/2023   Diet: resume previous home diet   Code Status: No Order   Activity:     No forceful arm movements or driving for 24 hours    OK to remove dressing after 24 hours and leave open to air. If minor oozing, OK to apply a Band-Aid and remove after 12 hours.     Ok to shower day after procedure. Avoid tub baths, swimming pools, hot tubs or soaking the wrist for 3 days.     Do not operate a lawnmower, motorcycle, chainsaw or all-terrain vehicle for 48 hours    No lifting more than 5-10 pounds and avoid excessive bending (flexion/extension) wrist movement with affected arm for 5 days.    No vigorous exercise using the affected arm for 5 days after discharge.    OK to  return to work in 72 hours if no complications and no heavy lifting.        Condition at Discharge: Stable      BRIEF HPI:   García Kitchen is a 78 year old WM with past medical history of CAD by virtue of CT scan, newly detected atrial fibrillation; on NOAC, valvular heart disease, HTN, HFmrEF, dilated ascending Ao, ESRD; on HD and DM Type II who underwent coronary angiogram in evaluation of weakness and fatigue which revealed severe single vessel disease resulting in LAD PCI with TEENA x1. The patient was observed in [facility] following his procedure. He was hypertensive and noted to have continued oozing from his right radial vascular access site prompting overnight stay. His post operative course was otherwise uncomplicated. He will continue on Plavix for 12 months following PCI. He is also on NOAC for CVA prevention in AF and due to high risk of bleeding on triple therapy will not be prescribed ASA at this time.     García was stable at the time of discharge and his bleeding issues had resolved. Hospitalist medicine and Nephrology were consulted for  assistance with management of non-cardiac medical problems and hemodialysis during his stay.     PRINCIPAL & ACTIVE DISCHARGE DIAGNOSES     Principal Problem:    Dyspnea on exertion  Active Problems:    Dyspnea    Abnormal stress test    Status post coronary angiogram    Coronary artery disease involving native coronary artery of native heart with angina pectoris (H24)    Fatigue, unspecified type    Status post insertion of drug-eluting stent into left anterior descending (LAD) artery for coronary artery disease    SIGNIFICANT FINDINGS (Imaging, labs):     ECHOCARDIOGRAM 11/14/2023  Interpretation Summary     Left ventricular function is decreased. The ejection fraction is 45-50%  (mildly reduced).  There is mild global hypokinesia of the left ventricle.  Normal right ventricle size and systolic function.  The left atrium is severely dilated.  There is moderate to mod-severe (2-3+) mitral regurgitation.  Ascending Aorta dilatation is present.  IVC diameter and respiratory changes fall into an intermediate range  suggesting an RA pressure of 8 mmHg.  There is mild to moderate (1-2+) aortic regurgitation.    EKG 12/20/2023 @ 05:50:54 (personally reviewed and interpreted):  HR 76bpm, QRSd 94, QT 408ms, Qtc 459ms    LABS or IMAGING REQUIRING FOLLOW-UP AFTER DISCHARGE:     None    PROCEDURES ( this hospitalization only)      Procedure(s):  Coronary Angiogram  Percutaneous Coronary Intervention - Atherectomy Rotational  Percutaneous Coronary Intervention Stent  Percutaneous Coronary Intervention - Lithotripsy  Intravascular Ultrasound    RECOMMENDATIONS TO OUTPATIENT PROVIDER FOR F/U VISIT     Follow up with Kathryn Tomas PA-C (Post PCI) on 01/05/2024 at 1250  Follow up with Dr. Paz (Electrophysiology) on 01/02/2024 at 1350    Cardiac rehab as outpatient    DISPOSITION     Home    HOSPITAL COURSE BY DIAGNOSIS:      #Obstructive coronary artery disease  - s/p LAD PCI x1  - denies chest pain/pressure or  "significant dyspnea at discharge, right vascular access site is stable  - continue Plavix for a minimum of 12 months  - no ASA prescribed due to high risk of bleeding while on NOAC and history of thrombocytopenia  - Cardiac rehab as outpatient  - Cardiology follow up is arranged as above    #HFmrEF  - compensated/euvolemic  - on BB    #Valvular Heart Disease  - moderate-severe MR (3+)  - mild-moderate AI    #HTN   - controlled/stable  - continue previously prescribed BB    #Atrial Fibrillation  - rate controlled, on NOAC  - Follow up with ERIC SEARS as outpatient for long term management    #ESRD on HD  - management per Nephrology    #Advanced age/frailty  - patient has baseline memory deficit; stable    Clinically Significant Risk Factors Present on Admission               # Drug Induced Coagulation Defect: home medication list includes an anticoagulant medication  # Thrombocytopenia: Lowest platelets = 122 in last 2 days, will monitor for bleeding   # Hypertension: Noted on problem list      # Overweight: Estimated body mass index is 26.14 kg/m  as calculated from the following:    Height as of this encounter: 1.753 m (5' 9\").    Weight as of this encounter: 80.3 kg (177 lb).           # Anemia: based on hgb <11       Discharge Medications with Med changes:        Review of your medicines        START taking        Dose / Directions   clopidogrel 75 MG tablet  Commonly known as: PLAVIX      Dose: 75 mg  Take 1 tablet (75 mg) by mouth daily  Quantity: 90 tablet  Refills: 3            CONTINUE these medicines which have NOT CHANGED        Dose / Directions   apixaban ANTICOAGULANT 5 MG tablet  Commonly known as: ELIQUIS  Used for: Atrial fibrillation, unspecified type (H)      Dose: 5 mg  Take 1 tablet (5 mg) by mouth 2 times daily  Quantity: 60 tablet  Refills: 11     carvedilol 25 MG tablet  Commonly known as: COREG  Used for: DM (diabetes mellitus) (H)      Dose: 1 tablet  [CARVEDILOL (COREG) 25 MG TABLET] Take 1 " tablet by mouth 2 times a day at 6:00 am and 4:00 pm.  Refills: 0     cinacalcet 60 MG tablet  Commonly known as: SENSIPAR      Dose: 1 tablet  Take 1 tablet by mouth daily at 2 pm  Refills: 0     Dialyvite Tabs      Dose: 1 tablet  Take 1 tablet by mouth daily  Refills: 0     ketoconazole 2 % external cream  Commonly known as: NIZORAL      Apply topically 2 times daily as needed for itching  Refills: 0     lidocaine-prilocaine 2.5-2.5 % external cream  Commonly known as: EMLA  Used for: DM (diabetes mellitus) (H)      Dose: 1 Application.  Apply 1 Application topically as needed  Refills: 0     nitroGLYcerin 0.4 MG sublingual tablet  Commonly known as: NITROSTAT  Used for: Coronary artery disease involving native coronary artery of native heart without angina pectoris      For chest pain place 1 tablet under the tongue every 5 minutes for 3 doses. If symptoms persist 5 minutes after 1st dose call 911.  Quantity: 50 tablet  Refills: 0     polyethylene glycol 17 g packet  Commonly known as: MIRALAX      Dose: 17 g  Take 17 g by mouth daily as needed for constipation  Refills: 0     * sevelamer carbonate 800 MG tablet  Commonly known as: RENVELA      Dose: 3,200 mg  Take 3,200 mg by mouth 3 times daily (with meals)  Refills: 0     * sevelamer carbonate 800 MG tablet  Commonly known as: RENVELA      Dose: 1,600 mg  Take 1,600 mg by mouth as needed (with snacks)  Refills: 0     simvastatin 40 MG tablet  Commonly known as: ZOCOR      Dose: 40 mg  Take 1 tablet (40 mg) by mouth At Bedtime  Quantity: 90 tablet  Refills: 3     vitamin D3 50 mcg (2000 units) tablet  Commonly known as: CHOLECALCIFEROL      Dose: 1 tablet  Take 1 tablet by mouth daily  Refills: 0           * This list has 2 medication(s) that are the same as other medications prescribed for you. Read the directions carefully, and ask your doctor or other care provider to review them with you.                   Where to get your medicines        These  medications were sent to [facility] [address] [place] [id number] [facility]  [address] [facility] [address]      Phone: [phone]   clopidogrel 75 MG tablet           Rationale for medication changes:      No ASA due to high risk of bleeding while on NOAC and history of thrombocytopenia        Consults     Hospitalist Medicine    Nephrology       Pertinent Labs          Lab Results   Component Value Date     12/19/2023    Lab Results   Component Value Date    CHLORIDE 102 12/19/2023    CHLORIDE 104 05/03/2022    Lab Results   Component Value Date    BUN 47.8 12/19/2023    BUN 37 05/03/2022      Lab Results   Component Value Date    POTASSIUM 4.9 12/19/2023    POTASSIUM 5.1 05/03/2022    Lab Results   Component Value Date    CO2 25 12/19/2023    CO2 21 05/03/2022    Lab Results   Component Value Date    CR 5.94 12/19/2023        Lab Results   Component Value Date    WBC 5.9 12/19/2023    HGB 10.6 (L) 12/19/2023    HCT 32.9 (L) 12/19/2023     (H) 12/19/2023     (L) 12/19/2023     Discharge Orders     Discharge Procedure Orders   Ambulatory Cardiac Rehab Referral   Standing Status: Future   Referral Priority: Routine: Next available opening Referral Type: Rehab Therapy Cardiac Therapy   Number of Visits Requested: 1     Discharge Instructions - IF on Metformin (Glucophage or Glucovance) or Metformin containing medications on day of the procedure and for 48 hours after IV contrast given- Patients with acute kidney injury or severe chronic kidney disease (stage IV or stage V; i.e., eGFR less than 30)   Order Comments: IF on Metformin (Glucophage or Glucovance) or Metformin containing medications , schedule a Basic Metabolic Panel at [facility] in 48 - 72 hours post procedure and PRIOR TO resuming the Metformin or Metformin containing medications.  Hold Metformin (Glucophage or Glucovance) or Metformin containing medications until after the Basic Metabolic Panel on the  "2nd or 3rd day following the procedure.  May resume after blood draw is complete.     Discharge Instructions - Follow up with [facility] Nurse Practitioner    Order Comments: Follow up with [facility] Nurse Practitioner at [facility] of patient preference in 7-10 days.     Discharge Instructions - Follow up with [facility] Cardiologist   Order Comments: Follow -up with the [facility] of patient preference in 6 weeks     Reason aspirin not prescribed from this order set     Order Specific Question Answer Comments   Reason aspirin not prescribed from this order set Due to Triple Therapy (TT)      Reason Lipid Lowering Medications not prescribed from this order set   Order Comments: Already on statin therapy     Order Specific Question Answer Comments   Reason not prescribed Other (see comments)      Reason for your hospital stay   Order Comments: Coronary artery stenting     Follow-up and recommended labs and tests    Order Comments: Follow up with Cardiology Nurse Practitioner in 7-10 days, Follow up with Cardiologist in 6-8 weeks     Activity   Order Comments: Your activity upon discharge: no lifting or strenuous exercise for 3 days. No driving for 1 day.     Order Specific Question Answer Comments   Is discharge order? Yes      Diet   Order Comments: Follow this diet upon discharge: Heart Healthy; low fat, low cholesterol, low sodium     Order Specific Question Answer Comments   Is discharge order? Yes      Examination     Vital Signs in last 24 hours:   BP (!) 144/65   Pulse 70   Temp 98.4  F (36.9  C) (Oral)   Resp 20   Ht 1.753 m (5' 9\")   Wt 80.3 kg (177 lb)   SpO2 96%   BMI 26.14 kg/m      General Appearance:   Alert, cooperative and in no acute distress   ENT/Mouth: membranes moist, no oral lesions or bleeding gums.      EYES:  no scleral icterus, normal conjunctivae   Neck: Supple without lymphadenopathy.  Thyroid not visualized   Chest/Lungs:   Fine crackles in RLL, diminished bilateral " bases, no wheezing   Cardiovascular:   IRRegular. Normal first and second heart sounds with II/VI systolic murmur in the aortic position. No rubs, or gallops; the carotid, radial and posterior tibial pulses are intact, mild generalized edema.    Abdomen:  Soft and nontender. Bowel sounds are present in all quadrants   Extremities: no cyanosis or clubbing. Rt radial vascular access site with moderate soft ecchymosis present. Mild swelling noted bilateral upper extremities. Lt UE fistula present.    Skin: no xanthelasma, warm.    Neurologic: normal gait, normal  bilateral, no tremors   Psychiatric: Normal mood and affect       Please see EMR for more detailed significant labs, imaging, consultant notes etc.    25 MINUTES SPENT BY ME on the date of service doing chart review, history, exam, documentation & further activities per the note.
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
## 2023-12-20 NOTE — PLAN OF CARE
Problem: Cardiac Catheterization (Diagnostic/Interventional)  Goal: Absence of Vascular Access Complication  Outcome: Progressing  Intervention: Prevent and Manage Access Complications  Recent Flowsheet Documentation  Taken 12/20/2023 0442 by Letty Grove RN  Bleeding Precautions: monitored for signs of bleeding  Taken 12/20/2023 0405 by Letty Grove RN  Activity Management: activity adjusted per tolerance  Head of Bed (HOB) Positioning: HOB at 20-30 degrees  Taken 12/19/2023 2338 by Letty Grove RN  Bleeding Precautions: monitored for signs of bleeding  Activity Management:   ambulated to bathroom   back to bed     Problem: Cardiac Catheterization (Diagnostic/Interventional)  Goal: Stable Heart Rate and Rhythm  Outcome: Progressing         Goal Outcome Evaluation:  Patient is alert and oriented to self/situation. Patient denied pain. Has a-fib with controlled hr. Patient's sating adequately on room air. BP stable this shift. Patient's right radial has bruising, but remains soft. CMS intact. Dressing CDI over right wrist. Patient educated about arm restrictions. Patient is assist x1 with gait belt. Bed alarm on. Call-light within reach.

## 2023-12-20 NOTE — PLAN OF CARE
Goal Outcome Evaluation:      Plan of Care Reviewed With: patient    Patient denies chest pain. Right radial site soft, bruised. CMS intact. Dialysis today.

## 2023-12-20 NOTE — PROGRESS NOTES
Patient was kept comfortable during post-procedure stay.VSS. Denies pain. Right radial access site is currently stable and has tolerated TR band removal at a slow pace with band currently 11/14ml air in place. Skin tear is noted distally from band, but is difficult to dress as TR band is currently in the way. Off going RN was aware of this tear. Site is bruised but soft with no firm hematoma noted. Tolerated fluid. Pt is on bedrest for duration of TR band removal. Pt to stay overnight for monitoring on P3. Report called to Heike ASHER and site handoff completed at bedside. Pt was transported in cart with hovermat with floor RN to complete removal of TR band.   Rosemarie Alvarez, RN

## 2023-12-20 NOTE — PROCEDURES
"Potassium   Date Value Ref Range Status   12/19/2023 4.9 3.4 - 5.3 mmol/L Final   05/03/2022 5.1 (H) 3.5 - 5.0 mmol/L Final     Hemoglobin   Date Value Ref Range Status   12/19/2023 10.6 (L) 13.3 - 17.7 g/dL Final     Creatinine   Date Value Ref Range Status   12/19/2023 5.94 (H) 0.67 - 1.17 mg/dL Final     Urea Nitrogen   Date Value Ref Range Status   12/19/2023 47.8 (H) 8.0 - 23.0 mg/dL Final   05/03/2022 37 (H) 8 - 28 mg/dL Final     Sodium   Date Value Ref Range Status   12/19/2023 141 135 - 145 mmol/L Final     Comment:     Reference intervals for this test were updated on 09/26/2023 to more accurately reflect our healthy population. There may be differences in the flagging of prior results with similar values performed with this method. Interpretation of those prior results can be made in the context of the updated reference intervals.      No results found for: \"INR\"    DIALYSIS PROCEDURE NOTE  Hepatitis status of previous patient on machine log was checked and verified ok to use with this patients hepatitis status.  Patient dialyzed for 3 hrs. on a K2 bath with a net fluid removal of  2.6L.  A BFR of 400 ml/min was obtained via a LUE AVF using 15 gauge needles.         Needle cannulation sites held x 3 min.       Meds  given: None   Complications: UF off for bp support per patient. Stated he did not want BP to drop below 120, pt did state he felt warm      Person educated: Patient. Knowledge base substantial. Barriers to learning: none. Educated on procedure via oral mode.      ICEBOAT? Timeout performed pre-treatment  I: Patient was identified using 2 identifiers  C:  Consent Signed Yes  E: Equipment preventative maintenance is current and dialysis delivery system OK to use  B: Hepatitis B Surface Antigen: negative; Draw Date: 10/9/23      Hepatitis B Surface Antibody: immune ; Draw Date: 10/9/23  O: Dialysis orders present and complete prior to treatment  A: Vascular access verified and assessed prior to " treatment  T: Treatment was performed at a clinically appropriate time  ?: Patient was allowed to ask questions and address concerns prior to treatment  See Adult Hemodialysis flowsheet in EPIC for further details and post assessment.  Machine water alarm in place and functioning. Transducer pods intact and checked every 15min.     Patient repositioned every 2 hours during the treatment.  Post treatment report given to RJ An RN regarding 2.7L of fluid removed, last BP of 143/65, and patient pain rating of 0/10.

## 2023-12-23 ENCOUNTER — HEALTH MAINTENANCE LETTER (OUTPATIENT)
Age: 78
End: 2023-12-23

## 2023-12-26 NOTE — INTERVAL H&P NOTE
"I have reviewed the surgical (or preoperative) H&P that is linked to this encounter, and examined the patient. There are no significant changes    Clinical Conditions Present on Arrival:  Clinically Significant Risk Factors Present on Admission                # Drug Induced Coagulation Defect: home medication list includes an anticoagulant medication  # Thrombocytopenia: Lowest platelets = 122 in last 30 days, will monitor for bleeding  # Overweight: Estimated body mass index is 26.14 kg/m  as calculated from the following:    Height as of this encounter: 1.753 m (5' 9\").    Weight as of this encounter: 80.3 kg (177 lb).   Late entry    "

## 2024-01-04 ENCOUNTER — HOSPITAL ENCOUNTER (OUTPATIENT)
Dept: CARDIAC REHAB | Facility: CLINIC | Age: 79
Discharge: HOME | End: 2024-01-04
Attending: INTERNAL MEDICINE
Payer: COMMERCIAL

## 2024-01-04 DIAGNOSIS — Z95.5 S/P CORONARY ARTERY STENT PLACEMENT: ICD-10-CM

## 2024-01-04 PROCEDURE — 93798 PHYS/QHP OP CAR RHAB W/ECG: CPT

## 2024-01-04 PROCEDURE — 93797 PHYS/QHP OP CAR RHAB WO ECG: CPT
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
## 2024-01-09 ENCOUNTER — HOSPITAL ENCOUNTER (OUTPATIENT)
Dept: CARDIAC REHAB | Facility: CLINIC | Age: 79
Discharge: HOME | End: 2024-01-09
Attending: INTERNAL MEDICINE
Payer: COMMERCIAL

## 2024-01-09 ENCOUNTER — OFFICE VISIT (OUTPATIENT)
Dept: CARDIOLOGY | Facility: CLINIC | Age: 79
End: 2024-01-09
Payer: COMMERCIAL

## 2024-01-09 VITALS
OXYGEN SATURATION: 94 % | WEIGHT: 179.8 LBS | SYSTOLIC BLOOD PRESSURE: 130 MMHG | DIASTOLIC BLOOD PRESSURE: 64 MMHG | RESPIRATION RATE: 20 BRPM | BODY MASS INDEX: 26.55 KG/M2 | HEART RATE: 75 BPM

## 2024-01-09 DIAGNOSIS — I10 ESSENTIAL HYPERTENSION WITH GOAL BLOOD PRESSURE LESS THAN 140/90: Chronic | ICD-10-CM

## 2024-01-09 DIAGNOSIS — I50.22 HEART FAILURE WITH MILDLY REDUCED EJECTION FRACTION (H): ICD-10-CM

## 2024-01-09 DIAGNOSIS — R06.09 DYSPNEA ON EXERTION: ICD-10-CM

## 2024-01-09 DIAGNOSIS — N18.6 END STAGE RENAL FAILURE ON DIALYSIS (H): Chronic | ICD-10-CM

## 2024-01-09 DIAGNOSIS — I25.119 CORONARY ARTERY DISEASE INVOLVING NATIVE CORONARY ARTERY OF NATIVE HEART WITH ANGINA PECTORIS (H): Primary | ICD-10-CM

## 2024-01-09 DIAGNOSIS — E78.5 HYPERLIPIDEMIA LDL GOAL <70: ICD-10-CM

## 2024-01-09 DIAGNOSIS — R42 LIGHTHEADEDNESS: ICD-10-CM

## 2024-01-09 DIAGNOSIS — Z99.2 END STAGE RENAL FAILURE ON DIALYSIS (H): Chronic | ICD-10-CM

## 2024-01-09 DIAGNOSIS — I48.19 ATRIAL FIBRILLATION, PERSISTENT (H): ICD-10-CM

## 2024-01-09 PROCEDURE — 93798 PHYS/QHP OP CAR RHAB W/ECG: CPT

## 2024-01-09 PROCEDURE — 99214 OFFICE O/P EST MOD 30 MIN: CPT

## 2024-01-09 NOTE — PATIENT INSTRUCTIONS
García Kitchen,    It was a pleasure to see you today at the [facility].     My recommendations after this visit include:    - No medications changes made today    - Please seek medical attention if you develop recurrent chest pain or shortness of breath or similar symptoms you experienced prior to recent cardiac event    - Cardiac rehab as scheduled    - Follow up with Brandi Marquez 1/16/2024, Dr. Cross in 4-5 months    - Please call [phone], if you have any questions or concerns    Kathryn Marques PA-C    Medication     Take all your medications as prescribed  Do not stop any medications without talking with a healthcare provider    Exercise      Physical activity is important for overall health  Set a goal of 150 minutes of exercise each week  For example, 30 minutes of exercise 5 days each week.    These 30 minutes can be broken into shorter periods of 15 minutes twice daily or 10 minutes three times daily  Start any exercise program slowly and work towards the goal of 150 minutes each week  For example, you may start with 10 minutes and plan to add a few minutes each week as you get stronger   Examples of exercise include walking, swimming, or biking  Remember to stretch and stay hydrated with exercise    Diet     A heart healthy diet includes:  A variety of fruits and vegetables  Whole grains  Low-fat dairy (fat-free, 1% fat, and low-fat)  Lean meats and poultry without skin   Fish (eat fish 2 times each week)  Nuts  Limit saturated fat to about 13 grams each day (based on a 2000 calorie diet)  Limit red meat  Limit sugars (sweets and sugary beverages)  Limit your portion sizes  Do not add salt to your food when cooking or at the table  Limit alcohol intake (no more than 1 drink each day for women or 2 drinks each day for men)    Weight Loss     Work on losing weight with diet and exercise  You BMI (body mass index) should be between 18.5-24.9  This is a calculation of your  weight and height  Please ask your healthcare provider for your BMI    Manage Other Chronic Health Conditions     Control cholesterol  Eat a diet low in saturated fat  Exercise   Take a statin medication as prescribed  Manage blood pressure  Eat a diet low in sodium  Exercise  Reduce stress  Lose weight   Take blood pressure medications as prescribed  Control blood sugars if diabetic  Monitor sugars and carbohydrates in your diet  Lose weight   Take diabetes medications as prescribed  Follow-up with your primary care provider to make sure your blood sugars are well controlled    Stress Reduction     Find time each day to relax  Reading, listening to music, yoga, meditation, exercise, spending time with friends and family, volunteering   Get 6-8 hours of sleep each night    Smoking Cessation     Smoking causes numerous health problems including coronary artery disease  It is never too late to quit  Set realistic goals for quitting  Decrease the number of cigarettes used each week  Use nicotine gum or patches to help you quit    Information from the [employer].  Please visit their website at www.heart.org
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
## 2024-01-11 ENCOUNTER — HOSPITAL ENCOUNTER (OUTPATIENT)
Dept: CARDIAC REHAB | Facility: CLINIC | Age: 79
Discharge: HOME | End: 2024-01-11
Attending: INTERNAL MEDICINE
Payer: COMMERCIAL

## 2024-01-11 PROCEDURE — 93798 PHYS/QHP OP CAR RHAB W/ECG: CPT

## 2024-01-16 ENCOUNTER — OFFICE VISIT (OUTPATIENT)
Dept: CARDIOLOGY | Facility: CLINIC | Age: 79
End: 2024-01-16
Payer: COMMERCIAL

## 2024-01-16 ENCOUNTER — DOCUMENTATION ONLY (OUTPATIENT)
Dept: CARDIOLOGY | Facility: CLINIC | Age: 79
End: 2024-01-16

## 2024-01-16 ENCOUNTER — HOSPITAL ENCOUNTER (OUTPATIENT)
Dept: CARDIAC REHAB | Facility: CLINIC | Age: 79
Discharge: HOME | End: 2024-01-16
Attending: INTERNAL MEDICINE
Payer: COMMERCIAL

## 2024-01-16 VITALS
SYSTOLIC BLOOD PRESSURE: 124 MMHG | RESPIRATION RATE: 16 BRPM | DIASTOLIC BLOOD PRESSURE: 62 MMHG | BODY MASS INDEX: 26.73 KG/M2 | HEART RATE: 70 BPM | WEIGHT: 181 LBS

## 2024-01-16 DIAGNOSIS — I48.91 ATRIAL FIBRILLATION, UNSPECIFIED TYPE (H): ICD-10-CM

## 2024-01-16 DIAGNOSIS — I48.91 ATRIAL FIBRILLATION, UNSPECIFIED TYPE (H): Primary | ICD-10-CM

## 2024-01-16 PROCEDURE — 99205 OFFICE O/P NEW HI 60 MIN: CPT | Performed by: INTERNAL MEDICINE

## 2024-01-16 PROCEDURE — 93798 PHYS/QHP OP CAR RHAB W/ECG: CPT
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
## 2024-01-16 NOTE — PATIENT INSTRUCTIONS
[facility]  Cardiac Electrophysiology  [address]  [place] [address]   Office: [phone]  Fax: [phone]       Thank you for seeing us in clinic today - it is a pleasure to be a part of your care team.  Below is a summary of our plan from today's visit.       You have persistent atrial fibrillation, presently being managed with Coreg.  We reviewed physiology and management options including antiarrhythmic drug therapy, catheter ablation and lifestyle modification.  We will plan for the following:  - will schedule you for a cardioversion  - continue Coreg for now  - continue Eliquis.     Please do not hesitate to be in touch with our office at [phone] with any questions that may arise.       Thank you for trusting us with your care,    Israel Paz MD  Clinical Cardiac Electrophysiology  [facility]  [address]  [place] [address]   Office: [phone]  Fax: [phone]                 ATRIAL FIBRILLATION: Patient Information     What is atrial fibrillation?  Atrial fibrillation (AF, A-fib) is a common heart rhythm problem (arrhythmia) occurring within the upper chambers of the heart (the atria).  In normal rhythm, the upper and lower chambers of the heart are electrically driven to contract in a coordinated sequence.  In atrial fibrillation, the atria lose their ability to contract because of rapid and chaotic electrical activity.  The lower chambers of the heart (the ventricles) continue to pump blood throughout the body, though with irregular and often faster rate due to the chaotic activity within the atria.          How do I know if I have atrial fibrillation?   Some people may feel their heart beating faster, harder, or irregularly while in atrial fibrillation.  Others may be lightheaded, fatigued, feel weak or tired or become more short of breath especially with activities.  Some patients have no symptoms at  all.  Atrial fibrillation may be found due to an irregular pulse or on an electrocardiogram (ECG). Atrial fibrillation can start and stop on its own, and episodes can last from seconds to several months.       How common is atrial fibrillation?   An estimated 3-6 million people in the [place] have atrial fibrillation.  Atrial fibrillation is a common heart rhythm problem for older persons, affecting as estimated 12-15% of people over the age of 65 years of age.     What causes atrial fibrillation?   Age is the most important risk factor for atrial fibrillation.  Atrial fibrillation is more common in people with other heart disease, high blood pressure, diabetes, obesity, sleep apnea and in people who regularly consume alcohol.  Surgery, lung disease, or thyroid problems can lead to atrial fibrillation.  Atrial fibrillation has multiple possible causes, and in most cases a single cause cannot be found.  Atrial fibrillation is a progressive condition, usually starting with at an early stage with short and infrequent episodes.  In later stages of disease, more frequent and longer lasting episodes of atrial fibrillation occur, ultimately culminating in episodes which do not spontaneously terminate.  Generally, more enlargement and scarring within the upper chambers of the heart is observed as atrial fibrillation progresses from early to late-stage disease.     How is atrial fibrillation diagnosed and evaluated?    Because of its start-stop nature, atrial fibrillation can be challenging to diagnose.  Atrial fibrillation is most commonly diagnosed via cardiac rhythm recordings - either an ECG or wearable cardiac rhythm monitor.  For patients with pacemakers, defibrillators or implantable loop recorders, atrial fibrillation may be recorded via these devices.  Recently, commercially available devices (eg. Apple Watch, tibdit device, certain FitBit devices, others) can allow patients to take 30 second cardiac rhythm  recordings which may document atrial fibrillation.  Once atrial fibrillation is diagnosed, additional tests include blood tests and an echocardiogram.  The echocardiogram uses ultrasound to look at your heart to assess your cardiac function and evaluate for other heart disease.  Additional evaluation may include CT or MRI studies.     Is atrial fibrillation dangerous?   Atrial fibrillation is not usually a life-threatening arrhythmia.  The most serious consequences of atrial fibrillation including stroke and worsening of overall cardiac function.  While in atrial fibrillation, the upper cardiac chambers do not contract normally, resulting in slower blood flow and increased risk of clot formation.  If this blood clot becomes detached from the heart a stroke can occur.  Unfortunately, stroke can be the first sign of atrial fibrillation for some people.  With a stroke, you may notice abnormal sensation, weakness on one side of the body or face, changes in your vision or speech.  If you have any of these signs, you should contact EMS and be evaluated in an emergency room as soon as possible.       How is atrial fibrillation treated?     Several treatment options exist for suppressing atrial fibrillation - however, it is not an easily curable arrhythmia.  The first goal in managing atrial fibrillation is to minimize stroke risk.  The second goal is to improve symptoms associated with atrial fibrillation.  Finally, in patients with reduced cardiac function, maintaining normal rhythm can help improve cardiac function.       Blood thinners are used to reduce the risk of stroke in patients with high estimated stroke risk related to atrial fibrillation.  For patients at higher risk of bleeding related to blood thinner use, implantable devices may be an option to reduce stroke risk without the need for long term blood thinner use.       Atrial fibrillation can be managed via two strategies: rate control and rhythm control.  In  a rate control strategy, continued atrial fibrillation is accepted and medications (eg. beta-blockers or calcium channel blockers) are used to control the lower chamber rate.  In a rhythm control strategy, anti-arrhythmic medications or catheter ablation are used to maintain normal cardiac rhythm and slow disease progression by suppressing atrial fibrillation.  A procedure called a cardioversion, in which an electric shock is delivered through patches placed on the chest wall while under deep sedation, can be performed to temporarily restore normal cardiac rhythm, though does not address the chance of atrial fibrillation recurrence.  Treatments are more effective for earlier rather than later stage atrial fibrillation.  Lifestyle modifications (maintaining a healthy weight, aerobic exercise, diagnosing and treating sleep apnea, and minimizing alcohol intake) are important elements of atrial fibrillation rhythm control.      What is catheter ablation for atrial fibrillation?  Cardiac catheter ablation is a commonly performed, minimally invasive procedure performed by a cardiac electrophysiologist to treat many different cardiac rhythm abnormalities.  During catheter ablation, long, thin, flexible tubes are advanced into the heart via small sheaths inserted into the femoral veins and thermal energy (either heating or cooling) is applied within the heart to disrupt abnormal electrical activity.  Atrial fibrillation ablation is performed under general anesthesia, with procedures generally taking approximately 2-3 hours.  Patients are typically observed for 3-5 hours after the ablation, and in most cases can be discharged home the same day.  Atrial fibrillation ablation is associated with better outcomes (mortality, cardiovascular hospitalizations, atrial arrhythmia recurrences) compared to antiarrhythmic drug therapy.  However, atrial fibrillation recurrences are not uncommon, and repeat catheter ablation may be  offered.  Your electrophysiology team can review atrial fibrillation ablation, anticipated success rates, risks, and recovery expectations with you.     What are anti-arrhythmic medications?  Anti-arrhythmic medications are specialized drugs which alter cardiac electrical functioning to suppress arrhythmias.  There are several anti-arrhythmic medications available, each with its own success rate and side effects.  Some anti-arrhythmic medications are less effective though safer to use, others are more effective though have serious potential toxicities.  Atrial fibrillation recurrences are common and may require dose adjustment or change in antiarrhythmic therapy.  Your electrophysiology team will carefully consider which medication would be the best and safest for your particular case.       Can I live a normal life?    The goal of atrial fibrillation management is for patients to live normal lives without being limited by symptoms related to atrial fibrillation.     Are any additional educational resources available?  There are a number of excellent atrial fibrillation education resources available to you online.  A few options you may wish to review include:  hrsonline.org/guide-atrial-fibrillation  afibmatters.org  getsmartaboutafib.com  stopaf.com     What comes next?    Consider your management options and let us know how we can help in your decision process.  Please take medications as they have been prescribed.  You should also get any tests that may have been ordered for you.       When to Call Your Doctor or seek emergency care:  Call your doctor or seek emergency care if you have any significant changes with the following:  Weakness  Dizziness  Fainting  Fatigue  Shortness of breath  Chest pain with increased activity  If you are concerned that your heart rate is too fast or too slow  Bleeding that does not stop in 10 minutes  Coughing or throwing up blood  Bloody diarrhea or bleeding  hemorrhoids  Dark-colored urine or black stool  Allergic reactions:  Rash  Itching  Swelling  Trouble breathing or swallowing        Please call the [facility] at [phone] if you have concerns about your symptoms, your medicines, or your follow-up appointments.
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
## 2024-01-16 NOTE — PROGRESS NOTES
H&P  PMD: []  Received [] Card OV: [x]  Date: 1-16 Sent LM  []  Teach  []   Orders  I [x] P  [x]  Letter []   AC: Eliquis NP Med Review: NEEDS review  Coreg 25 mg bid  DM Meds: None  GLP-1:None       1945  Home:[phone] (home) Cell:[phone] (mobile)  Emergency Contact: Kirsten Kitchen [phone]  PCP: Riki Martinez, [phone]    +++Important patient information for CSC/Cath Lab staff :  Hemodialysis MWF +++    [facility] EP Cath Lab Procedure Order   Procedure: Cardioversion for AF  Ordering Provider: Dr Paz  Date Ordered and Prepped: 1/16/2024 Jany Zimmer RN  Anticipated Case Duration:  Standard  Scheduling Needs/Timeframe:  Next Available  Scheduling Contact: Please contact pt to schedule  Cardiology Follow Up Apt s/p: Standard- EP ALE @ 4-6 wks or previously scheduled General Card apt  Current Device/Device Co Needed for Procedure: None NoneNone  Pre-Procedural Testing needed: None  Anesthesia:  General    [facility] EP Cath Lab Prep   H&P:  Compled by cardiology on 1-16-24 if scheduled within 30 days, pt to schedule with PMD if procedure outside of this timeframe  Pre-op Labs: K if pt taking diuretic medication or hx of low Potassium, Beta HcG if appropriate, and INR if on Warfarin will be ordered AM of procedure and Review of most recent labs, WEL for procedure  T&S Pre-Procedure Review: T&S is not required for DCCV/DFT Testing  Medical Records Pertinent for Procedure:  None  Iodinated Contrast Dye Allergies (Does not include Shellfish, Egg, and/or Iodine Allergy): NA  GLP-1 Protocol: If on Dulaglutide (Trulicity) (weekly)- Injection hold 7 days prior to procedure  , Exenatide extended release (Bydureon bcise) (weekly)- Injection hold 7 days prior to procedure, Exenatide (Byetta) (twice daily)- Oral Tablet hold day prior and morning of procedure and for Injection hold 7 days prior to procedure, Semaglutide (Ozempic) (weekly)- Injection and Oral hold 7 days prior to procedure, Liraglutide  (Victoza, Saxenda) (daily)- Injection hold day prior and morning of procedure    Allergies   Allergen Reactions    Codeine Nausea and Vomiting     Other Reaction(s): Not available    Lisinopril Cough     Other Reaction(s): Not available       Current Outpatient Medications:     apixaban ANTICOAGULANT (ELIQUIS) 5 MG tablet, Take 1 tablet (5 mg) by mouth 2 times daily, Disp: 60 tablet, Rfl: 11    carvedilol (COREG) 25 MG tablet, [CARVEDILOL (COREG) 25 MG TABLET] Take 1 tablet by mouth 2 times a day at 6:00 am and 4:00 pm., Disp: , Rfl:     cinacalcet (SENSIPAR) 60 MG tablet, Take 1 tablet by mouth daily, Disp: , Rfl:     clopidogrel (PLAVIX) 75 MG tablet, Take 1 tablet (75 mg) by mouth daily, Disp: 90 tablet, Rfl: 3    DIALYVITE 100-1 mg Tab, Take 1 tablet by mouth daily , Disp: , Rfl:     ketoconazole (NIZORAL) 2 % external cream, Apply topically 2 times daily as needed for itching, Disp: , Rfl:     lidocaine-prilocaine (EMLA) cream, Apply 1 Application topically as needed , Disp: , Rfl:     nitroGLYcerin (NITROSTAT) 0.4 MG sublingual tablet, For chest pain place 1 tablet under the tongue every 5 minutes for 3 doses. If symptoms persist 5 minutes after 1st dose call 911., Disp: 50 tablet, Rfl: 0    polyethylene glycol (MIRALAX) 17 gram packet, Take 17 g by mouth daily as needed for constipation, Disp: , Rfl:     sevelamer carbonate (RENVELA) 800 MG tablet, Take 1,600 mg by mouth as needed (with snacks), Disp: , Rfl:     sevelamer carbonate (RENVELA) 800 mg tablet, Take 3,200 mg by mouth 3 times daily (with meals), Disp: , Rfl:     simvastatin (ZOCOR) 40 MG tablet, Take 1 tablet (40 mg) by mouth At Bedtime, Disp: 90 tablet, Rfl: 3    vitamin D3 (CHOLECALCIFEROL) 50 mcg (2000 units) tablet, Take 1 tablet by mouth daily, Disp: , Rfl:     Documentation Date:1/16/2024 11:50 AM  Jany Zimmer RN

## 2024-01-18 ENCOUNTER — HOSPITAL ENCOUNTER (OUTPATIENT)
Dept: CARDIAC REHAB | Facility: CLINIC | Age: 79
Discharge: HOME | End: 2024-01-18
Attending: INTERNAL MEDICINE
Payer: COMMERCIAL

## 2024-01-18 ENCOUNTER — TELEPHONE (OUTPATIENT)
Dept: CARDIOLOGY | Facility: CLINIC | Age: 79
End: 2024-01-18
Payer: COMMERCIAL

## 2024-01-18 PROCEDURE — 93798 PHYS/QHP OP CAR RHAB W/ECG: CPT

## 2024-01-18 NOTE — TELEPHONE ENCOUNTER
Pre-Procedure Education    Procedure: DCCV with Gissell Pitts NP on 2/1/2024 with arrival time 8:30 am      PT IS ON ELIQUIS AND NO MISSED DOSES AND REVIEWED TO CALL IN IF HE DOES    COVID: COVID policy- if pt develops COVID like symptoms prior to procedure, he/she would need to complete an at home with a rapid antigen COVID test 1-2 days prior to your procedure date. If COVID + pt is aware the procedure will need to be rescheduled, and to contact CV scheduling as soon as possible    Pre-Op H&P: Completed- Available in Epic    Education:   ALL INSTRUCTIONS WERE REVIEWED WITH WIFE AND PT    PT HAS A  FOR PROCEDURE THAT WILL STAY WITH HIM    PT INSTRUCTED TO HOLD ANY VITAMINS, MINERALS CALCIUM IRON OR SUPPLEMENTS THE MORNING OF CV  PT INSTRUCTED NO GUM CHEWING MINTS OR CANDY THE MORNING OF CV   PT INSTRUCTED TO LEAVE JEWELRY AT HOME  PT INSTRUCTED TO BATHE OR SHOWER BEFORE COMING IN  PT IS ON ELIQUIS  NO MEDICATION CHANGES WERE MADE  PT HAS A POST CV FOLLOW UP WITH ANITA 3/12    Contact:   Reviewed via phone with pt and spouse/caregiver    Pre-Procedure Instruction: NPO after midnight pre procedure, Defined NPO with pt, Remove all jewelry and leave all valuables at home, Shower prior to arrival, Sedation plan/orders, Transportation requirements and arrangements post procedure, Post-procedure follow up process, Post-procedure restrictions/expectations, and Pre-procedure letter sent- letter tab  Risks:      Medication:   Instructions regarding anticoagulants: Eliquis- To continue anticoagulation uninterrupted through their procedure    Instructions regarding antiarrhythmic medication: Beta Blocker; continue medication prior to procedure as prescribed    Instructions given to pt regarding diuretics medication: NA for DCCV    Instructions given to pt regarding DM/GLP-1 medication:   DM- None  GLP-1- None    Instructions for medication, other than anticoagulants and antiarrhythmics listed above, given to pt: Take  all medication AM of procedure with small sips of water       Important patient information for staff: None    1/18/2024 3:35 PM  Padmini Sanches LPN

## 2024-01-23 ENCOUNTER — HOSPITAL ENCOUNTER (OUTPATIENT)
Dept: CARDIAC REHAB | Facility: CLINIC | Age: 79
Discharge: HOME | End: 2024-01-23
Attending: INTERNAL MEDICINE
Payer: COMMERCIAL

## 2024-01-23 PROCEDURE — 93798 PHYS/QHP OP CAR RHAB W/ECG: CPT

## 2024-01-25 ENCOUNTER — HOSPITAL ENCOUNTER (OUTPATIENT)
Dept: CARDIAC REHAB | Facility: CLINIC | Age: 79
Discharge: HOME | End: 2024-01-25
Attending: INTERNAL MEDICINE
Payer: COMMERCIAL

## 2024-01-25 PROCEDURE — 93798 PHYS/QHP OP CAR RHAB W/ECG: CPT

## 2024-01-30 ENCOUNTER — HOSPITAL ENCOUNTER (OUTPATIENT)
Dept: CARDIAC REHAB | Facility: CLINIC | Age: 79
Discharge: HOME | End: 2024-01-30
Attending: INTERNAL MEDICINE
Payer: COMMERCIAL

## 2024-01-30 ENCOUNTER — TRANSFERRED RECORDS (OUTPATIENT)
Dept: HEALTH INFORMATION MANAGEMENT | Facility: CLINIC | Age: 79
End: 2024-01-30
Payer: COMMERCIAL

## 2024-01-30 PROCEDURE — 93798 PHYS/QHP OP CAR RHAB W/ECG: CPT

## 2024-01-31 ENCOUNTER — PREP FOR PROCEDURE (OUTPATIENT)
Dept: CARDIOLOGY | Facility: CLINIC | Age: 79
End: 2024-01-31
Payer: COMMERCIAL

## 2024-01-31 DIAGNOSIS — I48.91 ATRIAL FIBRILLATION, UNSPECIFIED TYPE (H): Primary | ICD-10-CM

## 2024-01-31 RX ORDER — LIDOCAINE 40 MG/G
CREAM TOPICAL
Status: CANCELLED | OUTPATIENT
Start: 2024-01-31

## 2024-02-01 ENCOUNTER — HOSPITAL ENCOUNTER (OUTPATIENT)
Dept: CARDIOLOGY | Facility: HOSPITAL | Age: 79
Discharge: HOME | End: 2024-02-01
Attending: INTERNAL MEDICINE | Admitting: INTERNAL MEDICINE
Payer: COMMERCIAL

## 2024-02-01 ENCOUNTER — ANESTHESIA EVENT (OUTPATIENT)
Dept: CARDIOLOGY | Facility: HOSPITAL | Age: 79
End: 2024-02-01
Payer: COMMERCIAL

## 2024-02-01 ENCOUNTER — ANESTHESIA (OUTPATIENT)
Dept: CARDIOLOGY | Facility: HOSPITAL | Age: 79
End: 2024-02-01
Payer: COMMERCIAL

## 2024-02-01 VITALS
OXYGEN SATURATION: 100 % | TEMPERATURE: 97.7 F | RESPIRATION RATE: 15 BRPM | HEART RATE: 73 BPM | SYSTOLIC BLOOD PRESSURE: 139 MMHG | DIASTOLIC BLOOD PRESSURE: 62 MMHG

## 2024-02-01 DIAGNOSIS — I48.91 ATRIAL FIBRILLATION, UNSPECIFIED TYPE (H): ICD-10-CM

## 2024-02-01 LAB
ATRIAL RATE - MUSE: 72 BPM
DIASTOLIC BLOOD PRESSURE - MUSE: NORMAL MMHG
INTERPRETATION ECG - MUSE: NORMAL
P AXIS - MUSE: -12 DEGREES
PR INTERVAL - MUSE: 222 MS
QRS DURATION - MUSE: 94 MS
QT - MUSE: 422 MS
QTC - MUSE: 462 MS
R AXIS - MUSE: -1 DEGREES
SYSTOLIC BLOOD PRESSURE - MUSE: NORMAL MMHG
T AXIS - MUSE: 70 DEGREES
VENTRICULAR RATE- MUSE: 72 BPM

## 2024-02-01 PROCEDURE — 999N000054 HC STATISTIC EKG NON-CHARGEABLE

## 2024-02-01 PROCEDURE — 92960 CARDIOVERSION ELECTRIC EXT: CPT

## 2024-02-01 PROCEDURE — 93005 ELECTROCARDIOGRAM TRACING: CPT

## 2024-02-01 PROCEDURE — 370N000017 HC ANESTHESIA TECHNICAL FEE, PER MIN

## 2024-02-01 PROCEDURE — 92960 CARDIOVERSION ELECTRIC EXT: CPT | Performed by: NURSE PRACTITIONER

## 2024-02-01 PROCEDURE — 250N000009 HC RX 250

## 2024-02-01 PROCEDURE — 93010 ELECTROCARDIOGRAM REPORT: CPT | Performed by: INTERNAL MEDICINE

## 2024-02-01 RX ORDER — LIDOCAINE 40 MG/G
CREAM TOPICAL
Status: DISCONTINUED | OUTPATIENT
Start: 2024-02-01 | End: 2024-02-01 | Stop reason: HOSPADM

## 2024-02-01 RX ADMIN — METHOHEXITAL SODIUM 40 MG: 500 INJECTION, POWDER, LYOPHILIZED, FOR SOLUTION INTRAMUSCULAR; INTRAVENOUS; RECTAL at 10:01

## 2024-02-01 RX ADMIN — METHOHEXITAL SODIUM 60 MG: 500 INJECTION, POWDER, LYOPHILIZED, FOR SOLUTION INTRAMUSCULAR; INTRAVENOUS; RECTAL at 09:50

## 2024-02-01 ASSESSMENT — ACTIVITIES OF DAILY LIVING (ADL)
ADLS_ACUITY_SCORE: 35
ADLS_ACUITY_SCORE: 35

## 2024-02-01 ASSESSMENT — ENCOUNTER SYMPTOMS: DYSRHYTHMIAS: 1

## 2024-02-01 NOTE — PROCEDURES
[facility]    Procedure: Cardioversion    Date/Time: 2/1/2024 10:24 AM    Performed by: Gissell Pitts APRN CNP  Authorized by: Israel Paz MD      UNIVERSAL PROTOCOL   Site Marked: NA  Prior Images Obtained and Reviewed:  Yes  Required items: Required blood products, implants, devices and special equipment available    Patient identity confirmed:  Verbally with patient, arm band and provided demographic data  Patient was reevaluated immediately before administering moderate or deep sedation or anesthesia  Confirmation Checklist:  Patient's identity using two indicators, relevant allergies, procedure was appropriate and matched the consent or emergent situation and correct equipment/implants were available  Time out: Immediately prior to the procedure a time out was called    Universal Protocol: the Joint Commission Universal Protocol was followed       ANESTHESIA    Anesthesia was administered and monitored by anesthesiology.  See anesthesia documentation for details.    PROCEDURE DETAILS  Pre-procedure rhythm: atrial fibrillation  Patient position: patient was placed in a supine position  Chest area: chest area exposed  Electrodes: pads  Electrodes placed: anterior-posterior  Number of attempts: 1    Details of Attempts:  At 1003, after ministration of IV Brevital by MDA and confirmation of adequate sedation, he received a single synchronous shock of 200 J with prompt restoration of sinus rhythm.  Post cardioversion EKG pending.  Post-procedure rhythm: normal sinus rhythm  Complications: no complications      PROCEDURE  Describe Procedure: Persistent atrial fibrillation diagnosed in December.  He also has a history of coronary artery disease with previous PCI, hypertension, ESRD on HD.  Symptoms may consist of fatigue and dyspnea on exertion.  He is undergoing cardioversion for symptom clarification.  He is on Eliquis for stroke prophylaxis and denies missing any doses in >3  weeks.  Successful cardioversion to sinus rhythm.  Continue Eliquis 5 mg twice daily for stroke prophylaxis  Follow-up with Josie Guzman CNP on 3/12/2024  Discharge home 1 hour after cardioversion if fully awake and stable  Patient Tolerance:  Patient tolerated the procedure well with no immediate complications  Length of time physician/provider present for 1:1 monitoring during sedation: 10
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
## 2024-02-01 NOTE — ANESTHESIA CARE TRANSFER NOTE
Patient: García Kitchen    Procedure: * No procedures listed *  Cardioversion External    Diagnosis: * No pre-op diagnosis entered *  Diagnosis Additional Information: No value filed.    Anesthesia Type:   General     Note:    Oropharynx: oropharynx clear of all foreign objects and spontaneously breathing  Level of Consciousness: drowsy  Oxygen Supplementation: nasal cannula  Level of Supplemental Oxygen (L/min / FiO2): 5  Independent Airway: airway patency satisfactory and stable  Dentition: dentition unchanged  Vital Signs Stable: post-procedure vital signs reviewed and stable  Report to RN Given: handoff report given  Patient transferred to: Cardiac Special Care      Vitals:  Vitals Value Taken Time   /55 02/01/24 1004   Temp 36.5  C (97.7  F) 02/01/24 1004   Pulse 82 02/01/24 1004   Resp 15 02/01/24 1004   SpO2 100 % 02/01/24 1004       Electronically Signed By: KARLI Rodrigues CRNA  February 1, 2024  10:05 AM

## 2024-02-01 NOTE — PLAN OF CARE
Pt had a successful cardioversion . No medication changes. Pt feeling good. Home with wife.

## 2024-02-01 NOTE — ANESTHESIA POSTPROCEDURE EVALUATION
Patient: García Kitchen    Procedure: * No procedures listed *  Cardioversion External    Anesthesia Type:  General    Note:  Disposition: Outpatient   Postop Pain Control: Uneventful            Sign Out: Well controlled pain   PONV: No   Neuro/Psych: Uneventful            Sign Out: Acceptable/Baseline neuro status   Airway/Respiratory: Uneventful            Sign Out: Acceptable/Baseline resp. status   CV/Hemodynamics: Uneventful            Sign Out: Acceptable CV status; No obvious hypovolemia; No obvious fluid overload   Other NRE: NONE   DID A NON-ROUTINE EVENT OCCUR? No           Last vitals:  Vitals:    02/01/24 1030 02/01/24 1045 02/01/24 1050   BP: (!) 141/65 124/60 (!) 144/63   Pulse: 74 71 78   Resp: 18 14 28   Temp:      SpO2:          Electronically Signed By: Trisha Alejo MD  February 1, 2024  11:54 AM

## 2024-02-01 NOTE — INTERVAL H&P NOTE
I have reviewed the surgical (or preoperative) H&P that is linked to this encounter, and examined the patient. There are no significant changes.  Pt denies missing any doses of Eliquis in > 3 weeks.    Clinical Conditions Present on Arrival:  Clinically Significant Risk Factors Present on Admission                # Drug Induced Coagulation Defect: home medication list includes an anticoagulant medication  # Drug Induced Platelet Defect: home medication list includes an antiplatelet medication

## 2024-02-06 ENCOUNTER — HOSPITAL ENCOUNTER (OUTPATIENT)
Dept: CARDIAC REHAB | Facility: CLINIC | Age: 79
Discharge: HOME | End: 2024-02-06
Attending: INTERNAL MEDICINE
Payer: COMMERCIAL

## 2024-02-06 PROCEDURE — 93798 PHYS/QHP OP CAR RHAB W/ECG: CPT

## 2024-02-08 ENCOUNTER — HOSPITAL ENCOUNTER (OUTPATIENT)
Dept: CARDIAC REHAB | Facility: CLINIC | Age: 79
Discharge: HOME | End: 2024-02-08
Attending: INTERNAL MEDICINE
Payer: COMMERCIAL

## 2024-02-08 PROCEDURE — 93798 PHYS/QHP OP CAR RHAB W/ECG: CPT

## 2024-02-12 ENCOUNTER — TRANSFERRED RECORDS (OUTPATIENT)
Dept: HEALTH INFORMATION MANAGEMENT | Facility: CLINIC | Age: 79
End: 2024-02-12
Payer: COMMERCIAL

## 2024-02-12 LAB — RETINOPATHY: NEGATIVE

## 2024-02-13 ENCOUNTER — HOSPITAL ENCOUNTER (OUTPATIENT)
Dept: CARDIAC REHAB | Facility: CLINIC | Age: 79
Discharge: HOME | End: 2024-02-13
Attending: INTERNAL MEDICINE
Payer: COMMERCIAL

## 2024-02-13 PROCEDURE — 93798 PHYS/QHP OP CAR RHAB W/ECG: CPT

## 2024-02-15 ENCOUNTER — HOSPITAL ENCOUNTER (OUTPATIENT)
Dept: CARDIAC REHAB | Facility: CLINIC | Age: 79
Discharge: HOME | End: 2024-02-15
Attending: INTERNAL MEDICINE
Payer: COMMERCIAL

## 2024-02-15 PROCEDURE — 93798 PHYS/QHP OP CAR RHAB W/ECG: CPT

## 2024-02-22 ENCOUNTER — HOSPITAL ENCOUNTER (OUTPATIENT)
Dept: CARDIAC REHAB | Facility: CLINIC | Age: 79
Discharge: HOME | End: 2024-02-22
Attending: INTERNAL MEDICINE
Payer: COMMERCIAL

## 2024-02-22 PROCEDURE — 93798 PHYS/QHP OP CAR RHAB W/ECG: CPT

## 2024-02-27 ENCOUNTER — HOSPITAL ENCOUNTER (OUTPATIENT)
Dept: CARDIAC REHAB | Facility: CLINIC | Age: 79
Discharge: HOME | End: 2024-02-27
Attending: INTERNAL MEDICINE
Payer: COMMERCIAL

## 2024-02-27 PROCEDURE — 93798 PHYS/QHP OP CAR RHAB W/ECG: CPT

## 2024-02-29 ENCOUNTER — HOSPITAL ENCOUNTER (OUTPATIENT)
Dept: CARDIAC REHAB | Facility: CLINIC | Age: 79
Discharge: HOME | End: 2024-02-29
Attending: INTERNAL MEDICINE
Payer: COMMERCIAL

## 2024-02-29 PROCEDURE — 93798 PHYS/QHP OP CAR RHAB W/ECG: CPT

## 2024-03-07 ENCOUNTER — HOSPITAL ENCOUNTER (OUTPATIENT)
Dept: CARDIAC REHAB | Facility: CLINIC | Age: 79
Discharge: HOME | End: 2024-03-07
Attending: INTERNAL MEDICINE
Payer: COMMERCIAL

## 2024-03-07 PROCEDURE — 93798 PHYS/QHP OP CAR RHAB W/ECG: CPT

## 2024-03-12 ENCOUNTER — HOSPITAL ENCOUNTER (OUTPATIENT)
Dept: CARDIAC REHAB | Facility: CLINIC | Age: 79
Discharge: HOME | End: 2024-03-12
Attending: INTERNAL MEDICINE
Payer: COMMERCIAL

## 2024-03-12 ENCOUNTER — TELEPHONE (OUTPATIENT)
Dept: CARDIOLOGY | Facility: CLINIC | Age: 79
End: 2024-03-12

## 2024-03-12 ENCOUNTER — OFFICE VISIT (OUTPATIENT)
Dept: CARDIOLOGY | Facility: CLINIC | Age: 79
End: 2024-03-12
Payer: COMMERCIAL

## 2024-03-12 VITALS
HEART RATE: 70 BPM | HEIGHT: 69 IN | SYSTOLIC BLOOD PRESSURE: 128 MMHG | RESPIRATION RATE: 16 BRPM | OXYGEN SATURATION: 99 % | DIASTOLIC BLOOD PRESSURE: 44 MMHG | BODY MASS INDEX: 26.73 KG/M2 | WEIGHT: 180.5 LBS

## 2024-03-12 DIAGNOSIS — I48.19 PERSISTENT ATRIAL FIBRILLATION (H): Primary | ICD-10-CM

## 2024-03-12 PROCEDURE — 93798 PHYS/QHP OP CAR RHAB W/ECG: CPT

## 2024-03-12 PROCEDURE — G2211 COMPLEX E/M VISIT ADD ON: HCPCS | Performed by: NURSE PRACTITIONER

## 2024-03-12 PROCEDURE — 99215 OFFICE O/P EST HI 40 MIN: CPT | Performed by: NURSE PRACTITIONER

## 2024-03-12 NOTE — PATIENT INSTRUCTIONS
Information on Atrial Fibrillation Ablations    What is Catheter Ablation?  A Catheter Ablation is a procedure that treats certain types of abnormal heart rhythms (arrhythmia). There are several components to the procedure, but the final purpose is target and destroy (ablate) small areas of your heart muscle that are causing the arrhythmia.     Why is an Ablations Done?  A catheter ablation is an effective way to treat some types of abnormal heart rhythms. An ablation is a relatively low risk procedure that may permanently cure your abnormal heart rhythm.  The ablation process damages the heart cells which results in scarring of that area. The scar is electrically inactive and can produce a permanent cure for the abnormal rhythm.  Ablation procedures can help avoid the need for rhythm medications and give patients the ability to return to their normal activity and live an active life. In patients that do not have symptoms, ablations are not typically done as there may still be an increased risk of stroke.    Why is Catheter Ablation Done?  Sometimes, the heart s electrical system does not work properly.  This can cause abnormal heart rhythms, called arrhythmias.  During an arrhythmia, the heart may beat too fast, too slowly, or irregularly.  Your doctor has recommended catheter ablation to treat a rapid (fast) heart rhythm, or tachycardia.      How Catheter Ablation Is Done  Catheter ablation uses thin, flexible wires called electrode catheters to find and destroy (ablate) problem cells.     Here is how the procedure is done:  The pulmonary veins will be treated first. There are currently two tools used to ablate around the pulmonary veins.  Radiofrequency catheter will heat the tissue.  Cryo-balloon catheter will freeze the tissue.   Testing will be done to confirm that effective treatment has been delivered.   Further testing may be performed to see if a fib is still present or if some other rhythm problem  such as atrial flutter is present. If an ongoing rhythm problem is discovered then further ablation can be done to isolate and destroy those areas responsible for the arrhythmia.     Your  Experience during Catheter Ablation    In most cases, catheter ablations are done in an electrophysiology (EP) lab.  The procedural area: You will be transferred back to the procedure room once you have been appropriately prepped by the nursing staff and you are ready for your ablation.   Sedation: You to be put completely asleep for your ablation using general anesthesia.  Inserting the catheters: You will have 3-4 catheters inserted into the veins. Catheter locations can include the shoulder, neck, and groins. Catheters are guided to the heart with the help of ultrasound and x-ray monitors.  Finishing up: When the procedure is finished, the catheters are taken out of your body. A special closure device or suture may be used to help seal these puncture sites. You re then taken to your room to rest, and will be cared for by a nurse during your recovery.    Risks and Complications  The risks of catheter ablation are fairly low compared to the benefits you receive. Possible risks and complications include:  Common (up to 10%)  Bleeding or bruising  Shortness of breath  Heartburn  Uncommon (< 1%)  Blood clots  A slow heart rhythm (requiring a permanent pacemaker)  Perforation of the heart muscle, blood vessel, or lung (may require an emergency procedure)  Stroke  Damage to a heart valve   Heart attack, also known as acute myocardial infarction, or AMI   Death (extremely rare)    Before your Catheter Ablation  Before your catheter ablation, you will meet with the Electrophysiologist (specially trained heart doctor) who will do the procedure. The provider or a registered nurse will provide you with detailed instructions on how to prepare for this procedure, some of these instructions are listed below.  You will likely be told to stop  or change your heart rhythm medications for a period of time before your ablation.   You may have testing done several days prior to your ablation or the morning of your ablation, such as an ECG, x-ray, blood tests, or echocardiogram.   You will not be allowed to eat or drink 8 hours before your ablation. You will be given further instructions by your physician or a registered nurse regarding the medication you will take the morning of your procedure.  You will need to arrange to have a  home from the hospital; you will not be permitted to drive after your procedure due to the sedation that you receive.   You are allowed to bring personal items and clothing to the hospital, but please refrain from bringing any valuables as the hospital is not responsible for any lost or stolen items.  You will need to bring a list of the names and dosages of the medication you are taking to the hospital.  It is important to mention to your doctor or registered nurse if you have any allergies, reactions to anesthesia, or have had history of bleeding problems.    Arriving at the Hospital the morning of your Catheter Ablation  Please check in at the time that was given to you by the , who scheduled your procedure. You do not need to arrive any earlier than the time that you were given.    When you arrive, you will be directed to the area where they will be performing your procedure. The doctor or registered nurse will meet with you prior to your ablation, this is a good time to ask questions and address any concerns you may have. You will then be asked to sign the consent form for your ablation, if this has not already been done.    The nursing staff will begin to prepare you for your procedure:  A nurse will shave and cleanse the area where the ablation catheters will be placed. These areas are most commonly the left and right groin sites (the fold between your thigh and abdomen), and in some cases the chest, arm, and  neck. This is done to reduce the risk of infection.    The nursing staff will start an intravenous (IV) line into a vein in your arm, which allows the staff to give you medication and sedatives to help you relax prior and during your ablation.  In some cases, the nursing staff will need to place a catheter that will drain urine from your bladder (Modi Catheter), which is required due to the length and complexity of the ablation you are having.    After Catheter Ablation  Recovery immediately after your ablation in the hospital  After your catheter ablation procedure, you will be taken to a recovery room. After your ablation, you may be required to lay flat or be on bed rest. During this time, a nurse will monitor you, and you will be given medication to make you comfortable.     Going Home  When it is time to go home, your will need to have an adult family member or friend drive you. Most people can walk, climb stairs, and perform light activity soon after catheter ablation. You can most likely return to your full routine within a few days. However, you may be told to avoid running, heavy lifting, and other strenuous activities for a short time. Please make sure to follow any specific activity restrictions provided by the medical staff at the time of your discharge from the hospital.  Doctor's typically advise that you not drive until your post procedure assessment visit.   Avoid heavy physical activity and heavy lifting for several days after the procedure to allow your body to heal.  Ask your doctor when you can expect to return to work.  Take your temperature and check your incision for signs of infection (redness, swelling, drainage, or warmth) every day for a week. It is normal to have a small bruise or lump where the catheter was inserted.  Take your medications exactly as directed. Do not skip doses or stop medication without consulting your physician prior.  Learn to take your own pulse and keep a record of  your results.    Follow-Up  After your ablations you will have a follow up visit to see how you are doing, to assess your rhythm after your ablation, and to address any medication changes if necessary. In many cases, one ablation is enough to treat an arrhythmia. However, sometimes the problem returns or another is found. If this happens, you may need a second catheter ablation. Tell your healthcare provider if you have any new or returning symptoms.    Common Symptoms after Catheter Ablation  In the first few weeks after catheter ablation, you may feel mild chest fullness or aching. You may also feel as if your heart is skipping beats or your heartbeat may feel faster than normal. You may think that your heart rhythm problem is about to return. These sensations are normal and usually go away with time. Talk to your healthcare provider if you are concerned.      When to Call Your Doctor  Increased bleeding, bruising, or pain at the insertion site  Episodes of atrial fibrillation are common post procedure, call the clinic if episodes are lasting longer than 4-6 hours  Difficulty with your speech or walking, or any visual disturbance  Lightheaded, dizziness, or feeling faint  Shortness of breath or chest pain  Coldness, swelling, or numbness of the arm or leg near the insertion site  A bruise or lump at the insertion site that is larger than a walnut  A fever over 100 F
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
## 2024-03-12 NOTE — TELEPHONE ENCOUNTER
PVI Referral Request    Dr Dr Paz-    Pt has Persistent AF    If agreeable will schedule as follows:  Consult: Pt will meet with you am of PVI, as pt has had previous consult with you prior  Imaging Prior: Per guidelines no imaging will be ordered, ONEIDA and CT of PV will not be ordered unless you indicate otherwise  Anticoagulation Status: Continue Eliquis    AAD: Only taking a BB- will continue per guidelines  PPI: start Protonix 40mg Daily 3 days prior, and continue s/p for 6 wks  Scheduling Info: Dr Paz preferance- 3:1 day  Mapping: Dr Paz preference- Carto      Ok to to order/schedule with you?  Please advise  Thank you  Nithya Slater, RN  3/12/2024 3:34 PM

## 2024-03-12 NOTE — Clinical Note
Hi, this gentleman had significant symptomatic improvement following DCCV and would like to proceed with ablation.  Let me know if I can help! Thank you

## 2024-03-12 NOTE — TELEPHONE ENCOUNTER
Josie Guzman APRN CNP  Israel Paz MD; P Hcc Ep Support [facility], this gentleman had significant symptomatic improvement following DCCV and would like to proceed with ablation.  Let me know if I can help! Thank you        50

## 2024-03-13 ENCOUNTER — DOCUMENTATION ONLY (OUTPATIENT)
Dept: CARDIOLOGY | Facility: CLINIC | Age: 79
End: 2024-03-13
Payer: COMMERCIAL

## 2024-03-13 ENCOUNTER — PREP FOR PROCEDURE (OUTPATIENT)
Dept: CARDIOLOGY | Facility: CLINIC | Age: 79
End: 2024-03-13
Payer: COMMERCIAL

## 2024-03-13 ENCOUNTER — TELEPHONE (OUTPATIENT)
Dept: CARDIOLOGY | Facility: CLINIC | Age: 79
End: 2024-03-13
Payer: COMMERCIAL

## 2024-03-13 DIAGNOSIS — I48.19 PERSISTENT ATRIAL FIBRILLATION (H): Primary | ICD-10-CM

## 2024-03-13 RX ORDER — SODIUM CHLORIDE 9 MG/ML
100 INJECTION, SOLUTION INTRAVENOUS CONTINUOUS
Status: CANCELLED | OUTPATIENT
Start: 2024-04-18

## 2024-03-13 RX ORDER — LIDOCAINE 40 MG/G
CREAM TOPICAL
Status: CANCELLED | OUTPATIENT
Start: 2024-03-13

## 2024-03-13 NOTE — PROGRESS NOTES
H&P:  Card OV  Date:  EP ALE [] PVI  Order Case Req Y  Order Set Y Lab/EKG   Orders [] Imaging Order None     AC Eliquis-CONTINUE   AAD None-NA   PPI/H2 Blocker Start Protonix 40mg Daily 3 days prior, 6wk post   Diuretics None   DM/GLP-1 DM Meds- None  GLP-1- None      Pt has Persistent AF     If agreeable will schedule as follows:  Consult: Pt will meet with you am of PVI, as pt has had previous consult with you prior  Imaging Prior: Per guidelines no imaging will be ordered, ONEIDA and CT of PV will not be ordered unless you indicate otherwise  Anticoagulation Status: Continue Eliquis  AAD: Only taking a BB- will continue per guidelines2  PPI: start Protonix 40mg Daily 3 days prior, and continue s/p for 6 wks  Scheduling Info: Dr Paz preferance- 3:1 day  Mapping: Dr Paz preference- Carto     García ROMERO Radhaednajaneth, 1945, [id number]  Home:[phone] (home) Cell:[phone] (mobile)  Emergency Contact: Kirsten Kitchen [phone]  PCP: Riki Martinez, [phone]    Important patient information for CSC/Cath Lab staff : Hemodialysis, left arm fistula, MWF    [facility] EP Cath Lab Procedure Order   Ablation Type:  PVI- Atrial Fibrillation  Ordering Provider: Dr Paz  Date Ordered and Prepped: 3/13/2024 Cristela Dockery, RN    Scheduling Information:  Anticipated Case Duration:  Standard ( Case per day SA 2:1, DW 4:1, KA 3:1)   Scheduling Timeframe:  Next Available  Scheduling Restrictions: None  Scheduling Contact: Please contact pt to schedule, if you are unable to schedule date within the next 24 hours please contact pt to update on scheduling process  EP RN Follow Up Apt: Schedule EP RN PC visit 3-4 days s/p PVI  MD Preference: Scheduling with ordering provider  Current Device/Device Co Needed for Procedure: None NoneNone  Pre-Procedural Testing needed: None  Mapping System Required:  Carto (Dr Proctor and Dr Paz)  ICE Needed:  Yes  Anesthesia:General Anesthesia    [facility] EP Cath Lab Prep   H&P:  Compled  by cardiology on 3/12/24 if scheduled within 30 days, pt will need RN education and Labs apt within 3 days of procedure. If pts PVI scheduled outside of 30 days, pt to schedule H&P with EP ALE, RN Teach, and Labs within 30 days of PVI  Pre-op Labs: CBC, BMP, Beta HcG if appropriate, and INR if on Warfarin will be ordered AM of procedure, if not completed at pre-op H&P within 7 days of procedure.  T&S Pre-Procedure Review: Does not need for PVI procedures  Medical Records Pertinent for Procedure:  Stress Test 12/5/23, Echo 11/14/23, and EKG 2/1/24  Iodinated Contrast Dye Allergies (Does not include Shellfish, Egg, and/or Iodine Allergy): NA  GLP-1 Protocol: If on Dulaglutide (Trulicity) (weekly)- Injection hold 7 days prior to procedure  , Exenatide extended release (Bydureon bcise) (weekly)- Injection hold 7 days prior to procedure, Exenatide (Byetta) (twice daily)- Oral Tablet hold day prior and morning of procedure and for Injection hold 7 days prior to procedure, Semaglutide (Ozempic) (weekly)- Injection and Oral hold 7 days prior to procedure, Liraglutide (Victoza, Saxenda) (daily)- Injection hold day prior and morning of procedure  Follow Up S/P: EP RN 3-4 PC Visit and EP ALE 6wk (To be scheduled at time of case scheduling)    Allergies   Allergen Reactions    Codeine Nausea and Vomiting     Other Reaction(s): Not available    Lisinopril Cough     Other Reaction(s): Not available       Current Outpatient Medications:     apixaban ANTICOAGULANT (ELIQUIS) 5 MG tablet, Take 1 tablet (5 mg) by mouth 2 times daily, Disp: 60 tablet, Rfl: 11    carvedilol (COREG) 25 MG tablet, [CARVEDILOL (COREG) 25 MG TABLET] Take 1 tablet by mouth 2 times a day at 6:00 am and 4:00 pm., Disp: , Rfl:     cinacalcet (SENSIPAR) 60 MG tablet, Take 1 tablet by mouth daily, Disp: , Rfl:     clopidogrel (PLAVIX) 75 MG tablet, Take 1 tablet (75 mg) by mouth daily, Disp: 90 tablet, Rfl: 3    DIALYVITE 100-1 mg Tab, Take 1 tablet by mouth  daily , Disp: , Rfl:     ketoconazole (NIZORAL) 2 % external cream, Apply topically 2 times daily as needed for itching, Disp: , Rfl:     lidocaine-prilocaine (EMLA) cream, Apply 1 Application topically as needed , Disp: , Rfl:     nitroGLYcerin (NITROSTAT) 0.4 MG sublingual tablet, For chest pain place 1 tablet under the tongue every 5 minutes for 3 doses. If symptoms persist 5 minutes after 1st dose call 911., Disp: 50 tablet, Rfl: 0    polyethylene glycol (MIRALAX) 17 gram packet, Take 17 g by mouth daily as needed for constipation, Disp: , Rfl:     sevelamer carbonate (RENVELA) 800 MG tablet, Take 1,600 mg by mouth as needed (with snacks), Disp: , Rfl:     sevelamer carbonate (RENVELA) 800 mg tablet, Take 3,200 mg by mouth 3 times daily (with meals), Disp: , Rfl:     simvastatin (ZOCOR) 40 MG tablet, Take 1 tablet (40 mg) by mouth At Bedtime, Disp: 90 tablet, Rfl: 3    vitamin D3 (CHOLECALCIFEROL) 50 mcg (2000 units) tablet, Take 1 tablet by mouth daily, Disp: , Rfl:     Documentation Date:3/13/2024 8:13 AM  Cristela Dockery RN

## 2024-03-13 NOTE — TELEPHONE ENCOUNTER
Return call to patient and his spouse, reviewed ablation process and inducing afib during the ablation.  He is not on any antiarrhythmic medications.  He does not need to be in afib the day of the procedure.  They state understanding will call with additional questions.

## 2024-03-13 NOTE — TELEPHONE ENCOUNTER
[facility] Call Center    Phone Message    May a detailed message be left on voicemail: yes     Reason for Call: Other: Patient wife called regarding procedure. Kirsten would like to know if patient is not in Afib can they tell where the ablation needs to happen? Or not? Please reach out to Kirsten. Thank you      Action Taken: Other: cardiology     Travel Screening: Not Applicable     Thank you!  [facility]

## 2024-03-14 ENCOUNTER — HOSPITAL ENCOUNTER (OUTPATIENT)
Dept: CARDIAC REHAB | Facility: CLINIC | Age: 79
Discharge: HOME | End: 2024-03-14
Attending: INTERNAL MEDICINE
Payer: COMMERCIAL

## 2024-03-14 PROCEDURE — 93798 PHYS/QHP OP CAR RHAB W/ECG: CPT

## 2024-03-26 ENCOUNTER — TELEPHONE (OUTPATIENT)
Dept: CARDIOLOGY | Facility: CLINIC | Age: 79
End: 2024-03-26
Payer: COMMERCIAL

## 2024-03-26 NOTE — TELEPHONE ENCOUNTER
----- Message from Mady Ruiz sent at 3/26/2024  7:46 AM CDT -----  Regarding: Missed Medication  Good morning,     I received a vm from pt wife stating that he missed his Sunday evening dose of Eliquis and now is wondering if his ablation scheduled for 4/18 w/ KA will now need to be rescheduled. She would like a nurse to call and confirm what next steps should be.    Thank you,  Mady

## 2024-03-26 NOTE — TELEPHONE ENCOUNTER
Spoke with wife, she confirms missed eliquis dose on 3/24.  More then 21 days prior to procedure.  Ok to proceed as scheduled.  Reinforced importance of missing no further doses, she verbalizes understanding.  JW

## 2024-04-11 ENCOUNTER — ALLIED HEALTH/NURSE VISIT (OUTPATIENT)
Dept: CARDIOLOGY | Facility: CLINIC | Age: 79
End: 2024-04-11
Payer: COMMERCIAL

## 2024-04-11 ENCOUNTER — LAB (OUTPATIENT)
Dept: CARDIOLOGY | Facility: CLINIC | Age: 79
End: 2024-04-11
Payer: COMMERCIAL

## 2024-04-11 ENCOUNTER — OFFICE VISIT (OUTPATIENT)
Dept: CARDIOLOGY | Facility: CLINIC | Age: 79
End: 2024-04-11
Payer: COMMERCIAL

## 2024-04-11 VITALS
WEIGHT: 180 LBS | BODY MASS INDEX: 26.66 KG/M2 | HEIGHT: 69 IN | HEART RATE: 71 BPM | DIASTOLIC BLOOD PRESSURE: 42 MMHG | RESPIRATION RATE: 16 BRPM | SYSTOLIC BLOOD PRESSURE: 128 MMHG

## 2024-04-11 DIAGNOSIS — I48.19 PERSISTENT ATRIAL FIBRILLATION (H): Primary | ICD-10-CM

## 2024-04-11 DIAGNOSIS — I25.119 CORONARY ARTERY DISEASE INVOLVING NATIVE CORONARY ARTERY OF NATIVE HEART WITH ANGINA PECTORIS (H): ICD-10-CM

## 2024-04-11 DIAGNOSIS — I48.19 PERSISTENT ATRIAL FIBRILLATION (H): ICD-10-CM

## 2024-04-11 DIAGNOSIS — I10 ESSENTIAL HYPERTENSION WITH GOAL BLOOD PRESSURE LESS THAN 140/90: Chronic | ICD-10-CM

## 2024-04-11 PROBLEM — R06.00 DYSPNEA: Status: RESOLVED | Noted: 2023-12-19 | Resolved: 2024-04-11

## 2024-04-11 PROBLEM — I25.10 CORONARY ARTERY DISEASE INVOLVING NATIVE CORONARY ARTERY OF NATIVE HEART: Status: RESOLVED | Noted: 2019-05-23 | Resolved: 2024-04-11

## 2024-04-11 PROBLEM — R94.39 ABNORMAL STRESS TEST: Status: RESOLVED | Noted: 2023-12-19 | Resolved: 2024-04-11

## 2024-04-11 LAB
ANION GAP SERPL CALCULATED.3IONS-SCNC: 12 MMOL/L (ref 7–15)
BUN SERPL-MCNC: 26.8 MG/DL (ref 8–23)
CALCIUM SERPL-MCNC: 9.3 MG/DL (ref 8.8–10.2)
CHLORIDE SERPL-SCNC: 101 MMOL/L (ref 98–107)
CREAT SERPL-MCNC: 5.02 MG/DL (ref 0.67–1.17)
DEPRECATED HCO3 PLAS-SCNC: 26 MMOL/L (ref 22–29)
EGFRCR SERPLBLD CKD-EPI 2021: 11 ML/MIN/1.73M2
ERYTHROCYTE [DISTWIDTH] IN BLOOD BY AUTOMATED COUNT: 17.5 % (ref 10–15)
GLUCOSE SERPL-MCNC: 94 MG/DL (ref 70–99)
HCT VFR BLD AUTO: 33.5 % (ref 40–53)
HGB BLD-MCNC: 10.2 G/DL (ref 13.3–17.7)
MCH RBC QN AUTO: 33 PG (ref 26.5–33)
MCHC RBC AUTO-ENTMCNC: 30.4 G/DL (ref 31.5–36.5)
MCV RBC AUTO: 108 FL (ref 78–100)
PLATELET # BLD AUTO: 145 10E3/UL (ref 150–450)
POTASSIUM SERPL-SCNC: 4.6 MMOL/L (ref 3.4–5.3)
RBC # BLD AUTO: 3.09 10E6/UL (ref 4.4–5.9)
SODIUM SERPL-SCNC: 139 MMOL/L (ref 135–145)
WBC # BLD AUTO: 6.3 10E3/UL (ref 4–11)

## 2024-04-11 PROCEDURE — 36415 COLL VENOUS BLD VENIPUNCTURE: CPT

## 2024-04-11 PROCEDURE — 99207 PR NO CHARGE NURSE ONLY: CPT

## 2024-04-11 PROCEDURE — 80048 BASIC METABOLIC PNL TOTAL CA: CPT

## 2024-04-11 PROCEDURE — 85027 COMPLETE CBC AUTOMATED: CPT

## 2024-04-11 PROCEDURE — 99214 OFFICE O/P EST MOD 30 MIN: CPT | Performed by: NURSE PRACTITIONER

## 2024-04-11 PROCEDURE — 93000 ELECTROCARDIOGRAM COMPLETE: CPT | Performed by: INTERNAL MEDICINE

## 2024-04-11 RX ORDER — PANTOPRAZOLE SODIUM 40 MG/1
40 TABLET, DELAYED RELEASE ORAL DAILY
Qty: 45 TABLET | Refills: 0 | Status: SHIPPED | OUTPATIENT
Start: 2024-04-15 | End: 2024-06-04
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
## 2024-04-11 NOTE — PROGRESS NOTES
Pre-Procedure Pulmonary Vein Ablation (AF) Education    Procedure: PVI with Dr Paz on 4-16-24 with arrival time 7:00 am    COVID: Pt denies COVID like symptoms, and is aware if he/she develops COVID like symptoms they would need to complete an at home with a rapid antigen COVID test 1-2 days prior to your procedure date. If COVID + pt is aware the procedure will need to be rescheduled, and to contact CV scheduling as soon as possible    Type & Screen: Is not required for PVI Ablation    Pre-Op H&P: Completed today with EP ALE- See record in Epic    Education:   Reviewed with pt in Clinic today  Pre-Procedure Instruction: NPO after midnight pre procedure, Defined NPO, Remove all jewelry and leave all valuables at home, Shower prior to arrival, Anesthesia and intubation plan/orders, Intra-procedure PVI process, Post- PVI procedure expectations/recovery, Transportation requirements and arrangements post procedure, Post-procedure follow up process, Letter sent to pt via "[employer] and mail with written instructions (Refer to letters tab), Lab results would be called to pt if abnormal  Risks:   Atrial Fibrillation Ablation/Left Atrial Ablation  Cardiac Ablation  <1% Hypotension, Hemorrhage, Thrombophlebitis, Systemic or pulmonic emboli, Cardiac perforation (tamponade), Infection, Pneumothorax, Arrhythmias, Proarrhythmic effects of drugs, Radiation exposure, Catheter entrapment  <1 % Vascular injury including perforation of vein, artery or heart  1-2% Tamponade and Aortic puncture with left sided transeptal approach  1% CVA   <1% MI  <0.1% death  If external defibrillation or CV is needed, 25% risk for superficial burn  Risks associated with general anesthesia will be addressed by the Anesthesiology Department  Radiofrequency Risks:  In addition to standard risks for Radiofrequency Ablation, there is:  <2% Significant pulmonary vein stenosis  <2% Embolic events  <1% Esophageal fistula  <1% Phrenic nerve paralysis    Cryoablation Risks:  In addition to standard risks for Cryoablation Ablation, there is:  <1% Phrenic nerve paralysis  <1% Pulmonary vein stenosis  <1% Esophageal fistula    Medication:   Instructions regarding anticoagulants: Eliquis- Continue anticoagulation uninterrupted through their procedure, do not miss any doses of AC prior to procedure, importance of taking AC for stroke prevention, taking AC as prescribed, to call prior to PVI if missed a dose of AC, and if upon arrival pt reports missing a dose of AC PVI will potentially be cnx/postponed.  Pt states no missed doses in the last 3 weeks.  Instructions given to pt regarding antiarrhythmic medication: None- N/A  Instructions given to pt regarding PPI medication: Start Protonix 40mg Daily 3 days prior, 6wk post  Instructions given to pt regarding diuretics medication: None  Instructions given to pt regarding DM/GLP-1 medication:   DM- None  GLP-1- None  Instructions for medication, other than anticoagulants and antiarrhythmics listed above, given to pt: Take all medication AM of procedure with small sips of water     Important patient information for staff:  Hemodialysis, left arm fistula, [facility]    4/11/2024 10:12 AM  Jany Zimmer RN
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
## 2024-04-11 NOTE — PATIENT INSTRUCTIONS
García Kitchen,    It was a pleasure to see you today at the [facility].     My recommendations after this visit include:    You are scheduled for pulmonary vein isolation ablation with Dr. Paz on 4/18/2024    On 4/15/2024, start pantoprazole 40 mg daily, to continue for 6 weeks after ablation    Telephone follow up with EP RN on 4/22/2024  Follow-up with Josie Guzman CNP on 6/4/2024    Gissell Pitts CNP  [facility], Electrophysiology  [phone]  EP nurses [phone]
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
## 2024-04-12 LAB
ATRIAL RATE - MUSE: 71 BPM
DIASTOLIC BLOOD PRESSURE - MUSE: NORMAL MMHG
INTERPRETATION ECG - MUSE: NORMAL
P AXIS - MUSE: 73 DEGREES
PR INTERVAL - MUSE: 202 MS
QRS DURATION - MUSE: 92 MS
QT - MUSE: 432 MS
QTC - MUSE: 469 MS
R AXIS - MUSE: 7 DEGREES
SYSTOLIC BLOOD PRESSURE - MUSE: NORMAL MMHG
T AXIS - MUSE: 68 DEGREES
VENTRICULAR RATE- MUSE: 71 BPM

## 2024-04-15 DIAGNOSIS — I48.19 PERSISTENT ATRIAL FIBRILLATION (H): ICD-10-CM

## 2024-04-15 RX ORDER — PANTOPRAZOLE SODIUM 40 MG/1
TABLET, DELAYED RELEASE ORAL
Qty: 45 TABLET | Refills: 0 | OUTPATIENT
Start: 2024-04-15

## 2024-04-18 ENCOUNTER — HOSPITAL ENCOUNTER (OUTPATIENT)
Facility: HOSPITAL | Age: 79
Discharge: HOME | End: 2024-04-18
Attending: INTERNAL MEDICINE | Admitting: INTERNAL MEDICINE
Payer: COMMERCIAL

## 2024-04-18 ENCOUNTER — ANESTHESIA (OUTPATIENT)
Dept: CARDIOLOGY | Facility: HOSPITAL | Age: 79
End: 2024-04-18
Payer: COMMERCIAL

## 2024-04-18 ENCOUNTER — ANESTHESIA EVENT (OUTPATIENT)
Dept: CARDIOLOGY | Facility: HOSPITAL | Age: 79
End: 2024-04-18
Payer: COMMERCIAL

## 2024-04-18 VITALS
HEART RATE: 79 BPM | RESPIRATION RATE: 16 BRPM | SYSTOLIC BLOOD PRESSURE: 136 MMHG | WEIGHT: 174.3 LBS | BODY MASS INDEX: 25.82 KG/M2 | OXYGEN SATURATION: 99 % | HEIGHT: 69 IN | TEMPERATURE: 98 F | DIASTOLIC BLOOD PRESSURE: 62 MMHG

## 2024-04-18 DIAGNOSIS — I48.19 PERSISTENT ATRIAL FIBRILLATION (H): ICD-10-CM

## 2024-04-18 LAB
ACT BLD: 302 SECONDS (ref 74–150)
ACT BLD: 321 SECONDS (ref 74–150)
ACT BLD: 325 SECONDS (ref 74–150)
ACT BLD: 333 SECONDS (ref 74–150)
ANION GAP SERPL CALCULATED.3IONS-SCNC: 13 MMOL/L (ref 7–15)
BUN SERPL-MCNC: 35 MG/DL (ref 8–23)
CALCIUM SERPL-MCNC: 9 MG/DL (ref 8.8–10.2)
CHLORIDE SERPL-SCNC: 104 MMOL/L (ref 98–107)
CREAT SERPL-MCNC: 5.31 MG/DL (ref 0.67–1.17)
DEPRECATED HCO3 PLAS-SCNC: 24 MMOL/L (ref 22–29)
EGFRCR SERPLBLD CKD-EPI 2021: 11 ML/MIN/1.73M2
ERYTHROCYTE [DISTWIDTH] IN BLOOD BY AUTOMATED COUNT: 17.9 % (ref 10–15)
GLUCOSE SERPL-MCNC: 100 MG/DL (ref 70–99)
HCT VFR BLD AUTO: 29.9 % (ref 40–53)
HGB BLD-MCNC: 9.3 G/DL (ref 13.3–17.7)
MCH RBC QN AUTO: 33 PG (ref 26.5–33)
MCHC RBC AUTO-ENTMCNC: 31.1 G/DL (ref 31.5–36.5)
MCV RBC AUTO: 106 FL (ref 78–100)
PLATELET # BLD AUTO: 95 10E3/UL (ref 150–450)
POTASSIUM SERPL-SCNC: 4.3 MMOL/L (ref 3.4–5.3)
RBC # BLD AUTO: 2.82 10E6/UL (ref 4.4–5.9)
SODIUM SERPL-SCNC: 141 MMOL/L (ref 135–145)
WBC # BLD AUTO: 5.2 10E3/UL (ref 4–11)

## 2024-04-18 PROCEDURE — C1730 CATH, EP, 19 OR FEW ELECT: HCPCS | Performed by: INTERNAL MEDICINE

## 2024-04-18 PROCEDURE — 93657 TX L/R ATRIAL FIB ADDL: CPT | Performed by: INTERNAL MEDICINE

## 2024-04-18 PROCEDURE — 85027 COMPLETE CBC AUTOMATED: CPT | Performed by: INTERNAL MEDICINE

## 2024-04-18 PROCEDURE — 250N000011 HC RX IP 250 OP 636: Performed by: INTERNAL MEDICINE

## 2024-04-18 PROCEDURE — 999N000054 HC STATISTIC EKG NON-CHARGEABLE

## 2024-04-18 PROCEDURE — 250N000011 HC RX IP 250 OP 636: Performed by: NURSE ANESTHETIST, CERTIFIED REGISTERED

## 2024-04-18 PROCEDURE — 93615 ESOPHAGEAL RECORDING: CPT | Mod: 26 | Performed by: INTERNAL MEDICINE

## 2024-04-18 PROCEDURE — C1887 CATHETER, GUIDING: HCPCS | Performed by: INTERNAL MEDICINE

## 2024-04-18 PROCEDURE — C1732 CATH, EP, DIAG/ABL, 3D/VECT: HCPCS | Performed by: INTERNAL MEDICINE

## 2024-04-18 PROCEDURE — 710N000010 HC RECOVERY PHASE 1, LEVEL 2, PER MIN

## 2024-04-18 PROCEDURE — 93615 ESOPHAGEAL RECORDING: CPT | Performed by: INTERNAL MEDICINE

## 2024-04-18 PROCEDURE — 36415 COLL VENOUS BLD VENIPUNCTURE: CPT | Performed by: INTERNAL MEDICINE

## 2024-04-18 PROCEDURE — 93005 ELECTROCARDIOGRAM TRACING: CPT

## 2024-04-18 PROCEDURE — C1733 CATH, EP, OTHR THAN COOL-TIP: HCPCS | Performed by: INTERNAL MEDICINE

## 2024-04-18 PROCEDURE — C1766 INTRO/SHEATH,STRBLE,NON-PEEL: HCPCS | Performed by: INTERNAL MEDICINE

## 2024-04-18 PROCEDURE — 85347 COAGULATION TIME ACTIVATED: CPT

## 2024-04-18 PROCEDURE — 93656 COMPRE EP EVAL ABLTJ ATR FIB: CPT | Performed by: INTERNAL MEDICINE

## 2024-04-18 PROCEDURE — 93010 ELECTROCARDIOGRAM REPORT: CPT | Performed by: INTERNAL MEDICINE

## 2024-04-18 PROCEDURE — 250N000009 HC RX 250: Performed by: NURSE ANESTHETIST, CERTIFIED REGISTERED

## 2024-04-18 PROCEDURE — 999N000248 HC STATISTIC IV INSERT WITH US BY RN

## 2024-04-18 PROCEDURE — 250N000009 HC RX 250: Performed by: INTERNAL MEDICINE

## 2024-04-18 PROCEDURE — C1759 CATH, INTRA ECHOCARDIOGRAPHY: HCPCS | Performed by: INTERNAL MEDICINE

## 2024-04-18 PROCEDURE — 370N000017 HC ANESTHESIA TECHNICAL FEE, PER MIN: Performed by: INTERNAL MEDICINE

## 2024-04-18 PROCEDURE — C1894 INTRO/SHEATH, NON-LASER: HCPCS | Performed by: INTERNAL MEDICINE

## 2024-04-18 PROCEDURE — 272N000001 HC OR GENERAL SUPPLY STERILE: Performed by: INTERNAL MEDICINE

## 2024-04-18 PROCEDURE — 258N000003 HC RX IP 258 OP 636: Performed by: NURSE ANESTHETIST, CERTIFIED REGISTERED

## 2024-04-18 PROCEDURE — 258N000003 HC RX IP 258 OP 636: Performed by: INTERNAL MEDICINE

## 2024-04-18 PROCEDURE — 80048 BASIC METABOLIC PNL TOTAL CA: CPT | Performed by: INTERNAL MEDICINE

## 2024-04-18 RX ORDER — ACETAMINOPHEN 325 MG/1
650 TABLET ORAL EVERY 4 HOURS PRN
Status: DISCONTINUED | OUTPATIENT
Start: 2024-04-18 | End: 2024-04-18 | Stop reason: HOSPADM

## 2024-04-18 RX ORDER — FENTANYL CITRATE 50 UG/ML
25 INJECTION, SOLUTION INTRAMUSCULAR; INTRAVENOUS
Status: DISCONTINUED | OUTPATIENT
Start: 2024-04-18 | End: 2024-04-18 | Stop reason: HOSPADM

## 2024-04-18 RX ORDER — HEPARIN SODIUM 1000 [USP'U]/ML
INJECTION, SOLUTION INTRAVENOUS; SUBCUTANEOUS
Status: DISCONTINUED | OUTPATIENT
Start: 2024-04-18 | End: 2024-04-18 | Stop reason: HOSPADM

## 2024-04-18 RX ORDER — SODIUM CHLORIDE 9 MG/ML
100 INJECTION, SOLUTION INTRAVENOUS CONTINUOUS
Status: DISCONTINUED | OUTPATIENT
Start: 2024-04-18 | End: 2024-04-18 | Stop reason: HOSPADM

## 2024-04-18 RX ORDER — IBUPROFEN 600 MG/1
600 TABLET, FILM COATED ORAL EVERY 6 HOURS PRN
Status: DISCONTINUED | OUTPATIENT
Start: 2024-04-18 | End: 2024-04-18 | Stop reason: HOSPADM

## 2024-04-18 RX ORDER — HYDROMORPHONE HYDROCHLORIDE 1 MG/ML
0.2 INJECTION, SOLUTION INTRAMUSCULAR; INTRAVENOUS; SUBCUTANEOUS EVERY 5 MIN PRN
Status: DISCONTINUED | OUTPATIENT
Start: 2024-04-18 | End: 2024-04-18 | Stop reason: HOSPADM

## 2024-04-18 RX ORDER — ONDANSETRON 4 MG/1
4 TABLET, ORALLY DISINTEGRATING ORAL EVERY 6 HOURS PRN
Status: DISCONTINUED | OUTPATIENT
Start: 2024-04-18 | End: 2024-04-18 | Stop reason: HOSPADM

## 2024-04-18 RX ORDER — OXYCODONE HYDROCHLORIDE 5 MG/1
10 TABLET ORAL EVERY 4 HOURS PRN
Status: DISCONTINUED | OUTPATIENT
Start: 2024-04-18 | End: 2024-04-18 | Stop reason: HOSPADM

## 2024-04-18 RX ORDER — FENTANYL CITRATE 50 UG/ML
25 INJECTION, SOLUTION INTRAMUSCULAR; INTRAVENOUS EVERY 5 MIN PRN
Status: DISCONTINUED | OUTPATIENT
Start: 2024-04-18 | End: 2024-04-18 | Stop reason: HOSPADM

## 2024-04-18 RX ORDER — HEPARIN SODIUM 10000 [USP'U]/100ML
INJECTION, SOLUTION INTRAVENOUS CONTINUOUS PRN
Status: DISCONTINUED | OUTPATIENT
Start: 2024-04-18 | End: 2024-04-18 | Stop reason: HOSPADM

## 2024-04-18 RX ORDER — ACETAMINOPHEN 325 MG/1
975 TABLET ORAL
Status: DISCONTINUED | OUTPATIENT
Start: 2024-04-18 | End: 2024-04-18 | Stop reason: HOSPADM

## 2024-04-18 RX ORDER — ONDANSETRON 2 MG/ML
4 INJECTION INTRAMUSCULAR; INTRAVENOUS EVERY 30 MIN PRN
Status: DISCONTINUED | OUTPATIENT
Start: 2024-04-18 | End: 2024-04-18 | Stop reason: HOSPADM

## 2024-04-18 RX ORDER — MEPERIDINE HYDROCHLORIDE 25 MG/ML
12.5 INJECTION INTRAMUSCULAR; INTRAVENOUS; SUBCUTANEOUS EVERY 5 MIN PRN
Status: DISCONTINUED | OUTPATIENT
Start: 2024-04-18 | End: 2024-04-18 | Stop reason: HOSPADM

## 2024-04-18 RX ORDER — VASOPRESSIN IN 0.9 % NACL 2 UNIT/2ML
SYRINGE (ML) INTRAVENOUS PRN
Status: DISCONTINUED | OUTPATIENT
Start: 2024-04-18 | End: 2024-04-18

## 2024-04-18 RX ORDER — FENTANYL CITRATE 50 UG/ML
50 INJECTION, SOLUTION INTRAMUSCULAR; INTRAVENOUS EVERY 5 MIN PRN
Status: DISCONTINUED | OUTPATIENT
Start: 2024-04-18 | End: 2024-04-18 | Stop reason: HOSPADM

## 2024-04-18 RX ORDER — PROTAMINE SULFATE 10 MG/ML
INJECTION, SOLUTION INTRAVENOUS PRN
Status: DISCONTINUED | OUTPATIENT
Start: 2024-04-18 | End: 2024-04-18

## 2024-04-18 RX ORDER — SODIUM CHLORIDE, SODIUM LACTATE, POTASSIUM CHLORIDE, CALCIUM CHLORIDE 600; 310; 30; 20 MG/100ML; MG/100ML; MG/100ML; MG/100ML
INJECTION, SOLUTION INTRAVENOUS CONTINUOUS
Status: DISCONTINUED | OUTPATIENT
Start: 2024-04-18 | End: 2024-04-18 | Stop reason: HOSPADM

## 2024-04-18 RX ORDER — PROPOFOL 10 MG/ML
INJECTION, EMULSION INTRAVENOUS PRN
Status: DISCONTINUED | OUTPATIENT
Start: 2024-04-18 | End: 2024-04-18

## 2024-04-18 RX ORDER — LIDOCAINE 40 MG/G
CREAM TOPICAL
Status: DISCONTINUED | OUTPATIENT
Start: 2024-04-18 | End: 2024-04-18 | Stop reason: HOSPADM

## 2024-04-18 RX ORDER — LIDOCAINE HYDROCHLORIDE AND EPINEPHRINE 10; 10 MG/ML; UG/ML
INJECTION, SOLUTION INFILTRATION; PERINEURAL
Status: DISCONTINUED | OUTPATIENT
Start: 2024-04-18 | End: 2024-04-18 | Stop reason: HOSPADM

## 2024-04-18 RX ORDER — DEXAMETHASONE SODIUM PHOSPHATE 10 MG/ML
INJECTION, SOLUTION INTRAMUSCULAR; INTRAVENOUS PRN
Status: DISCONTINUED | OUTPATIENT
Start: 2024-04-18 | End: 2024-04-18

## 2024-04-18 RX ORDER — ONDANSETRON 4 MG/1
4 TABLET, ORALLY DISINTEGRATING ORAL EVERY 30 MIN PRN
Status: DISCONTINUED | OUTPATIENT
Start: 2024-04-18 | End: 2024-04-18 | Stop reason: HOSPADM

## 2024-04-18 RX ORDER — NALOXONE HYDROCHLORIDE 0.4 MG/ML
0.1 INJECTION, SOLUTION INTRAMUSCULAR; INTRAVENOUS; SUBCUTANEOUS
Status: DISCONTINUED | OUTPATIENT
Start: 2024-04-18 | End: 2024-04-18 | Stop reason: HOSPADM

## 2024-04-18 RX ORDER — FENTANYL CITRATE 50 UG/ML
INJECTION, SOLUTION INTRAMUSCULAR; INTRAVENOUS PRN
Status: DISCONTINUED | OUTPATIENT
Start: 2024-04-18 | End: 2024-04-18

## 2024-04-18 RX ORDER — HALOPERIDOL 5 MG/ML
1 INJECTION INTRAMUSCULAR
Status: DISCONTINUED | OUTPATIENT
Start: 2024-04-18 | End: 2024-04-18 | Stop reason: HOSPADM

## 2024-04-18 RX ORDER — ONDANSETRON 2 MG/ML
INJECTION INTRAMUSCULAR; INTRAVENOUS PRN
Status: DISCONTINUED | OUTPATIENT
Start: 2024-04-18 | End: 2024-04-18

## 2024-04-18 RX ORDER — ONDANSETRON 2 MG/ML
4 INJECTION INTRAMUSCULAR; INTRAVENOUS EVERY 6 HOURS PRN
Status: DISCONTINUED | OUTPATIENT
Start: 2024-04-18 | End: 2024-04-18 | Stop reason: HOSPADM

## 2024-04-18 RX ORDER — SODIUM CHLORIDE 9 MG/ML
INJECTION, SOLUTION INTRAVENOUS CONTINUOUS PRN
Status: DISCONTINUED | OUTPATIENT
Start: 2024-04-18 | End: 2024-04-18

## 2024-04-18 RX ORDER — HYDROMORPHONE HYDROCHLORIDE 1 MG/ML
0.4 INJECTION, SOLUTION INTRAMUSCULAR; INTRAVENOUS; SUBCUTANEOUS EVERY 5 MIN PRN
Status: DISCONTINUED | OUTPATIENT
Start: 2024-04-18 | End: 2024-04-18 | Stop reason: HOSPADM

## 2024-04-18 RX ORDER — LORAZEPAM 2 MG/ML
.5-1 INJECTION INTRAMUSCULAR
Status: DISCONTINUED | OUTPATIENT
Start: 2024-04-18 | End: 2024-04-18 | Stop reason: HOSPADM

## 2024-04-18 RX ORDER — OXYCODONE HYDROCHLORIDE 5 MG/1
5 TABLET ORAL EVERY 4 HOURS PRN
Status: DISCONTINUED | OUTPATIENT
Start: 2024-04-18 | End: 2024-04-18 | Stop reason: HOSPADM

## 2024-04-18 RX ADMIN — SODIUM CHLORIDE 100 ML/HR: 9 INJECTION, SOLUTION INTRAVENOUS at 08:28

## 2024-04-18 RX ADMIN — PHENYLEPHRINE HYDROCHLORIDE 100 MCG: 10 INJECTION INTRAVENOUS at 09:48

## 2024-04-18 RX ADMIN — PHENYLEPHRINE HYDROCHLORIDE 100 MCG: 10 INJECTION INTRAVENOUS at 09:35

## 2024-04-18 RX ADMIN — PROTAMINE SULFATE 50 MG: 10 INJECTION, SOLUTION INTRAVENOUS at 11:42

## 2024-04-18 RX ADMIN — Medication 1 UNITS: at 10:18

## 2024-04-18 RX ADMIN — PHENYLEPHRINE HYDROCHLORIDE 100 MCG: 10 INJECTION INTRAVENOUS at 10:00

## 2024-04-18 RX ADMIN — FENTANYL CITRATE 100 MCG: 50 INJECTION INTRAMUSCULAR; INTRAVENOUS at 09:35

## 2024-04-18 RX ADMIN — SODIUM CHLORIDE: 9 INJECTION, SOLUTION INTRAVENOUS at 09:41

## 2024-04-18 RX ADMIN — PROPOFOL 90 MG: 10 INJECTION, EMULSION INTRAVENOUS at 09:35

## 2024-04-18 RX ADMIN — ROCURONIUM BROMIDE 30 MG: 50 INJECTION, SOLUTION INTRAVENOUS at 10:19

## 2024-04-18 RX ADMIN — SODIUM CHLORIDE: 9 INJECTION, SOLUTION INTRAVENOUS at 09:13

## 2024-04-18 RX ADMIN — DEXAMETHASONE SODIUM PHOSPHATE 4 MG: 10 INJECTION, SOLUTION INTRAMUSCULAR; INTRAVENOUS at 09:42

## 2024-04-18 RX ADMIN — PHENYLEPHRINE HYDROCHLORIDE 100 MCG: 10 INJECTION INTRAVENOUS at 09:58

## 2024-04-18 RX ADMIN — ONDANSETRON 4 MG: 2 INJECTION INTRAMUSCULAR; INTRAVENOUS at 09:42

## 2024-04-18 RX ADMIN — PHENYLEPHRINE HYDROCHLORIDE 100 MCG: 10 INJECTION INTRAVENOUS at 09:46

## 2024-04-18 RX ADMIN — PHENYLEPHRINE HYDROCHLORIDE 0.2 MCG/KG/MIN: 10 INJECTION INTRAVENOUS at 09:47

## 2024-04-18 RX ADMIN — ROCURONIUM BROMIDE 20 MG: 50 INJECTION, SOLUTION INTRAVENOUS at 10:48

## 2024-04-18 RX ADMIN — PHENYLEPHRINE HYDROCHLORIDE 100 MCG: 10 INJECTION INTRAVENOUS at 10:06

## 2024-04-18 RX ADMIN — PHENYLEPHRINE HYDROCHLORIDE 100 MCG: 10 INJECTION INTRAVENOUS at 09:55

## 2024-04-18 RX ADMIN — ROCURONIUM BROMIDE 50 MG: 50 INJECTION, SOLUTION INTRAVENOUS at 09:35

## 2024-04-18 RX ADMIN — Medication 1 UNITS: at 10:16

## 2024-04-18 RX ADMIN — Medication 1 UNITS: at 10:48

## 2024-04-18 ASSESSMENT — ACTIVITIES OF DAILY LIVING (ADL)
ADLS_ACUITY_SCORE: 38

## 2024-04-18 ASSESSMENT — ENCOUNTER SYMPTOMS: DYSRHYTHMIAS: 1

## 2024-04-18 ASSESSMENT — LIFESTYLE VARIABLES: TOBACCO_USE: 1

## 2024-04-18 NOTE — INTERVAL H&P NOTE
"I have reviewed the surgical (or preoperative) H&P that is linked to this encounter, and examined the patient. There are no significant changes    Clinical Conditions Present on Arrival:  Clinically Significant Risk Factors Present on Admission                # Drug Induced Coagulation Defect: home medication list includes an anticoagulant medication  # Drug Induced Platelet Defect: home medication list includes an antiplatelet medication  # Overweight: Estimated body mass index is 25.74 kg/m  as calculated from the following:    Height as of this encounter: 1.753 m (5' 9\").    Weight as of this encounter: 79.1 kg (174 lb 4.8 oz).       "

## 2024-04-18 NOTE — PLAN OF CARE
Pt had a uneventful recovery. No pain. Right groin site intact. Gauze dressing changed once for oozing. Site soft. Vitals stable. Pt alert and oriented. Family at bedside.

## 2024-04-18 NOTE — ANESTHESIA CARE TRANSFER NOTE
Patient: García Kitchen    Procedure: Procedure(s):  Ablation Atrial Fibrillation       Diagnosis: Persistent Atrial Fibrillation  Diagnosis Additional Information: No value filed.    Anesthesia Type:   General     Note:    Oropharynx: oropharynx clear of all foreign objects and spontaneously breathing  Level of Consciousness: awake  Oxygen Supplementation: face mask  Level of Supplemental Oxygen (L/min / FiO2): 5  Independent Airway: airway patency satisfactory and stable  Dentition: dentition unchanged  Vital Signs Stable: post-procedure vital signs reviewed and stable  Report to RN Given: handoff report given  Patient transferred to: PACU    Handoff Report: Identifed the Patient, Identified the Reponsible Provider, Reviewed the pertinent medical history, Discussed the surgical course, Reviewed Intra-OP anesthesia mangement and issues during anesthesia, Set expectations for post-procedure period and Allowed opportunity for questions and acknowledgement of understanding  Vitals:  Vitals Value Taken Time   BP     Temp     Pulse     Resp     SpO2         Electronically Signed By: KARLI Norton CRNA  April 18, 2024  12:06 PM
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
## 2024-04-18 NOTE — ANESTHESIA POSTPROCEDURE EVALUATION
Patient: García Kitchen    Procedure: Procedure(s):  Ablation Atrial Fibrillation       Anesthesia Type:  General    Note:  Disposition: Outpatient   Postop Pain Control: Uneventful            Sign Out: Well controlled pain   PONV: No   Neuro/Psych: Uneventful            Sign Out: Acceptable/Baseline neuro status   Airway/Respiratory: Uneventful            Sign Out: Acceptable/Baseline resp. status   CV/Hemodynamics: Uneventful            Sign Out: Acceptable CV status; No obvious hypovolemia; No obvious fluid overload   Other NRE: NONE   DID A NON-ROUTINE EVENT OCCUR? No    Event details/Postop Comments:  Doing well without complaints. R arm with possibly infiltrated PIV (with Phenylephrine and Vasopressin) looks good with normal color, supple and not firm, with normal motor and sensory function. No other complaints. No nausea or vomiting.           Last vitals:  Vitals Value Taken Time   /60 04/18/24 1300   Temp 36.7  C (98.1  F) 04/18/24 1200   Pulse 77 04/18/24 1300   Resp     SpO2 92 % 04/18/24 1300       Electronically Signed By: Lopez Odell MD  April 18, 2024  2:01 PM

## 2024-04-18 NOTE — PROGRESS NOTES
D/I/A: Patient is tolerating liquids and foods, ambulating, puncture sites are stable (no bleeding or hematoma) and patient has a . Pt does MWF dialysis and does not produce urine. Going to dialysis tomorrow.    A+O x 4 and making needs known.  CCL access sites C/D/I with no bleeding or hematoma.  CMS +.  VSSA.   IV access removed.    Education completed and outlined in AVS.  Medication changes reviewed with patient. Questions answered prior to discharge. Belongings returned to patient at discharge  P: Discharged to self care.  Patient to follow up as per discharge instruction

## 2024-04-18 NOTE — ANESTHESIA PROCEDURE NOTES
Airway       Patient location during procedure: OR       Procedure Start/Stop Times: 4/18/2024 9:38 AM  Staff -        CRNA: Shannan Sanchez APRN CRNA       Performed By: CRNA  Consent for Airway        Urgency: elective  Indications and Patient Condition       Indications for airway management: geri-procedural       Induction type:intravenous       Mask difficulty assessment: 1 - vent by mask    Final Airway Details       Final airway type: endotracheal airway       Successful airway: ETT - single  Endotracheal Airway Details        ETT size (mm): 7.5       Cuffed: yes       Cuff volume (mL): 5       Successful intubation technique: direct laryngoscopy       DL Blade Type: MAC 3       Grade View of Cords: 2       Adjucts: stylet       Position: Right       Measured from: gums/teeth       Secured at (cm): 23       Bite block used: Soft    Post intubation assessment        Placement verified by: capnometry, equal breath sounds and chest rise        Number of attempts at approach: 1       Number of other approaches attempted: 0       Secured with: tape       Ease of procedure: easy       Dentition: Intact    Medication(s) Administered   Medication Administration Time: 4/18/2024 9:38 AM

## 2024-04-18 NOTE — DISCHARGE INSTRUCTIONS
[facility]  Cardiac Electrophysiology  [address]  [place] [address]   Office: [phone]  Fax: [phone]     Cardiac Electrophysiology - Post Ablation Discharge Instructions      PROCEDURE   Atrial fibrillation ablation         MEDICATION INSTRUCTIONS   Continue taking all home meds  Continue taking your blood thinner Eliquis.          DISCHARGE INSTRUCTIONS   General instructions  Have an adult stay with you until tomorrow.  You may resume your normal diet.    You may shower tomorrow.  Do NOT take a bath, or use a hot tub or pool for at least 1 week. Do not scrub the site. Do not use lotion or powder near the puncture site.    Groin care instructions  For the first 24 hrs - check the puncture site every 1-2 hours while awake.  You may keep a bandaid over the puncture sites for 1 or 2 days post-procedure and thereafter may keep these sites uncovered.  Change the bandaid daily.  If there is minor oozing, apply another bandaid and remove it after 12 hours.  For 2 days, when you cough, sneeze, laugh or move your bowels, hold your hand over the puncture site and press firmly.  Mild bruising at the access sites is normal.  If you notice increased swelling, external bleeding, or have other concerns regarding your access sites please consider emergency department evaluation and call your electrophysiology team's office    Activity recommendations  Do not drive for 3 days.  Avoid stooping or squatting more than 90 degrees at the hips for 7 days  Avoid repetitive motions such as loading , vacuuming, raking or shoveling  Avoid heavy lifting (greater than 25lbs) for 1 week    Post ablation instructions  You may have some irregular heartbeats following your ablation.  These may feel very strong and feel like atrial fibrillation re-initiation is imminent - these episodes should occur less frequently over time.  Recurrent atrial fibrillation can occur within the first 3  months post ablation while your heart recovers from the procedure.  Pleuritic chest discomfort (chest pain worse with taking deep breaths, worse with laying flat on your back) can occur after ablation, usually coming about within the first 24-48hrs post ablation.  If this occurs and is severe enough to be troublesome to you, please call us and consider starting a course of ibuprofen 400mg three times daily for 5 to 7 days    Things to watch for  As with any type of procedure, please be more attentive to unusual symptoms post ablation (eg. fever, neurologic changes, pain with swallowing, loss of consciousness, etc) - we recommend ER evaluation for any such symptoms in the first few weeks post procedure.    Consider ER evaluation for the following:  Severe chest pain not relieved by Tylenol or Ibuprofen  You have chills or a fever greater than 101 F (38 C)  Neurologic changes (eg. leg, arm or face weakness or numbness, difficulties with speech or word finding, problems walking or with your balance, vision changes)  Severe difficulty swallowing and/or you are coughing up blood  Shortness of breath  Increased groin pain or a large or growing hard lump around the site  Groin is red, swollen, hot or tender  Blood or fluid is draining from the groin site  Any numbness, coolness or changes in color in your extremities  Groin pain not relieved by Tylenol or Advil  Recurrent atrial fibrillation associated with sustained rapid heart rates or associated with additional concerning symptoms.    Our office will have a follow-up visit scheduled for you in approximately 6 weeks.  Please do not hesitate to call us before that time should issues arise.        [facility]    [phone]    If you are calling after hours, please listen to the entire voicemail,   a live  will answer at the end of the message.    [phone] to reach the EP nurses working with Dr Paz

## 2024-04-18 NOTE — Clinical Note
[employer] system 12 lead EKG, hemodynamics 5 lead, pulse oximetery, NIBP, Physiocontrol hands off defibrillator/external pacer, with 3 monitoring leads to patient. Baseline assessment done.

## 2024-04-19 LAB
ATRIAL RATE - MUSE: 83 BPM
DIASTOLIC BLOOD PRESSURE - MUSE: NORMAL MMHG
INTERPRETATION ECG - MUSE: NORMAL
P AXIS - MUSE: 63 DEGREES
PR INTERVAL - MUSE: 186 MS
QRS DURATION - MUSE: 98 MS
QT - MUSE: 424 MS
QTC - MUSE: 498 MS
R AXIS - MUSE: -2 DEGREES
SYSTOLIC BLOOD PRESSURE - MUSE: NORMAL MMHG
T AXIS - MUSE: 71 DEGREES
VENTRICULAR RATE- MUSE: 83 BPM

## 2024-04-22 ENCOUNTER — VIRTUAL VISIT (OUTPATIENT)
Dept: CARDIOLOGY | Facility: CLINIC | Age: 79
End: 2024-04-22
Payer: COMMERCIAL

## 2024-04-22 DIAGNOSIS — I48.19 PERSISTENT ATRIAL FIBRILLATION (H): Primary | ICD-10-CM

## 2024-04-22 PROCEDURE — 99207 PR NO CHARGE NURSE ONLY: CPT | Mod: 93

## 2024-04-22 NOTE — PROGRESS NOTES
Post PVI Procedural Follow Up Call    Pt is s/p PVI from 4/18/24 with Dr Rao  PC was placed to pt, spoke to pt    General Assessment:     Weight: Pt reports weight is at baseline compared to pre procedural weight    Pain: Pt denies generalized or localized pain abnormal to healing s/p     /GI: Pt denies difficulty swallowing, denies constipation, denies urinary retention/difficulty, reports no s/s of infection, report normal appetite, and reports staying hydrated.    Neurological: Pt Denies any neurological changes, or s/s of CVA    Respiratory: Pt denies SOB, denies difficulty breathing, denies throat pain, denies changes/abnormal sputum, and denies any further symptoms abnormal to normal healing process s/p PVI.    Activity: Pt is tolerating advancement in activity while following physical restrictions, staying well hydrated, and gradually working into baseline activity.     Rhythm Assessment:   Pt denies palpitations, denies irregularities in HR or rhythm, and denies symptoms or sustained AF episodes.    Procedure Site Assessment:   Pts some bruising around sites without significant change from hospital discharge and normal to PVI recovery    Anticoagulation/Medication:  Pt remain on Eliquis without interruption  Per guidelines by Dr Paz no ASA needed upon discharge    Education completed with pt at this visit:  Reviewed normal post-op PVI healing process, when to contact EP-RN/EP-MD, contact information was given to the pt for further concerns or questions, and pt verbalized understanding    Follow up  Pts AVS was printed and mailed to pt by scheduling team and pt will be seen by EP NP at 6 wks, monitor will be ordered at this follow-up if indicated as well as 3mo follow-up with either EP ALE or ERIC SEARS    4/22/2024 9:19 AM  Cristela Dockery RN

## 2024-04-22 NOTE — PATIENT INSTRUCTIONS
Instructions Following your Ablation Procedure    Your anticoagulation medication Eliquis:  It is important to remain on your anticoagulation medication uninterrupted after your ablation to reduce your risk of a stroke or heart attack, do not stop this medication  Please contact me if you have any questions regarding your anticoagulation medication    Groin care instructions  Keep the site clean and dry, do not place a bandage over the site. If there is minor oozing, apply another bandaid and remove it after 12 hours.  Mild bruising at the access sites is normal. If you notice increased swelling, external bleeding, or have other concerns regarding your access sites please consider emergency department evaluation for significant changes and call your electrophysiology team's office.  You may experience mild discomfort at your groin sites, applying ice packs 20min 3-4 times a day can help alleviate this discomfort.    Activity recommendations  You can resume driving.  Avoid stooping or squatting more than 90 degrees at the hips, repetitive motions such as loading , vacuuming, raking or shoveling, and heavy lifting (greater than 25lbs) for 1 week  Increase your activity gradually over the next 5-10 days, working back to your normal daily activity/routine.    Post ablation instructions  Stay well hydrated, and increase your fluid intake during this recovery period  High protein foods aide in your bodies healing process  You may have some irregular heartbeats and/or atrial fibrillation following your ablation which is normal to recovery, these episodes should occur less frequently over time.   Recurrent atrial fibrillation can occur within the first 3 months post ablation while your heart recovers from the procedure. Please call the electrophysiology team's office if you have an episode lasting greater than 4 hours, or if you notice the episodes are increasing in frequency or duration  Pleuritic chest  discomfort (chest pain worse with taking deep breaths, worse with laying flat on your back) can occur after ablation, usually coming about within the first 24-48hrs post ablation. If this occurs and is severe enough to be troublesome to you, please call us and consider starting a course of ibuprofen 400mg three times daily for 5 to 7 days    Things to watch for  As with any type of procedure, please be more attentive to unusual symptoms post ablation (eg. fever, neurologic changes, pain with swallowing, loss of consciousness, etc) - we recommend ER evaluation for any such symptoms in the first few weeks post procedure.    Consider ER evaluation for the following:  Severe chest pain not relieved by Tylenol or Ibuprofen  You have chills or a fever greater than 101 F (38 C)  Neurologic changes (eg. leg, arm or face weakness or numbness, difficulties with speech or word finding, problems  walking or with your balance, vision changes)  Severe difficulty swallowing and/or you are coughing up blood  Shortness of breath  Increased groin pain or a large or growing hard lump around the site  Groin is red, swollen, hot or tender  Blood or fluid is draining from the groin site  Any numbness, coolness or changes in color in your extremities  Groin pain not relieved by Tylenol or Advil  Recurrent atrial fibrillation associated with sustained rapid heart rates or associated with additional concerning  symptoms.    Your follow up appointments are as follows:  You will be seen by the electrophysiologist nurse practitioner at 6 weeks after your ablation  At your 6 week appointment, a 3 month follow-up appointment will be arranged with either the Nurse Practitioner or the Electrophysiology provider     Sincerely,  Cristela Dockery RN [phone]    After hours please contact the on call service at # [phone]

## 2024-04-24 ENCOUNTER — APPOINTMENT (OUTPATIENT)
Dept: CT IMAGING | Facility: HOSPITAL | Age: 79
DRG: 308 | End: 2024-04-24
Attending: EMERGENCY MEDICINE
Payer: COMMERCIAL

## 2024-04-24 ENCOUNTER — HOSPITAL ENCOUNTER (INPATIENT)
Facility: HOSPITAL | Age: 79
LOS: 3 days | Discharge: HOME HEALTH | DRG: 308 | End: 2024-04-27
Attending: EMERGENCY MEDICINE | Admitting: HOSPITALIST
Payer: COMMERCIAL

## 2024-04-24 ENCOUNTER — APPOINTMENT (OUTPATIENT)
Dept: RADIOLOGY | Facility: HOSPITAL | Age: 79
DRG: 308 | End: 2024-04-24
Attending: EMERGENCY MEDICINE
Payer: COMMERCIAL

## 2024-04-24 ENCOUNTER — TELEPHONE (OUTPATIENT)
Dept: CARDIOLOGY | Facility: CLINIC | Age: 79
End: 2024-04-24
Payer: COMMERCIAL

## 2024-04-24 DIAGNOSIS — I48.91 ATRIAL FIBRILLATION, UNSPECIFIED TYPE (H): ICD-10-CM

## 2024-04-24 DIAGNOSIS — Z99.2 ESRD (END STAGE RENAL DISEASE) ON DIALYSIS (H): ICD-10-CM

## 2024-04-24 DIAGNOSIS — E11.9 DM (DIABETES MELLITUS) (H): ICD-10-CM

## 2024-04-24 DIAGNOSIS — R07.9 CHEST PAIN, UNSPECIFIED TYPE: ICD-10-CM

## 2024-04-24 DIAGNOSIS — R41.0 CONFUSION: ICD-10-CM

## 2024-04-24 DIAGNOSIS — N18.6 ESRD (END STAGE RENAL DISEASE) ON DIALYSIS (H): ICD-10-CM

## 2024-04-24 DIAGNOSIS — I50.9 CONGESTIVE HEART FAILURE, UNSPECIFIED HF CHRONICITY, UNSPECIFIED HEART FAILURE TYPE (H): ICD-10-CM

## 2024-04-24 PROBLEM — R41.3 MEMORY LOSS: Status: ACTIVE | Noted: 2023-07-27

## 2024-04-24 PROBLEM — R31.29 MICROSCOPIC HEMATURIA: Status: ACTIVE | Noted: 2018-01-31

## 2024-04-24 PROBLEM — I25.119 CORONARY ARTERY DISEASE INVOLVING NATIVE CORONARY ARTERY OF NATIVE HEART WITH ANGINA PECTORIS (H): Status: ACTIVE | Noted: 2023-12-19

## 2024-04-24 PROBLEM — N28.9 KIDNEY DISORDER: Status: ACTIVE | Noted: 2020-02-19

## 2024-04-24 PROBLEM — J18.9 HCAP (HEALTHCARE-ASSOCIATED PNEUMONIA): Status: ACTIVE | Noted: 2024-04-24

## 2024-04-24 LAB
ALBUMIN SERPL BCG-MCNC: 3.9 G/DL (ref 3.5–5.2)
ALP SERPL-CCNC: 73 U/L (ref 40–150)
ALT SERPL W P-5'-P-CCNC: 10 U/L (ref 0–70)
ANION GAP SERPL CALCULATED.3IONS-SCNC: 13 MMOL/L (ref 7–15)
APTT PPP: 56 SECONDS (ref 22–38)
AST SERPL W P-5'-P-CCNC: 14 U/L (ref 0–45)
BASOPHILS # BLD AUTO: 0 10E3/UL (ref 0–0.2)
BASOPHILS NFR BLD AUTO: 0 %
BILIRUB SERPL-MCNC: 0.5 MG/DL
BUN SERPL-MCNC: 25.2 MG/DL (ref 8–23)
CALCIUM SERPL-MCNC: 10.1 MG/DL (ref 8.8–10.2)
CHLORIDE SERPL-SCNC: 96 MMOL/L (ref 98–107)
CREAT SERPL-MCNC: 3.33 MG/DL (ref 0.67–1.17)
DEPRECATED HCO3 PLAS-SCNC: 29 MMOL/L (ref 22–29)
EGFRCR SERPLBLD CKD-EPI 2021: 18 ML/MIN/1.73M2
EOSINOPHIL # BLD AUTO: 0.1 10E3/UL (ref 0–0.7)
EOSINOPHIL NFR BLD AUTO: 1 %
ERYTHROCYTE [DISTWIDTH] IN BLOOD BY AUTOMATED COUNT: 17.3 % (ref 10–15)
FLUAV RNA SPEC QL NAA+PROBE: NEGATIVE
FLUBV RNA RESP QL NAA+PROBE: NEGATIVE
GLUCOSE SERPL-MCNC: 133 MG/DL (ref 70–99)
HCT VFR BLD AUTO: 37.4 % (ref 40–53)
HGB BLD-MCNC: 11.4 G/DL (ref 13.3–17.7)
HOLD SPECIMEN: NORMAL
IMM GRANULOCYTES # BLD: 0 10E3/UL
IMM GRANULOCYTES NFR BLD: 0 %
INR PPP: 1.47 (ref 0.85–1.15)
LYMPHOCYTES # BLD AUTO: 0.7 10E3/UL (ref 0.8–5.3)
LYMPHOCYTES NFR BLD AUTO: 9 %
MAGNESIUM SERPL-MCNC: 2.5 MG/DL (ref 1.7–2.3)
MCH RBC QN AUTO: 32.4 PG (ref 26.5–33)
MCHC RBC AUTO-ENTMCNC: 30.5 G/DL (ref 31.5–36.5)
MCV RBC AUTO: 106 FL (ref 78–100)
MONOCYTES # BLD AUTO: 1.2 10E3/UL (ref 0–1.3)
MONOCYTES NFR BLD AUTO: 17 %
NEUTROPHILS # BLD AUTO: 5.4 10E3/UL (ref 1.6–8.3)
NEUTROPHILS NFR BLD AUTO: 73 %
NRBC # BLD AUTO: 0 10E3/UL
NRBC BLD AUTO-RTO: 0 /100
NT-PROBNP SERPL-MCNC: ABNORMAL PG/ML (ref 0–1800)
PHOSPHATE SERPL-MCNC: 3.2 MG/DL (ref 2.5–4.5)
PLATELET # BLD AUTO: 208 10E3/UL (ref 150–450)
POTASSIUM SERPL-SCNC: 4.4 MMOL/L (ref 3.4–5.3)
PROCALCITONIN SERPL IA-MCNC: 22.18 NG/ML
PROT SERPL-MCNC: 7.2 G/DL (ref 6.4–8.3)
RBC # BLD AUTO: 3.52 10E6/UL (ref 4.4–5.9)
RSV RNA SPEC NAA+PROBE: NEGATIVE
SARS-COV-2 RNA RESP QL NAA+PROBE: NEGATIVE
SODIUM SERPL-SCNC: 138 MMOL/L (ref 135–145)
TROPONIN T SERPL HS-MCNC: 757 NG/L
TROPONIN T SERPL HS-MCNC: 891 NG/L
WBC # BLD AUTO: 7.4 10E3/UL (ref 4–11)

## 2024-04-24 PROCEDURE — 84484 ASSAY OF TROPONIN QUANT: CPT | Performed by: EMERGENCY MEDICINE

## 2024-04-24 PROCEDURE — 99285 EMERGENCY DEPT VISIT HI MDM: CPT | Mod: 25

## 2024-04-24 PROCEDURE — 84100 ASSAY OF PHOSPHORUS: CPT | Performed by: EMERGENCY MEDICINE

## 2024-04-24 PROCEDURE — 250N000013 HC RX MED GY IP 250 OP 250 PS 637: Performed by: HOSPITALIST

## 2024-04-24 PROCEDURE — 93005 ELECTROCARDIOGRAM TRACING: CPT | Performed by: EMERGENCY MEDICINE

## 2024-04-24 PROCEDURE — 83735 ASSAY OF MAGNESIUM: CPT | Performed by: EMERGENCY MEDICINE

## 2024-04-24 PROCEDURE — 120N000004 HC R&B MS OVERFLOW

## 2024-04-24 PROCEDURE — 82247 BILIRUBIN TOTAL: CPT | Performed by: EMERGENCY MEDICINE

## 2024-04-24 PROCEDURE — 85610 PROTHROMBIN TIME: CPT | Performed by: EMERGENCY MEDICINE

## 2024-04-24 PROCEDURE — 93005 ELECTROCARDIOGRAM TRACING: CPT | Performed by: STUDENT IN AN ORGANIZED HEALTH CARE EDUCATION/TRAINING PROGRAM

## 2024-04-24 PROCEDURE — 71046 X-RAY EXAM CHEST 2 VIEWS: CPT

## 2024-04-24 PROCEDURE — 96365 THER/PROPH/DIAG IV INF INIT: CPT

## 2024-04-24 PROCEDURE — 87637 SARSCOV2&INF A&B&RSV AMP PRB: CPT | Performed by: EMERGENCY MEDICINE

## 2024-04-24 PROCEDURE — 85730 THROMBOPLASTIN TIME PARTIAL: CPT | Performed by: EMERGENCY MEDICINE

## 2024-04-24 PROCEDURE — 83880 ASSAY OF NATRIURETIC PEPTIDE: CPT | Performed by: EMERGENCY MEDICINE

## 2024-04-24 PROCEDURE — 250N000011 HC RX IP 250 OP 636: Performed by: EMERGENCY MEDICINE

## 2024-04-24 PROCEDURE — 82550 ASSAY OF CK (CPK): CPT | Performed by: HOSPITALIST

## 2024-04-24 PROCEDURE — 36415 COLL VENOUS BLD VENIPUNCTURE: CPT | Performed by: EMERGENCY MEDICINE

## 2024-04-24 PROCEDURE — 85025 COMPLETE CBC W/AUTO DIFF WBC: CPT | Performed by: EMERGENCY MEDICINE

## 2024-04-24 PROCEDURE — 84145 PROCALCITONIN (PCT): CPT | Performed by: EMERGENCY MEDICINE

## 2024-04-24 PROCEDURE — 70450 CT HEAD/BRAIN W/O DYE: CPT

## 2024-04-24 RX ORDER — ALBUTEROL SULFATE 0.83 MG/ML
3 SOLUTION RESPIRATORY (INHALATION)
Status: DISCONTINUED | OUTPATIENT
Start: 2024-04-24 | End: 2024-04-27 | Stop reason: HOSPADM

## 2024-04-24 RX ORDER — SEVELAMER CARBONATE 800 MG/1
1600 TABLET, FILM COATED ORAL
Status: DISCONTINUED | OUTPATIENT
Start: 2024-04-24 | End: 2024-04-27 | Stop reason: HOSPADM

## 2024-04-24 RX ORDER — AMOXICILLIN 250 MG
1 CAPSULE ORAL 2 TIMES DAILY PRN
Status: DISCONTINUED | OUTPATIENT
Start: 2024-04-24 | End: 2024-04-27 | Stop reason: HOSPADM

## 2024-04-24 RX ORDER — GUAIFENESIN 200 MG/10ML
200 LIQUID ORAL EVERY 4 HOURS PRN
Status: DISCONTINUED | OUTPATIENT
Start: 2024-04-24 | End: 2024-04-27 | Stop reason: HOSPADM

## 2024-04-24 RX ORDER — AMOXICILLIN 250 MG
2 CAPSULE ORAL 2 TIMES DAILY PRN
Status: DISCONTINUED | OUTPATIENT
Start: 2024-04-24 | End: 2024-04-27 | Stop reason: HOSPADM

## 2024-04-24 RX ORDER — LIDOCAINE 40 MG/G
CREAM TOPICAL
Status: DISCONTINUED | OUTPATIENT
Start: 2024-04-24 | End: 2024-04-27 | Stop reason: HOSPADM

## 2024-04-24 RX ORDER — CALCIUM CARBONATE 500 MG/1
1000 TABLET, CHEWABLE ORAL 4 TIMES DAILY PRN
Status: DISCONTINUED | OUTPATIENT
Start: 2024-04-24 | End: 2024-04-27 | Stop reason: HOSPADM

## 2024-04-24 RX ORDER — CARVEDILOL 12.5 MG/1
25 TABLET ORAL
Status: DISCONTINUED | OUTPATIENT
Start: 2024-04-25 | End: 2024-04-25

## 2024-04-24 RX ORDER — PIPERACILLIN SODIUM, TAZOBACTAM SODIUM 3; .375 G/15ML; G/15ML
3.38 INJECTION, POWDER, LYOPHILIZED, FOR SOLUTION INTRAVENOUS ONCE
Status: COMPLETED | OUTPATIENT
Start: 2024-04-24 | End: 2024-04-24

## 2024-04-24 RX ORDER — PANTOPRAZOLE SODIUM 40 MG/1
40 TABLET, DELAYED RELEASE ORAL DAILY
Status: DISCONTINUED | OUTPATIENT
Start: 2024-04-25 | End: 2024-04-27 | Stop reason: HOSPADM

## 2024-04-24 RX ORDER — VIT B COMP NO.3/FOLIC/C/BIOTIN 1 MG-60 MG
1 TABLET ORAL DAILY
Status: DISCONTINUED | OUTPATIENT
Start: 2024-04-25 | End: 2024-04-27 | Stop reason: HOSPADM

## 2024-04-24 RX ORDER — ACETAMINOPHEN 650 MG/1
650 SUPPOSITORY RECTAL EVERY 4 HOURS PRN
Status: DISCONTINUED | OUTPATIENT
Start: 2024-04-24 | End: 2024-04-27 | Stop reason: HOSPADM

## 2024-04-24 RX ORDER — ACETAMINOPHEN 325 MG/1
650 TABLET ORAL EVERY 4 HOURS PRN
Status: DISCONTINUED | OUTPATIENT
Start: 2024-04-24 | End: 2024-04-27 | Stop reason: HOSPADM

## 2024-04-24 RX ORDER — VITAMIN B COMPLEX
50 TABLET ORAL DAILY
Status: DISCONTINUED | OUTPATIENT
Start: 2024-04-25 | End: 2024-04-27 | Stop reason: HOSPADM

## 2024-04-24 RX ORDER — SEVELAMER CARBONATE 800 MG/1
3200 TABLET, FILM COATED ORAL
Status: DISCONTINUED | OUTPATIENT
Start: 2024-04-25 | End: 2024-04-27 | Stop reason: HOSPADM

## 2024-04-24 RX ORDER — CLOPIDOGREL BISULFATE 75 MG/1
75 TABLET ORAL DAILY
Status: DISCONTINUED | OUTPATIENT
Start: 2024-04-25 | End: 2024-04-27 | Stop reason: HOSPADM

## 2024-04-24 RX ORDER — CINACALCET 30 MG/1
60 TABLET, FILM COATED ORAL
Status: DISCONTINUED | OUTPATIENT
Start: 2024-04-25 | End: 2024-04-27 | Stop reason: HOSPADM

## 2024-04-24 RX ORDER — SIMVASTATIN 10 MG
40 TABLET ORAL AT BEDTIME
Status: DISCONTINUED | OUTPATIENT
Start: 2024-04-24 | End: 2024-04-27 | Stop reason: HOSPADM

## 2024-04-24 RX ORDER — SALIVA STIMULANT COMB. NO.3
1 SPRAY, NON-AEROSOL (ML) MUCOUS MEMBRANE 4 TIMES DAILY PRN
Status: DISCONTINUED | OUTPATIENT
Start: 2024-04-24 | End: 2024-04-27 | Stop reason: HOSPADM

## 2024-04-24 RX ORDER — ONDANSETRON 4 MG/1
4 TABLET, ORALLY DISINTEGRATING ORAL EVERY 6 HOURS PRN
Status: DISCONTINUED | OUTPATIENT
Start: 2024-04-24 | End: 2024-04-27 | Stop reason: HOSPADM

## 2024-04-24 RX ORDER — POLYETHYLENE GLYCOL 3350 17 G/17G
17 POWDER, FOR SOLUTION ORAL DAILY PRN
Status: DISCONTINUED | OUTPATIENT
Start: 2024-04-24 | End: 2024-04-27 | Stop reason: HOSPADM

## 2024-04-24 RX ORDER — PIPERACILLIN SODIUM, TAZOBACTAM SODIUM 3; .375 G/15ML; G/15ML
3.38 INJECTION, POWDER, LYOPHILIZED, FOR SOLUTION INTRAVENOUS EVERY 12 HOURS
Status: DISCONTINUED | OUTPATIENT
Start: 2024-04-25 | End: 2024-04-26 | Stop reason: ALTCHOICE

## 2024-04-24 RX ORDER — ONDANSETRON 2 MG/ML
4 INJECTION INTRAMUSCULAR; INTRAVENOUS EVERY 6 HOURS PRN
Status: DISCONTINUED | OUTPATIENT
Start: 2024-04-24 | End: 2024-04-27 | Stop reason: HOSPADM

## 2024-04-24 RX ADMIN — APIXABAN 5 MG: 5 TABLET, FILM COATED ORAL at 23:47

## 2024-04-24 RX ADMIN — PIPERACILLIN AND TAZOBACTAM 3.38 G: 3; .375 INJECTION, POWDER, FOR SOLUTION INTRAVENOUS at 21:08

## 2024-04-24 ASSESSMENT — COLUMBIA-SUICIDE SEVERITY RATING SCALE - C-SSRS
6. HAVE YOU EVER DONE ANYTHING, STARTED TO DO ANYTHING, OR PREPARED TO DO ANYTHING TO END YOUR LIFE?: NO
1. IN THE PAST MONTH, HAVE YOU WISHED YOU WERE DEAD OR WISHED YOU COULD GO TO SLEEP AND NOT WAKE UP?: NO
2. HAVE YOU ACTUALLY HAD ANY THOUGHTS OF KILLING YOURSELF IN THE PAST MONTH?: NO

## 2024-04-24 ASSESSMENT — ACTIVITIES OF DAILY LIVING (ADL)
ADLS_ACUITY_SCORE: 38

## 2024-04-24 NOTE — TELEPHONE ENCOUNTER
[facility] Call Center    Phone Message    May a detailed message be left on voicemail: yes     Reason for Call: Other: Kirsten states the pt is getting weaker since procedure, balance off, forgetful, shortness of breathe, asking strange questions. Pt currently at dialysis but spouse would like a call back to further discuss.       Action Taken: Other: Cardiology    Travel Screening: Not Applicable    Thank you!  [facility]                                                                       no

## 2024-04-24 NOTE — TELEPHONE ENCOUNTER
Return call to patients wife.  Pt is s/p PVI with KA on 4-18-24.  She reports that he had been doing very well post procedure.  He is hemodialysis on MWF.  He was ok Monday after dialysis, but she noted he was acting a bit strange yesterday, asking questions that did not make sense, seemed weaker with a change in balance and had a poor appetite.  She notes that he has some mild confusion at baseline but it seemed worse last night and still this morning.  He is currently at dialysis.    Due to patients complex history and neurological changes, suggest a visit to the ER for further evaluation.  She states understanding, will seek care at [facility] today.

## 2024-04-24 NOTE — ED PROVIDER NOTES
EMERGENCY DEPARTMENT ENCOUNTER      NAME: García Kitchen  YOB: 1945  MRN: [id number]    FINAL IMPRESSION  1. Confusion    2. Chest pain, unspecified type    3. ESRD (end stage renal disease) on dialysis (H)    4. Atrial fibrillation, unspecified type (H)    5. Congestive heart failure, unspecified HF chronicity, unspecified heart failure type (H)        MEDICAL DECISION MAKING   Pertinent Labs & Imaging studies reviewed. (See chart for details)    García Kitchen is a 78 year old male who presents for evaluation of confusion and chest pain.  Records reviewed.  Patient has a history of persistent atrial fibrillation and was admitted 4/18/2024 for a cardiac ablation with Dr. Paz, continues on [facility].  He also has a history of CKD and dialyzes Monday/Wednesday/Friday, hypertension, coronary artery disease.  He presents today with wife who reports that he has been more confused over the past couple of days, having trouble with his words and significant bilateral weakness.  The symptoms have been ongoing for at least 24 hours.  Patient did have some chest pain prior to arrival.  He did go for his dialysis run today and in triage was noted to have blood pressure of 97/54 which family reported is low for him.  At time of my initial evaluation, patient was not able to offer anything in the way of history.  He admits that he did have some chest pain earlier but is not currently experiencing any discomfort.  He requested that I speak with his wife for more history.    I considered a broad differential including but not limited to ACS/ischemia, unstable angina, CHF, pericarditis, myocarditis, pericardial effusion, PE, viral illness, pneumonia, aortic dissection, pneumothorax, costochondritis, anemia, electrolyte derangement,  intracranial hemorrhage, medication noncompliance/tolerance.  I have lower suspicion for PE given patient is on chronic anticoagulation.  He is currently chest pain-free.  He does not  make any urine so I have lower suspicion for UTI.  Abdominal exam is benign so I also have low suspicion for intra-abdominal infectious or surgical process.  Orders placed for labs, EKG, CT head, chest x-ray, viral swabs.    ED Course as of 04/25/24 0054   Wed Apr 24, 2024   1759 Comprehensive metabolic panel(!)  CMP with creatinine of 3.33, consistent with end-stage renal disease and dialysis run today.  No evidence of other electrolyte derangement, acidosis, or hepatobiliary disease.   1759 CBC with platelets differential(!)  CBC with hemoglobin of 11.4, appears to be at baseline.  No leukocytosis.   1800 Troponin T, High Sensitivity(!!): 891  Troponin significantly elevated at 891.  No recent for comparison.  I suspect this may at least in part elevated related to end-stage renal disease.  Patient did have chest pain prior to arrival but is currently chest pain-free.  I did obtain a second EKG which showed no significant change from previous or obvious ST changes.  Will plan to repeat at 2 hours   1836 XR Chest 2 Views  Intermittently reviewed x-ray.  This did reveal small bilateral pleural effusions with possible infiltrate on the left.  Will add on procalcitonin and BNP.   1953 Troponin T, High Sensitivity(!!): 757  Troponin trending down, less likely ACS.   2152 Procalcitonin(!!): 22.18  Procalcitonin significantly elevated.  With this and infiltrates on x-ray, will plan to start antibiotics for possible pneumonia.  Given recent hospitalization, will start with  Zosyn.   Thu Apr 25, 2024   0053 N-Terminal Pro BNP Inpatient(!): >70,000  BNP significantly elevated.  Patient does not appear to be in acute CHF but certainly, may benefit from echo and evaluation by cardiology team.     EKG x 2 showed atrial fibrillation but no acute ischemic changes.  I rechecked the patient multiple times and he remained very comfortable here.  I reviewed results with he and his wife and given his complicated past medical history  and recent admission for ablation, did agree on plan for admission.      I discussed the case with cardiology team who did not recommend starting heparin this evening, felt that his significant troponin elevation was more related to his kidney disease.  They did agree with plan for admission and will see the patient in the morning.    I discussed case with hospital medicine team who are to facilitate admission.    I independently reviewed CXR (as noted above), formal radiologist read pending.      Medical Decision Making  Obtained supplemental history:Supplemental history obtained?: Family Member/Significant Other  Reviewed external records: External records reviewed?: Outpatient Record: 4/18/2024 [facility]   Care impacted by chronic illness:Other: memory loss, persistent atrial fibrillation, coronary artery disease, s/p insertion of drug-eluting stent into LAD artery, end stage renal failure on dialysis, and hypertension  Care significantly affected by social determinants of health:N/A  Did you consider but not order tests?: Work up considered but not performed and documented in chart, if applicable  Did you interpret images independently?: Independent interpretation of ECG and images noted in documentation, when applicable.  Consultation discussion with other provider:Did you involve another provider (consultant, MH, pharmacy, etc.)?: I discussed the care with another health care provider, see documentation for details.  Admit.      ED COURSE  5:14 PM I met with the patient for initial interview and encounter. We discussed a plan for treatment and diagnostic interventions.  8:13 PM I spoke with Dr. Gondal, hospitalist who accepts the patient for admission.   8:17 PM I spoke with Dr. Paz, cardiology.  8:34 PM I rechecked and updated patient.   11:28 PM Rechecked the patient. Updated patient on plan for admission.       MEDICATIONS GIVEN IN THE ED  piperacillin-tazobactam (ZOSYN) 3.375 g vial to  "attach to  mL bag (0 g Intravenous Stopped 4/24/24 2173)       NEW PRESCRIPTIONS STARTED AT TODAY'S VISIT  New Prescriptions    No medications on file          =================================================================    Chief Complaint   Patient presents with    Chest Pain    Altered Mental Status    Generalized Weakness         HPI:    Patient information was obtained from: the patient     Use of : N/A     García Kitchen is a 78 year old male who presents to the ED by private car with spouse for evaluation of chest pain.    Limited HPI due to confusion    The patient reports he had chest pain but it has resolved. He states he is feeling \"confused.\" He had dialysis today. He no denies urine production.     Per wife, she reports the patient has been more confused the past few days. He was bilateral weakness and trouble with his words. He had an ablation done on 4/18 for atrial fibrillation. He is in anticoagulated state.     Per chart review,  4/18/2024 - patient presented to [facility] for persistent atrial fibrillation. Procedures included \"Electrophysiology testing and ablation of atrial fibrillation via pulmonary vein isolation\" and \"Additional ablation in L/R atrium after PVI x2.\"    RELEVANT HISTORY, MEDICATIONS, & ALLERGIES   Past medical history, surgical history, family history, medications, and allergies reviewed and pertinent noted in HPI.    REVIEW OF SYSTEMS:  A complete review of systems was performed with pertinent positives and negatives noted in the HPI. All other systems negative.     PHYSICAL EXAM:    Vitals: /67   Pulse 97   Temp 98.4  F (36.9  C) (Oral)   Resp 29   Wt 81.6 kg (180 lb)   SpO2 96%   BMI 26.58 kg/m     General: Alert and interactive, comfortable appearing.  HENT: Atraumatic. Full AROM of neck. Conjunctiva clear.   Cardiovascular: Irregularly irregular.   Chest/Pulmonary: Normal work of breathing. Speaking in complete sentences. Lungs " CTAB. No chest wall tenderness or deformities.  Abdomen: Soft, nondistended. Nontender without guarding or rebound.  Extremities: Normal AROM of all major joints.  AV fistula left upper extremity.  Skin: Warm and dry. Normal skin color.   Neuro: Speech clear. CNs grossly intact. Moves all extremities spontaneously.   Psych: Normal affect/mood, cooperative.      LAB  Labs Ordered and Resulted from Time of ED Arrival to Time of ED Departure   COMPREHENSIVE METABOLIC PANEL - Abnormal       Result Value    Sodium 138      Potassium 4.4      Carbon Dioxide (CO2) 29      Anion Gap 13      Urea Nitrogen 25.2 (*)     Creatinine 3.33 (*)     GFR Estimate 18 (*)     Calcium 10.1      Chloride 96 (*)     Glucose 133 (*)     Alkaline Phosphatase 73      AST 14      ALT 10      Protein Total 7.2      Albumin 3.9      Bilirubin Total 0.5     TROPONIN T, HIGH SENSITIVITY - Abnormal    Troponin T, High Sensitivity 891 (*)    INR - Abnormal    INR 1.47 (*)    PARTIAL THROMBOPLASTIN TIME - Abnormal    aPTT 56 (*)    MAGNESIUM - Abnormal    Magnesium 2.5 (*)    CBC WITH PLATELETS AND DIFFERENTIAL - Abnormal    WBC Count 7.4      RBC Count 3.52 (*)     Hemoglobin 11.4 (*)     Hematocrit 37.4 (*)      (*)     MCH 32.4      MCHC 30.5 (*)     RDW 17.3 (*)     Platelet Count 208      % Neutrophils 73      % Lymphocytes 9      % Monocytes 17      % Eosinophils 1      % Basophils 0      % Immature Granulocytes 0      NRBCs per 100 WBC 0      Absolute Neutrophils 5.4      Absolute Lymphocytes 0.7 (*)     Absolute Monocytes 1.2      Absolute Eosinophils 0.1      Absolute Basophils 0.0      Absolute Immature Granulocytes 0.0      Absolute NRBCs 0.0     TROPONIN T, HIGH SENSITIVITY - Abnormal    Troponin T, High Sensitivity 757 (*)    PROCALCITONIN - Abnormal    Procalcitonin 22.18 (*)    NT PROBNP INPATIENT - Abnormal    N terminal Pro BNP Inpatient >70,000 (*)    CK TOTAL - Abnormal    CK 18 (*)    PHOSPHORUS - Normal    Phosphorus 3.2      INFLUENZA A/B, RSV, & SARS-COV2 PCR - Normal    Influenza A PCR Negative      Influenza B PCR Negative      RSV PCR Negative      SARS CoV2 PCR Negative     CBC WITH PLATELETS   RESPIRATORY AEROBIC BACTERIAL CULTURE   GRAM STAIN   STREPTOCOCCUS PNEUMONIAE ANTIGEN       RADIOLOGY  XR Chest 2 Views   Final Result   IMPRESSION: Small bilateral pleural effusions and bibasilar opacities, left greater than right, which could represent atelectasis and/or pneumonia. No pneumothorax.      Head CT w/o contrast   Final Result   IMPRESSION:   1.  No acute intracranial process.          EKG  Performed at: 16:02  Impression: Atrial fibrillation with occasional PVC.  No acute ischemic changes.  Compared to previous, atrial fibrillation has replaced sinus rhythm.  Rate: 94  Rhythm: Irregularly irregular  QRS Interval: 96  QTc Interval: 470  Comparison: 4/18/24    Performed at: 17:42  Impression: Atrial fibrillation.  No acute ischemic changes.  Compared to previous, no significant change.  Rate: 94  Rhythm: Irregularly irregular  QRS Interval: 88  QTc Interval: 480  Comparison: 4/24/24 16:02    All laboratory and imaging results and EKG's were personally reviewed and interpreted by myself prior to disposition decision.         I, Estelle Che, am serving as a scribe to document services personally performed by Dr. Trisha Lowery based on my observation and the provider's statements to me. I, Trisha Lowery MD attest that Estelle Che is acting in a scribe capacity, has observed my performance of the services and has documented them in accordance with my direction.    Trisha Lowery M.D.  Emergency Medicine  [facility] EMERGENCY DEPARTMENT  [facility] [address]  [phone]  Dept: [phone]     Trisha Lowery MD  04/25/24 0056

## 2024-04-24 NOTE — ED TRIAGE NOTES
Pt to ED via private vehicle with SO. Pts wife reports pt has been more confused over the last couple days, having trouble with his words and has had significant bilateral weakness. She states pt had ablation done on 4/18 for a-fib. Is currently on blood thinners. Family confirms symptoms have been on and off since the ablation and confirms the bilateral weakness and word finding trouble has been present for >24hrs. Pt also reporting chest pain upper sternum area that's worse with deep breathing but not reproducible with palpation.   No fever in triage.   Pt goes to dialysis M,W,F and did have full run today. Systolic BP in high 90's during triage, family states this is a low BP for pt. Pt denies dizziness. No recent trauma     Triage Assessment (Adult)       Row Name 04/24/24 1600          Triage Assessment    Airway WDL WDL        Respiratory WDL    Respiratory WDL WDL        Skin Circulation/Temperature WDL    Skin Circulation/Temperature WDL WDL        Cardiac WDL    Cardiac WDL X;chest pain        Peripheral/Neurovascular WDL    Peripheral Neurovascular WDL WDL        Cognitive/Neuro/Behavioral WDL    Cognitive/Neuro/Behavioral WDL X     Level of Consciousness confused     Orientation person;place;time;situation

## 2024-04-25 ENCOUNTER — APPOINTMENT (OUTPATIENT)
Dept: CT IMAGING | Facility: HOSPITAL | Age: 79
DRG: 308 | End: 2024-04-25
Attending: INTERNAL MEDICINE
Payer: COMMERCIAL

## 2024-04-25 ENCOUNTER — APPOINTMENT (OUTPATIENT)
Dept: SPEECH THERAPY | Facility: HOSPITAL | Age: 79
DRG: 308 | End: 2024-04-25
Attending: HOSPITALIST
Payer: COMMERCIAL

## 2024-04-25 ENCOUNTER — TELEPHONE (OUTPATIENT)
Dept: CARDIOLOGY | Facility: CLINIC | Age: 79
End: 2024-04-25

## 2024-04-25 ENCOUNTER — APPOINTMENT (OUTPATIENT)
Dept: OCCUPATIONAL THERAPY | Facility: HOSPITAL | Age: 79
DRG: 308 | End: 2024-04-25
Attending: HOSPITALIST
Payer: COMMERCIAL

## 2024-04-25 DIAGNOSIS — I50.9 ACUTE DECOMPENSATED HEART FAILURE (H): Primary | ICD-10-CM

## 2024-04-25 LAB
CK SERPL-CCNC: 18 U/L (ref 39–308)
ERYTHROCYTE [DISTWIDTH] IN BLOOD BY AUTOMATED COUNT: 17.3 % (ref 10–15)
HCT VFR BLD AUTO: 30.3 % (ref 40–53)
HGB BLD-MCNC: 9.3 G/DL (ref 13.3–17.7)
HOLD SPECIMEN: NORMAL
MCH RBC QN AUTO: 32.4 PG (ref 26.5–33)
MCHC RBC AUTO-ENTMCNC: 30.7 G/DL (ref 31.5–36.5)
MCV RBC AUTO: 106 FL (ref 78–100)
PLATELET # BLD AUTO: 181 10E3/UL (ref 150–450)
RBC # BLD AUTO: 2.87 10E6/UL (ref 4.4–5.9)
WBC # BLD AUTO: 7.5 10E3/UL (ref 4–11)

## 2024-04-25 PROCEDURE — 92610 EVALUATE SWALLOWING FUNCTION: CPT | Mod: GN

## 2024-04-25 PROCEDURE — 99222 1ST HOSP IP/OBS MODERATE 55: CPT | Performed by: INTERNAL MEDICINE

## 2024-04-25 PROCEDURE — 250N000013 HC RX MED GY IP 250 OP 250 PS 637: Performed by: HOSPITALIST

## 2024-04-25 PROCEDURE — 97166 OT EVAL MOD COMPLEX 45 MIN: CPT | Mod: GO

## 2024-04-25 PROCEDURE — 71260 CT THORAX DX C+: CPT

## 2024-04-25 PROCEDURE — 250N000013 HC RX MED GY IP 250 OP 250 PS 637: Performed by: STUDENT IN AN ORGANIZED HEALTH CARE EDUCATION/TRAINING PROGRAM

## 2024-04-25 PROCEDURE — 97110 THERAPEUTIC EXERCISES: CPT | Mod: GO

## 2024-04-25 PROCEDURE — 97530 THERAPEUTIC ACTIVITIES: CPT | Mod: GO

## 2024-04-25 PROCEDURE — 250N000011 HC RX IP 250 OP 636: Performed by: INTERNAL MEDICINE

## 2024-04-25 PROCEDURE — 99222 1ST HOSP IP/OBS MODERATE 55: CPT | Performed by: STUDENT IN AN ORGANIZED HEALTH CARE EDUCATION/TRAINING PROGRAM

## 2024-04-25 PROCEDURE — 250N000011 HC RX IP 250 OP 636: Performed by: HOSPITALIST

## 2024-04-25 PROCEDURE — 85027 COMPLETE CBC AUTOMATED: CPT | Performed by: HOSPITALIST

## 2024-04-25 PROCEDURE — 99223 1ST HOSP IP/OBS HIGH 75: CPT | Performed by: HOSPITALIST

## 2024-04-25 PROCEDURE — 36415 COLL VENOUS BLD VENIPUNCTURE: CPT | Performed by: HOSPITALIST

## 2024-04-25 PROCEDURE — 120N000004 HC R&B MS OVERFLOW

## 2024-04-25 RX ORDER — CARVEDILOL 12.5 MG/1
12.5 TABLET ORAL
Status: DISCONTINUED | OUTPATIENT
Start: 2024-04-27 | End: 2024-04-27 | Stop reason: HOSPADM

## 2024-04-25 RX ORDER — IOPAMIDOL 755 MG/ML
75 INJECTION, SOLUTION INTRAVASCULAR ONCE
Status: COMPLETED | OUTPATIENT
Start: 2024-04-25 | End: 2024-04-25

## 2024-04-25 RX ORDER — CARVEDILOL 12.5 MG/1
12.5 TABLET ORAL
Status: DISCONTINUED | OUTPATIENT
Start: 2024-04-25 | End: 2024-04-27 | Stop reason: HOSPADM

## 2024-04-25 RX ADMIN — SIMVASTATIN 40 MG: 10 TABLET, FILM COATED ORAL at 21:16

## 2024-04-25 RX ADMIN — APIXABAN 5 MG: 5 TABLET, FILM COATED ORAL at 20:16

## 2024-04-25 RX ADMIN — PIPERACILLIN AND TAZOBACTAM 3.38 G: 3; .375 INJECTION, POWDER, FOR SOLUTION INTRAVENOUS at 16:26

## 2024-04-25 RX ADMIN — APIXABAN 5 MG: 5 TABLET, FILM COATED ORAL at 09:31

## 2024-04-25 RX ADMIN — CINACALCET 60 MG: 30 TABLET ORAL at 16:50

## 2024-04-25 RX ADMIN — IOPAMIDOL 75 ML: 755 INJECTION, SOLUTION INTRAVENOUS at 14:53

## 2024-04-25 RX ADMIN — PIPERACILLIN AND TAZOBACTAM 3.38 G: 3; .375 INJECTION, POWDER, FOR SOLUTION INTRAVENOUS at 03:19

## 2024-04-25 RX ADMIN — Medication 1 TABLET: at 09:31

## 2024-04-25 RX ADMIN — SEVELAMER CARBONATE 1600 MG: 800 TABLET, FILM COATED ORAL at 16:17

## 2024-04-25 RX ADMIN — DOCUSATE SODIUM 50 MG AND SENNOSIDES 8.6 MG 1 TABLET: 8.6; 5 TABLET, FILM COATED ORAL at 21:16

## 2024-04-25 RX ADMIN — CARVEDILOL 25 MG: 12.5 TABLET, FILM COATED ORAL at 09:31

## 2024-04-25 RX ADMIN — CLOPIDOGREL BISULFATE 75 MG: 75 TABLET ORAL at 09:31

## 2024-04-25 RX ADMIN — Medication 50 MCG: at 09:31

## 2024-04-25 RX ADMIN — CARVEDILOL 12.5 MG: 12.5 TABLET, FILM COATED ORAL at 16:28

## 2024-04-25 RX ADMIN — PANTOPRAZOLE SODIUM 40 MG: 40 TABLET, DELAYED RELEASE ORAL at 09:31

## 2024-04-25 ASSESSMENT — ACTIVITIES OF DAILY LIVING (ADL)
CHANGE_IN_FUNCTIONAL_STATUS_SINCE_ONSET_OF_CURRENT_ILLNESS/INJURY: NO
FALL_HISTORY_WITHIN_LAST_SIX_MONTHS: NO
ADLS_ACUITY_SCORE: 38
DRESSING/BATHING_DIFFICULTY: NO
ADLS_ACUITY_SCORE: 28
ADLS_ACUITY_SCORE: 28
CONCENTRATING,_REMEMBERING_OR_MAKING_DECISIONS_DIFFICULTY: YES
WALKING_OR_CLIMBING_STAIRS: AMBULATION DIFFICULTY, REQUIRES EQUIPMENT
ADLS_ACUITY_SCORE: 28
WALKING_OR_CLIMBING_STAIRS_DIFFICULTY: YES
ADLS_ACUITY_SCORE: 28
DEPENDENT_IADLS:: CLEANING;COOKING;LAUNDRY;SHOPPING;MEAL PREPARATION;TRANSPORTATION
ADLS_ACUITY_SCORE: 28
ADLS_ACUITY_SCORE: 28
WEAR_GLASSES_OR_BLIND: NO
HEARING_DIFFICULTY_OR_DEAF: NO
DOING_ERRANDS_INDEPENDENTLY_DIFFICULTY: YES
DIFFICULTY_COMMUNICATING: NO
ADLS_ACUITY_SCORE: 28
TOILETING_ISSUES: NO
ADLS_ACUITY_SCORE: 28
EQUIPMENT_CURRENTLY_USED_AT_HOME: WALKER, STANDARD
ADLS_ACUITY_SCORE: 28
ADLS_ACUITY_SCORE: 28
DIFFICULTY_EATING/SWALLOWING: NO
ADLS_ACUITY_SCORE: 28
ADLS_ACUITY_SCORE: 28

## 2024-04-25 NOTE — CONSULTS
RENAL CONSULTATION:     Date of Consultation:  4/25/2024    Requesting Physician: Dr Clement Horan.   Reason for Consult: ESRD    Assessment/ Recommendations:  ESRD on HD: MWF, Dr Caballero. [facility] Orders below.   HD MWF, no acute indication today.       2. PNA: on Zoxyn. Elevated procal. CXR noted.   3. Elevated troponin w/ hx CAD: recent ablation contributing. Per cardiology.   4. Paroxsymal afib: per EP.   5. Hypotension: noted decrease in coreg.       Dialyzer: 160NRe Optiflux   Na: 138 mEq/L   Bicarb: 27 mEq/L   Dialysate: 2.0 K, 2.5 Ca, 1.0 Mg, 100 Dextrose ()   Dialysate/Machine Temp (prescribed): 36 C   Dialysate/Machine Temp (actual): 35.8 C   BFR (prescribed): 450   BFR (actual): 200   Prescribed time: 03:15   EDW: 81 kg   Access Type: Active (In Use):AVFistula-Standard/Left Upper Arm       Woody Humphrey DO  [facility]  [phone] (off)       History of present illness:  Mr Kitchen is a 77 y/o male with PMH of ESRD on HD, HTN, Type 2 DM, CAD. Patient presents with fatiuged, some confusion. Patient had HD yesterday. Completed run. Wife noted decline since recent ablation procedure. Patient with memory loss at baseline but worse currently. Very fatigued. Patient seen with wife in the room. Reports full run of HD yesterday. Notes some cough. No oxygen currently. No sob. Patient denies any fever or chill.      Past Medical History:   Diagnosis Date    A-V fistula (H24) 07/16/2014    Placed November 2013     Anemia, unspecified     Created by Conversion     Atrial fibrillation (H)     Calculus of kidney     Created by Conversion     Cancer (H)     Renal Cell Carcinoma s/p resection    Carcinoma in situ, site unspecified     Created by Conversion     Diabetes mellitus (H)     ESRD (end stage renal disease) on dialysis (H) 07/16/2014    Currently in transplant work-up     History of anesthesia complications     urinary retention which required catheterization    History of  transfusion     Hyperparathyroidism, secondary renal (H24)     Created by Conversion     Inguinal hernia     recurrent right     Nontoxic uninodular goiter     Created by Conversion     Renal cell carcinoma (H) 07/16/2014    S/p right nephrectomy 9/2007     Skin cancer     squamous    Splenomegaly     Created by Conversion [facility] Annotation: Jul 22 2008 12:19PM - Woody Shaffer: mild, noted on  CT     Thrombocytopenia, unspecified (H24)     Created by Conversion     Unspecified essential hypertension     Created by Conversion     Vertigo        Drug and lactation database from the [place] [employer]:  http://toxnet.nlm.nih.gov/cgi-bin/sis/htmlgen?LACT      Allergies   Allergen Reactions    Codeine Nausea and Vomiting     Other Reaction(s): Not available    Lisinopril Cough     Other Reaction(s): Not available       Social History     Socioeconomic History    Marital status:    Tobacco Use    Smoking status: Former    Smokeless tobacco: Never    Tobacco comments:     6/15/23-Quit smoking over 30 years.    Vaping Use    Vaping status: Never Used   Substance and Sexual Activity    Alcohol use: No    Drug use: No    Sexual activity: Not Currently     Partners: Female       Family History   Problem Relation Age of Onset    Cancer Mother         Adenocarcinoma Of The Large Intestine     Cancer Father         Adenocarcinoma Of The Large Intestine        Review of Systems: The remainder of 10 point review of systems is negative except as noted in HPI above.     /60 (Patient Position: Sitting)   Pulse 90   Temp 98  F (36.7  C) (Oral)   Resp 22   Wt 80.8 kg (178 lb 1.6 oz)   SpO2 97%   BMI 26.30 kg/m      Intake/Output Summary (Last 24 hours) at 4/25/2024 1030  Last data filed at 4/25/2024 0836  Gross per 24 hour   Intake 129 ml   Output 0 ml   Net 129 ml     Physical Exam:   GENERAL: calm and comfortable, alert  EYES: No scleral icterus, conjunctiva clear  ENT: Hearing normal,  Oral mucosa moist  RESP: anterior clear.   CV:  no leg edema.    GI: Active BS, Soft, NT/ND,  Musculoskeletal: Normal muscle bulk/ tone; No gross joint abnormalities  SKIN: No rash, warm/ dry  PSYCH:  Appropriate mood and affect    LABS:  Most Recent 3 CBC's:  Recent Labs   Lab Test 04/25/24  0438 04/24/24  1638 04/18/24  0808   WBC 7.5 7.4 5.2   HGB 9.3* 11.4* 9.3*   * 106* 106*    208 95*     Most Recent 3 BMP's:  Recent Labs   Lab Test 04/24/24  1638 04/18/24  0808 04/11/24  0915    141 139   POTASSIUM 4.4 4.3 4.6   CHLORIDE 96* 104 101   CO2 29 24 26   BUN 25.2* 35.0* 26.8*   CR 3.33* 5.31* 5.02*   ANIONGAP 13 13 12   NAYLA 10.1 9.0 9.3   * 100* 94     Most Recent 2 LFT's:  Recent Labs   Lab Test 04/24/24  1638 04/20/21  1153   AST 14 10   ALT 10 <9   ALKPHOS 73 63   BILITOTAL 0.5 0.7     Most Recent 3 INR's:  Recent Labs   Lab Test 04/24/24  1638   INR 1.47*     Most Recent 3 Creatinines:  Recent Labs   Lab Test 04/24/24  1638 04/18/24  0808 04/11/24  0915   CR 3.33* 5.31* 5.02*     Most Recent 3 Hemoglobins:  Recent Labs   Lab Test 04/25/24  0438 04/24/24  1638 04/18/24  0808   HGB 9.3* 11.4* 9.3*     Most Recent 3 Troponin's:No lab results found.  Most Recent 3 BNP's:  Recent Labs   Lab Test 04/24/24  1904   NTBNPI >70,000*         All lab data was reviewed at 10:30 AM
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
## 2024-04-25 NOTE — ED NOTES
Pt is resting in bed, wife left to go home. Dressing changed on IV, flushed and VS retaken. Pt is in no pain at this time. Will continue to monitor.

## 2024-04-25 NOTE — PHARMACY-ADMISSION MEDICATION HISTORY
Pharmacist Admission Medication History    Admission medication history is complete. The information provided in this note is only as accurate as the sources available at the time of the update.    Information Source(s): Patient, Family member, and CareEverywhere/SureScripts via in-person    Pertinent Information: Patient does not take Carvedilol in the mornings on days he gets dialysis  which is MWF.     Changes made to PTA medication list:  Added: None  Deleted: None  Changed: None    Allergies reviewed with patient and updates made in EHR: yes    Medication History Completed By: AJITH FISCHER [facility] 4/24/2024 9:00 PM    PTA Med List   Medication Sig Note Last Dose    apixaban ANTICOAGULANT (ELIQUIS) 5 MG tablet Take 1 tablet (5 mg) by mouth 2 times daily  4/24/2024 at AM    carvedilol (COREG) 25 MG tablet [CARVEDILOL (COREG) 25 MG TABLET] Take 1 tablet by mouth 2 times a day at 6:00 am and 4:00 pm. 4/24/2024: Does not take on MWF morning when he gets dialysis 4/24/2024 at AM    cinacalcet (SENSIPAR) 60 MG tablet Take 1 tablet by mouth daily  4/23/2024 at supper    clopidogrel (PLAVIX) 75 MG tablet Take 1 tablet (75 mg) by mouth daily  4/24/2024 at am    DIALYVITE 100-1 mg Tab Take 1 tablet by mouth daily   4/24/2024 at am    ketoconazole (NIZORAL) 2 % external cream Apply topically 2 times daily as needed for itching  Unknown at prn    pantoprazole (PROTONIX) 40 MG EC tablet Take 1 tablet (40 mg) by mouth daily Continue for 6 weeks after ablation  4/24/2024 at am    polyethylene glycol (MIRALAX) 17 gram packet Take 17 g by mouth daily as needed for constipation  Past Week at prn    sevelamer carbonate (RENVELA) 800 MG tablet Take 1,600 mg by mouth as needed (with snacks)  4/24/2024 at PM snack    sevelamer carbonate (RENVELA) 800 mg tablet Take 3,200 mg by mouth 3 times daily (with meals)  4/24/2024 at lunch    simvastatin (ZOCOR) 40 MG tablet Take 1 tablet (40 mg) by mouth At Bedtime  4/23/2024 at qhs     vitamin D3 (CHOLECALCIFEROL) 50 mcg (2000 units) tablet Take 1 tablet by mouth daily  4/24/2024 at am

## 2024-04-25 NOTE — PROGRESS NOTES
"   04/25/24 0930   Appointment Info   Signing Clinician's Name / Credentials (OT) Jennifer Mai, OTR/L   Living Environment   People in Home spouse   Current Living Arrangements house   Self-Care   Equipment Currently Used at Home cane, straight;walker, rolling   Activity/Exercise/Self-Care Comment ind for BADLs. recently using cane vs walker, at times no AD.   Instrumental Activities of Daily Living (IADL)   IADL Comments assist for setup of meds from wife. wife doing most household IADLs and driving lateral but pt does drive at times.   General Information   Onset of Illness/Injury or Date of Surgery 04/24/24   Referring Physician Tom Edwards MD   Patient/Family Therapy Goal Statement (OT) go home   Additional Occupational Profile Info/Pertinent History of Current Problem presents with increased confusion, word finding difficulties and bilateral weakness since A-fib ablation on 4/18/2024. PMH ESRD on MWF dialysis   Existing Precautions/Restrictions fall   Limitations/Impairments safety/cognitive   Cognitive Status Examination   Orientation Status person;place  (oriented to month. \"I have no idea\" regarding year and day of week.)   Affect/Mental Status (Cognitive) confused   Follows Commands follows one-step commands   Cognitive Status Comments increased processing time required. pt aware of confusion and showing good insight into safety precautions - states he will not drive and spouse will assist with all meds.   Pain Assessment   Patient Currently in Pain No   Range of Motion Comprehensive   General Range of Motion bilateral upper extremity ROM WFL   Strength Comprehensive (MMT)   Comment, General Manual Muscle Testing (MMT) Assessment weakness present BUE   Bed Mobility   Bed Mobility supine-sit   Supine-Sit [place] (Bed Mobility) supervision   Assistive Device (Bed Mobility) bed rails   Transfers   Transfers sit-stand transfer   Sit-Stand Transfer   Sit-Stand [place] (Transfers) minimum " assist (75% patient effort);verbal cues   Assistive Device (Sit-Stand Transfers) walker, front-wheeled   Activities of Daily Living   BADL Assessment/Intervention lower body dressing;toileting;grooming   Lower Body Dressing Assessment/Training   [place] Level (Lower Body Dressing) contact guard assist   Grooming Assessment/Training   Position (Grooming) unsupported sitting   [place] Level (Grooming) set up   Toileting   [place] Level (Toileting) contact guard assist   Clinical Impression   Criteria for Skilled Therapeutic Interventions Met (OT) Yes, treatment indicated   OT Diagnosis dec BADL indep   OT Problem List-Impairments impacting ADL problems related to;activity tolerance impaired;cognition;mobility;strength   Assessment of Occupational Performance 3-5 Performance Deficits   Identified Performance Deficits dressing, toileting, bathing, household mobility, functional transfers, community mobility   Planned Therapy Interventions (OT) ADL retraining;IADL retraining;cognition;strengthening;transfer training;home program guidelines;progressive activity/exercise   Clinical Decision Making Complexity (OT) detailed assessment/moderate complexity   Risk & Benefits of therapy have been explained evaluation/treatment results reviewed;participants included;patient   OT Total Evaluation Time   OT Eval, Moderate Complexity Minutes (10908) 15   OT Goals   Therapy Frequency (OT) Daily   OT Predicted Duration/Target Date for Goal Attainment 05/02/24   OT Goals Hygiene/Grooming;Lower Body Dressing;Toilet Transfer/Toileting;Cognition   OT: Hygiene/Grooming supervision/stand-by assist;while standing   OT: Lower Body Dressing Supervision/stand-by assist;including set-up/clothing retrieval   OT: Toilet Transfer/Toileting Supervision/stand-by assist;toilet transfer;cleaning and garment management   OT: Cognitive   (Defer to home OT)   Therapeutic Procedures/Exercise   Therapeutic Procedure: strength, endurance, ROM,  flexibillity minutes (54899) 10   Symptoms Noted During/After Treatment dizziness   Treatment Detail/Skilled Intervention Facilitated transfer training to increase IND and safety. Educ on hand placement following first transfer of pulling up on FWW - progresses to CGA for additional sit <> stand from bed x2, toilet, and recliner. with min cues for hand placement, especially with stand > sit. Extended educ and cues on management of dizziness during ADL routines - min cues to pace self following transfers prior to amb.   Therapeutic Activities   Therapeutic Activity Minutes (10422) 10   Symptoms noted during/after treatment dizziness   Treatment Detail/Skilled Intervention Facilitated chau walk with chair follow and monitoring of vitals. Pre act HR 85 afib, /80. Following 10ftx2 CGA with FWW mob in room HR 92 afib, /60. RN reports just giving pt BP meds - came to assess d/t ongoing dizziness. CGA with FWW for chau walk 100ft, slow pace - following HR 94 afib, /58.   OT Discharge Planning   OT Plan ADL routines - LB dressing, toileting; chau walks - 4WW?   OT Discharge Recommendation (DC Rec) home with assist;home with home care occupational therapy   OT Rationale for DC Rec pt reports spouse is able to provide recommended level of assist - 24/7 SBA for ADLs and assist with cognitive tasks. agreeable to home OT to maximize IND.   OT Brief overview of current status CGA   Total Session Time   Timed Code Treatment Minutes 20   Total Session Time (sum of timed and untimed services) 35

## 2024-04-25 NOTE — ED NOTES
[facility] ED Handoff Report    ED Chief Complaint: weakness, chest pain, confusion     ED Diagnosis:  (R41.0) Confusion  Comment:   Plan:     (R07.9) Chest pain, unspecified type  Comment:   Plan:     (N18.6,  Z99.2) ESRD (end stage renal disease) on dialysis (H)  Comment:   Plan:        PMH:    Past Medical History:   Diagnosis Date    A-V fistula (H24) 07/16/2014    Placed November 2013     Anemia, unspecified     Created by Conversion     Atrial fibrillation (H)     Calculus of kidney     Created by Conversion     Cancer (H)     Renal Cell Carcinoma s/p resection    Carcinoma in situ, site unspecified     Created by Conversion     Diabetes mellitus (H)     ESRD (end stage renal disease) on dialysis (H) 07/16/2014    Currently in transplant work-up     History of anesthesia complications     urinary retention which required catheterization    History of transfusion     Hyperparathyroidism, secondary renal (H24)     Created by Conversion     Inguinal hernia     recurrent right     Nontoxic uninodular goiter     Created by Conversion     Renal cell carcinoma (H) 07/16/2014    S/p right nephrectomy 9/2007     Skin cancer     squamous    Splenomegaly     Created by Conversion [facility] Annotation: Jul 22 2008 12:19PM - Woody Shaffer: mild, noted on  CT     Thrombocytopenia, unspecified (H24)     Created by Conversion     Unspecified essential hypertension     Created by Conversion     Vertigo         Code Status:  Full Code     Falls Risk: Yes Band: Applied    Current Living Situation/Residence: lives with a significant other     Elimination Status: Continent: Yes     Activity Level: SBA w/ walker    Patients Preferred Language:  English     Needed: No    Vital Signs:  /64   Pulse 97   Temp 98.4  F (36.9  C) (Oral)   Resp 27   Wt 81.6 kg (180 lb)   SpO2 96%   BMI 26.58 kg/m       Cardiac Rhythm: A fib    Pain Score: 0/10    Is the Patient Confused:  Yes    Last Food or Drink:  04/25/24 at breakfast    Focused Assessment:  Pt came in after dialysis, because he was getting more confused during dialysis. A bit of chest pain started, as well as, generalized weakness over the past couple of days. Pts baseline mental status is confused, is aware of month, time, place, sometimes misplaces things and is aware of baseline mental status. Hx of cardiac problems the past 6 months, recently had an ablation (2 weeks ago), was cardioverted, has A Fib at baseline. Xray showed bilarteral pleural effusion, abx given for possible pneumonia. Plan: cardiac consult in the morning.     Tests Performed: Done: Labs and Imaging    Treatments Provided:  labs, abx, imaging, fluids, meds     Family Dynamics/Concerns: No    Family Updated On Visitor Policy: Yes    Plan of Care Communicated to Family: Yes    Who Was Updated about Plan of Care: wife (just left ED will return in morning)     Belongings Checklist Done and Signed by Patient: Yes    Belongings Sent with Patient: Shoes, socks, jeans, belt, hat, flannel, tshirt, phone     Medications sent with patient:     Covid: asymptomatic ,     Additional Information: Full code, needs gram stain still. Very sweet, just a bit confused.     RN: Kailee Hare RN   4/25/2024 12:22 AM

## 2024-04-25 NOTE — PROGRESS NOTES
Awaiting ST eval. Patient did well with sips of water with no straw and was able to swallow up to 2 tablets of PO meds this morning without any issues observed. HOB 90 degrees.

## 2024-04-25 NOTE — CONSULTS
Care Management Initial Consult    General Information  Assessment completed with: Patient, Patient  Type of CM/SW Visit: Initial Assessment    Primary Care Provider verified and updated as needed: Yes   Readmission within the last 30 days: current reason for admission unrelated to previous admission      Reason for Consult: discharge planning  Advance Care Planning: Advance Care Planning Reviewed: verified with patient        Communication Assessment  Patient's communication style: spoken language (English or Bilingual)    Hearing Difficulty or Deaf: no   Wear Glasses or Blind: no    Cognitive  Cognitive/Neuro/Behavioral: orientation  Level of Consciousness: alert  Arousal Level: opens eyes spontaneously  Orientation: disoriented to, time, situation  Mood/Behavior: calm, cooperative  Best Language: 0 - No aphasia  Speech: clear    Living Environment:   People in home: spouse  Kirsten  Current living Arrangements: other (see comments) ([facility])      Able to return to prior arrangements: yes     Family/Social Support:  Care provided by: self, spouse/significant other  Provides care for: no one  Marital Status:   Wife  Kirsten       Description of Support System: Supportive    Support Assessment: Adequate family and caregiver support    Current Resources:   Patient receiving home care services: No     Community Resources: Dialysis Services  Equipment currently used at home: walker, standard  Supplies currently used at home: None    Employment/Financial:  Employment Status: retired        Financial Concerns: none   Referral to Financial Worker: No     Does the patient's insurance plan have a 3 day qualifying hospital stay waiver?  Yes     Which insurance plan 3 day waiver is available? Alternative insurance waiver    Will the waiver be used for post-acute placement? Undetermined at this time    Lifestyle & Psychosocial Needs:  Social Determinants of Health     Food Insecurity: Not on file   Depression: Not at  risk (7/27/2023)    PHQ-2     PHQ-2 Score: 2   Housing Stability: Not on file   Tobacco Use: Medium Risk (4/18/2024)    Patient History     Smoking Tobacco Use: Former     Smokeless Tobacco Use: Never     Passive Exposure: Not on file   Financial Resource Strain: Not on file   Alcohol Use: Not on file   Transportation Needs: Not on file   Physical Activity: Not on file   Interpersonal Safety: Not on file   Stress: Not on file   Social Connections: Not on file   Health Literacy: Not on file     Functional Status:  Prior to admission patient needed assistance:   Dependent ADLs:: Independent  Dependent IADLs:: Cleaning, Cooking, Laundry, Shopping, Meal Preparation, Transportation    Additional Information:  Writer met with patient at bedside to review role of care management services, discuss goals of care and assess need for any possible services at discharge. Patient alert, answering questions appropriately and engaged in the conversation. Patient has no HCD. Lives with spouse in a townhouse. Report independent with ADLs, but needs assistance with IADLs. Attends [employer] M/W/F. Has walker and cane. Goal is to return home. Family able to transport.       Kayy Velarde RN
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
## 2024-04-25 NOTE — PLAN OF CARE
"  Problem: Adult Inpatient Plan of Care  Goal: Plan of Care Review  Outcome: Progressing  Flowsheets (Taken 4/25/2024 1235)  Plan of Care Reviewed With:   patient   spouse  Overall Patient Progress: improving  D/o to time and situ, forgetful, easily redirected, calm and cooperative. Patient and spouse updated at bedside, verbalized understanding.   NPO, awaiting ST eval. Did well with PO meds and sips of water this am.   PT/OT eval. Ambulated chau this morning with assist x 1 and FWW. PT attempted to do eval but patient had symptomatic hypotension.   Passing gas, thought he had to have a BM today several times but unable to.   Pending EP consult.   Still needs chest CT done.    Problem: Dysrhythmia  Goal: Normalized Cardiac Rhythm  Outcome: Progressing  S/p ablation on 4/18/24.  A-fib, HR 80's, mild ST elevation V2. Denies CP, n/v, palpitations today.   On Eliquis and BB.      Problem: Hypertension Acute  Goal: Blood Pressure Within Desired Range  Outcome: Progressing  Intervention: Normalize Blood Pressure  Recent Flowsheet Documentation  Taken 4/25/2024 1154 by Summer Hollingsworth RN  Medication Review/Management: medications reviewed  Taken 4/25/2024 0800 by Summer Hollingsworth RN  Medication Review/Management: medications reviewed  /80 this morning. When he sat up with OT, /60 with c/o feeling dizzy.   BP this afternoon 94/55 when he c/o feeling \"hot and dizzy.\" Discussed with cards, Dr. Wen, who decreased his Coreg from 25 mg to 12.5 mg. Patient reports he's been having this dizziness for some time at home. Continue to monitor BP's.   BP now 119/57.       Problem: Pneumonia  Goal: Effective Oxygenation and Ventilation  Outcome: Progressing  Intervention: Promote Airway Secretion Clearance  Recent Flowsheet Documentation  Taken 4/25/2024 1154 by Summer Hollingsworth RN  Cough And Deep Breathing: done independently per patient  Taken 4/25/2024 0800 by Summer Hollingsworth RN  Cough And Deep Breathing: done independently " per patient  Intervention: Optimize Oxygenation and Ventilation  Recent Flowsheet Documentation  Taken 4/25/2024 1154 by uSmmer Hollingsworth, RN  Head of Bed ([facility]) Positioning: HOB at 30 degrees  Taken 4/25/2024 0800 by Summer Hollingsworth, RN  Head of Bed ([facility]) Positioning: HOB at 20 degrees  IV Zosyn bid for HCAP.  T 99.1 ax.   Procalcitonin 22.18.  C/o feeling sob when hypotensive. Put on 2L nc for comfort, SpO2 100% 2L nc. LS clear, fine crackles posterior lower lobes. Infrequent, dry and non-productive cough today.  He reports the oxygen support helps and is feeling better.         Goal Outcome Evaluation:      Plan of Care Reviewed With: patient, spouse    Overall Patient Progress: improvingOverall Patient Progress: improving

## 2024-04-25 NOTE — PROGRESS NOTES
"Speech-Language Pathology: Clinical Swallow Evaluation     04/25/24 1300   Appointment Info   Signing Clinician's Name / Credentials (SLP) Sandy Ayon MS, CCC-SLP   General Information   Onset of Illness/Injury or Date of Surgery 04/24/24   Referring Physician Tom Edwards MD   Pertinent History of Current Problem Per primary provider, \"78 year old male who was brought to the ED by family for evaluation of confusion, word finding difficulties and generalized weakness.  Past medical history of coronary artery disease, atrial fibrillation, status post ablation, ESRD, RCC status post nephrectomy, secondary hyperparathyroidism, anemia, thrombocytopenia, gout, splenomegaly, hypertension, hyperlipidemia.  Patient had atrial fibrillation ablation on 4/18/2024, family has noted some confusion, word finding difficulties and generalized weakness since then.  Patient reports an occasional productive cough between clear and yellowish phlegm.  He reported upper chest pain when he takes deep breath or coughs.  Denies shortness of breath, palpitations, nausea, vomiting or diarrhea.\" Clinical swallow evaluation completed per MD orders.   General Observations Alert & cooperative. Pt's wife present at bedside.   Type of Evaluation   Type of Evaluation Swallow Evaluation   Oral Motor   Oral Musculature generally intact   Structural Abnormalities none present   Mucosal Quality good   Dentition (Oral Motor)   Dentition (Oral Motor) natural dentition;adequate dentition;some missing teeth   Facial Symmetry (Oral Motor)   Facial Symmetry (Oral Motor) WNL   Lip Function (Oral Motor)   Lip Range of Motion (Oral Motor) WNL   Lip Strength (Oral Motor) WNL   Tongue Function (Oral Motor)   Tongue Strength (Oral Motor) WNL   Tongue Coordination/Speed (Oral Motor) WNL   Tongue ROM (Oral Motor) WNL   Jaw Function (Oral Motor)   Jaw Function (Oral Motor) WNL   Cough/Swallow/Gag Reflex (Oral Motor)   Soft Palate/Velum (Oral Motor) WNL "   Volitional Throat Clear/Cough (Oral Motor) WNL   Volitional Swallow (Oral Motor) WNL   Vocal Quality/Secretion Management (Oral Motor)   Vocal Quality (Oral Motor) WNL   Secretion Management (Oral Motor) WNL   General Swallowing Observations   Past History of Dysphagia Per EMR review, none. Pt endorses more frequent coughing up phlegm, but this remains stable with and without intake.   Comment, General Swallowing Observations RN endorsed pt consumed meds whole with thin liquid with no overt s/s of aspiration.   Respiratory Support nasal cannula  (2L)   Current Diet/Method of Nutritional Intake (General Swallowing Observations, NIS) NPO   Swallowing Evaluation Clinical swallow evaluation   Clinical Swallow Evaluation   Feeding Assistance no assistance needed   Additional evaluation(s) completed today No   Clinical Swallow Evaluation Textures Trialed thin liquids;pureed;solid foods   Clinical Swallow Eval: Thin Liquid Texture Trial   Mode of Presentation, Thin Liquids cup;straw;self-fed   Volume of Liquid or Food Presented 6 oz   Oral Phase of Swallow WFL   Pharyngeal Phase of Swallow intact   Diagnostic Statement No overt s/s of aspiration via sequential straw sips.   Clinical Swallow Evaluation: Puree Solid Texture Trial   Mode of Presentation, Puree spoon;self-fed   Volume of Puree Presented 3 oz   Oral Phase, Puree WFL   Pharyngeal Phase, Puree intact   Diagnostic Statement No overt s/s of aspiration or dysphagia. Pt denied globus sensation. No oral residue noted.   Clinical Swallow Evaluation: Solid Food Texture Trial   Mode of Presentation self-fed   Volume Presented 2 romie crackers   Oral Phase residue in oral cavity   Oral Residue other (see comments)  (diffuse throughout)   Pharyngeal Phase intact   Successful Strategies Trialed During Procedure other (see comments)  (liquid wash)   Diagnostic Statement No overt s/s of aspiration or dysphagia. Timely and complete mastication. Mild oral residue, which  fully clears with liquid wash.   Esophageal Phase of Swallow   Patient reports or presents with symptoms of esophageal dysphagia No   Swallowing Recommendations   Diet Consistency Recommendations thin liquids (level 0);regular diet   Supervision Level for Intake close supervision needed  (check in)   Mode of Delivery Recommendations bolus size, small;slow rate of intake   Postural Recommendations none   Monitoring/Assistance Required (Eating/Swallowing) stop eating activities when fatigue is present;monitor for cough or change in vocal quality with intake   Recommended Feeding/Eating Techniques (Swallow Eval) maintain upright sitting position for eating   Medication Administration Recommendations, Swallowing (SLP) as tolerated   Instrumental Assessment Recommendations instrumental evaluation not recommended at this time   Clinical Impression   Criteria for Skilled Therapeutic Interventions Met (SLP Eval) Evaluation only   Risks & Benefits of therapy have been explained evaluation/treatment results reviewed;care plan/treatment goals reviewed;risks/benefits reviewed;current/potential barriers reviewed;participants voiced agreement with care plan;participants included;patient;spouse/significant other   Clinical Impression Comments Clinical Swallow Evaluation completedper MD orders. Cognitive-communication evaluation was deferred d/t pt & pt's wife endorsing resolved word-finding deficits that were present at admit. Patient with clear, strong vocal quality. Oral motor function was WNL. Patient had no s/s aspiration across all textures trialed. Pt with mild oral residue after romie cracker, which cleared with IND liquid wash. Pt voiced he was aware of mild residue in oral cavity prior to liquid wash. Mastication was timely and complete. Hyolaryngeal elevation appears intact. Recommend regular diet and thin liquids when sitting fully upright and alert & alternate solids/liquids. No further ST is warranted at this time.  Contact SLP if any questions or concerns.   SLP Total Evaluation Time   Eval: oral/pharyngeal swallow function, clinical swallow Minutes (84780) 15   SLP Discharge Planning   SLP Plan complete orders   SLP Discharge Recommendation other (see comments)  (defer to medical team)   SLP Rationale for DC Rec No further ST warrented. Pt at baseline swallowing fx.   SLP Brief overview of current status  Recommend regular diet and thin liquids when sitting fully upright and alert & alternate solids/liquids. No further ST is warranted at this time. Contact SLP if any questions or concerns.   Total Session Time   Total Session Time (sum of timed and untimed services) 15

## 2024-04-25 NOTE — PROGRESS NOTES
"Care Management Follow Up    Length of Stay (days): 1    Expected Discharge Date: 04/26/2024     Concerns to be Addressed: Care progression - discharge planning     Patient plan of care discussed at interdisciplinary rounds: Yes    Anticipated Discharge Disposition:  Home with home care     Anticipated Discharge Services:  Home care  Anticipated Discharge DME:  NA    Patient/family educated on Medicare website which has current facility and service quality ratings:  NA  Education Provided on the Discharge Plan:  Yes per team  Patient/Family in Agreement with the Plan:  Yes    Referrals Placed by CM/SW:  Yes  Private pay costs discussed: Not applicable    Additional Information:  Rec'd a page from Jennifer CORTÉS OT recommend home with home care. PT might not be able to see today.    Referral sent    Social Hx: \"Live w/spouse in a townhouse. Report independent with ADLs, but needs assistance with IADLs. Attend [facility] M/W/F. Has walker and cane. OT rec home care, referral sent. Family able to transport.\"    RNCM to follow for medical progression, recommendations, and final discharge plan.     Kayy Velarde RN     "
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
## 2024-04-25 NOTE — PLAN OF CARE
Problem: Risk for Delirium  Goal: Improved Attention and Thought Clarity  Outcome: Progressing  Intervention: Maximize Cognitive Function  Recent Flowsheet Documentation  Taken 4/25/2024 0332 by Karel Michel RN  Reorientation Measures:   calendar in view   clock in view  Taken 4/25/2024 0117 by Karel Michel RN  Reorientation Measures:   calendar in view   clock in view   Goal Outcome Evaluation:       Pt is alert, oriented x2-3. On RA. VSS. Neuro intact. IV abx given per MAR order. Pt attempted to have BM x3, unsuccessful. Report being on stool softener at home. Pt is able to make needs known. Uses call light appropriately. Call light is within reach.

## 2024-04-25 NOTE — CONSULTS
NUTRITION EDUCATION      REASON FOR ASSESSMENT:  Consulted to educate pt on low sodium diet ( 2 gram Na)    NUTRITION HISTORY:  Information obtained from pt and his wife    Pt usually follows a dialysis diet at home ( also low in Na) He does not add salt to his food at the table or in the cooking. They don't used processed foods much    CURRENT DIET:  NPO    INTERVENTIONS:    Nutrition Prescription:  2 gram Na and continue with dialysis diet    Implementation:      *  Nutrition Education (Content):   A)  Provided handout low sodium nutrition therapy, sodium free seasonings, low sodium shopping guidelines   B)  Discussed foods high and low in Na, label reading-reviewed      *  Nutrition Education (Application):   A)  Discussed current eating habits and recommended alternative food choices      *  Anticipate good compliance      *  Diet Education - refer to Education Flowsheet    Goals:      *  Patient will verbalize understanding of diet met      *  All of the above goals met during the education session    Follow Up/Monitoring:      *  Provided RD contact information for future questions      *  Recommended Out-Patient Nutrition Referral, if further diet instructions are needed

## 2024-04-26 ENCOUNTER — APPOINTMENT (OUTPATIENT)
Dept: CARDIOLOGY | Facility: HOSPITAL | Age: 79
DRG: 308 | End: 2024-04-26
Attending: NURSE PRACTITIONER
Payer: COMMERCIAL

## 2024-04-26 ENCOUNTER — ANESTHESIA EVENT (OUTPATIENT)
Dept: CARDIOLOGY | Facility: HOSPITAL | Age: 79
DRG: 308 | End: 2024-04-26
Payer: COMMERCIAL

## 2024-04-26 ENCOUNTER — ANESTHESIA (OUTPATIENT)
Dept: CARDIOLOGY | Facility: HOSPITAL | Age: 79
DRG: 308 | End: 2024-04-26
Payer: COMMERCIAL

## 2024-04-26 LAB
ANION GAP SERPL CALCULATED.3IONS-SCNC: 19 MMOL/L (ref 7–15)
BUN SERPL-MCNC: 55.9 MG/DL (ref 8–23)
CALCIUM SERPL-MCNC: 9.6 MG/DL (ref 8.8–10.2)
CHLORIDE SERPL-SCNC: 94 MMOL/L (ref 98–107)
CREAT SERPL-MCNC: 6.17 MG/DL (ref 0.67–1.17)
DEPRECATED HCO3 PLAS-SCNC: 22 MMOL/L (ref 22–29)
EGFRCR SERPLBLD CKD-EPI 2021: 9 ML/MIN/1.73M2
GLUCOSE BLDC GLUCOMTR-MCNC: 124 MG/DL (ref 70–99)
GLUCOSE SERPL-MCNC: 122 MG/DL (ref 70–99)
POTASSIUM SERPL-SCNC: 5.1 MMOL/L (ref 3.4–5.3)
PROCALCITONIN SERPL IA-MCNC: 21.24 NG/ML
SODIUM SERPL-SCNC: 135 MMOL/L (ref 135–145)
TSH SERPL DL<=0.005 MIU/L-ACNC: 1.31 UIU/ML (ref 0.3–4.2)

## 2024-04-26 PROCEDURE — 250N000013 HC RX MED GY IP 250 OP 250 PS 637: Performed by: NURSE PRACTITIONER

## 2024-04-26 PROCEDURE — 92960 CARDIOVERSION ELECTRIC EXT: CPT

## 2024-04-26 PROCEDURE — 370N000017 HC ANESTHESIA TECHNICAL FEE, PER MIN

## 2024-04-26 PROCEDURE — 80048 BASIC METABOLIC PNL TOTAL CA: CPT | Performed by: INTERNAL MEDICINE

## 2024-04-26 PROCEDURE — 93010 ELECTROCARDIOGRAM REPORT: CPT | Performed by: INTERNAL MEDICINE

## 2024-04-26 PROCEDURE — 250N000013 HC RX MED GY IP 250 OP 250 PS 637: Performed by: HOSPITALIST

## 2024-04-26 PROCEDURE — 99232 SBSQ HOSP IP/OBS MODERATE 35: CPT | Performed by: INTERNAL MEDICINE

## 2024-04-26 PROCEDURE — 250N000013 HC RX MED GY IP 250 OP 250 PS 637: Performed by: INTERNAL MEDICINE

## 2024-04-26 PROCEDURE — 250N000009 HC RX 250: Performed by: NURSE ANESTHETIST, CERTIFIED REGISTERED

## 2024-04-26 PROCEDURE — 84443 ASSAY THYROID STIM HORMONE: CPT | Performed by: NURSE PRACTITIONER

## 2024-04-26 PROCEDURE — 92960 CARDIOVERSION ELECTRIC EXT: CPT | Performed by: NURSE PRACTITIONER

## 2024-04-26 PROCEDURE — 36415 COLL VENOUS BLD VENIPUNCTURE: CPT | Performed by: INTERNAL MEDICINE

## 2024-04-26 PROCEDURE — 84145 PROCALCITONIN (PCT): CPT | Performed by: HOSPITALIST

## 2024-04-26 PROCEDURE — 90935 HEMODIALYSIS ONE EVALUATION: CPT

## 2024-04-26 PROCEDURE — 210N000001 HC R&B IMCU HEART CARE

## 2024-04-26 PROCEDURE — 5A2204Z RESTORATION OF CARDIAC RHYTHM, SINGLE: ICD-10-PCS | Performed by: NURSE PRACTITIONER

## 2024-04-26 PROCEDURE — 99232 SBSQ HOSP IP/OBS MODERATE 35: CPT | Performed by: STUDENT IN AN ORGANIZED HEALTH CARE EDUCATION/TRAINING PROGRAM

## 2024-04-26 PROCEDURE — 5A1D70Z PERFORMANCE OF URINARY FILTRATION, INTERMITTENT, LESS THAN 6 HOURS PER DAY: ICD-10-PCS | Performed by: INTERNAL MEDICINE

## 2024-04-26 PROCEDURE — 250N000011 HC RX IP 250 OP 636: Performed by: HOSPITALIST

## 2024-04-26 PROCEDURE — 93005 ELECTROCARDIOGRAM TRACING: CPT

## 2024-04-26 RX ORDER — AMIODARONE HYDROCHLORIDE 200 MG/1
200 TABLET ORAL 3 TIMES DAILY
Status: DISCONTINUED | OUTPATIENT
Start: 2024-04-26 | End: 2024-04-27 | Stop reason: HOSPADM

## 2024-04-26 RX ORDER — METHOHEXITAL IN WATER/PF 100MG/10ML
SYRINGE (ML) INTRAVENOUS PRN
Status: DISCONTINUED | OUTPATIENT
Start: 2024-04-26 | End: 2024-04-26

## 2024-04-26 RX ORDER — SEVELAMER CARBONATE 800 MG/1
1600 TABLET, FILM COATED ORAL ONCE
Status: COMPLETED | OUTPATIENT
Start: 2024-04-26 | End: 2024-04-26

## 2024-04-26 RX ORDER — LIDOCAINE 40 MG/G
CREAM TOPICAL
Status: DISCONTINUED | OUTPATIENT
Start: 2024-04-26 | End: 2024-04-26 | Stop reason: HOSPADM

## 2024-04-26 RX ADMIN — PIPERACILLIN AND TAZOBACTAM 3.38 G: 3; .375 INJECTION, POWDER, FOR SOLUTION INTRAVENOUS at 03:51

## 2024-04-26 RX ADMIN — APIXABAN 5 MG: 5 TABLET, FILM COATED ORAL at 19:27

## 2024-04-26 RX ADMIN — SEVELAMER CARBONATE 3200 MG: 800 TABLET, FILM COATED ORAL at 14:54

## 2024-04-26 RX ADMIN — SEVELAMER CARBONATE 1600 MG: 800 TABLET, FILM COATED ORAL at 17:10

## 2024-04-26 RX ADMIN — Medication 50 MCG: at 11:29

## 2024-04-26 RX ADMIN — APIXABAN 5 MG: 5 TABLET, FILM COATED ORAL at 08:35

## 2024-04-26 RX ADMIN — CLOPIDOGREL BISULFATE 75 MG: 75 TABLET ORAL at 08:35

## 2024-04-26 RX ADMIN — Medication 50 MG: at 10:11

## 2024-04-26 RX ADMIN — CARVEDILOL 12.5 MG: 12.5 TABLET, FILM COATED ORAL at 16:59

## 2024-04-26 RX ADMIN — PANTOPRAZOLE SODIUM 40 MG: 40 TABLET, DELAYED RELEASE ORAL at 11:29

## 2024-04-26 RX ADMIN — CINACALCET 60 MG: 30 TABLET ORAL at 17:50

## 2024-04-26 RX ADMIN — AMIODARONE HYDROCHLORIDE 200 MG: 200 TABLET ORAL at 19:27

## 2024-04-26 RX ADMIN — Medication 1 TABLET: at 11:29

## 2024-04-26 RX ADMIN — AMIODARONE HYDROCHLORIDE 200 MG: 200 TABLET ORAL at 14:38

## 2024-04-26 RX ADMIN — ACETAMINOPHEN 650 MG: 325 TABLET ORAL at 06:53

## 2024-04-26 RX ADMIN — SIMVASTATIN 40 MG: 10 TABLET, FILM COATED ORAL at 21:09

## 2024-04-26 ASSESSMENT — LIFESTYLE VARIABLES: TOBACCO_USE: 1

## 2024-04-26 ASSESSMENT — ACTIVITIES OF DAILY LIVING (ADL)
ADLS_ACUITY_SCORE: 28
ADLS_ACUITY_SCORE: 29
ADLS_ACUITY_SCORE: 29
ADLS_ACUITY_SCORE: 28
ADLS_ACUITY_SCORE: 29
ADLS_ACUITY_SCORE: 28
ADLS_ACUITY_SCORE: 29
ADLS_ACUITY_SCORE: 28

## 2024-04-26 ASSESSMENT — ENCOUNTER SYMPTOMS: DYSRHYTHMIAS: 1

## 2024-04-26 NOTE — ANESTHESIA CARE TRANSFER NOTE
Patient: García Kitchen    Procedure: * No procedures listed *       Diagnosis: * No pre-op diagnosis entered *  Diagnosis Additional Information: No value filed.    Anesthesia Type:   General     Note:    Oropharynx: oropharynx clear of all foreign objects  Level of Consciousness: drowsy  Oxygen Supplementation: room air    Independent Airway: airway patency satisfactory and stable  Dentition: dentition unchanged  Vital Signs Stable: post-procedure vital signs reviewed and stable  Report to RN Given: handoff report given  Patient transferred to: Cardiac [facility]          Vitals:  Vitals Value Taken Time   /60 04/26/24 1014   Temp     Pulse 89 04/26/24 1014   Resp 23 04/26/24 1014   SpO2 99 % 04/26/24 1014   Vitals shown include unfiled device data.    Electronically Signed By: KARLI Abebe CRNA  April 26, 2024  10:15 AM
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
## 2024-04-26 NOTE — PROGRESS NOTES
[facility]    PROGRESS NOTE - Hospitalist Service    Assessment and Plan    Principal Problem:    Chest pain, unspecified type  Active Problems:    Essential hypertension with goal blood pressure less than 140/90    Memory loss    Coronary artery disease involving native coronary artery of native heart with angina pectoris (H24)    Persistent atrial fibrillation (H)    Confusion    ESRD (end stage renal disease) on dialysis (H)    HCAP (healthcare-associated pneumonia)    García Kitchen is a 78 year old male with h/o ncreased confusion, word finding difficulties and bilateral weakness since A-fib ablation on 4/18/2024     Bibasilar atelectasis possible HCAP   -Chest x-ray showed small bilateral pleural effusions and bibasilar opacities left greater than right,  - personally reviewed chest CT did not show signs of infiltrates   -Procalcitonin elevated maybe secondary to ESRD, patient is afebrile and normal WBC   -SLP seen no signs of aspiration   - was on IV zosyn but will stop now cultures negative and based on CT findings no signs of infection, monitor for changes   - monitor for now, unlikely HCAP      #Abnormal high-sensitivity troponin T  #Coronary artery disease, CP   - Coronary angiogram 12/19/2023: Severe proximal LAD stenosis, moderate calcification of the RCA, ostial PDA moderate stenosis; complex PCI of proximal LAD with Rotablator, shockwave lithotripsy and TEENA  - Continue PTA clopidogrel, carvedilol, simvastatin  - Cardiology consult appreciated      Paroxysmal atrial fibrillation  -Status post ablation on 4/18/2024, now back in A-fib  -Continue PTA carvedilol, apixaban  - EP cards plan for DCCV today      #ESRD  #Secondary hyperparathyroidism  -History of right nephrectomy secondary to RCC in 2007  -HD on MWF  -Nephrology consult for assessment of volume status  -Continue PTA Cinacalcet, Dialyvite, sevelamer, vitamin D     #Acute on chronic heart failure with reduced ejection  "fraction  -N-terminal proBNP > 70,000  -Volume management with hemodialysis     #Anemia of chronic kidney disease  #Macrocytosis  -Monitor     #Chronic thrombocytopenia  #Splenomegaly  -Stable platelet count     #Drug-induced platelet defect  #Drug-induced coagulation defect  -On PTA apixaban and clopidogrel     Confusion Acute metabolic encephalopathy  Underlying Mild cognitive impairment, memory loss  -Delirium prevention and treatment  - no signs of speech or focal deficits, still weak   - possible HF pushed him over?  -PT/OT evaluation and treatment  - tsh free T4 pending    Clinically Significant Risk Factors      # Overweight: Estimated body mass index is 28.31 kg/m  as calculated from the following:  Height as of this encounter: 1.626 m (5' 4\").  Weight as of this encounter: 74.8 kg (164 lb 14.4 oz)., PRESENT ON ADMISSION    COVID-19 PCR Results          5/3/2022    12:45 4/24/2024    17:35   COVID-19 PCR Results   SARS CoV2 PCR Negative  Negative      COVID-19 Antibody Results, Testing for Immunity           No data to display               Code Status: Full Code  VTE prophylaxis:  DOAC  DIET: Orders Placed This Encounter      NPO per Anesthesia Guidelines for Procedure/Surgery Except for: Meds    Drains/Lines: Patient has No line.    Modi Catheter: Not present      Weight bearing status: WBAt     Expected Discharge Date: 04/27/2024      Destination: home with family  Discharge Comments: Cardioversion 4/26.      Subjective:  Patient is feeling stronger today. Plan for DCCV this morning     PHYSICAL EXAM  Temp:  [97.4  F (36.3  C)-99.1  F (37.3  C)] 98.2  F (36.8  C)  Pulse:  [77-90] 81  Resp:  [16-22] 18  BP: ()/(55-80) 131/62  SpO2:  [93 %-100 %] 96 %  Wt Readings from Last 1 Encounters:   04/26/24 79.9 kg (176 lb 3.2 oz)       Intake/Output Summary (Last 24 hours) at 4/26/2024 0805  Last data filed at 4/26/2024 0000  Gross per 24 hour   Intake 679 ml   Output --   Net 679 ml      Body mass index is " 26.02 kg/m .    Physical Exam  Vitals and nursing note reviewed.   Constitutional:       Appearance: Normal appearance.      Comments: Much more alert and awake today    HENT:      Mouth/Throat:      Mouth: Mucous membranes are moist.      Pharynx: Oropharynx is clear.   Cardiovascular:      Rate and Rhythm: Normal rate. Rhythm irregular.      Pulses: Normal pulses.      Heart sounds: Normal heart sounds.   Pulmonary:      Effort: Pulmonary effort is normal.      Breath sounds: Normal breath sounds.   Abdominal:      General: Bowel sounds are normal. There is no distension.      Palpations: Abdomen is soft.      Tenderness: There is no abdominal tenderness. There is no guarding.   Musculoskeletal:         General: Normal range of motion.   Skin:     General: Skin is warm.      Capillary Refill: Capillary refill takes less than 2 seconds.   Neurological:      Mental Status: He is alert and oriented to person, place, and time. Mental status is at baseline.       PERTINENT LABS/IMAGING:  Results for orders placed or performed during the hospital encounter of 04/24/24   Head CT w/o contrast    Impression    IMPRESSION:  1.  No acute intracranial process.   XR Chest 2 Views    Impression    IMPRESSION: Small bilateral pleural effusions and bibasilar opacities, left greater than right, which could represent atelectasis and/or pneumonia. No pneumothorax.   CT Chest w Contrast    Impression    IMPRESSION:   1.  Small left, trace right pleural effusions with compressive atelectasis. No convincing findings of infection or atrial fistula.  2.  Medium-sized pericardial effusion.       Imaging results reviewed over the past 24 hrs:   Recent Results (from the past 24 hour(s))   CT Chest w Contrast    Narrative    EXAM: CT CHEST W CONTRAST  LOCATION: [facility]  DATE: 4/25/2024    INDICATION: pneumonia, alectasis, atrial fistula  COMPARISON: CT chest 02/07/2023  TECHNIQUE: CT chest with IV contrast.  Multiplanar reformats were obtained. Dose reduction techniques were used.    CONTRAST: isovue 370 75ml    FINDINGS:   LUNGS AND PLEURA: Central airways are patent. Small left, trace right pleural effusions with compressive atelectasis. No consolidation in the aerated lungs.    MEDIASTINUM/AXILLAE: Unchanged heart size. No findings of atrial fistula. Medium sized pericardial effusion. Thoracic aorta is nonaneurysmal with moderate atheromatous disease. No pulmonary embolism to the proximal segmental level (due to limitations   from contrast timing/non-arterial phase). No thoracic lymphadenopathy.     CORONARY ARTERY CALCIFICATION: Moderate with stent.    UPPER ABDOMEN: Benign hepatic cysts.    MUSCULOSKELETAL: Degenerative changes of the visualized spine. No destructive osseous lesions.      Impression    IMPRESSION:   1.  Small left, trace right pleural effusions with compressive atelectasis. No convincing findings of infection or atrial fistula.  2.  Medium-sized pericardial effusion.     Recent Labs   Lab 04/26/24  0426 04/25/24  0438 04/24/24  1638   WBC  --  7.5 7.4   HGB  --  9.3* 11.4*   MCV  --  106* 106*   PLT  --  181 208   INR  --   --  1.47*     --  138   POTASSIUM 5.1  --  4.4   CHLORIDE 94*  --  96*   CO2 22  --  29   BUN 55.9*  --  25.2*   CR 6.17*  --  3.33*   ANIONGAP 19*  --  13   NAYLA 9.6  --  10.1   *  --  133*   ALBUMIN  --   --  3.9   PROTTOTAL  --   --  7.2   BILITOTAL  --   --  0.5   ALKPHOS  --   --  73   ALT  --   --  10   AST  --   --  14     Recent Labs   Lab Test 12/19/23  0756   CHOL 91   HDL 40   LDL 26   TRIG 126     Recent Labs   Lab Test 04/26/24  0426      POTASSIUM 5.1   CHLORIDE 94*   CO2 22   *   BUN 55.9*   CR 6.17*   GFRESTIMATED 9*   NAYLA 9.6     Recent Labs   Lab Test 06/15/23  1356 05/03/22  1243 04/20/21  1153   A1C 5.4 5.4 5.1     Recent Labs   Lab Test 04/25/24  0438 04/24/24  1638 04/18/24  0808   HGB 9.3* 11.4* 9.3*     No results for input(s):  "\"TROPONINI\" in the last 08619 hours.  Recent Labs   Lab Test 04/24/24  1904   NTBNPI >70,000*     Recent Labs   Lab Test 04/20/21  1153   TSH 1.80     Recent Labs   Lab Test 04/24/24  1638   INR 1.47*       40 MINUTES SPENT BY ME on the date of service doing chart review, history, exam, documentation, discussion with nursing staff and specialist, & further activities per the note.  Laurie Nixon MD  [facility] Medicine Service  [phone]   "

## 2024-04-26 NOTE — PROCEDURES
[facility]    Procedure: EP Cardioversion External    Date/Time: 4/26/2024 10:49 AM    Performed by: Gissell Pitts APRN CNP  Authorized by: Gissell Pitts APRN CNP      UNIVERSAL PROTOCOL   Site Marked: NA  Prior Images Obtained and Reviewed:  Yes  Required items: Required blood products, implants, devices and special equipment available    Patient identity confirmed:  Verbally with patient, arm band and provided demographic data  Patient was reevaluated immediately before administering moderate or deep sedation or anesthesia  Confirmation Checklist:  Patient's identity using two indicators, relevant allergies, procedure was appropriate and matched the consent or emergent situation and correct equipment/implants were available  Time out: Immediately prior to the procedure a time out was called    Universal Protocol: the Joint Commission Universal Protocol was followed       ANESTHESIA    Anesthesia was administered and monitored by anesthesiology.  See anesthesia documentation for details.    PROCEDURE DETAILS  Pre-procedure rhythm: atrial fibrillation  Patient position: patient was placed in a supine position  Chest area: chest area exposed  Electrodes: pads  Electrodes placed: anterior-posterior  Number of attempts: 1    Details of Attempts:  At 1014, after administration of IV Brevital by MDA and confirmation of adequate sedation, he received a single synchronous shock at 200 J with prompt restoration of sinus rhythm.  Post cardioversion EKG pending.  Post-procedure rhythm: normal sinus rhythm  Complications: no complications      PROCEDURE  Describe Procedure: Early recurrence of A-fib after catheter ablation by Dr. Paz on 4/18/2024.  Hospitalized 4/24/2024 with recurrent AF, possible pneumonia, altered mental status.  He had been having confusion, though head CT negative for acute changes.  This morning, he is entirely clear and oriented.  He has a [id number] score of 5 for age  >75, CAD, HTN, DM 2.  He also has ESRD on HD.  He is on Eliquis for stroke prophylaxis and denies missing any doses in >3 weeks.  Successful cardioversion to sinus rhythm  Start amiodarone 200 mg 3 times daily x 10 days, then decrease to 200 mg once daily.  Continue amiodarone for 6 to 12 weeks.  Continue Eliquis 5 mg twice daily for stroke prophylaxis  Follow-up with Josie Guzman CNP on 6/4/2024  Transfer back to telemetry as he is fully awake and stable post cardioversion  Okay to discharge from an EP standpoint once medically stable  Patient Tolerance:  Patient tolerated the procedure well with no immediate complications  Length of time physician/provider present for 1:1 monitoring during sedation: 10

## 2024-04-26 NOTE — SIGNIFICANT EVENT
7 beat run of Vtach at 1636 (returned to NSR). Nursing assistant was in the room with pt during the event. VSS. Pt was asymptomatic during the event. Dr. Wen was notified at 1645. No new orders. Will continued to monitor.

## 2024-04-26 NOTE — PLAN OF CARE
Problem: Dysrhythmia  Goal: Normalized Cardiac Rhythm  4/26/2024 1514 by García Johnson, RN  Outcome: Progressing  4/26/2024 1513 by García Johnson, RN  Outcome: Progressing     Problem: Hypertension Acute  Goal: Blood Pressure Within Desired Range  Intervention: Normalize Blood Pressure  Recent Flowsheet Documentation  Taken 4/26/2024 0836 by García Johnson, RN  Medication Review/Management: medications reviewed     Cardioversion completed this morning. Pt is now in a sinus rhythm with occasional PVCs. Seven beat run of Vtach was observed at 1636. Pt was asymptomatic during the event and vitals stable. Dr. Wen was updated.    Pt tolerated hemodialysis run late this morning into the afternoon. He is A&O to name, place, situation, and month. Pt is mostly alert, although somewhat tired due to dialysis.

## 2024-04-26 NOTE — PROGRESS NOTES
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   García Kitchen is a 78 year old old male with  uncreased confusion, word finding difficulties and bilateral weakness since A-fib ablation on 4/18/2024     #Strokelike symptoms  -Generalized weakness, word finding difficulties and confusion  -CT head showed no acute intracranial process  -Probable related to acute infection  -PT/OT evaluation and treatment     #Bibasilar atelectasis and or pneumonia  -Chest x-ray showed small bilateral pleural effusions and bibasilar opacities left greater than right  -Procalcitonin 22.18  -Intermittent cough of yellowish phlegm production  -SLP   -Continue IV Zosyn     #Abnormal high-sensitivity troponin T  #Coronary artery disease  -Pleuritic chest pain, atypical  -ECG reviewed: Atrial fibrillation at 94 bpm, PVC, nonspecific ST waves; compared to 4/18/2024 A-fib replaced sinus rhythm  -Coronary angiogram 12/19/2023: Severe proximal LAD stenosis, moderate calcification of the RCA, ostial PDA moderate stenosis; complex PCI of proximal LAD with Rotablator, shockwave lithotripsy and TEENA  -Continue PTA clopidogrel, carvedilol, simvastatin  -,Cardiology consulted      #Acute metabolic encephalopathy  #Mild cognitive impairment, memory loss  -Delirium prevention and treatment     #Paroxysmal atrial fibrillation  -Status post ablation on 4/18/2024, now back in A-fib  -Continue PTA carvedilol, apixaban  - EP cards      #ESRD  #Secondary  hyperparathyroidism  -History of right nephrectomy secondary to RCC in 2007  -HD on MWF  -Nephrology consult for assessment of volume status  -Continue PTA Cinacalcet, Dialyvite, sevelamer, vitamin D     #Acute on chronic heart failure with reduced ejection fraction  -N-terminal proBNP > 70,000  -Volume management with hemodialysis     #Anemia of chronic kidney disease  #Macrocytosis  -Monitor     #Chronic thrombocytopenia  #Splenomegaly  -Stable platelet count     #Drug-induced platelet defect  #Drug-induced coagulation defect  -On PTA apixaban and clopidogrel     #Hyperlipidemia  -Continue PTA simvastatin  VTE prophylaxis:  DOAC  DIET: Orders Placed This Encounter      NPO per Anesthesia Guidelines for Procedure/Surgery Except for: Meds    Drains/Lines: none  Weight bearing status: WBAt  Disposition/Barriers to discharge: pending improvement   Code Status:Full Code    Subjective:  Still fatigued and weak    B/P: 131/62, T: 98.2, P: 81, R: 18     PERTINENT LABS  Imaging results reviewed over the past 24 hrs:   Recent Results (from the past 24 hour(s))   CT Chest w Contrast    Narrative    EXAM: CT CHEST W CONTRAST  LOCATION: [facility]  DATE: 4/25/2024    INDICATION: pneumonia, alectasis, atrial fistula  COMPARISON: CT chest 02/07/2023  TECHNIQUE: CT chest with IV contrast. Multiplanar reformats were obtained. Dose reduction techniques were used.    CONTRAST: isovue 370 75ml    FINDINGS:   LUNGS AND PLEURA: Central airways are patent. Small left, trace right pleural effusions with compressive atelectasis. No consolidation in the aerated lungs.    MEDIASTINUM/AXILLAE: Unchanged heart size. No findings of atrial fistula. Medium sized pericardial effusion. Thoracic aorta is nonaneurysmal with moderate atheromatous disease. No pulmonary embolism to the proximal segmental level (due to limitations   from contrast timing/non-arterial phase). No thoracic lymphadenopathy.     CORONARY ARTERY  CALCIFICATION: Moderate with stent.    UPPER ABDOMEN: Benign hepatic cysts.    MUSCULOSKELETAL: Degenerative changes of the visualized spine. No destructive osseous lesions.      Impression    IMPRESSION:   1.  Small left, trace right pleural effusions with compressive atelectasis. No convincing findings of infection or atrial fistula.  2.  Medium-sized pericardial effusion.     Recent Labs   Lab 04/26/24  0426 04/25/24  0438 04/24/24  1638   WBC  --  7.5 7.4   HGB  --  9.3* 11.4*   MCV  --  106* 106*   PLT  --  181 208   INR  --   --  1.47*     --  138   POTASSIUM 5.1  --  4.4   CHLORIDE 94*  --  96*   CO2 22  --  29   BUN 55.9*  --  25.2*   CR 6.17*  --  3.33*   ANIONGAP 19*  --  13   NAYLA 9.6  --  10.1   *  --  133*   ALBUMIN  --   --  3.9   PROTTOTAL  --   --  7.2   BILITOTAL  --   --  0.5   ALKPHOS  --   --  73   ALT  --   --  10   AST  --   --  14       Laurie Nixon MD  [facility]  [phone]

## 2024-04-26 NOTE — PLAN OF CARE
Problem: Dysrhythmia  Goal: Normalized Cardiac Rhythm  Outcome: Progressing     Problem: Hypertension Acute  Goal: Blood Pressure Within Desired Range  Outcome: Progressing  Intervention: Normalize Blood Pressure  Recent Flowsheet Documentation  Taken 4/26/2024 0000 by Letty Grove RN  Sensory Stimulation Regulation:   care clustered   lighting decreased  Medication Review/Management: medications reviewed     Problem: Pneumonia  Goal: Fluid Balance  Outcome: Progressing  Goal: Resolution of Infection Signs and Symptoms  Outcome: Progressing  Goal: Effective Oxygenation and Ventilation  Outcome: Progressing  Intervention: Optimize Oxygenation and Ventilation  Recent Flowsheet Documentation  Taken 4/25/2024 2334 by Letty Grove RN  Head of Bed (HOB) Positioning: HOB at 20-30 degrees       Goal Outcome Evaluation:  Patient is aox2-3. Patient is disoriented to time and occasionally to situation. Neuro remains unchanged. Patient denied chest pain and SOB. On room air. Patient reports he feels dizzy if he stands up too quickly. VSS. Has a-fib with PVCs. Plan to have cardioversion today. Patient NPO since midnight. Call-light within reach. Patient is assist x1 with a walker.

## 2024-04-26 NOTE — PROGRESS NOTES
Transfer to P3 via wheelchair.  Report called to García ASHER.  Patient has no questions.  Wife present.

## 2024-04-26 NOTE — CONSULTS
SPIRITUAL HEALTH SERVICES Progress Note  [facility], P3    Saw pt García Kitchen per consult order. Brief visit with García and his wife, Kirsten, due to García's need to transfer to dialysis suite. He shared about his 10 year history of dialysis treatment and his current cardiac issues that he is hospitalized for. They expressed appreciation for the introductory visit.      Plan of Care - A  will continue to visit as able or per request by patient/family/staff.      Ruben Crowder MDiv, [facility]  /Manager [facility]  [phone]       Spiritual Health Services is available 24/7 for emergent requests and consults, either by paging the on-call  or by entering an ASAP/STAT consult in [employer], which will also page the on-call .

## 2024-04-26 NOTE — PLAN OF CARE
AOX3, disoriented to time, forgetful requiring redirecting; VSS on RA; Afib rate controlled w/ PVCs on tele, HR 80s; denies pain, CP, dizziness/lightheadedness.    Reports some SOB, 1 L NC applied.   Reports no BM for a few days, PRN senna given this evening.   NPO midnight for possible cardioversion tomorrow per orders.   IV zosyn administered.    Partial dose given of RENVELA per pt and spouse, stating he takes partial dose based on apetite/what he eats at home.    Able to make needs known, bed alarm on for safety, call light in reach.     Mandy Kirkland RN        Goal Outcome Evaluation:      Plan of Care Reviewed With: patient, spouse    Overall Patient Progress: improvingOverall Patient Progress: improving         Problem: Adult Inpatient Plan of Care  Goal: Plan of Care Review  Description: The Plan of Care Review/Shift note should be completed every shift.  The Outcome Evaluation is a brief statement about your assessment that the patient is improving, declining, or no change.  This information will be displayed automatically on your shift  note.  Outcome: Progressing  Flowsheets (Taken 4/25/2024 2240)  Plan of Care Reviewed With:   patient   spouse  Overall Patient Progress: improving  Goal: Absence of Hospital-Acquired Illness or Injury  Intervention: Identify and Manage Fall Risk  Recent Flowsheet Documentation  Taken 4/25/2024 2010 by Mandy Kirkland, RN  Safety Promotion/Fall Prevention:   activity supervised   assistive device/personal items within reach   clutter free environment maintained   nonskid shoes/slippers when out of bed   patient and family education   room near nurse's station   safety round/check completed   toileting scheduled  Taken 4/25/2024 1630 by Mandy Kirkland, LB  Safety Promotion/Fall Prevention:   activity supervised   assistive device/personal items within reach   clutter free environment maintained   nonskid shoes/slippers when out of bed   patient and family education   room near  nurse's station   safety round/check completed   toileting scheduled  Intervention: Prevent Skin Injury  Recent Flowsheet Documentation  Taken 4/25/2024 2010 by Mandy Kirkland RN  Body Position:   position changed independently   weight shifting  Device Skin Pressure Protection:   adhesive use limited   tubing/devices free from skin contact  Taken 4/25/2024 1630 by Mandy Kirkland RN  Body Position:   position changed independently   weight shifting  Device Skin Pressure Protection:   adhesive use limited   tubing/devices free from skin contact  Intervention: Prevent Infection  Recent Flowsheet Documentation  Taken 4/25/2024 2010 by Mandy Kirkland RN  Infection Prevention:   environmental surveillance performed   hand hygiene promoted   rest/sleep promoted   personal protective equipment utilized   single patient room provided  Taken 4/25/2024 1630 by Mandy Kirkland RN  Infection Prevention:   environmental surveillance performed   hand hygiene promoted   rest/sleep promoted   personal protective equipment utilized   single patient room provided     Problem: Risk for Delirium  Goal: Optimal Coping  Intervention: Optimize Psychosocial Adjustment to Delirium  Recent Flowsheet Documentation  Taken 4/25/2024 2010 by Mandy Kirkland RN  Supportive Measures:   active listening utilized   verbalization of feelings encouraged  Taken 4/25/2024 1630 by Mandy Kirkland RN  Supportive Measures:   active listening utilized   verbalization of feelings encouraged  Goal: Improved Behavioral Control  Intervention: Prevent and Manage Agitation  Recent Flowsheet Documentation  Taken 4/25/2024 2010 by Mandy Kirkland RN  Environment Familiarity/Consistency: personal clothing/items utilized  Taken 4/25/2024 1630 by Mandy Kirkland RN  Environment Familiarity/Consistency: personal clothing/items utilized  Intervention: Minimize Safety Risk  Recent Flowsheet Documentation  Taken 4/25/2024 2010 by Mandy Kirkland RN  Communication Enhancement  Strategies:   call light answered in person   repetition utilized   one-step directions provided   extra time allowed for response  Enhanced Safety Measures:   assistive devices when indicated   monitor patients coagulation values   pain management   patient/family teach back on injury risk   review medications for side effects with activity   room near unit station  Taken 4/25/2024 1630 by Mandy Kirkland RN  Communication Enhancement Strategies:   call light answered in person   repetition utilized   one-step directions provided   extra time allowed for response  Enhanced Safety Measures:   assistive devices when indicated   monitor patients coagulation values   pain management   patient/family teach back on injury risk   review medications for side effects with activity   room near unit station  Goal: Improved Attention and Thought Clarity  Intervention: Maximize Cognitive Function  Recent Flowsheet Documentation  Taken 4/25/2024 2010 by Mandy Kirkland RN  Reorientation Measures:   calendar in view   clock in view   reorientation provided  Taken 4/25/2024 1630 by Mandy Kirkland RN  Reorientation Measures:   calendar in view   clock in view   reorientation provided     Problem: Hypertension Acute  Goal: Blood Pressure Within Desired Range  Intervention: Normalize Blood Pressure  Recent Flowsheet Documentation  Taken 4/25/2024 2010 by Mandy Kirkland RN  Medication Review/Management: medications reviewed  Taken 4/25/2024 1630 by Mandy Kirkland RN  Medication Review/Management: medications reviewed     Problem: Pneumonia  Goal: Effective Oxygenation and Ventilation  Intervention: Promote Airway Secretion Clearance  Recent Flowsheet Documentation  Taken 4/25/2024 2010 by Mandy Kirkland RN  Cough And Deep Breathing: done independently per patient  Taken 4/25/2024 1630 by Mandy Kirkland RN  Cough And Deep Breathing: done independently per patient  Intervention: Optimize Oxygenation and Ventilation  Recent Flowsheet  Documentation  Taken 4/25/2024 2010 by Mandy Kirkland, RN  Head of Bed (HOB) Positioning: HOB at 30 degrees  Taken 4/25/2024 1630 by Mandy Kirkland, LB  Head of Bed ([facility]) Positioning: HOB at 30 degrees
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
## 2024-04-26 NOTE — PROCEDURES
Potassium   Date Value Ref Range Status   04/26/2024 5.1 3.4 - 5.3 mmol/L Final   05/03/2022 5.1 (H) 3.5 - 5.0 mmol/L Final     Hemoglobin   Date Value Ref Range Status   04/25/2024 9.3 (L) 13.3 - 17.7 g/dL Final     Creatinine   Date Value Ref Range Status   04/26/2024 6.17 (H) 0.67 - 1.17 mg/dL Final     Urea Nitrogen   Date Value Ref Range Status   04/26/2024 55.9 (H) 8.0 - 23.0 mg/dL Final   05/03/2022 37 (H) 8 - 28 mg/dL Final     Sodium   Date Value Ref Range Status   04/26/2024 135 135 - 145 mmol/L Final     Comment:     Reference intervals for this test were updated on 09/26/2023 to more accurately reflect our healthy population. There may be differences in the flagging of prior results with similar values performed with this method. Interpretation of those prior results can be made in the context of the updated reference intervals.      INR   Date Value Ref Range Status   04/24/2024 1.47 (H) 0.85 - 1.15 Final       DIALYSIS PROCEDURE NOTE  Hepatitis status of previous patient on machine log was checked and verified ok to use with this patients hepatitis status.  Patient dialyzed for 3.5 hrs. on a K2 bath with a net fluid removal of  2.5L.  A BFR of 450 ml/min was obtained via a AVF using 15 gauge needles.      The treatment plan was discussed with Dr. Humphrey during the treatment.    Total heparin received during the treatment: 0 units.   Needle cannulation sites held x 5 min.       Meds  given: none   Complications: tolerated tx well      Person educated: patient. Knowledge base fair. Barriers to learning: none. Educated on procedure via verbal mode.      ICEBOAT? Timeout performed pre-treatment  I: Patient was identified using 2 identifiers  C:  Consent Signed Yes  E: Equipment preventative maintenance is current and dialysis delivery system OK to use  B: Hepatitis B Surface Antigen: negative; Draw Date: 10/9/23      Hepatitis B Surface Antibody: immune; Draw Date: 10/9/23  O: Dialysis orders present and  complete prior to treatment  A: Vascular access verified and assessed prior to treatment  T: Treatment was performed at a clinically appropriate time  ?: Patient was allowed to ask questions and address concerns prior to treatment  See Adult Hemodialysis flowsheet in EPIC for further details and post assessment.  Machine water alarm in place and functioning. Transducer pods intact and checked every 15min.   Pt returned via bed.  Chlorine/Chloramine water system checked every 4 hours.  Outpatient Dialysis at [facility]    Patient repositioned every 2 hours during the treatment.  Post treatment report given to García RODRIGUEZ RN regarding 2.5L of fluid removed, last BP of 135/59, and patient pain rating of 0/10.

## 2024-04-26 NOTE — PROGRESS NOTES
'    RENAL ([facility]) progress note  CC: F/U ESRD  S: Since last visit, seen on HD. Tolerating alright. No sob. Had cardioversion this am.     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 [facility] Orders below.   HD MWF,         2. PNA: on Zoxyn. Elevated procal. CXR noted.   3. Elevated troponin w/ hx CAD: recent ablation contributing. Per cardiology.   4. Paroxsymal afib: per EP. S/p cardioversion on 4/26  5. Hypotension: noted decrease in coreg.         Dialyzer: 160NRe Optiflux   Na: 138 mEq/L   Bicarb: 27 mEq/L   Dialysate: 2.0 K, 2.5 Ca, 1.0 Mg, 100 Dextrose ()   Dialysate/Machine Temp (prescribed): 36 C   Dialysate/Machine Temp (actual): 35.8 C   BFR (prescribed): 450   BFR (actual): 200   Prescribed time: 03:15   EDW: 81 kg   Access Type: Active (In Use):AVFistula-Standard/Left Upper Arm       Woody Humphrey DO  [facility]  [phone] (off)       No interval changes to past medical history, social history or family history to report.    /56 (BP Location: Right arm, Patient Position: Semi-Cano's)   Pulse 85   Temp 98.2  F (36.8  C) (Axillary)   Resp 20   Wt 79.9 kg (176 lb 3.2 oz)   SpO2 94%   BMI 26.02 kg/m      I/O last 3 completed shifts:  In: 679 [P.O.:550; I.V.:129]  Out: -     Physical Exam:   GENERAL: calm and comfortable, alert  EYES: No scleral icterus, conjunctiva clear  ENT: Hearing normal, Oral mucosa moist  RESP: normal effort.   CV:  no leg edema.    GI: ND,  Musculoskeletal: Normal muscle bulk/ tone; No gross joint abnormalities  SKIN: No rash, warm/ dry  PSYCH:   Appropriate mood and affect    Recent Labs   Lab 04/26/24  0426 04/24/24  1638    138   POTASSIUM 5.1 4.4   CHLORIDE 94* 96*   CO2 22 29   BUN 55.9* 25.2*   CR 6.17* 3.33*   GFRESTIMATED 9* 18*   NAYLA 9.6 10.1   PHOS  --  3.2   MAG  --  2.5*   ALBUMIN  --  3.9       Recent Labs   Lab 04/25/24  0438 04/24/24  1638   WBC 7.5 7.4   HGB 9.3* 11.4*   HCT 30.3* 37.4*   * 106*    208             Current Facility-Administered Medications:     - MEDICATION INSTRUCTIONS -, , Does not apply, DOES NOT GO TO Grace TRENT Angel Luis, MD    acetaminophen (TYLENOL) tablet 650 mg, 650 mg, Oral, Q4H PRN, 650 mg at 04/26/24 0653 **OR** acetaminophen (TYLENOL) Suppository 650 mg, 650 mg, Rectal, Q4H PRN, Tom Edwards MD    albuterol (PROVENTIL) neb solution 2.5 mg, 3 mL, Nebulization, Q2H PRN, Tom Edwards MD    amiodarone (PACERONE) tablet 200 mg, 200 mg, Oral, TID, Gissell Pitts, APRN CNP    apixaban ANTICOAGULANT (ELIQUIS) tablet 5 mg, 5 mg, Oral, BID, Tom Edwards MD, 5 mg at 04/26/24 0835    artificial saliva (BIOTENE MT) solution 1 spray, 1 spray, Mouth/Throat, 4x Daily PRN, Tom Edwards MD    calcium carbonate (TUMS) chewable tablet 1,000 mg, 1,000 mg, Oral, 4x Daily PRN, Tom Edwards MD    carvedilol (COREG) tablet 12.5 mg, 12.5 mg, Oral, Daily at 4 pm, Siva Wen DO, 12.5 mg at 04/25/24 1628    [START ON 4/27/2024] carvedilol (COREG) tablet 12.5 mg, 12.5 mg, Oral, Once per day on Sunday Tuesday Thursday Saturday, Siva Wen DO    cinacalcet (SENSIPAR) tablet 60 mg, 60 mg, Oral, Daily before supper, Tom Edwards MD, 60 mg at 04/25/24 1650    clopidogrel (PLAVIX) tablet 75 mg, 75 mg, Oral, Daily, Tom Edwards MD, 75 mg at 04/26/24 0835    guaiFENesin (ROBITUSSIN) 20 mg/mL solution 200 mg, 200 mg, Oral, Q4H PRN, Tom Edwards MD    lidocaine (LMX4) cream, , Topical, Q1H PRN, Tom Edwards MD    lidocaine 1 % 0.1-1 mL, 0.1-1 mL, Other, Q1H PRN,  Tom Edwards MD    melatonin tablet 1 mg, 1 mg, Oral, At Bedtime PRN, Tom Edwards MD    menthol-zinc oxide (CALMOSEPTINE) 0.44-20.6 % ointment OINT, , Topical, 4x Daily PRN, Tom Edwards MD    miconazole (MICATIN) 2 % powder, , Topical, BID PRN, Tom Edwards MD    multivitamin RENAL (RENAVITE RX/NEPHROVITE) tablet 1 tablet, 1 tablet, Oral, Daily, Tom Edwards MD, 1 tablet at 04/26/24 1129    No heparin via hemodialysis machine, , Does not apply, Once, Woody Humphrey,     ondansetron (ZOFRAN ODT) ODT tab 4 mg, 4 mg, Oral, Q6H PRN **OR** ondansetron (ZOFRAN) injection 4 mg, 4 mg, Intravenous, Q6H PRN, Tom Edwards MD    pantoprazole (PROTONIX) EC tablet 40 mg, 40 mg, Oral, Daily, Tom Edwards MD, 40 mg at 04/26/24 1129    Patient is already receiving anticoagulation with heparin, enoxaparin (LOVENOX), warfarin (COUMADIN)  or other anticoagulant medication, , Does not apply, Continuous PRN, Tom Edwards MD    polyethylene glycol (MIRALAX) Packet 17 g, 17 g, Oral, Daily PRN, Tom Edwards MD    senna-docusate (SENOKOT-S/PERICOLACE) 8.6-50 MG per tablet 1 tablet, 1 tablet, Oral, BID PRN, 1 tablet at 04/25/24 2116 **OR** senna-docusate (SENOKOT-S/PERICOLACE) 8.6-50 MG per tablet 2 tablet, 2 tablet, Oral, BID PRN, Tom Edwards MD    sevelamer carbonate (RENVELA) tablet 1,600 mg, 1,600 mg, Oral, With Snacks or Supplements, Tom Edwards MD    sevelamer carbonate (RENVELA) tablet 3,200 mg, 3,200 mg, Oral, TID w/meals, Tom Edwards MD, 1,600 mg at 04/25/24 1617    simvastatin (ZOCOR) tablet 40 mg, 40 mg, Oral, At Bedtime, Tom Edwards MD, 40 mg at 04/25/24 2116    sodium chloride (PF) 0.9% PF flush 3 mL, 3 mL, Intracatheter, Q8H, Tom Edwards MD, 3 mL at 04/25/24 1631    sodium chloride (PF) 0.9% PF flush 3 mL, 3 mL, Intracatheter, q1 min prn, Tom Edwards MD    sodium chloride 0.9% BOLUS 100-150 mL, 100-150 mL,  Intravenous, Q15 Min PRN, Woody Humphrey,     sodium chloride 0.9% BOLUS 250 mL, 250 mL, Intravenous, Once in dialysis/CRRT, Woody Humphrey,     sodium chloride 0.9% BOLUS 300 mL, 300 mL, Hemodialysis Machine, Once, Woody Humphrey,     Vitamin D3 (CHOLECALCIFEROL) tablet 50 mcg, 50 mcg, Oral, Daily, Tom Edwards MD, 50 mcg at 04/26/24 1129      Labs personally reviewed today during this evaluation at 12:29 PM

## 2024-04-26 NOTE — ANESTHESIA POSTPROCEDURE EVALUATION
(E4) spontaneous Patient: García Kitchen    Procedure: * No procedures listed *       Anesthesia Type:  General    Note:  Disposition: Inpatient   Postop Pain Control: Uneventful            Sign Out: Well controlled pain   PONV: No   Neuro/Psych: Uneventful            Sign Out: Acceptable/Baseline neuro status   Airway/Respiratory: Uneventful            Sign Out: Acceptable/Baseline resp. status   CV/Hemodynamics: Uneventful            Sign Out: Acceptable CV status; No obvious hypovolemia; No obvious fluid overload   Other NRE: NONE   DID A NON-ROUTINE EVENT OCCUR? No           Last vitals:  Vitals:    04/26/24 1027 04/26/24 1113 04/26/24 1130   BP:  114/60 119/56   Pulse:  85    Resp:  20 20   Temp: 36.9  C (98.4  F) 36.8  C (98.2  F) 36.8  C (98.2  F)   SpO2:  98% 94%       Electronically Signed By: Shamir Greer MD  April 26, 2024  12:32 PM

## 2024-04-27 ENCOUNTER — APPOINTMENT (OUTPATIENT)
Dept: PHYSICAL THERAPY | Facility: HOSPITAL | Age: 79
DRG: 308 | End: 2024-04-27
Attending: NURSE PRACTITIONER
Payer: COMMERCIAL

## 2024-04-27 VITALS
RESPIRATION RATE: 18 BRPM | TEMPERATURE: 98.2 F | WEIGHT: 172.3 LBS | BODY MASS INDEX: 25.44 KG/M2 | OXYGEN SATURATION: 96 % | HEART RATE: 81 BPM | DIASTOLIC BLOOD PRESSURE: 61 MMHG | SYSTOLIC BLOOD PRESSURE: 137 MMHG

## 2024-04-27 LAB
ANION GAP SERPL CALCULATED.3IONS-SCNC: 14 MMOL/L (ref 7–15)
ATRIAL RATE - MUSE: 78 BPM
BUN SERPL-MCNC: 37.7 MG/DL (ref 8–23)
CALCIUM SERPL-MCNC: 9.1 MG/DL (ref 8.8–10.2)
CHLORIDE SERPL-SCNC: 94 MMOL/L (ref 98–107)
CREAT SERPL-MCNC: 4.55 MG/DL (ref 0.67–1.17)
DEPRECATED HCO3 PLAS-SCNC: 28 MMOL/L (ref 22–29)
DIASTOLIC BLOOD PRESSURE - MUSE: NORMAL MMHG
EGFRCR SERPLBLD CKD-EPI 2021: 13 ML/MIN/1.73M2
ERYTHROCYTE [DISTWIDTH] IN BLOOD BY AUTOMATED COUNT: 17.3 % (ref 10–15)
GLUCOSE SERPL-MCNC: 128 MG/DL (ref 70–99)
HCT VFR BLD AUTO: 28.9 % (ref 40–53)
HGB BLD-MCNC: 9 G/DL (ref 13.3–17.7)
INTERPRETATION ECG - MUSE: NORMAL
MCH RBC QN AUTO: 32.8 PG (ref 26.5–33)
MCHC RBC AUTO-ENTMCNC: 31.1 G/DL (ref 31.5–36.5)
MCV RBC AUTO: 106 FL (ref 78–100)
P AXIS - MUSE: 66 DEGREES
PLATELET # BLD AUTO: 204 10E3/UL (ref 150–450)
POTASSIUM SERPL-SCNC: 4.5 MMOL/L (ref 3.4–5.3)
PR INTERVAL - MUSE: 174 MS
QRS DURATION - MUSE: 94 MS
QT - MUSE: 408 MS
QTC - MUSE: 465 MS
R AXIS - MUSE: 30 DEGREES
RBC # BLD AUTO: 2.74 10E6/UL (ref 4.4–5.9)
SODIUM SERPL-SCNC: 136 MMOL/L (ref 135–145)
SYSTOLIC BLOOD PRESSURE - MUSE: NORMAL MMHG
T AXIS - MUSE: 57 DEGREES
VENTRICULAR RATE- MUSE: 78 BPM
WBC # BLD AUTO: 7.3 10E3/UL (ref 4–11)

## 2024-04-27 PROCEDURE — 85027 COMPLETE CBC AUTOMATED: CPT | Performed by: INTERNAL MEDICINE

## 2024-04-27 PROCEDURE — 97162 PT EVAL MOD COMPLEX 30 MIN: CPT | Mod: GP

## 2024-04-27 PROCEDURE — 97110 THERAPEUTIC EXERCISES: CPT | Mod: GP

## 2024-04-27 PROCEDURE — 97530 THERAPEUTIC ACTIVITIES: CPT | Mod: GP

## 2024-04-27 PROCEDURE — 250N000013 HC RX MED GY IP 250 OP 250 PS 637: Performed by: NURSE PRACTITIONER

## 2024-04-27 PROCEDURE — 36415 COLL VENOUS BLD VENIPUNCTURE: CPT | Performed by: INTERNAL MEDICINE

## 2024-04-27 PROCEDURE — 80048 BASIC METABOLIC PNL TOTAL CA: CPT | Performed by: NURSE PRACTITIONER

## 2024-04-27 PROCEDURE — 99239 HOSP IP/OBS DSCHRG MGMT >30: CPT | Performed by: STUDENT IN AN ORGANIZED HEALTH CARE EDUCATION/TRAINING PROGRAM

## 2024-04-27 RX ORDER — AMIODARONE HYDROCHLORIDE 200 MG/1
TABLET ORAL
Qty: 117 TABLET | Refills: 0 | Status: SHIPPED | OUTPATIENT
Start: 2024-04-27 | End: 2024-06-04

## 2024-04-27 RX ORDER — CARVEDILOL 25 MG/1
12.5 TABLET ORAL 2 TIMES DAILY
Status: ON HOLD
Start: 2024-04-27 | End: 2024-09-12

## 2024-04-27 RX ADMIN — AMIODARONE HYDROCHLORIDE 200 MG: 200 TABLET ORAL at 08:41

## 2024-04-27 RX ADMIN — APIXABAN 5 MG: 5 TABLET, FILM COATED ORAL at 08:41

## 2024-04-27 RX ADMIN — POLYETHYLENE GLYCOL 3350 17 G: 17 POWDER, FOR SOLUTION ORAL at 06:11

## 2024-04-27 RX ADMIN — CLOPIDOGREL BISULFATE 75 MG: 75 TABLET ORAL at 08:41

## 2024-04-27 RX ADMIN — Medication 50 MCG: at 08:41

## 2024-04-27 RX ADMIN — PANTOPRAZOLE SODIUM 40 MG: 40 TABLET, DELAYED RELEASE ORAL at 08:41

## 2024-04-27 RX ADMIN — SEVELAMER CARBONATE 3200 MG: 800 TABLET, FILM COATED ORAL at 10:19

## 2024-04-27 RX ADMIN — SENNOSIDES AND DOCUSATE SODIUM 2 TABLET: 8.6; 5 TABLET ORAL at 06:11

## 2024-04-27 RX ADMIN — CARVEDILOL 12.5 MG: 12.5 TABLET, FILM COATED ORAL at 08:41

## 2024-04-27 RX ADMIN — Medication 1 TABLET: at 08:41

## 2024-04-27 ASSESSMENT — ACTIVITIES OF DAILY LIVING (ADL)
ADLS_ACUITY_SCORE: 28

## 2024-04-27 NOTE — PROGRESS NOTES
"   04/27/24 1050   Appointment Info   Signing Clinician's Name / Credentials (PT) Ashley Batistaon PT, DPT   Living Environment   People in Home spouse   Current Living Arrangements house  ([facility])   Home Accessibility no concerns  (no stairs)   Self-Care   Equipment Currently Used at Home cane, quad;walker, rolling   Fall history within last six months yes   Number of times patient has fallen within last six months 1   Activity/Exercise/Self-Care Comment Pt reports no falls, but pt's wife Britany reports  pt had 1 fall. Pt reports IND with ADLs.   General Information   Onset of Illness/Injury or Date of Surgery 04/24/24   Referring Physician Gissell Pitts APRN CNP   Patient/Family Therapy Goals Statement (PT) none stated.   Pertinent History of Current Problem (include personal factors and/or comorbidities that impact the POC) Per H&P: \"\"García Kitchen is a 78 year old male admitted on 4/24/2024. He was brought to the ED by family for evaluation of increased confusion, word finding difficulties and bilateral weakness since A-fib ablation on 4/18/2024\"   General Observations Pt reports dizziness while supine at beginning of session - 118/56 (prior to mobility on screen was at 137/61). Spoke to RN García regarding drops in BP throughout session and dizziness/fatigue.   Cognition   Affect/Mental Status (Cognition) WNL   Orientation Status (Cognition) oriented x 4   Cognitive Status Comments Pt's wife reports pt sometimes has some safety awareness forgetfulness with use of FWW   Pain Assessment   Patient Currently in Pain No   Range of Motion (ROM)   Range of Motion ROM is WNL   Strength (Manual Muscle Testing)   Strength (Manual Muscle Testing) Deficits observed during functional mobility   Strength Comments quick fatigue with standing and amb, & lightheadedness with drops in BP   Bed Mobility   Bed Mobility supine-sit   Supine-Sit [place] (Bed Mobility) modified independence   Assistive Device (Bed Mobility) bed " rails   Comment, (Bed Mobility) reports dizziness upon  therapist entering room while pt in supine. Pt reports dizziness sitting up that improves but still lingers with sitting up for a few min   Transfers   Transfers sit-stand transfer   Impairments Contributing to Impaired Transfers impaired balance;decreased strength   Comment, (Transfers) reports dizziness upon standing   Sit-Stand Transfer   Sit-Stand [place] (Transfers) contact guard;verbal cues   Assistive Device (Sit-Stand Transfers) walker, 4-wheeled   Comment, (Sit-Stand Transfer) verbal cueing to not pull up from 4WW and instead push up from EOB/rail   Gait/Stairs (Locomotion)   [place] Level (Gait) contact guard;verbal cues   Assistive Device (Gait) walker, 4-wheeled   Distance in Feet (Gait) 15   Deviations/Abnormal Patterns (Gait) stride length decreased;weight shifting decreased;gait speed decreased   Comment, (Gait/Stairs) Pt reports dizziness with amb. Cues for safe management of 4WW.   Balance   Balance Comments needs 4WW CGA to remain balanced in standing   Clinical Impression   Criteria for Skilled Therapeutic Intervention Yes, treatment indicated   PT Diagnosis (PT) impaired functional mobility   Influenced by the following impairments impaired functional strength, balance, activity tolerance, dizziness   Functional limitations due to impairments gait, transfers   Clinical Presentation (PT Evaluation Complexity) evolving   Clinical Presentation Rationale clinical judgment   Clinical Decision Making (Complexity) moderate complexity   Planned Therapy Interventions (PT) balance training;gait training;home exercise program;bed mobility training;neuromuscular re-education;patient/family education;strengthening;stretching;transfer training;progressive activity/exercise;home program guidelines   Risk & Benefits of therapy have been explained evaluation/treatment results reviewed;risks/benefits reviewed;care plan/treatment goals  reviewed;patient;spouse/significant other;participants included   PT Total Evaluation Time   PT Eval, Moderate Complexity Minutes (11591) 7   Physical Therapy Goals   PT Frequency Daily   PT Predicted Duration/Target Date for Goal Attainment 05/03/24   PT Goals Gait;Transfers   PT: Transfers Modified independent;Sit to/from stand   PT: Gait Modified independent;100 feet;Assistive device   Interventions   Interventions Quick Adds Therapeutic Activity;Therapeutic Procedure   Therapeutic Procedure/Exercise   Ther. Procedure: strength, endurance, ROM, flexibillity Minutes (83241) 9   Symptoms Noted During/After Treatment fatigue;dizziness   Treatment Detail/Skilled Intervention Facilitated seated therex for functional strength ,cues for excursion x12-15 reps each: hip flex, LAQ, hip abd toe taps, ankle pf/df. Education on ankle pumps to improve circulation. dizziness improves with increased time spent in sitting, but still lingers. Pt reports fatigue after exercises   Therapeutic Activity   Therapeutic Activities: dynamic activities to improve functional performance Minutes (25376) 9   Symptoms Noted During/After Treatment Dizziness   Treatment Detail/Skilled Intervention Education on safe use and management of 4WW. Verbal cueing for pushoff from sitting surface rather than pulling up from 4WW. Pt's wife reporting concerns with safety of 4WW in case pt forgets and pulls up from 4WW, and that they will think more about whether or not they want 4WW upon discharge. Time spent taking BP at beginning of session when pt supine - 118/56, and when pt sitting EOB after amb - 108/55. Pt symptomatic w reports of lightheadedness. Facilitated sit>supine SBA, verbal cueing for scooting up in bed SBA - takes pt 3x with effort but is successful. Updated RN on BP drops with activity during session.   PT Discharge Planning   PT Plan Check BPs; Gait with 4WW, transfers, standing therex   PT Discharge Recommendation (DC Rec) home with  assist;home with home care physical therapy   PT Rationale for DC Rec Pt overall safe and functionally strong with bed mobility and transfers, primary concerns are with dizziness at rest and with activity. Recommend home with assist of his wife for Ax1 at all times for safety, and home therapy to help pt return to functional baseline.   PT Brief overview of current status CGA transfers; monitor BPs per dizziness   PT Equipment Needed at Discharge walker, rolling  (check with pt if they have decided whethery they would like to order one)   Total Session Time   Timed Code Treatment Minutes 18   Total Session Time (sum of timed and untimed services) 25

## 2024-04-27 NOTE — PROGRESS NOTES
'    RENAL ([facility]) progress note  CC: F/U ESRD  S: Since last visit, had HD on 4/26 for 3.5hr with 2.5L off.  Cardiology started on amiodarone therapy for 3 months.  Bp better today.  Seen in bed resting, on RA.  No breathing issue.  No chest pain, dizziness, weakness, abd pain, fever and chills.  May be discharging later today.           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 [facility] Orders below.   HD MWF.  Cleared to discharge from purely renal standpoint, can continue HD MWF at outpatient dialysis unit.       2. PNA: on Zoxyn. Elevated procal. CXR noted.   3. Elevated troponin w/ hx CAD: recent ablation contributing. Per cardiology.   4. Paroxsymal afib: per EP. S/p cardioversion on 4/26  5. Hypotension: noted decrease in coreg.  Bp much better today.        Dialyzer: 160NRe Optiflux   Na: 138 mEq/L   Bicarb: 27 mEq/L   Dialysate: 2.0 K, 2.5 Ca, 1.0 Mg, 100 Dextrose ()   Dialysate/Machine Temp (prescribed): 36 C   Dialysate/Machine Temp (actual): 35.8 C   BFR (prescribed): 450   BFR (actual): 200   Prescribed time: 03:15   EDW: 81 kg   Access Type: Active (In Use):AVFistula-Standard/Left Upper Arm        Clint Rosa, VICKI, CNP, ALE  [facility]  Pager: [phone]         No interval changes to past medical history, social history or family history to report.    /61 (BP Location: Right arm)   Pulse 81   Temp 98.2  F (36.8  C) (Oral)   Resp 18   Wt 78.2 kg (172 lb 4.8 oz)   SpO2 96%   BMI 25.44 kg/m      I/O last 3 completed shifts:  In: 573 [P.O.:570; I.V.:3]  Out: 2500  [Other:2500]    Physical Exam:   GENERAL: calm and comfortable, alert and interactive.   EYES: No scleral icterus, conjunctiva clear  ENT: Hearing normal, Oral mucosa moist  RESP: normal effort.   CV:  no leg edema.    GI: ND,  Musculoskeletal: Normal muscle bulk/ tone; No gross joint abnormalities  SKIN: No rash, warm/ dry  PSYCH:  Appropriate mood and affect    Recent Labs   Lab 04/27/24  0435 04/26/24  0426 04/24/24  1638    135 138   POTASSIUM 4.5 5.1 4.4   CHLORIDE 94* 94* 96*   CO2 28 22 29   BUN 37.7* 55.9* 25.2*   CR 4.55* 6.17* 3.33*   GFRESTIMATED 13* 9* 18*   NAYLA 9.1 9.6 10.1   PHOS  --   --  3.2   MAG  --   --  2.5*   ALBUMIN  --   --  3.9       Recent Labs   Lab 04/27/24  0435 04/25/24  0438 04/24/24  1638   WBC 7.3 7.5 7.4   HGB 9.0* 9.3* 11.4*   HCT 28.9* 30.3* 37.4*   * 106* 106*    181 208             Current Facility-Administered Medications:     - MEDICATION INSTRUCTIONS -, , Does not apply, DOES NOT GO TO MAR, Hong Gissell APRN CNP    acetaminophen (TYLENOL) tablet 650 mg, 650 mg, Oral, Q4H PRN, 650 mg at 04/26/24 0653 **OR** acetaminophen (TYLENOL) Suppository 650 mg, 650 mg, Rectal, Q4H PRN, Pitts, Gissell, APRN CNP    albuterol (PROVENTIL) neb solution 2.5 mg, 3 mL, Nebulization, Q2H PRN, Pitts, Gissell, APRN CNP    amiodarone (PACERONE) tablet 200 mg, 200 mg, Oral, TID, Pitts, Gissell, APRN CNP, 200 mg at 04/27/24 0841    apixaban ANTICOAGULANT (ELIQUIS) tablet 5 mg, 5 mg, Oral, BID, Pitts, Gissell, APRN CNP, 5 mg at 04/27/24 0841    artificial saliva (BIOTENE MT) solution 1 spray, 1 spray, Mouth/Throat, 4x Daily PRN, Hong, Gissell, APRN CNP    calcium carbonate (TUMS) chewable tablet 1,000 mg, 1,000 mg, Oral, 4x Daily PRN, Hong, Gissell, APRN CNP    carvedilol (COREG) tablet 12.5 mg, 12.5 mg, Oral, Daily at 4 pm, Gissell Pitts, APRSAURAV CNP, 12.5 mg at 04/26/24 5018    carvedilol (COREG) tablet 12.5 mg, 12.5 mg, Oral, Once per day on Sunday Tuesday Thursday Saturday, Hong  Gissell, APRN CNP, 12.5 mg at 04/27/24 0841    cinacalcet (SENSIPAR) tablet 60 mg, 60 mg, Oral, Daily before supper, Pitts, Gissell, APRN CNP, 60 mg at 04/26/24 1750    clopidogrel (PLAVIX) tablet 75 mg, 75 mg, Oral, Daily, Pitts, Gissell, APRN CNP, 75 mg at 04/27/24 0841    guaiFENesin (ROBITUSSIN) 20 mg/mL solution 200 mg, 200 mg, Oral, Q4H PRN, Pitts, Gissell, APRN CNP    lidocaine (LMX4) cream, , Topical, Q1H PRN, Pitts, Gissell, APRN CNP    lidocaine 1 % 0.1-1 mL, 0.1-1 mL, Other, Q1H PRN, Pitts, Gissell, APRN CNP    melatonin tablet 1 mg, 1 mg, Oral, At Bedtime PRN, Pitts, Gissell, APRN CNP    menthol-zinc oxide (CALMOSEPTINE) 0.44-20.6 % ointment OINT, , Topical, 4x Daily PRN, Pitts, Gissell, APRN CNP    miconazole (MICATIN) 2 % powder, , Topical, BID PRN, Pitts Gissell, APRN CNP    multivitamin RENAL (RENAVITE RX/NEPHROVITE) tablet 1 tablet, 1 tablet, Oral, Daily, Pitts, Gissell, APRN CNP, 1 tablet at 04/27/24 0841    No heparin via hemodialysis machine, , Does not apply, Once, Woody Humphrey,     ondansetron (ZOFRAN ODT) ODT tab 4 mg, 4 mg, Oral, Q6H PRN **OR** ondansetron (ZOFRAN) injection 4 mg, 4 mg, Intravenous, Q6H PRN, Pitts, Gissell, APRN CNP    pantoprazole (PROTONIX) EC tablet 40 mg, 40 mg, Oral, Daily, Pitts, Gissell, APRN CNP, 40 mg at 04/27/24 0841    Patient is already receiving anticoagulation with heparin, enoxaparin (LOVENOX), warfarin (COUMADIN)  or other anticoagulant medication, , Does not apply, Continuous PRN, Pitts, Gissell, APRN CNP    polyethylene glycol (MIRALAX) Packet 17 g, 17 g, Oral, Daily PRN, Pitts, Gissell, APRN CNP, 17 g at 04/27/24 0611    senna-docusate (SENOKOT-S/PERICOLACE) 8.6-50 MG per tablet 1 tablet, 1 tablet, Oral, BID PRN, 1 tablet at 04/25/24 2116 **OR** senna-docusate (SENOKOT-S/PERICOLACE) 8.6-50 MG per tablet 2 tablet, 2 tablet, Oral, BID PRN, Pitts, Gissell, APRN CNP, 2 tablet at 04/27/24 0611    sevelamer carbonate (RENVELA) tablet 1,600 mg, 1,600 mg, Oral, With Snacks or  Supplements, Pitts, Gissell, APRN CNP    sevelamer carbonate (RENVELA) tablet 3,200 mg, 3,200 mg, Oral, TID w/meals, Pitts, Gissell, APRN CNP, 3,200 mg at 04/26/24 1454    simvastatin (ZOCOR) tablet 40 mg, 40 mg, Oral, At Bedtime, Pitts, Gissell, APRN CNP, 40 mg at 04/26/24 2109    sodium chloride (PF) 0.9% PF flush 3 mL, 3 mL, Intracatheter, Q8H, Pitts, Gissell, APRN CNP, 3 mL at 04/27/24 0551    sodium chloride (PF) 0.9% PF flush 3 mL, 3 mL, Intracatheter, q1 min prn, Pitts Gissell, APRN CNP    sodium chloride 0.9% BOLUS 100-150 mL, 100-150 mL, Intravenous, Q15 Min PRN, Woody Humphrey DO    sodium chloride 0.9% BOLUS 250 mL, 250 mL, Intravenous, Once in dialysis/CRRT, Woody Humphrey DO    sodium chloride 0.9% BOLUS 300 mL, 300 mL, Hemodialysis Machine, Once, Woody Humphrey DO    Vitamin D3 (CHOLECALCIFEROL) tablet 50 mcg, 50 mcg, Oral, Daily, Pitts, Gissell, APRN CNP, 50 mcg at 04/27/24 0841      Labs personally reviewed today during this evaluation at 12:29 PM

## 2024-04-27 NOTE — PLAN OF CARE
Problem: Hypertension Acute  Goal: Blood Pressure Within Desired Range  Outcome: Progressing  Intervention: Normalize Blood Pressure  Recent Flowsheet Documentation  Taken 4/26/2024 2000 by Irwin Abrams RN  Medication Review/Management: medications reviewed     Problem: Hypertension Acute  Goal: Blood Pressure Within Desired Range  Intervention: Normalize Blood Pressure  Recent Flowsheet Documentation  Taken 4/26/2024 2000 by Irwin Abrams RN  Medication Review/Management: medications reviewed   Goal Outcome Evaluation:  Pt alert and oriented x 4, up with assist of one. Denies pain/ discomfort. Pt had dialysis removed 2.5 L, vitals remain stable on room air.

## 2024-04-27 NOTE — PLAN OF CARE
Occupational Therapy Discharge Summary    Reason for therapy discharge:    Discharged to home with home therapy.    Progress towards therapy goal(s). See goals on Care Plan in [facility] electronic health record for goal details.  Goals partially met.  Barriers to achieving goals:   discharge from facility.    Therapy recommendation(s):    Continued therapy is recommended.  Rationale/Recommendations:  to progress safety and independence in home setting.

## 2024-04-27 NOTE — DISCHARGE SUMMARY
"[facility]  Hospitalist Discharge Summary      Date of Admission:  4/24/2024  Date of Discharge:  4/27/2024 12:23 PM  Discharging Provider: SHASHI GUADARRAMA MD  Discharge Service: Hospitalist Service    Discharge Diagnoses     #Paroxysmal atrial fibrillation  #ESRD  #Secondary hyperparathyroidism  #Acute on chronic HFrEF  #Anemia of chronic kidney disease  #Chronic thrombocytopenia  #Splenomegaly  #Drug-induced platelet defect  #Drug-induced coagulation defect  #Mild cognitive impairment  #Acute metabolic encephalopathy  #Non-MI troponin elevation  #Bibasilar atelectasis  #Bilateral small pleural effusions    Clinically Significant Risk Factors     # Overweight: Estimated body mass index is 25.44 kg/m  as calculated from the following:    Height as of 4/18/24: 1.753 m (5' 9\").    Weight as of this encounter: 78.2 kg (172 lb 4.8 oz).       Follow-ups Needed After Discharge   Follow-up Appointments     Follow-up and recommended labs and tests       Follow up with primary care provider, Riki Martinez, within 1 month for   hospital follow- up.  No follow up labs or test are needed.            Unresulted Labs Ordered in the Past 30 Days of this Admission       No orders found from 3/25/2024 to 4/25/2024.            Discharge Disposition   Discharged to home  Condition at discharge: Stable    Hospital Course   Shashi Kitchen is a 78 year old male who presented with increased confusion, word finding difficulties and bilateral weakness since A-fib ablation on 4/18/2024, found to be in recurrent a-fib.     #Paroxysmal atrial fibrillation  Status post ablation on 4/18/2024, now back in A-fib. Evaluated by EP who recommended DCCV and amiodarone x3 months. S/p successful DCCV with improvement in confusion and weakness symptoms.  - Continue amiodarone as prescribed for 3 months per cardiology  - Continue home Eliquis  - Continue home Coreg, but decrease dose to 12.5mg BID for hypotension " here    #ESRD  #Secondary hyperparathyroidism  -History of right nephrectomy secondary to RCC in 2007  -HD on MWF, underwent usual schedule here  -Nephrology consult for assessment of volume status  -Continue PTA Cinacalcet, Dialyvite, sevelamer, vitamin D     #Acute on chronic heart failure with reduced ejection fraction  -N-terminal proBNP > 70,000  -Volume management with hemodialysis    #Bibasilar atelectasis  #Bilateral pleural effusions, small  Chest x-ray showed small bilateral pleural effusions and bibasilar opacities left greater than right. Personally reviewed chest CT did not show signs of infiltrates, just small bilateral pleural effusions with compressive atelectasis, consistent with his hypervolemic status. Procalcitonin elevated maybe secondary to ESRD, patient is afebrile and normal WBC. SLP saw no signs of aspiration.  - Initially on IV zosyn but stopped due to above lack of evidence of pneumonia and cultures negative     #Non-MI troponin elevation  #Coronary artery disease  Coronary angiogram 12/19/2023: Severe proximal LAD stenosis, moderate calcification of the RCA, ostial PDA moderate stenosis; complex PCI of proximal LAD with Rotablator, shockwave lithotripsy and TEENA  - Continue PTA clopidogrel, Eliquis, carvedilol, simvastatin  - Cardiology consult appreciated      #Anemia of chronic kidney disease  #Macrocytosis  -Monitor     #Chronic thrombocytopenia  #Splenomegaly  -Stable platelet count     #Drug-induced platelet defect  #Drug-induced coagulation defect  -On PTA apixaban and clopidogrel     #Acute metabolic encephalopathy  #Underlying mild cognitive impairment  Improved after DCCV. Has some underlying mild cognitive impairment in combination with CHF, ESRD, likely stress of ablation procedure with recurrent afib led to these symptoms. No signs of other metabolic derangements.  -PT/OT evaluations recommended  PT/OT    Consultations This Hospital Stay   CARE MANAGEMENT / SOCIAL WORK IP  CONSULT  OCCUPATIONAL THERAPY ADULT IP CONSULT  PHYSICAL THERAPY ADULT IP CONSULT  CARDIOLOGY IP CONSULT  SPEECH LANGUAGE PATH ADULT IP CONSULT  NEPHROLOGY IP CONSULT  CORE CLINIC EVALUATION IP CONSULT  OCCUPATIONAL THERAPY ADULT IP CONSULT  NUTRITION SERVICES ADULT IP CONSULT  CARE MANAGEMENT / SOCIAL WORK IP CONSULT  ELECTROPHYSIOLOGY IP CONSULT  SPIRITUAL HEALTH SERVICES IP CONSULT    Code Status   Full Code    Time Spent on this Encounter   I, SHASHI GUADARRAMA MD, personally saw the patient today and spent greater than 30 minutes discharging this patient.       SHASHI GUADARRAMA MD  [facility]  [address] [place], [place] [id number]  Phone: [phone]  Fax: [phone]  ______________________________________________________________________    Physical Exam   Vital Signs: Temp: 98.2  F (36.8  C) Temp src: Oral BP: 137/61 Pulse: 81   Resp: 18 SpO2: 96 % O2 Device: None (Room air)    Weight: 172 lbs 4.8 oz    Physical Exam  Vitals and nursing note reviewed.   Constitutional:       Appearance: Normal appearance.      Comments: Very alert and awake today    Cardiovascular:      Rate and Rhythm: Normal rate, regular rhythm     Pulses: Normal pulses.      Heart sounds: Normal heart sounds.   Pulmonary:      Effort: Pulmonary effort is normal.      Breath sounds: Normal breath sounds.   Abdominal:      General: Bowel sounds are normal. There is no distension.      Palpations: Abdomen is soft.      Tenderness: There is no abdominal tenderness. There is no guarding.   Skin:     General: Skin is warm.   Neurological:      Mental Status: He is alert and oriented to person, place, and time.      Primary Care Physician   Riki Martinez    Discharge Orders      Home Care Referral      Follow Up with the Primary Physician    Follow-up with Josie Guzman CNP on 6/4/2024     Reason for your hospital stay    You were admitted for recurrence of atrial fibrillation. This required a  cardioversion and a new medication called amiodarone, which should be taken as prescribed. Follow up with your cardiologist as scheduled.    Continue your usual Monday, Wednesday, and Friday dialysis. Please note the reduced carvedilol dose, start taking 12.5mg instead of your prior 25mg dosage. The tabs can be broken in half to get the correct dose.     Follow-up and recommended labs and tests     Follow up with primary care provider, Riki Martinez, within 1 month for hospital follow- up.  No follow up labs or test are needed.     Activity    Your activity upon discharge: activity as tolerated     Diet    Follow this diet upon discharge: Orders Placed This Encounter      Renal Diet (dialysis)       Significant Results and Procedures   Most Recent 3 CBC's:  Recent Labs   Lab Test 04/27/24  0435 04/25/24  0438 04/24/24  1638   WBC 7.3 7.5 7.4   HGB 9.0* 9.3* 11.4*   * 106* 106*    181 208     Most Recent 3 BMP's:  Recent Labs   Lab Test 04/27/24  0435 04/26/24  1438 04/26/24  0426 04/24/24  1638     --  135 138   POTASSIUM 4.5  --  5.1 4.4   CHLORIDE 94*  --  94* 96*   CO2 28  --  22 29   BUN 37.7*  --  55.9* 25.2*   CR 4.55*  --  6.17* 3.33*   ANIONGAP 14  --  19* 13   NAYLA 9.1  --  9.6 10.1   * 124* 122* 133*   ,   Results for orders placed or performed during the hospital encounter of 04/24/24   Head CT w/o contrast    Narrative    EXAM: CT HEAD W/O CONTRAST  LOCATION: [facility]  DATE: 4/24/2024    INDICATION: altered mental status/confusion  COMPARISON: None.  TECHNIQUE: Routine CT Head without IV contrast. Multiplanar reformats. Dose reduction techniques were used.    FINDINGS:  INTRACRANIAL CONTENTS: No intracranial hemorrhage, extraaxial collection, or mass effect.  No CT evidence of acute infarct. Mild presumed chronic small vessel ischemic changes. Mild to moderate generalized volume loss. No hydrocephalus.     VISUALIZED ORBITS/SINUSES/MASTOIDS: No  intraorbital abnormality. No paranasal sinus mucosal disease. No middle ear or mastoid effusion.    BONES/SOFT TISSUES: No acute abnormality.      Impression    IMPRESSION:  1.  No acute intracranial process.   XR Chest 2 Views    Narrative    EXAM: XR CHEST 2 VIEWS  LOCATION: [facility]  DATE: 4/24/2024    INDICATION: chest pain  COMPARISON: Chest radiograph 10/30/2023.      Impression    IMPRESSION: Small bilateral pleural effusions and bibasilar opacities, left greater than right, which could represent atelectasis and/or pneumonia. No pneumothorax.   CT Chest w Contrast    Narrative    EXAM: CT CHEST W CONTRAST  LOCATION: [facility]  DATE: 4/25/2024    INDICATION: pneumonia, alectasis, atrial fistula  COMPARISON: CT chest 02/07/2023  TECHNIQUE: CT chest with IV contrast. Multiplanar reformats were obtained. Dose reduction techniques were used.    CONTRAST: isovue 370 75ml    FINDINGS:   LUNGS AND PLEURA: Central airways are patent. Small left, trace right pleural effusions with compressive atelectasis. No consolidation in the aerated lungs.    MEDIASTINUM/AXILLAE: Unchanged heart size. No findings of atrial fistula. Medium sized pericardial effusion. Thoracic aorta is nonaneurysmal with moderate atheromatous disease. No pulmonary embolism to the proximal segmental level (due to limitations   from contrast timing/non-arterial phase). No thoracic lymphadenopathy.     CORONARY ARTERY CALCIFICATION: Moderate with stent.    UPPER ABDOMEN: Benign hepatic cysts.    MUSCULOSKELETAL: Degenerative changes of the visualized spine. No destructive osseous lesions.      Impression    IMPRESSION:   1.  Small left, trace right pleural effusions with compressive atelectasis. No convincing findings of infection or atrial fistula.  2.  Medium-sized pericardial effusion.   EP Cardioversion External    Narrative    Gissell Pitts APRN CNP     4/26/2024 10:54 AM  [facility]  [facility]    Procedure: EP Cardioversion External    Date/Time: 4/26/2024 10:49 AM    Performed by: Gissell Pitts APRN CNP  Authorized by: Gissell Pitts APRN CNP      UNIVERSAL PROTOCOL   Site Marked: NA  Prior Images Obtained and Reviewed:  Yes  Required items: Required blood products, implants, devices and special   equipment available    Patient identity confirmed:  Verbally with patient, arm band and provided   demographic data  Patient was reevaluated immediately before administering moderate or deep   sedation or anesthesia  Confirmation Checklist:  Patient's identity using two indicators, relevant   allergies, procedure was appropriate and matched the consent or emergent   situation and correct equipment/implants were available  Time out: Immediately prior to the procedure a time out was called    Universal Protocol: the [facility] Universal Protocol was followed       ANESTHESIA    Anesthesia was administered and monitored by anesthesiology.  See   anesthesia documentation for details.    PROCEDURE DETAILS  Pre-procedure rhythm: atrial fibrillation  Patient position: patient was placed in a supine position  Chest area: chest area exposed  Electrodes: pads  Electrodes placed: anterior-posterior  Number of attempts: 1    Details of Attempts:  At 1014, after administration of IV Brevital by MDA   and confirmation of adequate sedation, he received a single synchronous   shock at 200 J with prompt restoration of sinus rhythm.  Post   cardioversion EKG pending.  Post-procedure rhythm: normal sinus rhythm  Complications: no complications      PROCEDURE  Describe Procedure: Early recurrence of A-fib after catheter ablation by   Dr. Paz on 4/18/2024.  Hospitalized 4/24/2024 with recurrent AF,   possible pneumonia, altered mental status.  He had been having confusion,   though head CT negative for acute changes.  This morning, he is entirely   clear and oriented.  He has a [id number] score of 5  for age >75, CAD,   HTN, DM 2.  He also has ESRD on HD.  He is on Eliquis for stroke   prophylaxis and denies missing any doses in >3 weeks.  Successful cardioversion to sinus rhythm  Start amiodarone 200 mg 3 times daily x 10 days, then decrease to 200 mg   once daily.  Continue amiodarone for 6 to 12 weeks.  Continue Eliquis 5 mg twice daily for stroke prophylaxis  Follow-up with Josie Guzman CNP on 6/4/2024  Transfer back to telemetry as he is fully awake and stable post   cardioversion  Okay to discharge from an EP standpoint once medically stable  Patient Tolerance:  Patient tolerated the procedure well with no immediate   complications  Length of time physician/provider present for 1:1 monitoring during   sedation: 10       Discharge Medications   Discharge Medication List as of 4/27/2024 11:48 AM        START taking these medications    Details   amiodarone (PACERONE) 200 MG tablet Take 1 tablet (200 mg) by mouth 3 times daily for 9 days, THEN 1 tablet (200 mg) daily for 90 days., Disp-117 tablet, R-0, E-Prescribe           CONTINUE these medications which have CHANGED    Details   carvedilol (COREG) 25 MG tablet Take 0.5 tablets (12.5 mg) by mouth 2 times daily, No Print OutDo not take the morning dose on Monday, Wednesday, or Friday.           CONTINUE these medications which have NOT CHANGED    Details   apixaban ANTICOAGULANT (ELIQUIS) 5 MG tablet Take 1 tablet (5 mg) by mouth 2 times daily, Disp-60 tablet, R-11, E-Prescribe      cinacalcet (SENSIPAR) 60 MG tablet Take 1 tablet by mouth daily, Historical      clopidogrel (PLAVIX) 75 MG tablet Take 1 tablet (75 mg) by mouth daily, Disp-90 tablet, R-3, E-Prescribe      DIALYVITE 100-1 mg Tab Take 1 tablet by mouth daily , GABRIELA, Historical      ketoconazole (NIZORAL) 2 % external cream Apply topically 2 times daily as needed for itchingHistorical      pantoprazole (PROTONIX) 40 MG EC tablet Take 1 tablet (40 mg) by mouth daily Continue for 6 weeks after  ablation, Disp-45 tablet, R-0, E-Prescribe      polyethylene glycol (MIRALAX) 17 gram packet Take 17 g by mouth daily as needed for constipation, Historical      !! sevelamer carbonate (RENVELA) 800 MG tablet Take 1,600 mg by mouth as needed (with snacks), Historical      !! sevelamer carbonate (RENVELA) 800 mg tablet Take 3,200 mg by mouth 3 times daily (with meals), Historical      simvastatin (ZOCOR) 40 MG tablet Take 1 tablet (40 mg) by mouth At Bedtime, Disp-90 tablet, R-3, E-Prescribe      vitamin D3 (CHOLECALCIFEROL) 50 mcg (2000 units) tablet Take 1 tablet by mouth daily, Historical       !! - Potential duplicate medications found. Please discuss with provider.        Allergies   Allergies   Allergen Reactions    Codeine Nausea and Vomiting     Other Reaction(s): Not available    Lisinopril Cough     Other Reaction(s): Not available

## 2024-04-27 NOTE — PLAN OF CARE
Problem: Adult Inpatient Plan of Care  Goal: Plan of Care Review  Description: The Plan of Care Review/Shift note should be completed every shift.  The Outcome Evaluation is a brief statement about your assessment that the patient is improving, declining, or no change.  This information will be displayed automatically on your shift  note.  Outcome: Progressing  Flowsheets (Taken 4/27/2024 0252)  Plan of Care Reviewed With: patient   Goal Outcome Evaluation:      Plan of Care Reviewed With: patient  Pt alert and oriented x 3 forgetful to situation. Up with assist of one, bed alarm for safety. Denies pain/  VSS on room air. No cardiac event for the shift remained NSR.

## 2024-04-27 NOTE — DISCHARGE SUMMARY
Pt discharged at approximately 1220. AVS reviewed with pt and spouse. No questions or concerns. Discharge medications handed to pt. Pt does have some dizziness with standing. Orthostatic Bps (-). Hospitalist was notified. Pt will follow up with PCP. Pt was escorted out of hospital in a wheelchair by nursing assistant.

## 2024-04-27 NOTE — PLAN OF CARE
Physical Therapy Discharge Summary    Reason for therapy discharge:    Discharged to home with home therapy.    Progress towards therapy goal(s). See goals on Care Plan in [facility] electronic health record for goal details.  Goals not met.  Barriers to achieving goals:   discharge on same date as initial evaluation.    Therapy recommendation(s):    Continued therapy is recommended.  Rationale/Recommendations:  Recommend home therapy to help patient return to functional baseline.    Ashley Huff, PT

## 2024-04-28 LAB
ATRIAL RATE - MUSE: 153 BPM
ATRIAL RATE - MUSE: 340 BPM
DIASTOLIC BLOOD PRESSURE - MUSE: 58 MMHG
DIASTOLIC BLOOD PRESSURE - MUSE: NORMAL MMHG
INTERPRETATION ECG - MUSE: NORMAL
INTERPRETATION ECG - MUSE: NORMAL
P AXIS - MUSE: NORMAL DEGREES
P AXIS - MUSE: NORMAL DEGREES
PR INTERVAL - MUSE: NORMAL MS
PR INTERVAL - MUSE: NORMAL MS
QRS DURATION - MUSE: 88 MS
QRS DURATION - MUSE: 96 MS
QT - MUSE: 376 MS
QT - MUSE: 384 MS
QTC - MUSE: 470 MS
QTC - MUSE: 480 MS
R AXIS - MUSE: 20 DEGREES
R AXIS - MUSE: 58 DEGREES
SYSTOLIC BLOOD PRESSURE - MUSE: 126 MMHG
SYSTOLIC BLOOD PRESSURE - MUSE: NORMAL MMHG
T AXIS - MUSE: 68 DEGREES
T AXIS - MUSE: 78 DEGREES
VENTRICULAR RATE- MUSE: 94 BPM
VENTRICULAR RATE- MUSE: 94 BPM

## 2024-04-29 ENCOUNTER — NURSE TRIAGE (OUTPATIENT)
Dept: CARDIOLOGY | Facility: CLINIC | Age: 79
End: 2024-04-29

## 2024-04-29 ENCOUNTER — HOSPITAL ENCOUNTER (EMERGENCY)
Facility: HOSPITAL | Age: 79
Discharge: HOME | End: 2024-04-29
Attending: EMERGENCY MEDICINE | Admitting: EMERGENCY MEDICINE
Payer: COMMERCIAL

## 2024-04-29 ENCOUNTER — PATIENT OUTREACH (OUTPATIENT)
Dept: CARE COORDINATION | Facility: CLINIC | Age: 79
End: 2024-04-29
Payer: COMMERCIAL

## 2024-04-29 VITALS
BODY MASS INDEX: 25.77 KG/M2 | OXYGEN SATURATION: 99 % | WEIGHT: 180 LBS | SYSTOLIC BLOOD PRESSURE: 146 MMHG | HEIGHT: 70 IN | RESPIRATION RATE: 20 BRPM | DIASTOLIC BLOOD PRESSURE: 65 MMHG | HEART RATE: 79 BPM

## 2024-04-29 DIAGNOSIS — D63.8 ANEMIA OF CHRONIC DISEASE: ICD-10-CM

## 2024-04-29 DIAGNOSIS — Z99.2 ACUTE RENAL FAILURE SUPERIMPOSED ON CHRONIC KIDNEY DISEASE, ON CHRONIC DIALYSIS, UNSPECIFIED ACUTE RENAL FAILURE TYPE (H): ICD-10-CM

## 2024-04-29 DIAGNOSIS — R04.0 EPISTAXIS: ICD-10-CM

## 2024-04-29 DIAGNOSIS — N18.6 ACUTE RENAL FAILURE SUPERIMPOSED ON CHRONIC KIDNEY DISEASE, ON CHRONIC DIALYSIS, UNSPECIFIED ACUTE RENAL FAILURE TYPE (H): ICD-10-CM

## 2024-04-29 DIAGNOSIS — N17.9 ACUTE RENAL FAILURE SUPERIMPOSED ON CHRONIC KIDNEY DISEASE, ON CHRONIC DIALYSIS, UNSPECIFIED ACUTE RENAL FAILURE TYPE (H): ICD-10-CM

## 2024-04-29 LAB
ERYTHROCYTE [DISTWIDTH] IN BLOOD BY AUTOMATED COUNT: 17.5 % (ref 10–15)
HCT VFR BLD AUTO: 25.5 % (ref 40–53)
HGB BLD-MCNC: 7.9 G/DL (ref 13.3–17.7)
INR PPP: 1.77 (ref 0.85–1.15)
MCH RBC QN AUTO: 32.6 PG (ref 26.5–33)
MCHC RBC AUTO-ENTMCNC: 31 G/DL (ref 31.5–36.5)
MCV RBC AUTO: 105 FL (ref 78–100)
PLATELET # BLD AUTO: 194 10E3/UL (ref 150–450)
RBC # BLD AUTO: 2.42 10E6/UL (ref 4.4–5.9)
WBC # BLD AUTO: 9.2 10E3/UL (ref 4–11)

## 2024-04-29 PROCEDURE — 999N000285 HC STATISTIC VASC ACCESS LAB DRAW WITH PIV START

## 2024-04-29 PROCEDURE — 999N000127 HC STATISTIC PERIPHERAL IV START W US GUIDANCE

## 2024-04-29 PROCEDURE — 30901 CONTROL OF NOSEBLEED: CPT | Mod: LT

## 2024-04-29 PROCEDURE — 36415 COLL VENOUS BLD VENIPUNCTURE: CPT | Performed by: EMERGENCY MEDICINE

## 2024-04-29 PROCEDURE — 85027 COMPLETE CBC AUTOMATED: CPT | Performed by: EMERGENCY MEDICINE

## 2024-04-29 PROCEDURE — 250N000009 HC RX 250: Performed by: EMERGENCY MEDICINE

## 2024-04-29 PROCEDURE — 99284 EMERGENCY DEPT VISIT MOD MDM: CPT

## 2024-04-29 PROCEDURE — 85610 PROTHROMBIN TIME: CPT | Performed by: EMERGENCY MEDICINE

## 2024-04-29 RX ORDER — TRANEXAMIC ACID 100 MG/ML
500 INJECTION, SOLUTION INTRAVENOUS ONCE
Status: COMPLETED | OUTPATIENT
Start: 2024-04-29 | End: 2024-04-29

## 2024-04-29 RX ORDER — OXYMETAZOLINE HYDROCHLORIDE 0.05 G/100ML
2 SPRAY NASAL ONCE
Status: COMPLETED | OUTPATIENT
Start: 2024-04-29 | End: 2024-04-29

## 2024-04-29 RX ADMIN — OXYMETAZOLINE HYDROCHLORIDE 2 SPRAY: 0.05 SPRAY NASAL at 11:17

## 2024-04-29 RX ADMIN — TRANEXAMIC ACID 500 MG: 1 INJECTION, SOLUTION INTRAVENOUS at 11:17

## 2024-04-29 RX ADMIN — Medication 3 ML: at 11:18

## 2024-04-29 ASSESSMENT — COLUMBIA-SUICIDE SEVERITY RATING SCALE - C-SSRS
6. HAVE YOU EVER DONE ANYTHING, STARTED TO DO ANYTHING, OR PREPARED TO DO ANYTHING TO END YOUR LIFE?: NO
2. HAVE YOU ACTUALLY HAD ANY THOUGHTS OF KILLING YOURSELF IN THE PAST MONTH?: NO
1. IN THE PAST MONTH, HAVE YOU WISHED YOU WERE DEAD OR WISHED YOU COULD GO TO SLEEP AND NOT WAKE UP?: NO

## 2024-04-29 ASSESSMENT — ACTIVITIES OF DAILY LIVING (ADL): ADLS_ACUITY_SCORE: 38

## 2024-04-29 NOTE — PROGRESS NOTES
[facility]    Background: Transitional Care Management program identified per system criteria and reviewed by [facility] team for possible outreach.    Assessment: Upon chart review, [facility] Team member will not proceed with patient outreach related to this episode of Transitional Care Management program due to reason below:    Patient has active communication with a nurse, provider or care team for reason of post-hospital follow up plan.  Outreach call by [facility] team not indicated to minimize duplicative efforts.     Patient is in active communication today with a Registered Nurse in Cardiology from Nurse Triage at [facility] [place]. No W outreach call needed at this time.    Plan: Transitional Care Management episode addressed appropriately per reason noted above.      NELLY Knowles  [phone]  [facility]    *Connected Care Resource Team does NOT follow patient ongoing. Referrals are identified based on internal discharge reports and the outreach is to ensure patient has an understanding of their discharge instructions.
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
## 2024-04-29 NOTE — TELEPHONE ENCOUNTER
"1. REASON FOR CALL or QUESTION: \"What is your reason for calling today?\" or \"How can I best help you?\" or \"What question do you have that I can help answer?\"    Patient's spouse Kirsten spoke to Triage stating patient is having hemoptysis.  Patient had a Cardioversion performed last Friday and Kirsten says patient has been doing well. This morning now he has been coughing up blood with some blood clots. Kirsten says patient has had bloody noses quite a bit. No bloody nose this morning. The coughing with blood comes and goes and has been occurring frequently. Kirsten thinks it has been quite a bit of blood. Patient is not having any other concurrent symptoms.   Advised that patient should present to the ED this morning for more urgent evaluation of current hemoptysis. Kirsten verbalized understanding.   "

## 2024-04-29 NOTE — ED TRIAGE NOTES
Patient on Eliquis for A fib, epistaxis for 2 days, right nare. Currently no bleeding noted. Clamp applied. States he is coughing up blood. Patient is on dialysis, schedule today at 1030

## 2024-05-03 ENCOUNTER — TELEPHONE (OUTPATIENT)
Dept: FAMILY MEDICINE | Facility: CLINIC | Age: 79
End: 2024-05-03
Payer: COMMERCIAL

## 2024-05-03 NOTE — TELEPHONE ENCOUNTER
[facility] is calling regarding an established patient with [facility].    Requesting orders from: Riki Martinez  Provider is following patient: Yes  Is this a 60-day recertification request?  No    Orders Requested    Physical Therapy  Request for delay in care, service is not able to be provided within same scheduled day.   Patient requested delay in care - Original start date was for 5/9/2024    Please call Lilibeth - Clinical Supervisor - [facility] [phone]      Information was gathered and will be sent to provider for review.  RN will contact Home Care with information after provider review.  Confirmed ok to leave a detailed message with call back.  Contact information confirmed and updated as needed.    Chio Sánchez RN

## 2024-05-03 NOTE — TELEPHONE ENCOUNTER
Writer left voicemail for home care. Writer relayed approval of delay in home care orders by PCP as requested.     Nevaeh Garcia RN

## 2024-05-08 ENCOUNTER — APPOINTMENT (OUTPATIENT)
Dept: CARDIOLOGY | Facility: CLINIC | Age: 79
End: 2024-05-08
Payer: COMMERCIAL

## 2024-05-08 ENCOUNTER — OFFICE VISIT (OUTPATIENT)
Dept: CARDIOLOGY | Facility: CLINIC | Age: 79
End: 2024-05-08
Payer: COMMERCIAL

## 2024-05-08 VITALS
RESPIRATION RATE: 16 BRPM | SYSTOLIC BLOOD PRESSURE: 128 MMHG | BODY MASS INDEX: 27.55 KG/M2 | DIASTOLIC BLOOD PRESSURE: 52 MMHG | HEART RATE: 80 BPM | HEIGHT: 69 IN | WEIGHT: 186 LBS

## 2024-05-08 DIAGNOSIS — I10 ESSENTIAL HYPERTENSION WITH GOAL BLOOD PRESSURE LESS THAN 140/90: Chronic | ICD-10-CM

## 2024-05-08 DIAGNOSIS — I25.119 CORONARY ARTERY DISEASE INVOLVING NATIVE CORONARY ARTERY OF NATIVE HEART WITH ANGINA PECTORIS (H): ICD-10-CM

## 2024-05-08 DIAGNOSIS — I48.19 PERSISTENT ATRIAL FIBRILLATION (H): ICD-10-CM

## 2024-05-08 DIAGNOSIS — I50.20 HFREF (HEART FAILURE WITH REDUCED EJECTION FRACTION) (H): Primary | ICD-10-CM

## 2024-05-08 DIAGNOSIS — N18.6 ESRD (END STAGE RENAL DISEASE) ON DIALYSIS (H): ICD-10-CM

## 2024-05-08 DIAGNOSIS — Z99.2 ESRD (END STAGE RENAL DISEASE) ON DIALYSIS (H): ICD-10-CM

## 2024-05-08 PROCEDURE — 99215 OFFICE O/P EST HI 40 MIN: CPT | Performed by: NURSE PRACTITIONER

## 2024-05-08 PROCEDURE — G2211 COMPLEX E/M VISIT ADD ON: HCPCS | Performed by: NURSE PRACTITIONER

## 2024-05-08 RX ORDER — FAMOTIDINE 20 MG/1
1 TABLET, FILM COATED ORAL DAILY PRN
COMMUNITY
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
## 2024-05-08 NOTE — PATIENT INSTRUCTIONS
García Kitchen,    It was a pleasure to see you today at [facility].     My recommendations after this visit include:  - Please follow up with Dr Cross in 3 months   - Please follow up with Josie Guzman June 4  - Please follow up with Gomez Forrest in 2 months  - Continue current medications    Stefany Pruett, CNP
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
## 2024-05-09 ENCOUNTER — MEDICAL CORRESPONDENCE (OUTPATIENT)
Dept: HEALTH INFORMATION MANAGEMENT | Facility: CLINIC | Age: 79
End: 2024-05-09

## 2024-05-09 ENCOUNTER — TELEPHONE (OUTPATIENT)
Dept: FAMILY MEDICINE | Facility: CLINIC | Age: 79
End: 2024-05-09
Payer: COMMERCIAL

## 2024-05-09 NOTE — TELEPHONE ENCOUNTER
Home Care is calling regarding an established patient with [facility].       Requesting orders from: Riki Martinez  Provider is following patient: Yes  Is this a 60-day recertification request?  No    Orders Requested    Physical Therapy  Request for initial certification (first set of orders)   Frequency:  1x/wk for 8 wks      Information was gathered and will be sent to provider for review.  RN will contact Home Care with information after provider review.  Confirmed ok to leave a detailed message with call back.  Contact information confirmed and updated as needed.    Nevaeh Garcia RN

## 2024-05-10 NOTE — TELEPHONE ENCOUNTER
Writer left voicemail for home care. Writer relayed approval of home care orders by PCP as requested.     Nevaeh Garcia RN

## 2024-05-18 ENCOUNTER — HOSPITAL ENCOUNTER (EMERGENCY)
Facility: CLINIC | Age: 79
Discharge: HOME | End: 2024-05-18
Attending: EMERGENCY MEDICINE | Admitting: EMERGENCY MEDICINE
Payer: COMMERCIAL

## 2024-05-18 ENCOUNTER — APPOINTMENT (OUTPATIENT)
Dept: RADIOLOGY | Facility: CLINIC | Age: 79
End: 2024-05-18
Attending: PHYSICIAN ASSISTANT
Payer: COMMERCIAL

## 2024-05-18 VITALS
DIASTOLIC BLOOD PRESSURE: 67 MMHG | BODY MASS INDEX: 27.11 KG/M2 | WEIGHT: 183 LBS | OXYGEN SATURATION: 97 % | HEIGHT: 69 IN | HEART RATE: 85 BPM | SYSTOLIC BLOOD PRESSURE: 170 MMHG | TEMPERATURE: 98.8 F | RESPIRATION RATE: 30 BRPM

## 2024-05-18 DIAGNOSIS — D64.9 ANEMIA: ICD-10-CM

## 2024-05-18 LAB
ABO/RH(D): NORMAL
ALBUMIN SERPL BCG-MCNC: 3.2 G/DL (ref 3.5–5.2)
ALP SERPL-CCNC: 61 U/L (ref 40–150)
ALT SERPL W P-5'-P-CCNC: <5 U/L (ref 0–70)
ANION GAP SERPL CALCULATED.3IONS-SCNC: 12 MMOL/L (ref 7–15)
ANTIBODY SCREEN: NEGATIVE
AST SERPL W P-5'-P-CCNC: 9 U/L (ref 0–45)
ATRIAL RATE - MUSE: 258 BPM
BASOPHILS # BLD AUTO: 0 10E3/UL (ref 0–0.2)
BASOPHILS NFR BLD AUTO: 0 %
BILIRUB DIRECT SERPL-MCNC: <0.2 MG/DL (ref 0–0.3)
BILIRUB SERPL-MCNC: 0.4 MG/DL
BLD PROD TYP BPU: NORMAL
BLOOD COMPONENT TYPE: NORMAL
BUN SERPL-MCNC: 35.9 MG/DL (ref 8–23)
CALCIUM SERPL-MCNC: 9.1 MG/DL (ref 8.8–10.2)
CHLORIDE SERPL-SCNC: 100 MMOL/L (ref 98–107)
CODING SYSTEM: NORMAL
CREAT SERPL-MCNC: 4.59 MG/DL (ref 0.67–1.17)
CROSSMATCH: NORMAL
DEPRECATED HCO3 PLAS-SCNC: 25 MMOL/L (ref 22–29)
DIASTOLIC BLOOD PRESSURE - MUSE: 73 MMHG
EGFRCR SERPLBLD CKD-EPI 2021: 13 ML/MIN/1.73M2
EOSINOPHIL # BLD AUTO: 0.1 10E3/UL (ref 0–0.7)
EOSINOPHIL NFR BLD AUTO: 1 %
ERYTHROCYTE [DISTWIDTH] IN BLOOD BY AUTOMATED COUNT: 17.8 % (ref 10–15)
FLUAV RNA SPEC QL NAA+PROBE: NEGATIVE
FLUBV RNA RESP QL NAA+PROBE: NEGATIVE
GLUCOSE SERPL-MCNC: 120 MG/DL (ref 70–99)
HCT VFR BLD AUTO: 23 % (ref 40–53)
HGB BLD-MCNC: 7 G/DL (ref 13.3–17.7)
IMM GRANULOCYTES # BLD: 0 10E3/UL
IMM GRANULOCYTES NFR BLD: 1 %
INTERPRETATION ECG - MUSE: NORMAL
ISSUE DATE AND TIME: NORMAL
LYMPHOCYTES # BLD AUTO: 0.9 10E3/UL (ref 0.8–5.3)
LYMPHOCYTES NFR BLD AUTO: 15 %
MAGNESIUM SERPL-MCNC: 2.3 MG/DL (ref 1.7–2.3)
MCH RBC QN AUTO: 29.5 PG (ref 26.5–33)
MCHC RBC AUTO-ENTMCNC: 30.4 G/DL (ref 31.5–36.5)
MCV RBC AUTO: 97 FL (ref 78–100)
MONOCYTES # BLD AUTO: 0.8 10E3/UL (ref 0–1.3)
MONOCYTES NFR BLD AUTO: 13 %
NEUTROPHILS # BLD AUTO: 4.5 10E3/UL (ref 1.6–8.3)
NEUTROPHILS NFR BLD AUTO: 71 %
NRBC # BLD AUTO: 0 10E3/UL
NRBC BLD AUTO-RTO: 0 /100
NT-PROBNP SERPL-MCNC: ABNORMAL PG/ML (ref 0–1800)
P AXIS - MUSE: NORMAL DEGREES
PLATELET # BLD AUTO: 181 10E3/UL (ref 150–450)
POTASSIUM SERPL-SCNC: 4.6 MMOL/L (ref 3.4–5.3)
PR INTERVAL - MUSE: NORMAL MS
PROT SERPL-MCNC: 6.1 G/DL (ref 6.4–8.3)
QRS DURATION - MUSE: 98 MS
QT - MUSE: 430 MS
QTC - MUSE: 480 MS
R AXIS - MUSE: 43 DEGREES
RBC # BLD AUTO: 2.37 10E6/UL (ref 4.4–5.9)
RSV RNA SPEC NAA+PROBE: NEGATIVE
SARS-COV-2 RNA RESP QL NAA+PROBE: NEGATIVE
SODIUM SERPL-SCNC: 137 MMOL/L (ref 135–145)
SPECIMEN EXPIRATION DATE: NORMAL
SYSTOLIC BLOOD PRESSURE - MUSE: 107 MMHG
T AXIS - MUSE: 197 DEGREES
TROPONIN T SERPL HS-MCNC: 176 NG/L
TROPONIN T SERPL HS-MCNC: 181 NG/L
UNIT ABO/RH: NORMAL
UNIT NUMBER: NORMAL
UNIT STATUS: NORMAL
UNIT TYPE ISBT: 5100
VENTRICULAR RATE- MUSE: 75 BPM
WBC # BLD AUTO: 6.3 10E3/UL (ref 4–11)

## 2024-05-18 PROCEDURE — 84484 ASSAY OF TROPONIN QUANT: CPT | Performed by: PHYSICIAN ASSISTANT

## 2024-05-18 PROCEDURE — 82310 ASSAY OF CALCIUM: CPT | Performed by: PHYSICIAN ASSISTANT

## 2024-05-18 PROCEDURE — 86900 BLOOD TYPING SEROLOGIC ABO: CPT | Performed by: PHYSICIAN ASSISTANT

## 2024-05-18 PROCEDURE — 83880 ASSAY OF NATRIURETIC PEPTIDE: CPT | Performed by: PHYSICIAN ASSISTANT

## 2024-05-18 PROCEDURE — 71046 X-RAY EXAM CHEST 2 VIEWS: CPT

## 2024-05-18 PROCEDURE — 36430 TRANSFUSION BLD/BLD COMPNT: CPT

## 2024-05-18 PROCEDURE — 99284 EMERGENCY DEPT VISIT MOD MDM: CPT | Mod: 25

## 2024-05-18 PROCEDURE — 87637 SARSCOV2&INF A&B&RSV AMP PRB: CPT | Performed by: PHYSICIAN ASSISTANT

## 2024-05-18 PROCEDURE — 93005 ELECTROCARDIOGRAM TRACING: CPT | Performed by: EMERGENCY MEDICINE

## 2024-05-18 PROCEDURE — 85025 COMPLETE CBC W/AUTO DIFF WBC: CPT | Performed by: PHYSICIAN ASSISTANT

## 2024-05-18 PROCEDURE — 86923 COMPATIBILITY TEST ELECTRIC: CPT | Performed by: PHYSICIAN ASSISTANT

## 2024-05-18 PROCEDURE — 36415 COLL VENOUS BLD VENIPUNCTURE: CPT | Performed by: PHYSICIAN ASSISTANT

## 2024-05-18 PROCEDURE — P9016 RBC LEUKOCYTES REDUCED: HCPCS | Performed by: PHYSICIAN ASSISTANT

## 2024-05-18 PROCEDURE — 80053 COMPREHEN METABOLIC PANEL: CPT | Performed by: PHYSICIAN ASSISTANT

## 2024-05-18 PROCEDURE — 83735 ASSAY OF MAGNESIUM: CPT | Performed by: PHYSICIAN ASSISTANT

## 2024-05-18 ASSESSMENT — ACTIVITIES OF DAILY LIVING (ADL)
ADLS_ACUITY_SCORE: 38

## 2024-05-18 ASSESSMENT — COLUMBIA-SUICIDE SEVERITY RATING SCALE - C-SSRS
2. HAVE YOU ACTUALLY HAD ANY THOUGHTS OF KILLING YOURSELF IN THE PAST MONTH?: NO
6. HAVE YOU EVER DONE ANYTHING, STARTED TO DO ANYTHING, OR PREPARED TO DO ANYTHING TO END YOUR LIFE?: NO
1. IN THE PAST MONTH, HAVE YOU WISHED YOU WERE DEAD OR WISHED YOU COULD GO TO SLEEP AND NOT WAKE UP?: YES
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
## 2024-05-18 NOTE — ED TRIAGE NOTES
Patient arrives via EMS from home for dizziness and lightheadeness s/p dialysis yesterday with hypotension during dialysis requiring fluids to be given to him. Pt reports SOB frequently but not currently in triage. Denies CP but does endorse lightheadedness which is worse with standing. A-fib on EMS EKG and on blood thinner. Did have ablation for afib in April. BP 87/49 prior to EMS arrival at home and 97/44 for EMS on arrival. Pt pale, denies known blood in stool but does endorse loose stools. Generalized bruising noted to arms which patient and wife state is an improvement from normal. Pt alert.     Triage Assessment (Adult)       Row Name 05/18/24 0918          Triage Assessment    Airway WDL WDL        Respiratory WDL    Respiratory WDL X;cough;rhythm/pattern     Rhythm/Pattern, Respiratory other (see comments);dyspnea upon exertion  no SOB in triage but reports frequent SOB     Cough Frequency infrequent     Cough Type dry        Skin Circulation/Temperature WDL    Skin Circulation/Temperature WDL X  pale        Cardiac WDL    Cardiac WDL X;rhythm  lightheaded, dizzy, hypotension     Cardiac Rhythm Atrial fibrillation        Peripheral/Neurovascular WDL    Peripheral Neurovascular WDL X  +1/2 leg edema        Cognitive/Neuro/Behavioral WDL    Cognitive/Neuro/Behavioral WDL X     Level of Consciousness alert     Arousal Level opens eyes spontaneously     Orientation disoriented to;time     Speech clear;spontaneous;logical     Mood/Behavior calm;cooperative        Pupils (CN II)    Pupil PERRLA yes     Pupil Size Left 2 mm     Pupil Size Right 2 mm        Florian Coma Scale    Best Eye Response 4-->(E4) spontaneous     Best Motor Response 6-->(M6) obeys commands     Best Verbal Response 5-->(V5) oriented     Florian Coma Scale Score 15

## 2024-05-18 NOTE — ED PROVIDER NOTES
"Emergency Department Midlevel Supervisory Note     I had a face to face encounter with this patient seen by the Advanced Practice Provider (ALE). I personally made/approved the management plan and take responsibility for the patient management. I personally saw patient and performed a substantive portion of the visit including all aspects of the medical decision making.     ED Course:  3:12 PM Linda Varela PA-C staffed patient with me. I agree with their assessment and plan of management, and I will see the patient.  3:27 PM I met with the patient to introduce myself, gather additional history, perform my initial exam, and discuss the plan.   4:40 PM Rechecked patient. Discussed plan for discharge.    Brief HPI:     García Kitchen is a 78 year old male who presents for evaluation of generalized weakness and dizziness/lightheadedness that started after dialysis yesterday. Denies any pain, shortness of breath, chest pain/pressure, nausea, vomiting, diarrhea, constipation, or other associated symptoms.    I, Xochitl Ledbetter, am serving as a scribe to document services personally performed by Roc Chvaez MD, based on my observations and the provider's statements to me.   I, Roc Chavez MD attest that Xochitl Ledbetter was acting in a scribe capacity, has observed my performance of the services and has documented them in accordance with my direction.    Brief Physical Exam: /63   Pulse 74   Temp 98.8  F (37.1  C) (Oral)   Resp 17   Ht 1.753 m (5' 9\")   Wt 83 kg (183 lb)   SpO2 96%   BMI 27.02 kg/m    Constitutional:  Alert, in no acute distress  EYES: Conjunctivae clear  HENT:  Atraumatic  Respiratory:  Respirations even, unlabored, in no acute respiratory distress  Cardiovascular:  Regular rate and rhythm, good peripheral perfusion  GI: Soft, non-distended, non-tender  Musculoskeletal:  Moves all 4 extremities equally, grossly symmetrical strength  Integument: Warm & dry. No appreciable rash, " erythema.  Neurologic:  Alert & oriented, speech clear and fluent, no focal deficits noted  Psych: Normal mood and affect       MDM:  ***       1. Anemia        Consults:  I discussed the patient with *** who recommends ***    Labs and Imaging:  Results for orders placed or performed during the hospital encounter of 05/18/24   Chest XR,  PA & LAT    Impression    IMPRESSION: Borderline cardiomegaly. Pulmonary vasculature is mildly congested. Mild diffuse prominence of the interstitium which could represent mild pulmonary edema. No definite focal infiltrates. Small lateral pleural effusions, left greater than   right. No pneumothorax.   Basic metabolic panel   Result Value Ref Range    Sodium 137 135 - 145 mmol/L    Potassium 4.6 3.4 - 5.3 mmol/L    Chloride 100 98 - 107 mmol/L    Carbon Dioxide (CO2) 25 22 - 29 mmol/L    Anion Gap 12 7 - 15 mmol/L    Urea Nitrogen 35.9 (H) 8.0 - 23.0 mg/dL    Creatinine 4.59 (H) 0.67 - 1.17 mg/dL    GFR Estimate 13 (L) >60 mL/min/1.73m2    Calcium 9.1 8.8 - 10.2 mg/dL    Glucose 120 (H) 70 - 99 mg/dL   Hepatic panel   Result Value Ref Range    Protein Total 6.1 (L) 6.4 - 8.3 g/dL    Albumin 3.2 (L) 3.5 - 5.2 g/dL    Bilirubin Total 0.4 <=1.2 mg/dL    Alkaline Phosphatase 61 40 - 150 U/L    AST 9 0 - 45 U/L    ALT <5 0 - 70 U/L    Bilirubin Direct <0.20 0.00 - 0.30 mg/dL   Result Value Ref Range    Troponin T, High Sensitivity 181 (HH) <=22 ng/L   Result Value Ref Range    Magnesium 2.3 1.7 - 2.3 mg/dL   N terminal pro BNP outpatient   Result Value Ref Range    N Terminal Pro BNP Outpatient 61,590 (H) 0 - 1,800 pg/mL   Symptomatic Influenza A/B, RSV, & SARS-CoV2 PCR (COVID-19) Nasopharyngeal    Specimen: Nasopharyngeal; Swab   Result Value Ref Range    Influenza A PCR Negative Negative    Influenza B PCR Negative Negative    RSV PCR Negative Negative    SARS CoV2 PCR Negative Negative   CBC with platelets and differential   Result Value Ref Range    WBC Count 6.3 4.0 - 11.0 10e3/uL     RBC Count 2.37 (L) 4.40 - 5.90 10e6/uL    Hemoglobin 7.0 (L) 13.3 - 17.7 g/dL    Hematocrit 23.0 (L) 40.0 - 53.0 %    MCV 97 78 - 100 fL    MCH 29.5 26.5 - 33.0 pg    MCHC 30.4 (L) 31.5 - 36.5 g/dL    RDW 17.8 (H) 10.0 - 15.0 %    Platelet Count 181 150 - 450 10e3/uL    % Neutrophils 71 %    % Lymphocytes 15 %    % Monocytes 13 %    % Eosinophils 1 %    % Basophils 0 %    % Immature Granulocytes 1 %    NRBCs per 100 WBC 0 <1 /100    Absolute Neutrophils 4.5 1.6 - 8.3 10e3/uL    Absolute Lymphocytes 0.9 0.8 - 5.3 10e3/uL    Absolute Monocytes 0.8 0.0 - 1.3 10e3/uL    Absolute Eosinophils 0.1 0.0 - 0.7 10e3/uL    Absolute Basophils 0.0 0.0 - 0.2 10e3/uL    Absolute Immature Granulocytes 0.0 <=0.4 10e3/uL    Absolute NRBCs 0.0 10e3/uL   Result Value Ref Range    Troponin T, High Sensitivity 176 (HH) <=22 ng/L   Adult Type and Screen   Result Value Ref Range    ABO/RH(D) O POS     Antibody Screen Negative Negative    SPECIMEN EXPIRATION DATE [id number]    Prepare red blood cells (unit)   Result Value Ref Range    Blood Component Type Red Blood Cells     Product Code C1866R00     Unit Status Transfused     Unit Number [id number]     CROSSMATCH Compatible     CODING SYSTEM [id number]     ISSUE DATE AND TIME [id number]     UNIT ABO/RH O+     UNIT TYPE ISBT 5100        I have reviewed the relevant laboratory studies above.    I independently interpreted the following imaging study(s):   ***    EKG: I reviewed and independently interpreted the patient's EKG, with comments made as listed below. Please see scanned EKG for full report.   ***    Procedures:  I was present for the key portions of procedures documented in ALE/midlevel note, see midlevel note for further details.    Roc Chavez MD  [facility] EMERGENCY ROOM  [facility] [address] [phone]

## 2024-05-18 NOTE — ED PROVIDER NOTES
Emergency Department Midlevel Supervisory Note     I personally saw the patient and performed a substantive portion of the visit including all aspects of the medical decision making.    ED Course:  9:23 AM Linda Varela PA-C staffed patient with me. I agree with their assessment and plan of management, and I will see the patient.  9:23 AM I met with the patient to introduce myself, gather additional history, perform my initial exam, and discuss the plan.   10 AM hemoglobin 7.0.  Given his symptoms and diminished fluid status we will proceed with transfusion of 1 unit of blood  10:15AM.  Initial troponin markedly elevated.  However this is trending downward from most recent troponins of greater than 700.  Review of cardiology note indicated patient with extensive weight gain and dietary indiscretions with recommendation for increasing dialysis fluid removal.  10:34 AM.  Electrolytes unremarkable.  BUN and creatinine elevated consistent with his renal failure.  Magnesium normal at 2.3.  Viral swabs negative for COVID/RSV/influenza.  10:38 AM.  Chest x-ray reviewed.  No overt infiltrative disease.  Small effusion on the left with loss of costophrenic angle.  Possible increased vascular congestion.  Awaiting definitive report  12:47 PM.  BNP markedly elevated at 69,000 consistent with his renal failure.  Chest x-ray confirms early vascular congestion and small effusions greater on the left.  2:07 PM.  Delta troponin unchanged.  Patient completing transfusion.  Will ambulate to determine plans for disposition.  2:53 PM.  Patient likely require hospitalization given his frailty.    Patient represents a critical care situation.  Proximately 45 minutes spent directly involved in patient's care independent of any procedures.        Brief HPI:     García Kitchen is a 78 year old male who presents for evaluation of generalized weakness and dizziness.  Patient denies any sensation of room spinning just feels weak.  Patient  is dialysis dependent and had dialysis yesterday routinely.  Patient also was anemia of chronic disease.  Denies any vomiting or diarrhea which would lead to addition of fluid losses    I, Janice Alexander, am serving as a scribe to document services personally performed by Brian Carrillo MD, based on my observations and the provider's statements to me.   I, Brian Carrillo MD, attest that Janice Benjamin was acting in a scribe capacity, has observed my performance of the services and has documented them in accordance with my direction.    Brief Physical Exam:  Constitutional:  Alert, in mild acute distress  EYES: Conjunctivae clear  HENT:  Atraumatic, normocephalic.  Mucous membranes moist.  Mucosa slightly pale  Respiratory:  Respirations even, unlabored, in no acute respiratory distress  Cardiovascular: Slightly irregular rhythm, good peripheral perfusion  GI: Soft, nondistended, nontender, no palpable masses, no rebound, no guarding   Musculoskeletal:  No edema. No cyanosis. Range of motion major extremities intact.    Integument: Warm, Dry, No erythema, No rash.   Neurologic:  Alert & oriented, no focal deficits noted  Psych: Normal mood and affect     MDM:  Patient is a elderly male with mild dementia.  Arrives with his wife who provides much of the history.  Patient with dialysis dependency for approximately 9-1/2 years.  Had routine dialysis yesterday.  Since then has had episodes of low blood pressure and weakness.  Wife reports blood pressure was 80 systolic earlier this morning.  On arrival blood pressure is 107 systolic.  Mucous membranes moist.  No focal findings.  Will obtain electrolytes to assess for contributory electrolyte imbalance, hypomagnesemia.  Will also obtain hemoglobin as patient with recurrent issues of anemia.  EKG and troponin obtained out of caution.       Symptomatic anemia  Elevated troponin  Elevated BNP  Chronic renal failure  Labs and Imaging:     I have reviewed the relevant laboratory and  radiology studies    Procedures:  I was present for the key portions of this procedure: Blood transfusion  Brian Carrillo MD  [facility] EMERGENCY ROOM  [facility] [address] [phone]     Brian Carrillo MD  05/18/24 [id number]

## 2024-05-18 NOTE — DISCHARGE INSTRUCTIONS
Please arrange a follow-up with your primary care provider for recheck in the next 1 to 2 weeks.  Do not hesitate to return to the ER for any new or worsening symptoms.

## 2024-05-21 ENCOUNTER — TELEPHONE (OUTPATIENT)
Dept: FAMILY MEDICINE | Facility: CLINIC | Age: 79
End: 2024-05-21
Payer: COMMERCIAL

## 2024-05-21 NOTE — TELEPHONE ENCOUNTER
Home Care is calling regarding an established patient with [facility].       Requesting orders from: Riki Martinez  Provider is following patient: Yes  Is this a 60-day recertification request?  No    Orders Requested    Physical Therapy  Request for delay in care, service is not able to be provided within same scheduled day.   Pt and wife want to reschedule PT visit for today, to next week, due fatigue and blood transfusion.       Information was gathered and will be sent to provider for review.  RN will contact Home Care with information after provider review.  Confirmed ok to leave a detailed message with call back.  Contact information confirmed and updated as needed.    Tisha Taylor RN

## 2024-05-28 ENCOUNTER — MEDICAL CORRESPONDENCE (OUTPATIENT)
Dept: HEALTH INFORMATION MANAGEMENT | Facility: CLINIC | Age: 79
End: 2024-05-28
Payer: COMMERCIAL

## 2024-06-04 ENCOUNTER — PREP FOR PROCEDURE (OUTPATIENT)
Dept: CARDIOLOGY | Facility: CLINIC | Age: 79
End: 2024-06-04

## 2024-06-04 ENCOUNTER — DOCUMENTATION ONLY (OUTPATIENT)
Dept: CARDIOLOGY | Facility: CLINIC | Age: 79
End: 2024-06-04

## 2024-06-04 ENCOUNTER — OFFICE VISIT (OUTPATIENT)
Dept: CARDIOLOGY | Facility: CLINIC | Age: 79
End: 2024-06-04
Payer: COMMERCIAL

## 2024-06-04 VITALS
BODY MASS INDEX: 25.55 KG/M2 | WEIGHT: 173 LBS | DIASTOLIC BLOOD PRESSURE: 50 MMHG | RESPIRATION RATE: 20 BRPM | OXYGEN SATURATION: 99 % | HEART RATE: 74 BPM | SYSTOLIC BLOOD PRESSURE: 132 MMHG

## 2024-06-04 DIAGNOSIS — I48.91 ATRIAL FIBRILLATION, UNSPECIFIED TYPE (H): ICD-10-CM

## 2024-06-04 DIAGNOSIS — I48.19 PERSISTENT ATRIAL FIBRILLATION (H): Primary | ICD-10-CM

## 2024-06-04 LAB
ATRIAL RATE - MUSE: 312 BPM
DIASTOLIC BLOOD PRESSURE - MUSE: NORMAL MMHG
INTERPRETATION ECG - MUSE: NORMAL
P AXIS - MUSE: 104 DEGREES
PR INTERVAL - MUSE: NORMAL MS
QRS DURATION - MUSE: 98 MS
QT - MUSE: 410 MS
QTC - MUSE: 445 MS
R AXIS - MUSE: 21 DEGREES
SYSTOLIC BLOOD PRESSURE - MUSE: NORMAL MMHG
T AXIS - MUSE: 255 DEGREES
VENTRICULAR RATE- MUSE: 71 BPM

## 2024-06-04 PROCEDURE — 99215 OFFICE O/P EST HI 40 MIN: CPT | Performed by: NURSE PRACTITIONER

## 2024-06-04 PROCEDURE — G2211 COMPLEX E/M VISIT ADD ON: HCPCS | Performed by: NURSE PRACTITIONER

## 2024-06-04 PROCEDURE — 93000 ELECTROCARDIOGRAM COMPLETE: CPT | Performed by: STUDENT IN AN ORGANIZED HEALTH CARE EDUCATION/TRAINING PROGRAM

## 2024-06-04 RX ORDER — AMIODARONE HYDROCHLORIDE 200 MG/1
200 TABLET ORAL DAILY
Status: SHIPPED
Start: 2024-06-04 | End: 2024-08-06

## 2024-06-04 RX ORDER — LIDOCAINE 40 MG/G
CREAM TOPICAL
Status: CANCELLED | OUTPATIENT
Start: 2024-06-04
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
## 2024-06-04 NOTE — Clinical Note
Hi, this gentleman is interested in proceeding with Watchman implant. TTE up to date. He has apt with Dr. Cross in August as well. Thank you

## 2024-06-04 NOTE — PROGRESS NOTES
H&P  PMD: []  Date: Card OV: []  Date:  Sent for Teach []   Orders: I [x] P  [x]      AC: Eliquis NP Med Review: NO Changes-per EP Np    DM Meds: None  GLP-1:None   Josie Guzman, KARLI CNP  P [facility] Ep Support Pool - [facility]  Can we please coordinate outpatient cardioversion when able? Orders in Epic. On amiodarone, Coreg; no med changes prior. Follow-up with Dr. Paz thereafter. Thank you    García Kitchen, 1945, [id number]  Home:[phone] (home) Cell:[phone] (mobile)  Emergency Contact: Kirsten Kitchen   PCP: Riki Martinez, [phone]    +++Important patient information for CSC/Cath Lab staff : None+++    [facility] EP Cath Lab Procedure Order   Procedure: Cardioversion for AF  Ordering Provider: Josie Bertrand NP  Date Ordered and Prepped: 6/4/2024 Cristela Dockery RN  Anticipated Case Duration:  Standard  Scheduling Needs/Timeframe:  Next Available  Scheduling Contact: Please contact pt to schedule  Cardiology Follow Up Apt s/p: Standard- EP ALE @ 4-6 wks or previously scheduled General Card apt  Current Device/Device Co Needed for Procedure: None NoneNone  Pre-Procedural Testing needed: None  Anesthesia:  General    [facility] EP Cath Lab Prep   H&P:  Compled by cardiology on 6/4/24 if scheduled within 30 days, pt to schedule with PMD if procedure outside of this timeframe  Pre-op Labs: K if pt taking diuretic medication or hx of low Potassium, Beta HcG if appropriate, and INR if on Warfarin will be ordered AM of procedure and Review of most recent labs, WEL for procedure  T&S Pre-Procedure Review: T&S is not required for DCCV/DFT Testing  Medical Records Pertinent for Procedure:  None  Iodinated Contrast Dye Allergies (Does not include Shellfish, Egg, and/or Iodine Allergy): NA  GLP-1 Protocol: If on Dulaglutide (Trulicity) (weekly)- Injection hold 7 days prior to procedure  , Exenatide extended release (Bydureon bcise) (weekly)- Injection hold 7 days prior to procedure, Exenatide (Byetta) (twice daily)- Oral  Tablet hold day prior and morning of procedure and for Injection hold 7 days prior to procedure, Semaglutide (Ozempic) (weekly)- Injection and Oral hold 7 days prior to procedure, Liraglutide (Victoza, Saxenda) (daily)- Injection hold day prior and morning of procedure    Allergies   Allergen Reactions    Codeine Nausea and Vomiting     Other Reaction(s): Not available    Lisinopril Cough     Other Reaction(s): Not available       Current Outpatient Medications:     amiodarone (PACERONE) 200 MG tablet, Take 1 tablet (200 mg) by mouth daily, Disp: , Rfl:     apixaban ANTICOAGULANT (ELIQUIS) 5 MG tablet, Take 1 tablet (5 mg) by mouth 2 times daily, Disp: 60 tablet, Rfl: 11    carvedilol (COREG) 25 MG tablet, Take 0.5 tablets (12.5 mg) by mouth 2 times daily, Disp: , Rfl:     cinacalcet (SENSIPAR) 60 MG tablet, Take 1 tablet by mouth daily, Disp: , Rfl:     clopidogrel (PLAVIX) 75 MG tablet, Take 1 tablet (75 mg) by mouth daily, Disp: 90 tablet, Rfl: 3    DIALYVITE 100-1 mg Tab, Take 1 tablet by mouth daily , Disp: , Rfl:     famotidine (PEPCID) 20 MG tablet, Take 1 tablet by mouth every other day, Disp: , Rfl:     ketoconazole (NIZORAL) 2 % external cream, Apply topically 2 times daily as needed for itching, Disp: , Rfl:     Multiple Vitamins-Minerals (PRESERVISION AREDS PO), Take 1 tablet by mouth 2 times daily, Disp: , Rfl:     polyethylene glycol (MIRALAX) 17 gram packet, Take 17 g by mouth daily as needed for constipation, Disp: , Rfl:     sevelamer carbonate (RENVELA) 800 MG tablet, Take 1,600 mg by mouth as needed (with snacks), Disp: , Rfl:     sevelamer carbonate (RENVELA) 800 mg tablet, Take 3,200 mg by mouth 3 times daily (with meals), Disp: , Rfl:     simvastatin (ZOCOR) 40 MG tablet, Take 1 tablet (40 mg) by mouth At Bedtime, Disp: 90 tablet, Rfl: 3    vitamin D3 (CHOLECALCIFEROL) 50 mcg (2000 units) tablet, Take 1 tablet by mouth daily, Disp: , Rfl:     Documentation Date:6/4/2024 2:50 PM  Cristela SORIANO  LB Dockery

## 2024-06-04 NOTE — Clinical Note
Can we please coordinate outpatient cardioversion when able? Orders in Epic. On amiodarone, Coreg; no med changes prior. Follow-up with Dr. Paz thereafter. Thank you
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
## 2024-06-04 NOTE — Clinical Note
Isa, pt with early recurrence post ablation requiring DCCV (AF) 4/26/2024 and was started on amiodarone. He was in atrial flutter at ED visit for anemia 5/18, confirmed by EKG today. I ordered DCCV with no med changes prior. If you have further recommendations please let me know., otherwise will follow-up with you for 3 mo post ablation. Thank you

## 2024-06-05 ENCOUNTER — TELEPHONE (OUTPATIENT)
Dept: CARDIOLOGY | Facility: CLINIC | Age: 79
End: 2024-06-05

## 2024-06-05 NOTE — TELEPHONE ENCOUNTER
Patient scheduled for DCCV 6/20/2024 and had PVI 4/18/2024. Scheduled to see Dr. Cross 8/8/2024. Will follow-up with patient on completion of appt with Dr. Cross. [facility]    ----- Message -----  From: Josie Guzman APRN CNP  Sent: 6/4/2024   2:33 PM CDT  To: Hcc Left Atrial Appendage Closure [facility], this gentleman is interested in proceeding with Watchman implant. TTE up to date. He has apt with Dr. Cross in August as well. Thank you

## 2024-06-11 ENCOUNTER — TELEPHONE (OUTPATIENT)
Dept: CARDIOLOGY | Facility: CLINIC | Age: 79
End: 2024-06-11
Payer: COMMERCIAL

## 2024-06-11 NOTE — TELEPHONE ENCOUNTER
Pre-Procedure Education    Procedure: DCCV with Josie Guzman NP on 6/20/2024 with arrival time 8:30 am    PT IS ON ELIQUIS AND NO MISSED DOSES AWARE TO CALL IF HE DOES    Orders: Orderset for procedure verified signed/held    COVID: COVID policy- if pt develops COVID like symptoms prior to procedure, he/she would need to complete an at home with a rapid antigen COVID test 1-2 days prior to your procedure date. If COVID + pt is aware the procedure will need to be rescheduled, and to contact CV scheduling as soon as possible    Pre-Op H&P: Completed- Available in Epic    Education:    PT HAS A  FOR PROCEDURE THAT WILL STAY WITH HIM    ALL INSTRUCTIONS REVIEWED WITH PT AND WIFE    PT HAS MY CHART AND  REVIEWED   PT RECEIVED PROCEDURE LETTER    PT INSTRUCTED TO HOLD ANY VITAMINS, MINERAL CALCIUM IRON OR SUPPLEMENTS THE MORNING OF CV  PT INSTRUCTED NO GUM CHEWING MINTS OR CANDY THE MORNING OF CV  [PT INSTRUCTED TO LEAVE JEWELRY AT HOME  PT INSTRUCTED  TO BATHE OR SHOWER BEFORE COMING IN  PT IS ON ELIQUIS  PT IS ON AMIODARONE  NO MEDICATION CHANGES WERE MADE  PT HAS A POST CV FOLLOW UP WITH [facility] 7/9 AND DR HUDSON 8/6     Contact: Reviewed via phone with pt and spouse/caregiver    Pre-Procedure Instruction: NPO after midnight pre procedure, Defined NPO with pt, Remove all jewelry and leave all valuables at home, Shower prior to arrival, Sedation plan/orders, Transportation requirements and arrangements post procedure, Post-procedure follow up process, Post-procedure restrictions/expectations, and Pre-procedure letter sent- letter tab  Risks:      Medication:   Instructions regarding anticoagulants: Eliquis- To continue anticoagulation uninterrupted through their procedure    Instructions regarding antiarrhythmic medication: Amiodarone; continue medication prior to procedure as prescribed    Instructions given to pt regarding diuretics medication: NA for DCCV    Instructions given to pt regarding DM/GLP-1  medication:   DM- None  GLP-1- None    Instructions for medication, other than anticoagulants and antiarrhythmics listed above, given to pt: Take all medication AM of procedure with small sips of water     Important patient information for staff:  PT IS ON AMIODARONE    6/11/2024 3:10 PM  Padmini Sanches LPN

## 2024-06-18 ENCOUNTER — OFFICE VISIT (OUTPATIENT)
Dept: FAMILY MEDICINE | Facility: CLINIC | Age: 79
End: 2024-06-18
Payer: COMMERCIAL

## 2024-06-18 VITALS
WEIGHT: 174 LBS | OXYGEN SATURATION: 98 % | RESPIRATION RATE: 20 BRPM | SYSTOLIC BLOOD PRESSURE: 137 MMHG | DIASTOLIC BLOOD PRESSURE: 62 MMHG | BODY MASS INDEX: 25.77 KG/M2 | HEIGHT: 69 IN | HEART RATE: 62 BPM | TEMPERATURE: 98 F

## 2024-06-18 DIAGNOSIS — G47.34 NOCTURNAL HYPOXIA: ICD-10-CM

## 2024-06-18 DIAGNOSIS — I50.20 HFREF (HEART FAILURE WITH REDUCED EJECTION FRACTION) (H): ICD-10-CM

## 2024-06-18 DIAGNOSIS — Z99.2 TYPE 2 DIABETES MELLITUS WITH CHRONIC KIDNEY DISEASE ON CHRONIC DIALYSIS, WITHOUT LONG-TERM CURRENT USE OF INSULIN (H): Primary | ICD-10-CM

## 2024-06-18 DIAGNOSIS — Z99.2 ESRD (END STAGE RENAL DISEASE) ON DIALYSIS (H): ICD-10-CM

## 2024-06-18 DIAGNOSIS — N18.6 TYPE 2 DIABETES MELLITUS WITH CHRONIC KIDNEY DISEASE ON CHRONIC DIALYSIS, WITHOUT LONG-TERM CURRENT USE OF INSULIN (H): Primary | ICD-10-CM

## 2024-06-18 DIAGNOSIS — R26.89 BALANCE PROBLEMS: ICD-10-CM

## 2024-06-18 DIAGNOSIS — E11.22 TYPE 2 DIABETES MELLITUS WITH CHRONIC KIDNEY DISEASE ON CHRONIC DIALYSIS, WITHOUT LONG-TERM CURRENT USE OF INSULIN (H): Primary | ICD-10-CM

## 2024-06-18 DIAGNOSIS — N18.6 ESRD (END STAGE RENAL DISEASE) ON DIALYSIS (H): ICD-10-CM

## 2024-06-18 LAB — HBA1C MFR BLD: 5 % (ref 0–5.6)

## 2024-06-18 PROCEDURE — 99214 OFFICE O/P EST MOD 30 MIN: CPT | Performed by: FAMILY MEDICINE

## 2024-06-18 PROCEDURE — G2211 COMPLEX E/M VISIT ADD ON: HCPCS | Performed by: FAMILY MEDICINE

## 2024-06-18 PROCEDURE — 36415 COLL VENOUS BLD VENIPUNCTURE: CPT | Performed by: FAMILY MEDICINE

## 2024-06-18 PROCEDURE — 83036 HEMOGLOBIN GLYCOSYLATED A1C: CPT | Performed by: FAMILY MEDICINE

## 2024-06-18 RX ORDER — RESPIRATORY SYNCYTIAL VIRUS VACCINE 120MCG/0.5
0.5 KIT INTRAMUSCULAR ONCE
Qty: 1 EACH | Refills: 0 | Status: CANCELLED | OUTPATIENT
Start: 2024-06-18 | End: 2024-06-18

## 2024-06-18 ASSESSMENT — PAIN SCALES - GENERAL: PAINLEVEL: NO PAIN (0)

## 2024-06-18 NOTE — PROGRESS NOTES
"  Assessment & Plan     Type 2 diabetes mellitus with chronic kidney disease on chronic dialysis, without long-term current use of insulin (H)  Doing well. Check A1c and notify with results.  - HEMOGLOBIN A1C; Future    ESRD (end stage renal disease) on dialysis (H)  Continue with dialysis.  I will order an overnight pulse oximeter to see if he is having hypoxia so we can order him home oxygen.  - Overnight oximetry study for DME - ONLY FOR DME; Future    HFrEF (heart failure with reduced ejection fraction) (H)  Continue following with cardiologist. I will order an overnight pulse oximeter to see if he is having hypoxia so we can order him home oxygen.  - Walker Order for DME - ONLY FOR DME  - Overnight oximetry study for DME - ONLY FOR DME; Future    Nocturnal hypoxia  I will order an overnight pulse oximeter to see if he is having hypoxia so we can order him home oxygen.  - Overnight oximetry study for DME - ONLY FOR DME; Future    Balance problems  Order four-wheel walker, cautioned on balance.  - Walker Order for DME - ONLY FOR DME      The longitudinal plan of care for the diagnosis(es)/condition(s) as documented were addressed during this visit. Due to the added complexity in care, I will continue to support García in the subsequent management and with ongoing continuity of care.      MED REC REQUIRED  Post Medication Reconciliation Status:     BMI  Estimated body mass index is 25.7 kg/m  as calculated from the following:    Height as of this encounter: 1.753 m (5' 9\").    Weight as of this encounter: 78.9 kg (174 lb).             Subjective   García is a 78 year old, presenting for the following health issues:  Hospital F/U (5/18/24 [facility] anemia, low blood pressure. Experiencing weakness, balance issues - using walker, difficulty breathing while lying down. ) and Dme (Patient uses oxygen while at dialysis, questions about having his own supply/equipment. Would like to discuss options for walkers. )        6/18/2024 "     1:09 PM   Additional Questions   Roomed by [facility], Visit Facilitator   Accompanied by Wife - Kirsten     History of Present Illness       Reason for visit:  F-up after hispital stay. Also have trouble breathing wben laying down. Have congestive heart failure. Is having ixygen at home a possibility?  Also weaker now, use a walker.  Would like a rolater request for ins. You.  Symptom onset:  More than a month  Symptoms include:  Ptessure onchest from fluid build up. Fatigue, weak, appetite not the best, dizzy, wobbly.  Symptom intensity:  Severe  Symptom progression:  Staying the same  Had these symptoms before:  Yes  What makes it better:  No    He eats 0-1 servings of fruits and vegetables daily.He consumes 0 sweetened beverage(s) daily.He exercises with enough effort to increase his heart rate 9 or less minutes per day.  He exercises with enough effort to increase his heart rate 3 or less days per week.   He is taking medications regularly.       Hospital Follow-up Visit:  Hospital/Nursing Home/IP Rehab Facility: [facility]  Date of Admission: 05/18/2024  Date of Discharge: 05/18/2024  Reason(s) for Admission: anemia  Was the patient in the ICU or did the patient experience delirium during hospitalization?  No  Do you have any other stressors you would like to discuss with your provider? No    Problems taking medications regularly:  None  Medication changes since discharge: none  Problems adhering to non-medication therapy:  None    Summary of hospitalization:  [facility] discharge summary reviewed  Diagnostic Tests/Treatments reviewed.  Follow up needed: Dialysis and cardiology appointments  Other Healthcare Providers Involved in Patient s Care:     Cardiologist and nephrologist  Update since discharge: worsened.   Plan of care communicated with patient         Patient was seen in the emergency department 3 days ago for anemia.  Hemoglobin was 7.  Patient was given 1  "PRBC, which improved his energy and ability to ambulate back to baseline.  Patient was discharged from the emergency department to home.  He does receive long-acting erythropoietin through dialysis.  Patient had hemoglobin checked yesterday and hemoglobin is at 10.7.    Patient is also currently working with a cardiologist for treatment of A flutter.  He has scheduled cardioversion in 2 days, this will be his 3rd cardioversion and he has had an ablation.  His ablation worked really well for 3 weeks and he felt much better, normal amounts of energy, walking without a walker, but unfortunately A-fib returned after 3 weeks.    Patient is having difficulty with hypoxia at night.  They have a home pulse ox that has right 88 or below.  He feels like if he lies down he is short of breath so he needs to sit up at night.  Patient and wife are requesting home oxygen to use.  He does use oxygen when doing dialysis because he feels short of breath, but wife believes it is more for comfort.    Patient has difficulty with balance and requires front wheel walker.  He is requesting a four-wheel walker so he can have a chair to sit and get back [place].          Review of Systems  No fever      Objective    /62 (BP Location: Left arm, Patient Position: Sitting, Cuff Size: Adult Regular)   Pulse 62   Temp 98  F (36.7  C) (Oral)   Resp 20   Ht 1.753 m (5' 9\")   Wt 78.9 kg (174 lb)   SpO2 98%   BMI 25.70 kg/m    Body mass index is 25.7 kg/m .  Physical Exam   GENERAL: alert and no distress  RESP: lungs clear to auscultation - no rales, rhonchi or wheezes  CV: irregularly irregular rhythm, grade 3/6 systolic murmur heard best over the right 2nd intercostal space, and no peripheral edema  PSYCH: mentation appears normal, affect normal/bright            Signed Electronically by: Riki Martinez MD    "

## 2024-06-18 NOTE — H&P (VIEW-ONLY)
"  Assessment & Plan     Type 2 diabetes mellitus with chronic kidney disease on chronic dialysis, without long-term current use of insulin (H)  Doing well. Check A1c and notify with results.  - HEMOGLOBIN A1C; Future    ESRD (end stage renal disease) on dialysis (H)  Continue with dialysis.  I will order an overnight pulse oximeter to see if he is having hypoxia so we can order him home oxygen.  - Overnight oximetry study for DME - ONLY FOR DME; Future    HFrEF (heart failure with reduced ejection fraction) (H)  Continue following with cardiologist. I will order an overnight pulse oximeter to see if he is having hypoxia so we can order him home oxygen.  - Walker Order for DME - ONLY FOR DME  - Overnight oximetry study for DME - ONLY FOR DME; Future    Nocturnal hypoxia  I will order an overnight pulse oximeter to see if he is having hypoxia so we can order him home oxygen.  - Overnight oximetry study for DME - ONLY FOR DME; Future    Balance problems  Order four-wheel walker, cautioned on balance.  - Walker Order for DME - ONLY FOR DME      The longitudinal plan of care for the diagnosis(es)/condition(s) as documented were addressed during this visit. Due to the added complexity in care, I will continue to support García in the subsequent management and with ongoing continuity of care.      MED REC REQUIRED  Post Medication Reconciliation Status:     BMI  Estimated body mass index is 25.7 kg/m  as calculated from the following:    Height as of this encounter: 1.753 m (5' 9\").    Weight as of this encounter: 78.9 kg (174 lb).             Subjective   García is a 78 year old, presenting for the following health issues:  Hospital F/U (5/18/24 [facility] anemia, low blood pressure. Experiencing weakness, balance issues - using walker, difficulty breathing while lying down. ) and Dme (Patient uses oxygen while at dialysis, questions about having his own supply/equipment. Would like to discuss options for walkers. )        6/18/2024 "     1:09 PM   Additional Questions   Roomed by [facility], Visit Facilitator   Accompanied by Wife - Kirsten     History of Present Illness       Reason for visit:  F-up after hispital stay. Also have trouble breathing wben laying down. Have congestive heart failure. Is having ixygen at home a possibility?  Also weaker now, use a walker.  Would like a rolater request for ins. You.  Symptom onset:  More than a month  Symptoms include:  Ptessure onchest from fluid build up. Fatigue, weak, appetite not the best, dizzy, wobbly.  Symptom intensity:  Severe  Symptom progression:  Staying the same  Had these symptoms before:  Yes  What makes it better:  No    He eats 0-1 servings of fruits and vegetables daily.He consumes 0 sweetened beverage(s) daily.He exercises with enough effort to increase his heart rate 9 or less minutes per day.  He exercises with enough effort to increase his heart rate 3 or less days per week.   He is taking medications regularly.       Hospital Follow-up Visit:  Hospital/Nursing Home/IP Rehab Facility: [facility]  Date of Admission: 05/18/2024  Date of Discharge: 05/18/2024  Reason(s) for Admission: anemia  Was the patient in the ICU or did the patient experience delirium during hospitalization?  No  Do you have any other stressors you would like to discuss with your provider? No    Problems taking medications regularly:  None  Medication changes since discharge: none  Problems adhering to non-medication therapy:  None    Summary of hospitalization:  [facility] discharge summary reviewed  Diagnostic Tests/Treatments reviewed.  Follow up needed: Dialysis and cardiology appointments  Other Healthcare Providers Involved in Patient s Care:     Cardiologist and nephrologist  Update since discharge: worsened.   Plan of care communicated with patient         Patient was seen in the emergency department 3 days ago for anemia.  Hemoglobin was 7.  Patient was given 1  "PRBC, which improved his energy and ability to ambulate back to baseline.  Patient was discharged from the emergency department to home.  He does receive long-acting erythropoietin through dialysis.  Patient had hemoglobin checked yesterday and hemoglobin is at 10.7.    Patient is also currently working with a cardiologist for treatment of A flutter.  He has scheduled cardioversion in 2 days, this will be his 3rd cardioversion and he has had an ablation.  His ablation worked really well for 3 weeks and he felt much better, normal amounts of energy, walking without a walker, but unfortunately A-fib returned after 3 weeks.    Patient is having difficulty with hypoxia at night.  They have a home pulse ox that has right 88 or below.  He feels like if he lies down he is short of breath so he needs to sit up at night.  Patient and wife are requesting home oxygen to use.  He does use oxygen when doing dialysis because he feels short of breath, but wife believes it is more for comfort.    Patient has difficulty with balance and requires front wheel walker.  He is requesting a four-wheel walker so he can have a chair to sit and get [place].          Review of Systems  No fever      Objective    /62 (BP Location: Left arm, Patient Position: Sitting, Cuff Size: Adult Regular)   Pulse 62   Temp 98  F (36.7  C) (Oral)   Resp 20   Ht 1.753 m (5' 9\")   Wt 78.9 kg (174 lb)   SpO2 98%   BMI 25.70 kg/m    Body mass index is 25.7 kg/m .  Physical Exam   GENERAL: alert and no distress  RESP: lungs clear to auscultation - no rales, rhonchi or wheezes  CV: irregularly irregular rhythm, grade 3/6 systolic murmur heard best over the right 2nd intercostal space, and no peripheral edema  PSYCH: mentation appears normal, affect normal/bright            Signed Electronically by: Riki Martinez MD    "

## 2024-06-20 ENCOUNTER — HOSPITAL ENCOUNTER (OUTPATIENT)
Dept: CARDIOLOGY | Facility: HOSPITAL | Age: 79
Discharge: HOME | End: 2024-06-20
Attending: NURSE PRACTITIONER | Admitting: NURSE PRACTITIONER
Payer: COMMERCIAL

## 2024-06-20 ENCOUNTER — ANESTHESIA (OUTPATIENT)
Dept: CARDIOLOGY | Facility: HOSPITAL | Age: 79
End: 2024-06-20
Payer: COMMERCIAL

## 2024-06-20 ENCOUNTER — ANESTHESIA EVENT (OUTPATIENT)
Dept: CARDIOLOGY | Facility: HOSPITAL | Age: 79
End: 2024-06-20
Payer: COMMERCIAL

## 2024-06-20 VITALS
TEMPERATURE: 97.7 F | WEIGHT: 170.4 LBS | RESPIRATION RATE: 20 BRPM | OXYGEN SATURATION: 100 % | SYSTOLIC BLOOD PRESSURE: 132 MMHG | DIASTOLIC BLOOD PRESSURE: 62 MMHG | HEIGHT: 69 IN | BODY MASS INDEX: 25.24 KG/M2 | HEART RATE: 69 BPM

## 2024-06-20 DIAGNOSIS — I48.19 PERSISTENT ATRIAL FIBRILLATION (H): ICD-10-CM

## 2024-06-20 LAB
ANION GAP SERPL CALCULATED.3IONS-SCNC: 12 MMOL/L (ref 7–15)
ATRIAL RATE - MUSE: 72 BPM
BUN SERPL-MCNC: 28.4 MG/DL (ref 8–23)
CALCIUM SERPL-MCNC: 9.5 MG/DL (ref 8.8–10.2)
CHLORIDE SERPL-SCNC: 103 MMOL/L (ref 98–107)
CREAT SERPL-MCNC: 4.56 MG/DL (ref 0.67–1.17)
DEPRECATED HCO3 PLAS-SCNC: 25 MMOL/L (ref 22–29)
DIASTOLIC BLOOD PRESSURE - MUSE: NORMAL MMHG
EGFRCR SERPLBLD CKD-EPI 2021: 13 ML/MIN/1.73M2
GLUCOSE SERPL-MCNC: 97 MG/DL (ref 70–99)
INTERPRETATION ECG - MUSE: NORMAL
P AXIS - MUSE: 42 DEGREES
POTASSIUM SERPL-SCNC: 4.3 MMOL/L (ref 3.4–5.3)
PR INTERVAL - MUSE: 228 MS
QRS DURATION - MUSE: 102 MS
QT - MUSE: 422 MS
QTC - MUSE: 462 MS
R AXIS - MUSE: 35 DEGREES
SODIUM SERPL-SCNC: 140 MMOL/L (ref 135–145)
SYSTOLIC BLOOD PRESSURE - MUSE: NORMAL MMHG
T AXIS - MUSE: -88 DEGREES
VENTRICULAR RATE- MUSE: 72 BPM

## 2024-06-20 PROCEDURE — 92960 CARDIOVERSION ELECTRIC EXT: CPT

## 2024-06-20 PROCEDURE — 93010 ELECTROCARDIOGRAM REPORT: CPT | Performed by: INTERNAL MEDICINE

## 2024-06-20 PROCEDURE — 93005 ELECTROCARDIOGRAM TRACING: CPT

## 2024-06-20 PROCEDURE — 250N000009 HC RX 250: Performed by: NURSE ANESTHETIST, CERTIFIED REGISTERED

## 2024-06-20 PROCEDURE — 36415 COLL VENOUS BLD VENIPUNCTURE: CPT | Performed by: NURSE PRACTITIONER

## 2024-06-20 PROCEDURE — 92960 CARDIOVERSION ELECTRIC EXT: CPT | Performed by: NURSE PRACTITIONER

## 2024-06-20 PROCEDURE — 999N000054 HC STATISTIC EKG NON-CHARGEABLE

## 2024-06-20 PROCEDURE — 370N000017 HC ANESTHESIA TECHNICAL FEE, PER MIN

## 2024-06-20 PROCEDURE — 80048 BASIC METABOLIC PNL TOTAL CA: CPT | Performed by: ANESTHESIOLOGY

## 2024-06-20 RX ORDER — METHOHEXITAL IN WATER/PF 100MG/10ML
SYRINGE (ML) INTRAVENOUS PRN
Status: DISCONTINUED | OUTPATIENT
Start: 2024-06-20 | End: 2024-06-20

## 2024-06-20 RX ORDER — LIDOCAINE 40 MG/G
CREAM TOPICAL
Status: DISCONTINUED | OUTPATIENT
Start: 2024-06-20 | End: 2024-06-20 | Stop reason: HOSPADM

## 2024-06-20 RX ADMIN — Medication 70 MG: at 10:14

## 2024-06-20 ASSESSMENT — ACTIVITIES OF DAILY LIVING (ADL)
ADLS_ACUITY_SCORE: 38

## 2024-06-20 ASSESSMENT — ENCOUNTER SYMPTOMS: DYSRHYTHMIAS: 1

## 2024-06-20 ASSESSMENT — LIFESTYLE VARIABLES: TOBACCO_USE: 1

## 2024-06-20 NOTE — ANESTHESIA CARE TRANSFER NOTE
Patient: García Kitchen    Procedure: * No procedures listed *  Cardioversion External    Diagnosis: * No pre-op diagnosis entered *  Diagnosis Additional Information: No value filed.    Anesthesia Type:   General     Note:    Oropharynx: oropharynx clear of all foreign objects and spontaneously breathing  Level of Consciousness: drowsy  Oxygen Supplementation: nasal cannula  Level of Supplemental Oxygen (L/min / FiO2): 4  Independent Airway: airway patency satisfactory and stable  Dentition: dentition unchanged  Vital Signs Stable: post-procedure vital signs reviewed and stable  Report to RN Given: handoff report given  Patient transferred to: Cardiac Special Care      Vitals:  Vitals Value Taken Time   /63 06/20/24 1020   Temp     Pulse 69 06/20/24 1021   Resp 19 06/20/24 1021   SpO2 100 % 06/20/24 1021   Vitals shown include unfiled device data.    Electronically Signed By: KARLI Norton CRNA  June 20, 2024  10:23 AM

## 2024-06-20 NOTE — INTERVAL H&P NOTE
"I have reviewed the surgical (or preoperative) H&P that is linked to this encounter, and examined the patient. There are no significant changes    Clinical Conditions Present on Arrival:  Clinically Significant Risk Factors Present on Admission                # Drug Induced Coagulation Defect: home medication list includes an anticoagulant medication  # Drug Induced Platelet Defect: home medication list includes an antiplatelet medication      # Overweight: Estimated body mass index is 25.16 kg/m  as calculated from the following:    Height as of this encounter: 1.753 m (5' 9\").    Weight as of this encounter: 77.3 kg (170 lb 6.4 oz).       "

## 2024-06-20 NOTE — DISCHARGE INSTRUCTIONS
Sedation: Care Instructions  Overview     Sedation is the use of medicine to help you feel relaxed and comfortable during a procedure. The medicine is usually given in a vein (by I.V.). It may be used with numbing medicines.  There are different levels of sedation. They range from being awake but relaxed to being completely unconscious. Which level you have will depend on the procedure and your needs. You will be watched closely by a doctor or nurse during sedation.  Common side effects from sedation include:  Feeling sleepy or tired. (Your doctors and nurses will make sure you aren't too sleepy to go home.)  Feeling dizzy or unsteady.  Follow-up care is a key part of your treatment and safety. Be sure to make and go to all appointments, and call your doctor if you are having problems. It's also a good idea to know your test results and keep a list of the medicines you take.  How can you care for yourself at home?  Activity    Don't do anything that requires attention to detail until you recover. This includes going to work or school, making important decisions, and signing any legal documents. It takes time for the medicine effects to completely wear off.     For at least 24 hours, do not drive or operate any machinery.     When you get home, make sure to rest until the anesthesia has worn off. Some people will feel drowsy or dizzy for up to a few hours after leaving the hospital.     Take your time and walk slowly. Sudden changes in position may cause nausea.     Rest when you feel tired. Getting enough sleep will help you recover.     If you have sleep apnea and you have a CPAP machine, be sure to use it.   Diet    Don't drink alcohol for 24 hours.     You can eat your normal diet, unless your doctor gives you other instructions. If your stomach is upset, try clear liquids and bland, low-fat foods like plain toast or rice.     Drink plenty of fluids (unless your doctor tells you not to).   When should you call  "for help?   Call 911 anytime you think you may need emergency care. For example, call if:    You have trouble breathing.     You passed out (lost consciousness).   Call your doctor now or seek immediate medical care if:    You have nausea or vomiting that gets worse or won't stop.     You have a fever.     You have a new or worse headache.     The medicine is not wearing off and you can't think clearly.   Watch closely for changes in your health, and be sure to contact your doctor if:    You do not get better as expected.   Where can you learn more?  Go to https://www.[employer].net/patiented  Enter G817 in the search box to learn more about \"Sedation: Care Instructions.\"  Current as of: June 24, 2023               Content Version: 14.0    4449-7054 [employer].   Care instructions adapted under license by your healthcare professional. If you have questions about a medical condition or this instruction, always ask your healthcare professional. [employer] disclaims any warranty or liability for your use of this information.    Cardioversion  Cardioversion is a procedure to restore your heart's normal rhythm from a fast or irregular rhythm (arrhythmia) in the top or bottom chambers of your heart. You may have the procedure in a hospital or surgery center. It's often done on an outpatient (same day) basis. During the procedure, your doctor will give you medication to keep you free from pain. Then the doctor gives you a brief electric shock. This helps your heartbeat become normal again. In most cases, you can go home within hours of the procedure.    Before Your Procedure  Tell your doctor what over-the-counter and prescription medications, herbs, and supplements you are taking.  Take medication as directed. Your doctor may prescribe anticoagulants (blood thinners), depending on your situation. They help prevent blood clots from forming.  Ask your doctor about the risks and benefits of " cardioversion.  Sign your consent form.  Don t eat or drink anything for 8  hours before your procedure.  Follow any other instructions your doctor gives you.   Arrange for an adult to drive you home after the procedure.     During Your Procedure  Your health care provider will place small pads (electrodes) on your chest to record your heartbeat at all times.  Your health care provider will place an intravenous (IV) line in your arm. This gives you medication (sedation) that keeps you free of pain. You ll feel sleepy.      After Your Procedure  Your health care provider will monitor you until you are fully awake. Then you ll be able to sit up, walk, and eat.  In most cases, you ll be able to go home after the sedation wears off. This usually takes a few hours.  For a few days, the skin on your chest may feel a little sore, like a mild sunburn.  DO NOT  drive or operate heavy machinery for 24 hours after the procedure.  The day after your procedure, try to take it easy. Take medication as directed.  Call your doctor if you notice skipped beats, a rapid heartbeat, or chest tightness. These may be signs that an irregular heartbeat has returned.
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
## 2024-06-20 NOTE — PROCEDURES
[facility]    Procedure: Cardioversion    Date/Time: 6/20/2024 11:17 AM    Performed by: Josie Guzman APRN CNP  Authorized by: Josie Guzman APRN CNP      UNIVERSAL PROTOCOL   Site Marked: NA  Prior Images Obtained and Reviewed:  Yes  Required items: Required blood products, implants, devices and special equipment available    Patient identity confirmed:  Verbally with patient, arm band, provided demographic data and hospital-assigned identification number  Patient was reevaluated immediately before administering moderate or deep sedation or anesthesia  Confirmation Checklist:  Patient's identity using two indicators, relevant allergies, procedure was appropriate and matched the consent or emergent situation and correct equipment/implants were available  Time out: Immediately prior to the procedure a time out was called    Universal Protocol: the Joint Commission Universal Protocol was followed       ANESTHESIA    Anesthesia was administered and monitored by anesthesiology.  See anesthesia documentation for details.    SEDATION  Patient Sedated: Yes    Vital signs: Vital signs monitored during sedation      PROCEDURE DETAILS  Cardioversion basis: elective  Pre-procedure rhythm: atrial fibrillation  Patient position: patient was placed in a supine position  Chest area: chest area exposed  Electrodes: pads  Electrodes placed: anterior-posterior  Number of attempts: 1    Details of Attempts:  At 1021 after administration of IV Brevital by MDA and confirmation of adequate sedation he received a single synchronous shock at 200 J with prompt restoration of sinus rhythm  Post-procedure rhythm: normal sinus rhythm  Complications: no complications      PROCEDURE  Describe Procedure: Status post RF PVI and PWI 4/18/2024. He was hospitalized 4/24/2024 after developing progressive confusion after dialysis, found to have recurrent atrial fibrillation and possible pneumonia. CT was unremarkable  for atrial/esophageal complications.  He underwent DCCV 4/26/2024 and was started on oral amiodarone thereafter, with recurrent atrial flutter 1 month afterward detected at ED evaluation for anemia.  Successful cardioversion to sinus rhythm today.  Post cardioversion EKG sinus rhythm with atrial ectopy 72 bpm, AV delay  -Continue amiodarone 200 mg daily  -Continue Eliquis 5 mg twice a day for stroke prophylaxis pending further discussion of percutaneous left atrial appendage closure  -Follow up as previously outlined  Patient Tolerance:  Patient tolerated the procedure well with no immediate complications

## 2024-06-20 NOTE — PLAN OF CARE
Goal Outcome Evaluation:             Pt admitted for a cardioversion due to his atrial fibrillation. Pt converted to NSR with PVC after 200J shock. Discharge instructions given to pt and wife. Pt discharged home with wife.    Mira Anderson RN

## 2024-06-20 NOTE — ANESTHESIA POSTPROCEDURE EVALUATION
Patient: García Kitchen    Procedure: * No procedures listed *  Cardioversion External    Anesthesia Type:  General    Note:  Disposition: Admission   Postop Pain Control: Uneventful            Sign Out: Well controlled pain   PONV: No   Neuro/Psych: Uneventful            Sign Out: Acceptable/Baseline neuro status   Airway/Respiratory: Uneventful            Sign Out: Acceptable/Baseline resp. status   CV/Hemodynamics: Uneventful            Sign Out: Acceptable CV status; No obvious hypovolemia; No obvious fluid overload   Other NRE: NONE   DID A NON-ROUTINE EVENT OCCUR? No       Last vitals:  Vitals:    06/20/24 1024 06/20/24 1026 06/20/24 1030   BP: 124/60 119/57 120/57   Pulse: 67 67 69   Resp: 14 14 19   Temp:      SpO2: 100% 100% 100%       Electronically Signed By: Shamir Greer MD  June 20, 2024  10:45 AM

## 2024-06-27 ENCOUNTER — PATIENT OUTREACH (OUTPATIENT)
Dept: CARE COORDINATION | Facility: CLINIC | Age: 79
End: 2024-06-27
Payer: COMMERCIAL

## 2024-07-03 ENCOUNTER — TELEPHONE (OUTPATIENT)
Dept: FAMILY MEDICINE | Facility: CLINIC | Age: 79
End: 2024-07-03
Payer: COMMERCIAL

## 2024-07-03 NOTE — TELEPHONE ENCOUNTER
Home Care is calling regarding an established patient with [facility].    Requesting orders from: Riki Martinez  Provider is following patient: Yes  Is this a 60-day recertification request?  Yes    Orders Requested    Physical Therapy  Request for recertification   Frequency:  Every other week for 8 weeks.       Information was gathered and will be sent to provider for review.  RN will contact Home Care with information after provider review.  Confirmed ok to leave a detailed message with call back.  Contact information confirmed and updated as needed.    Tsering Nava RN

## 2024-07-03 NOTE — TELEPHONE ENCOUNTER
Writer left voicemail for home care. Writer relayed approval of home care orders by covering provider as requested.     Nevaeh Garcia RN

## 2024-07-08 ENCOUNTER — MEDICAL CORRESPONDENCE (OUTPATIENT)
Dept: HEALTH INFORMATION MANAGEMENT | Facility: CLINIC | Age: 79
End: 2024-07-08

## 2024-07-08 PROBLEM — N28.9 KIDNEY DISORDER: Status: RESOLVED | Noted: 2020-02-19 | Resolved: 2024-07-08

## 2024-07-08 PROBLEM — I25.5 ISCHEMIC CARDIOMYOPATHY: Status: ACTIVE | Noted: 2024-07-08
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
## 2024-07-09 ENCOUNTER — OFFICE VISIT (OUTPATIENT)
Dept: CARDIOLOGY | Facility: CLINIC | Age: 79
End: 2024-07-09
Attending: NURSE PRACTITIONER
Payer: COMMERCIAL

## 2024-07-09 VITALS
BODY MASS INDEX: 24.88 KG/M2 | RESPIRATION RATE: 16 BRPM | SYSTOLIC BLOOD PRESSURE: 138 MMHG | DIASTOLIC BLOOD PRESSURE: 58 MMHG | WEIGHT: 168 LBS | HEART RATE: 76 BPM | HEIGHT: 69 IN

## 2024-07-09 DIAGNOSIS — I50.22 HEART FAILURE WITH MILDLY REDUCED EJECTION FRACTION (HFMREF) (H): Primary | ICD-10-CM

## 2024-07-09 DIAGNOSIS — I10 ESSENTIAL HYPERTENSION WITH GOAL BLOOD PRESSURE LESS THAN 140/90: Chronic | ICD-10-CM

## 2024-07-09 DIAGNOSIS — I25.119 CORONARY ARTERY DISEASE INVOLVING NATIVE CORONARY ARTERY OF NATIVE HEART WITH ANGINA PECTORIS (H): ICD-10-CM

## 2024-07-09 DIAGNOSIS — N18.6 ESRD (END STAGE RENAL DISEASE) ON DIALYSIS (H): ICD-10-CM

## 2024-07-09 DIAGNOSIS — I25.5 ISCHEMIC CARDIOMYOPATHY: ICD-10-CM

## 2024-07-09 DIAGNOSIS — Z99.2 ESRD (END STAGE RENAL DISEASE) ON DIALYSIS (H): ICD-10-CM

## 2024-07-09 DIAGNOSIS — I48.19 PERSISTENT ATRIAL FIBRILLATION (H): ICD-10-CM

## 2024-07-09 PROCEDURE — G2211 COMPLEX E/M VISIT ADD ON: HCPCS | Performed by: NURSE PRACTITIONER

## 2024-07-09 PROCEDURE — 99214 OFFICE O/P EST MOD 30 MIN: CPT | Performed by: NURSE PRACTITIONER
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
## 2024-07-09 NOTE — PATIENT INSTRUCTIONS
García Kitchen,    It was a pleasure to see you today at the [facility].     My recommendations after this visit include:    - No medications changes made today    - Schedule limited ECHO in around August 6th    -Limit salt intake to < 2000 mg/day, daily weight monitoring, and maintain adequate fluid intake at 50 to 60 ounces per day    -Please call if you experience persistent weight gain 2 to 3 pounds 2 days in a row or 5 pounds in a week with shortness of breath, abdominal bloating and leg swelling    - Please call Heart Failure Nurse Line at [phone], if you have any questions or concerns

## 2024-07-11 ENCOUNTER — PATIENT OUTREACH (OUTPATIENT)
Dept: CARE COORDINATION | Facility: CLINIC | Age: 79
End: 2024-07-11
Payer: COMMERCIAL

## 2024-07-12 DIAGNOSIS — Z53.9 DIAGNOSIS NOT YET DEFINED: Primary | ICD-10-CM

## 2024-07-12 PROCEDURE — G0179 MD RECERTIFICATION HHA PT: HCPCS | Performed by: FAMILY MEDICINE

## 2024-08-06 ENCOUNTER — OFFICE VISIT (OUTPATIENT)
Dept: CARDIOLOGY | Facility: CLINIC | Age: 79
End: 2024-08-06
Payer: COMMERCIAL

## 2024-08-06 ENCOUNTER — HOSPITAL ENCOUNTER (OUTPATIENT)
Dept: CARDIOLOGY | Facility: HOSPITAL | Age: 79
Discharge: HOME | End: 2024-08-06
Attending: NURSE PRACTITIONER | Admitting: NURSE PRACTITIONER
Payer: COMMERCIAL

## 2024-08-06 VITALS
HEIGHT: 69 IN | DIASTOLIC BLOOD PRESSURE: 50 MMHG | RESPIRATION RATE: 16 BRPM | SYSTOLIC BLOOD PRESSURE: 114 MMHG | HEART RATE: 74 BPM | WEIGHT: 161 LBS | BODY MASS INDEX: 23.85 KG/M2

## 2024-08-06 DIAGNOSIS — I48.19 PERSISTENT ATRIAL FIBRILLATION (H): ICD-10-CM

## 2024-08-06 DIAGNOSIS — I50.22 HEART FAILURE WITH MILDLY REDUCED EJECTION FRACTION (HFMREF) (H): ICD-10-CM

## 2024-08-06 LAB — LVEF ECHO: NORMAL

## 2024-08-06 PROCEDURE — 255N000002 HC RX 255 OP 636: Performed by: NURSE PRACTITIONER

## 2024-08-06 PROCEDURE — 99214 OFFICE O/P EST MOD 30 MIN: CPT | Performed by: INTERNAL MEDICINE

## 2024-08-06 PROCEDURE — G2211 COMPLEX E/M VISIT ADD ON: HCPCS | Performed by: INTERNAL MEDICINE

## 2024-08-06 PROCEDURE — 93306 TTE W/DOPPLER COMPLETE: CPT | Mod: 26 | Performed by: INTERNAL MEDICINE

## 2024-08-06 RX ADMIN — PERFLUTREN 3 ML: 6.52 INJECTION, SUSPENSION INTRAVENOUS at 10:45

## 2024-08-06 NOTE — PATIENT INSTRUCTIONS
" [facility]  Cardiac Electrophysiology  [address]  [place] [address]   Office: [phone]  Fax: [phone]       Thank you for seeing us in clinic today - it is a pleasure to be a part of your care team.  Below is a summary of our plan from today's visit.      Continue current meds  We will call you to schedule the \"Watchman\" procedure    Please do not hesitate to be in touch with our office at [phone] with any questions that may arise.      Thank you for trusting us with your care,    Israel Paz MD  Clinical Cardiac Electrophysiology  [facility]  [address]  [place] [address]   Office: [phone]  Fax: [phone]    "
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
## 2024-08-08 ENCOUNTER — OFFICE VISIT (OUTPATIENT)
Dept: CARDIOLOGY | Facility: CLINIC | Age: 79
End: 2024-08-08
Attending: NURSE PRACTITIONER
Payer: COMMERCIAL

## 2024-08-08 VITALS
WEIGHT: 163 LBS | SYSTOLIC BLOOD PRESSURE: 138 MMHG | HEART RATE: 68 BPM | BODY MASS INDEX: 24.07 KG/M2 | RESPIRATION RATE: 16 BRPM | DIASTOLIC BLOOD PRESSURE: 52 MMHG

## 2024-08-08 DIAGNOSIS — I10 ESSENTIAL HYPERTENSION WITH GOAL BLOOD PRESSURE LESS THAN 140/90: ICD-10-CM

## 2024-08-08 DIAGNOSIS — Z99.2 ESRD (END STAGE RENAL DISEASE) ON DIALYSIS (H): ICD-10-CM

## 2024-08-08 DIAGNOSIS — I50.20 HFREF (HEART FAILURE WITH REDUCED EJECTION FRACTION) (H): ICD-10-CM

## 2024-08-08 DIAGNOSIS — I48.19 PERSISTENT ATRIAL FIBRILLATION (H): ICD-10-CM

## 2024-08-08 DIAGNOSIS — N18.6 ESRD (END STAGE RENAL DISEASE) ON DIALYSIS (H): ICD-10-CM

## 2024-08-08 DIAGNOSIS — I25.5 ISCHEMIC CARDIOMYOPATHY: Primary | ICD-10-CM

## 2024-08-08 DIAGNOSIS — I25.119 CORONARY ARTERY DISEASE INVOLVING NATIVE CORONARY ARTERY OF NATIVE HEART WITH ANGINA PECTORIS (H): ICD-10-CM

## 2024-08-08 PROCEDURE — 99214 OFFICE O/P EST MOD 30 MIN: CPT | Performed by: INTERNAL MEDICINE
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
## 2024-08-08 NOTE — PATIENT INSTRUCTIONS
It was a pleasure to meet with you today.      Below is a summary of your visit.   I agree that proceeding with a Watchman left atrial appendage closure is a good idea.  Continue your medications without changes.  Follow up with me in 6 months     Please do not hesitate to call the [facility] with any questions or concerns at [phone]. You can also reach my nurse, Angy, at [phone].    Sincerely,

## 2024-08-09 ENCOUNTER — TELEPHONE (OUTPATIENT)
Dept: CARDIOLOGY | Facility: CLINIC | Age: 79
End: 2024-08-09
Payer: COMMERCIAL

## 2024-08-09 DIAGNOSIS — I48.19 PERSISTENT ATRIAL FIBRILLATION (H): Primary | ICD-10-CM

## 2024-08-09 NOTE — TELEPHONE ENCOUNTER
H&P:  Card OV  Date:  8/08/24 Jimmy Cross  Structural ALE [] LAAC  Order Case Req Y  Order Set: to be placed on completion of pre-op Intra-Op ONEIDA Order? Y 3 month post-LAAC imaging order? Y     AC Eliquis   Antiplatelet Plavix-Continue   DM/GLP-1 DM Meds- None  GLP-1- None   ED Meds NONE - NONE     García Kitchen, 1945, [id number]  Home:[phone] (home) Cell:[phone] (mobile)  Emergency Contact: Kirsten Kitchen   PCP: Riki Martinez, [phone]    Important patient information for CSC/Cath Lab staff : None    LAAC Cath Lab Procedure Order   LAAC Type:  [facility]-WM  Implanting Provider: Next available (no preference)  Date Ordered and Prepped: 8/9/2024 Rosa Jacques RN    Scheduling Information:  Anticipated Case Per Day:  Standard WM (4 cases per day)   Scheduling Timeframe:  Next Available  Scheduling Restrictions: Patient has Heart Failure.   Shared Decision Making Completed?: Done 8/08/24 by Dr Cross  Current Device/Device Co Needed for Procedure:  None - None  Intra-Op ONEIDA needed?: Yes, order placed  Anesthesia: General Anesthesia  Overnight stay required?:[facility] - WM case, no overnight stay anticipated      [facility] EP Cath Lab Prep   H&P:  Completed by cardiology on 8/08/24 (Dr Cross) if scheduled within 30 days, pt will need RN education and Labs appt within 3 days of procedure. If pts LAAC scheduled outside of 30 days, pt to schedule H&P with Structural ALE, RN Teach, and Labs within 30 days of LAAC  Pre-op Labs: CBC, BMP, Lab 276 (T&S), and INR if on Warfarin, if not completed at pre-op H&P within 7 days of procedure.  Medical Records Pertinent for Procedure:   NA, CT Pulm Vein NA, and ONEIDA NA  Iodinated Contrast Dye Allergies (Does not include Shellfish, Egg, and/or Iodine Allergy): No known allergy to contrast  GLP-1 Protocol: If on Dulaglutide (Trulicity) (weekly)- Injection hold 7 days prior to procedure  , Exenatide extended release (Bydureon bcise) (weekly)- Injection hold 7 days prior to procedure,  Exenatide (Byetta) (twice daily)- Oral Tablet hold day prior and morning of procedure and for Injection hold 7 days prior to procedure, Semaglutide (Ozempic) (weekly)- Injection and Oral hold 7 days prior to procedure, Liraglutide (Victoza, Saxenda) (daily)- Injection hold day prior and morning of procedure  Post-LAAC RN Phone Call: POLO - : please place on LAAC RN schedule for the day following LAAC, time based on case order  Post-LAAC Follow Up Plan: All patients, regardless of vendor, to be seen at 45 days post-LAAC with Structural ALE in clinic  Post-LAAC Imaging?: ONEIDA at 3 months (try to align 45 day office visit as H&P for ONEIDA), and again at one year post-implant.      Allergies   Allergen Reactions    Codeine Nausea and Vomiting     Other Reaction(s): Not available    Lisinopril Cough     Other Reaction(s): Not available       Current Outpatient Medications:     apixaban ANTICOAGULANT (ELIQUIS) 5 MG tablet, Take 1 tablet (5 mg) by mouth 2 times daily, Disp: 60 tablet, Rfl: 11    carvedilol (COREG) 25 MG tablet, Take 0.5 tablets (12.5 mg) by mouth 2 times daily (Patient taking differently: Take 12.5 mg by mouth 2 times daily Mon, wed, fri only taking 1 pill, all other days 0.5 a tab), Disp: , Rfl:     cinacalcet (SENSIPAR) 60 MG tablet, Take 1 tablet by mouth daily, Disp: , Rfl:     clopidogrel (PLAVIX) 75 MG tablet, Take 1 tablet (75 mg) by mouth daily, Disp: 90 tablet, Rfl: 3    DIALYVITE 100-1 mg Tab, Take 1 tablet by mouth daily , Disp: , Rfl:     famotidine (PEPCID) 20 MG tablet, Take 1 tablet by mouth every other day (Patient not taking: Reported on 7/9/2024), Disp: , Rfl:     ketoconazole (NIZORAL) 2 % external cream, Apply topically 2 times daily as needed for itching (Patient not taking: Reported on 7/9/2024), Disp: , Rfl:     Multiple Vitamins-Minerals (PRESERVISION AREDS PO), Take 1 tablet by mouth 2 times daily, Disp: , Rfl:     polyethylene glycol (MIRALAX) 17 gram packet, Take 17 g by mouth  daily as needed for constipation, Disp: , Rfl:     sevelamer carbonate (RENVELA) 800 mg tablet, Take 3,200 mg by mouth 3 times daily (with meals), Disp: , Rfl:     simvastatin (ZOCOR) 40 MG tablet, Take 1 tablet (40 mg) by mouth At Bedtime, Disp: 90 tablet, Rfl: 3    vitamin D3 (CHOLECALCIFEROL) 50 mcg (2000 units) tablet, Take 1 tablet by mouth daily, Disp: , Rfl:     Documentation Date:8/9/2024 11:26 AM  Rosa Jacques, RN

## 2024-08-23 DIAGNOSIS — I48.19 PERSISTENT ATRIAL FIBRILLATION (H): Primary | ICD-10-CM

## 2024-08-27 ENCOUNTER — TELEPHONE (OUTPATIENT)
Dept: CARDIOLOGY | Facility: CLINIC | Age: 79
End: 2024-08-27

## 2024-08-27 NOTE — TELEPHONE ENCOUNTER
Patient currently scheduled for LAAC this week 8/29/2024. Patient receives HD MWF with Dr. Geronimo Caballero of [facility]. Phone call to office of Dr. Caballero ([phone]). Spoke to LB Alejandre and discussed patient plan of care, including patient procedure scheduled for Thursday requiring administration of IV contrast. She will discuss with Dr. Caballero and give writer a call back. Provided her with direct office number. No further questions at this time. [facility]   Physical Therapy Daily Treatment      Visit Count: 2 of planned 16 (visits) to 4/22/17 (date)  Authorization Status: NA  Next Referring Provider Visit: 3/23/17    Initial Evaluation Date: 2/22/17  Referred by:  MANAN Ruggiero  Medical Diagnosis (from order):  M17.11 Primary osteoarthritis of right knee  M25.561 Acute pain of right knee   Primary Insurance: MEDICARE  Secondary Insurance: N/A    Date of Onset: new onset; December 2016   Diagnosis Precautions: none  Relevant co-morbidities and medications: left knee pain   Relevant Tests: Relevant diagnostic tests: plain film radiograph     SUBJECTIVE   Swelling continues when she is on her feet for long periods of time. Can't do the wall squat exercise due to weakness and joint noise is bothersome.  Current Pain: 4/10.    Functional Change: none reported    OBJECTIVE       Treatment   Therapeutic Exercise: right knee  Nu step 8 min Seat 9 arms 10 level 5  Multi hip abduction 12# x 10, extension 24# x 10 B  PROM right hip flexion, piriformis, 90/90  STM to lateral hip and IT band in left sidelying; PROM hip extension and Obers stretch  Instructed in standing TKE, clamshells in sitting and supine    Current Home Program (not performed this date except as noted above):   stretching to hip flexors, quads and hamstrings; hip abduction, SLR, bridge and wall squats for strengthening.       ASSESSMENT   Tight/weak hip continues to affect ADL's.    Pain after treatment: 4/10  Result of above outlined education: Verbalizes understanding and Needs reinforcement    Goals:       To be obtained by end of this plan of care:  1. Patient independent with modified and progressed home exercise program.  2. Patient will decrease involved knee pain to 0-4/10 to aid in ambulating on level and unlevel surfaces, completing transfers including low and soft surfaces, dressing, completion of household tasks for independent living, age appropriate activities, sitting/standing to cook/eat a  meal and squatting for lifting for household independent living.   3. Patient will increase involved knee active range of motion to 5-115° to aid in ambulating on level and unlevel surfaces, reciprocal stair ambulation, completing transfers including low and soft surfaces, dressing, completion of household tasks for independent living, age appropriate activities, sitting/standing to cook/eat a meal and squatting for lifting for household independent living.   Patient will increase involved knee strength to within functional limits to aid in ambulating on level and unlevel surfaces, reciprocal stair ambulation, completing transfers including low and soft surfaces, dressing, completion of household tasks for independent living, age appropriate activities and squatting for lifting for household independent living.     PLAN   As above, add leg press next.     THERAPY DAILY BILLING   Primary Insurance: MEDICARE  Secondary Insurance: N/A    Evaluation Procedures:  No evaluation codes were used on this date of service    Timed Procedures:  Manual Therapy, 10 minutes  Therapeutic Exercise, 30 minutes    Untimed Procedures:  No untimed codes were used on this date of service    Total Treatment Time: 40 minutes    G-Code:  G-Code Score ABN form  reporting not required this treatment session  Modifier based on outcome measure(s)/functional testing/clinical judgement as listed above    Certification from: 2/22/17 through: 4/22/17.  I certify the need for these services, furnished under this plan of treatment and while under my care  Physician Signature on file.

## 2024-08-27 NOTE — TELEPHONE ENCOUNTER
Voicemail left for writer from LB Alejandre with Dr. Caballero's office. No concerns for IV contrast for LAAC procedure, patient ok to run regular scheduled treatment without additional runs/orders. DOLORES

## 2024-08-27 NOTE — TELEPHONE ENCOUNTER
Unfortunately due to multiple illnesses within clinic/hospital, patient procedure will be cancelled and rescheduled. Phone call to patient to notify, to which they agree. Will have scheduling contact them to arrange. No further questions at this time. DOLORES

## 2024-08-28 LAB
ABO/RH(D): NORMAL
ANTIBODY SCREEN: NEGATIVE
SPECIMEN EXPIRATION DATE: NORMAL

## 2024-08-29 ENCOUNTER — DOCUMENTATION ONLY (OUTPATIENT)
Dept: CARDIOLOGY | Facility: CLINIC | Age: 79
End: 2024-08-29

## 2024-08-29 ENCOUNTER — LAB (OUTPATIENT)
Dept: CARDIOLOGY | Facility: CLINIC | Age: 79
End: 2024-08-29
Payer: COMMERCIAL

## 2024-08-29 ENCOUNTER — ALLIED HEALTH/NURSE VISIT (OUTPATIENT)
Dept: CARDIOLOGY | Facility: CLINIC | Age: 79
End: 2024-08-29
Payer: COMMERCIAL

## 2024-08-29 ENCOUNTER — OFFICE VISIT (OUTPATIENT)
Dept: CARDIOLOGY | Facility: CLINIC | Age: 79
End: 2024-08-29
Payer: COMMERCIAL

## 2024-08-29 ENCOUNTER — PREP FOR PROCEDURE (OUTPATIENT)
Dept: CARDIOLOGY | Facility: CLINIC | Age: 79
End: 2024-08-29

## 2024-08-29 VITALS
HEIGHT: 69 IN | WEIGHT: 160 LBS | HEART RATE: 75 BPM | DIASTOLIC BLOOD PRESSURE: 48 MMHG | RESPIRATION RATE: 16 BRPM | SYSTOLIC BLOOD PRESSURE: 110 MMHG | BODY MASS INDEX: 23.7 KG/M2

## 2024-08-29 DIAGNOSIS — I48.19 PERSISTENT ATRIAL FIBRILLATION (H): ICD-10-CM

## 2024-08-29 DIAGNOSIS — Z95.5 STATUS POST INSERTION OF DRUG-ELUTING STENT INTO LEFT ANTERIOR DESCENDING (LAD) ARTERY FOR CORONARY ARTERY DISEASE: ICD-10-CM

## 2024-08-29 DIAGNOSIS — I48.19 PERSISTENT ATRIAL FIBRILLATION (H): Primary | ICD-10-CM

## 2024-08-29 LAB
ANION GAP SERPL CALCULATED.3IONS-SCNC: 16 MMOL/L (ref 7–15)
BUN SERPL-MCNC: 40.3 MG/DL (ref 8–23)
CALCIUM SERPL-MCNC: 9.7 MG/DL (ref 8.8–10.4)
CHLORIDE SERPL-SCNC: 97 MMOL/L (ref 98–107)
CREAT SERPL-MCNC: 5.28 MG/DL (ref 0.67–1.17)
EGFRCR SERPLBLD CKD-EPI 2021: 11 ML/MIN/1.73M2
ERYTHROCYTE [DISTWIDTH] IN BLOOD BY AUTOMATED COUNT: 18.1 % (ref 10–15)
GLUCOSE SERPL-MCNC: 109 MG/DL (ref 70–99)
HCO3 SERPL-SCNC: 23 MMOL/L (ref 22–29)
HCT VFR BLD AUTO: 37.1 % (ref 40–53)
HGB BLD-MCNC: 11.9 G/DL (ref 13.3–17.7)
MCH RBC QN AUTO: 30.1 PG (ref 26.5–33)
MCHC RBC AUTO-ENTMCNC: 32.1 G/DL (ref 31.5–36.5)
MCV RBC AUTO: 94 FL (ref 78–100)
PLATELET # BLD AUTO: 131 10E3/UL (ref 150–450)
POTASSIUM SERPL-SCNC: 5.5 MMOL/L (ref 3.4–5.3)
RBC # BLD AUTO: 3.96 10E6/UL (ref 4.4–5.9)
SODIUM SERPL-SCNC: 136 MMOL/L (ref 135–145)
WBC # BLD AUTO: 7.1 10E3/UL (ref 4–11)

## 2024-08-29 PROCEDURE — 80048 BASIC METABOLIC PNL TOTAL CA: CPT

## 2024-08-29 PROCEDURE — 99214 OFFICE O/P EST MOD 30 MIN: CPT | Performed by: INTERNAL MEDICINE

## 2024-08-29 PROCEDURE — 99207 PR NO CHARGE NURSE ONLY: CPT

## 2024-08-29 PROCEDURE — 86850 RBC ANTIBODY SCREEN: CPT

## 2024-08-29 PROCEDURE — 86900 BLOOD TYPING SEROLOGIC ABO: CPT

## 2024-08-29 PROCEDURE — 86901 BLOOD TYPING SEROLOGIC RH(D): CPT

## 2024-08-29 PROCEDURE — 36415 COLL VENOUS BLD VENIPUNCTURE: CPT

## 2024-08-29 PROCEDURE — 85027 COMPLETE CBC AUTOMATED: CPT

## 2024-08-29 PROCEDURE — 93000 ELECTROCARDIOGRAM COMPLETE: CPT | Performed by: STUDENT IN AN ORGANIZED HEALTH CARE EDUCATION/TRAINING PROGRAM

## 2024-08-29 RX ORDER — LIDOCAINE 40 MG/G
CREAM TOPICAL
Status: CANCELLED | OUTPATIENT
Start: 2024-08-29

## 2024-08-29 RX ORDER — ASPIRIN 81 MG/1
81 TABLET ORAL ONCE
Status: CANCELLED | OUTPATIENT
Start: 2024-08-29 | End: 2024-08-29

## 2024-08-29 RX ORDER — CEFAZOLIN SODIUM/WATER 2 G/20 ML
2 SYRINGE (ML) INTRAVENOUS
Status: CANCELLED | OUTPATIENT
Start: 2024-09-12
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
## 2024-08-29 NOTE — PROGRESS NOTES
García Kitchen  [address] [facility] [address]  [phone] (home)     Patient in to see RN for Pre-LAAC visit on 8/29/2024    All pre-procedure labs drawn: Collected 8/29/2024   EKG obtained: Yes   Labs reviewed: Collected 8/29/2024  Renal Issues: Yes  Diabetic?: No  Device?: No          Pre-procedure instructions  Patient instructed to be NPO after midnight the evening prior to procedure.  Patient instructed to shower the evening before or the morning of the procedure.  Leave all valuables at home (jewlery, rings, watches, large amounts of money).  Patient understands there are two visitors allowed during patients stay.    Patient instructed to arrange for transportation home following procedure from a responsible family member of friend. No driving for at least 72 hours post-procedure.  Patient instructed to have a responsible adult with them for 24 hours post-procedure.  Post-procedure follow up process.    Patient instructed on medications:   Take Eliquis, Plavix, Carvedilol    Aspirin 81mg morning of procedure: To be given in pre-procedure area    Education was given to patient regarding what to expect pre-procedure.     Patient was informed procedure will be done at [facility], [address] [place] [address]. They have been instructed to check in at the  at the Hospital and their arrival time is at 10:00 am    LAAC procedure, ONEIDA  and blood consent signed at the time of the appt: Yes    All questions were answered to family and patient by RN.    Patient and wife Kirsten present at the time of appointment. Wife Kirsten will be present day of procedure.    Gina Orosco RN BSN  Structural Heart Coordinator   [facility]  [phone]    Patient Active Problem List   Diagnosis    Osteoarthritis Of The Hip    Nontoxic Solitary Thyroid Nodule    Microalbuminuria    Hyperparathyroidism, secondary renal (H24)    Skin Neoplasm Of Uncertain  Behavior    Thrombocytopenia (H24)    Acute Gout    Normochromic, Normocytic Anemia    Allergic Rhinitis    Nephrolithiasis    Spleen enlargement    Squamous Cell Carcinoma In Situ    Essential hypertension with goal blood pressure less than 140/90    End stage renal failure on dialysis (H)    Renal cell carcinoma (H)    A-V fistula (H24)    Vitamin D deficiency    Nasal septal deviation    Inguinal hernia without mention of obstruction or gangrene, recurrent unilateral or unspecified    S/P total knee replacement using cement, left    Arthritis of knee    Neurocardiogenic syncope    Macular degeneration (senile) of retina    Acquired cystic kidney disease    History of primary malignant neoplasm of kidney    Chronic cough    Ear fullness, bilateral    Hyperlipidemia LDL goal <70    Memory loss    Dyspnea on exertion    Status post coronary angiogram    Coronary artery disease involving native coronary artery of native heart with angina pectoris (H24)    Fatigue, unspecified type    Status post insertion of drug-eluting stent into left anterior descending (LAD) artery for coronary artery disease    Persistent atrial fibrillation (H)    Microscopic hematuria    Confusion    ESRD (end stage renal disease) on dialysis (H)    Chest pain, unspecified type    HCAP (healthcare-associated pneumonia)    HFrEF (heart failure with reduced ejection fraction) (H)    Balance problems    Ischemic cardiomyopathy     Current Outpatient Medications   Medication Sig Dispense Refill    apixaban ANTICOAGULANT (ELIQUIS) 5 MG tablet Take 1 tablet (5 mg) by mouth 2 times daily 60 tablet 11    carvedilol (COREG) 25 MG tablet Take 0.5 tablets (12.5 mg) by mouth 2 times daily (Patient taking differently: Take 12.5 mg by mouth 2 times daily. Mon, wed, fri only taking 1 pill, all other days 0.5 a tab)      cinacalcet (SENSIPAR) 60 MG tablet Take 1 tablet by mouth daily      clopidogrel (PLAVIX) 75 MG tablet Take 1 tablet (75 mg) by mouth daily  90 tablet 3    DIALYVITE 100-1 mg Tab Take 1 tablet by mouth daily       famotidine (PEPCID) 20 MG tablet Take 1 tablet by mouth every other day (Patient not taking: Reported on 7/9/2024)      ketoconazole (NIZORAL) 2 % external cream Apply topically 2 times daily as needed for itching (Patient not taking: Reported on 7/9/2024)      Multiple Vitamins-Minerals (PRESERVISION AREDS PO) Take 1 tablet by mouth 2 times daily      polyethylene glycol (MIRALAX) 17 gram packet Take 17 g by mouth daily as needed for constipation      sevelamer carbonate (RENVELA) 800 mg tablet Take 3,200 mg by mouth 3 times daily (with meals)      simvastatin (ZOCOR) 40 MG tablet Take 1 tablet (40 mg) by mouth At Bedtime 90 tablet 3    vitamin D3 (CHOLECALCIFEROL) 50 mcg (2000 units) tablet Take 1 tablet by mouth daily       No current facility-administered medications for this visit.      Allergies   Allergen Reactions    Codeine Nausea and Vomiting     Other Reaction(s): Not available    Lisinopril Cough     Other Reaction(s): Not available
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
## 2024-08-29 NOTE — PATIENT INSTRUCTIONS
García Kitchen,    It was a pleasure to see you today in the clinic regarding your upcoming Watchman implant.     My recommendations after this visit include:     - report to [facility] on September 12 at the instructed time        If you have questions or concerns, please call using the numbers below:    CHASE (Watchman/Amulet) clinic phone   [phone]     After Hours/Scheduling  [phone]    Otherwise you can dial the nurse directly at:                  Gina Orosco RN  [phone]    Rosa Jacques RN  [phone]

## 2024-08-29 NOTE — PROGRESS NOTES
MODIFIED CESILIA SCALE   Timepoint: Pre-LAAC    Previous score: initial CESILIA    Score Description   0 No symptoms at all   1 No significant disability despite symptoms; able to carry out all usual duties and activities   2 Slight disability; unable to carry out all previous activities, but able to look after own affairs without assistance   3 Moderate disability; requiring some help, but able to walk without assistance   4 Moderately severe disability; unable to walk without assistance and unable to attend to own bodily needs without assistance   5 Severe disability; bedridden, incontinent and requiring constant nursing care and attention   6 Dead    Total score (0 - 6):  0, No reported history of stroke/TIA    Change in score if s/p LAAC? N/A  If yes, notify implanting cardiologist.    Gina Orosco RN BSN  Structural Heart Coordinator   [facility]  [phone]    
Home

## 2024-08-31 LAB
ATRIAL RATE - MUSE: 81 BPM
DIASTOLIC BLOOD PRESSURE - MUSE: NORMAL MMHG
INTERPRETATION ECG - MUSE: NORMAL
P AXIS - MUSE: NORMAL DEGREES
PR INTERVAL - MUSE: NORMAL MS
QRS DURATION - MUSE: 102 MS
QT - MUSE: 406 MS
QTC - MUSE: 453 MS
R AXIS - MUSE: 28 DEGREES
SYSTOLIC BLOOD PRESSURE - MUSE: NORMAL MMHG
T AXIS - MUSE: 95 DEGREES
VENTRICULAR RATE- MUSE: 75 BPM

## 2024-09-03 ENCOUNTER — TELEPHONE (OUTPATIENT)
Dept: FAMILY MEDICINE | Facility: CLINIC | Age: 79
End: 2024-09-03
Payer: COMMERCIAL

## 2024-09-03 ENCOUNTER — DOCUMENTATION ONLY (OUTPATIENT)
Dept: CARDIOLOGY | Facility: CLINIC | Age: 79
End: 2024-09-03
Payer: COMMERCIAL

## 2024-09-03 NOTE — TELEPHONE ENCOUNTER
Home Care is calling regarding an established patient with [facility].    Requesting orders from: Riki Martinez  Provider is following patient: Yes  Is this a 60-day recertification request?  No    Orders Requested    Physical Therapy  Request for discontinuation of care   Goals have been met/progressing.  Frequency: complete        Verbal orders given.  Home Care will send orders for provider to sign.  Confirmed ok to leave a detailed message with call back.  Contact information confirmed and updated as needed.    Nevaeh Garcia RN

## 2024-09-03 NOTE — PROGRESS NOTES
Below noted for future reference for patient upcoming procedure. [facility]    ----- Message -----  From: Israel Paz MD  Sent: 9/3/2024  12:14 PM CDT  To: Suma Corona PA-C; *    Thanks Suma  ----- Message -----  From: Suma Corona PA-C  Sent: 9/3/2024  12:11 PM CDT  To: Israel Paz MD; *    Sounds good.  He reports no symptoms from his arrhythmia, so my sense is that rate control is the way to go.      Suma    ----- Message -----  From: Israel Paz MD  Sent: 8/29/2024   4:02 PM CDT  To: Suma Corona PA-C; *    Thanks Suma.  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    We can certainly discuss how he would like to proceed.  If if he would like to pursue a cardioversion bear in mind the current practice is to maintain anticoagulation for at least 1 month post cardioversion, even in patients with a Watchman device.     If he is asymptomatic and is okay continue to monitor we can certainly also pursue that.  ----- Message -----  From: Suma Corona PA-C  Sent: 8/29/2024   3:24 PM CDT  To: Israel Paz MD; *    I saw this gentleman today for his preoperative visit for upcoming watchman implant on September 12.  He has history of persistent atrial fibrillation and end-stage renal disease on HD.  He had PVI ablation in April 2024 and then had recurrence of his arrhythmia requiring cardioversions x 3.  Last cardioversion was in June and there was no evidence of recurrence, but today's EKG shows atrial fibrillation with controlled ventricular response    My question is, would you like to try and cardiovert him the day of procedure or since he is asymptomatic, leave him in atrial fibrillation and continue to rate control him?    He is currently taking carvedilol.  EF 45-50%    ThanksSuma

## 2024-09-12 ENCOUNTER — ANESTHESIA (OUTPATIENT)
Dept: CARDIOLOGY | Facility: HOSPITAL | Age: 79
DRG: 273 | End: 2024-09-12
Payer: COMMERCIAL

## 2024-09-12 ENCOUNTER — ANESTHESIA EVENT (OUTPATIENT)
Dept: CARDIOLOGY | Facility: HOSPITAL | Age: 79
DRG: 273 | End: 2024-09-12
Payer: COMMERCIAL

## 2024-09-12 ENCOUNTER — HOSPITAL ENCOUNTER (INPATIENT)
Facility: HOSPITAL | Age: 79
LOS: 1 days | Discharge: HOME | DRG: 273 | End: 2024-09-12
Attending: INTERNAL MEDICINE | Admitting: INTERNAL MEDICINE
Payer: COMMERCIAL

## 2024-09-12 ENCOUNTER — HOSPITAL ENCOUNTER (OUTPATIENT)
Dept: CARDIOLOGY | Facility: HOSPITAL | Age: 79
Discharge: HOME | DRG: 273 | End: 2024-09-12
Attending: NURSE PRACTITIONER | Admitting: INTERNAL MEDICINE
Payer: COMMERCIAL

## 2024-09-12 VITALS
BODY MASS INDEX: 23.7 KG/M2 | SYSTOLIC BLOOD PRESSURE: 112 MMHG | HEIGHT: 69 IN | WEIGHT: 160 LBS | HEART RATE: 75 BPM | RESPIRATION RATE: 15 BRPM | OXYGEN SATURATION: 96 % | DIASTOLIC BLOOD PRESSURE: 56 MMHG | TEMPERATURE: 97.7 F

## 2024-09-12 DIAGNOSIS — I48.19 PERSISTENT ATRIAL FIBRILLATION (H): ICD-10-CM

## 2024-09-12 DIAGNOSIS — I50.9 CONGESTIVE HEART FAILURE, UNSPECIFIED HF CHRONICITY, UNSPECIFIED HEART FAILURE TYPE (H): ICD-10-CM

## 2024-09-12 PROBLEM — Z95.5 STATUS POST INSERTION OF DRUG-ELUTING STENT INTO LEFT ANTERIOR DESCENDING (LAD) ARTERY FOR CORONARY ARTERY DISEASE: Status: ACTIVE | Noted: 2023-12-19

## 2024-09-12 PROBLEM — Z98.890 S/P ABLATION OF ATRIAL FIBRILLATION: Status: ACTIVE | Noted: 2024-09-12

## 2024-09-12 PROBLEM — Z95.818 PRESENCE OF WATCHMAN LEFT ATRIAL APPENDAGE CLOSURE DEVICE: Status: ACTIVE | Noted: 2024-09-12

## 2024-09-12 PROBLEM — Z86.79 S/P ABLATION OF ATRIAL FIBRILLATION: Status: ACTIVE | Noted: 2024-09-12

## 2024-09-12 LAB
ACT BLD: 408 SECONDS (ref 74–150)
ANION GAP SERPL CALCULATED.3IONS-SCNC: 13 MMOL/L (ref 7–15)
ATRIAL RATE - MUSE: 147 BPM
BLD PROD TYP BPU: NORMAL
BLD PROD TYP BPU: NORMAL
BLOOD COMPONENT TYPE: NORMAL
BLOOD COMPONENT TYPE: NORMAL
BUN SERPL-MCNC: 40.3 MG/DL (ref 8–23)
CALCIUM SERPL-MCNC: 9.6 MG/DL (ref 8.8–10.4)
CHLORIDE SERPL-SCNC: 99 MMOL/L (ref 98–107)
CODING SYSTEM: NORMAL
CODING SYSTEM: NORMAL
CREAT SERPL-MCNC: 5.2 MG/DL (ref 0.67–1.17)
CREAT SERPL-MCNC: 5.36 MG/DL (ref 0.67–1.17)
CROSSMATCH: NORMAL
CROSSMATCH: NORMAL
DIASTOLIC BLOOD PRESSURE - MUSE: NORMAL MMHG
EGFRCR SERPLBLD CKD-EPI 2021: 10 ML/MIN/1.73M2
EGFRCR SERPLBLD CKD-EPI 2021: 11 ML/MIN/1.73M2
ERYTHROCYTE [DISTWIDTH] IN BLOOD BY AUTOMATED COUNT: 18.3 % (ref 10–15)
GLUCOSE SERPL-MCNC: 97 MG/DL (ref 70–99)
HCO3 SERPL-SCNC: 25 MMOL/L (ref 22–29)
HCT VFR BLD AUTO: 30.3 % (ref 40–53)
HGB BLD-MCNC: 7.6 G/DL (ref 13.3–17.7)
HGB BLD-MCNC: 9.4 G/DL (ref 13.3–17.7)
INTERPRETATION ECG - MUSE: NORMAL
LVEF ECHO: NORMAL
MCH RBC QN AUTO: 29.7 PG (ref 26.5–33)
MCHC RBC AUTO-ENTMCNC: 31 G/DL (ref 31.5–36.5)
MCV RBC AUTO: 96 FL (ref 78–100)
P AXIS - MUSE: NORMAL DEGREES
PLATELET # BLD AUTO: 120 10E3/UL (ref 150–450)
POTASSIUM SERPL-SCNC: 4.8 MMOL/L (ref 3.4–5.3)
PR INTERVAL - MUSE: NORMAL MS
QRS DURATION - MUSE: 98 MS
QT - MUSE: 424 MS
QTC - MUSE: 457 MS
R AXIS - MUSE: 18 DEGREES
RBC # BLD AUTO: 3.16 10E6/UL (ref 4.4–5.9)
SODIUM SERPL-SCNC: 137 MMOL/L (ref 135–145)
SYSTOLIC BLOOD PRESSURE - MUSE: NORMAL MMHG
T AXIS - MUSE: 81 DEGREES
UNIT ABO/RH: NORMAL
UNIT ABO/RH: NORMAL
UNIT NUMBER: NORMAL
UNIT NUMBER: NORMAL
UNIT STATUS: NORMAL
UNIT STATUS: NORMAL
UNIT TYPE ISBT: 5100
UNIT TYPE ISBT: 5100
VENTRICULAR RATE- MUSE: 70 BPM
WBC # BLD AUTO: 5.7 10E3/UL (ref 4–11)

## 2024-09-12 PROCEDURE — 85347 COAGULATION TIME ACTIVATED: CPT

## 2024-09-12 PROCEDURE — 33340 PERQ CLSR TCAT L ATR APNDGE: CPT | Performed by: NURSE ANESTHETIST, CERTIFIED REGISTERED

## 2024-09-12 PROCEDURE — 33340 PERQ CLSR TCAT L ATR APNDGE: CPT | Performed by: ANESTHESIOLOGY

## 2024-09-12 PROCEDURE — 85018 HEMOGLOBIN: CPT | Performed by: INTERNAL MEDICINE

## 2024-09-12 PROCEDURE — 120N000001 HC R&B MED SURG/OB

## 2024-09-12 PROCEDURE — 93355 ECHO TRANSESOPHAGEAL (TEE): CPT | Performed by: INTERNAL MEDICINE

## 2024-09-12 PROCEDURE — 93355 ECHO TRANSESOPHAGEAL (TEE): CPT

## 2024-09-12 PROCEDURE — 82565 ASSAY OF CREATININE: CPT | Performed by: INTERNAL MEDICINE

## 2024-09-12 PROCEDURE — 80048 BASIC METABOLIC PNL TOTAL CA: CPT | Performed by: INTERNAL MEDICINE

## 2024-09-12 PROCEDURE — 93005 ELECTROCARDIOGRAM TRACING: CPT

## 2024-09-12 PROCEDURE — 85027 COMPLETE CBC AUTOMATED: CPT | Performed by: INTERNAL MEDICINE

## 2024-09-12 PROCEDURE — 258N000003 HC RX IP 258 OP 636: Performed by: INTERNAL MEDICINE

## 2024-09-12 PROCEDURE — 370N000017 HC ANESTHESIA TECHNICAL FEE, PER MIN: Performed by: INTERNAL MEDICINE

## 2024-09-12 PROCEDURE — 86923 COMPATIBILITY TEST ELECTRIC: CPT | Performed by: INTERNAL MEDICINE

## 2024-09-12 PROCEDURE — 250N000011 HC RX IP 250 OP 636: Performed by: INTERNAL MEDICINE

## 2024-09-12 PROCEDURE — 36415 COLL VENOUS BLD VENIPUNCTURE: CPT | Performed by: INTERNAL MEDICINE

## 2024-09-12 PROCEDURE — 33340 PERQ CLSR TCAT L ATR APNDGE: CPT | Performed by: INTERNAL MEDICINE

## 2024-09-12 PROCEDURE — 258N000003 HC RX IP 258 OP 636: Performed by: NURSE ANESTHETIST, CERTIFIED REGISTERED

## 2024-09-12 PROCEDURE — 93010 ELECTROCARDIOGRAM REPORT: CPT | Performed by: INTERNAL MEDICINE

## 2024-09-12 PROCEDURE — C1894 INTRO/SHEATH, NON-LASER: HCPCS | Performed by: INTERNAL MEDICINE

## 2024-09-12 PROCEDURE — C1889 IMPLANT/INSERT DEVICE, NOC: HCPCS | Performed by: INTERNAL MEDICINE

## 2024-09-12 PROCEDURE — 250N000009 HC RX 250: Performed by: NURSE ANESTHETIST, CERTIFIED REGISTERED

## 2024-09-12 PROCEDURE — 710N000010 HC RECOVERY PHASE 1, LEVEL 2, PER MIN

## 2024-09-12 PROCEDURE — 250N000011 HC RX IP 250 OP 636: Performed by: NURSE ANESTHETIST, CERTIFIED REGISTERED

## 2024-09-12 PROCEDURE — 33340 PERQ CLSR TCAT L ATR APNDGE: CPT | Mod: Q0 | Performed by: INTERNAL MEDICINE

## 2024-09-12 PROCEDURE — 272N000001 HC OR GENERAL SUPPLY STERILE: Performed by: INTERNAL MEDICINE

## 2024-09-12 PROCEDURE — 02L73DK OCCLUSION OF LEFT ATRIAL APPENDAGE WITH INTRALUMINAL DEVICE, PERCUTANEOUS APPROACH: ICD-10-PCS | Performed by: INTERNAL MEDICINE

## 2024-09-12 PROCEDURE — 99100 ANES PT EXTEME AGE<1 YR&>70: CPT | Performed by: NURSE ANESTHETIST, CERTIFIED REGISTERED

## 2024-09-12 PROCEDURE — 250N000009 HC RX 250: Performed by: INTERNAL MEDICINE

## 2024-09-12 DEVICE — OCCLUDER CV WATCHMAN FLX OD31 MM M635WU50310: Type: IMPLANTABLE DEVICE | Site: HEART | Status: FUNCTIONAL

## 2024-09-12 RX ORDER — CARVEDILOL 25 MG/1
12.5 TABLET ORAL 2 TIMES DAILY
Status: SHIPPED
Start: 2024-09-12

## 2024-09-12 RX ORDER — PROPOFOL 10 MG/ML
INJECTION, EMULSION INTRAVENOUS PRN
Status: DISCONTINUED | OUTPATIENT
Start: 2024-09-12 | End: 2024-09-12

## 2024-09-12 RX ORDER — OXYCODONE HYDROCHLORIDE 5 MG/1
5 TABLET ORAL EVERY 4 HOURS PRN
Status: DISCONTINUED | OUTPATIENT
Start: 2024-09-12 | End: 2024-09-12 | Stop reason: HOSPADM

## 2024-09-12 RX ORDER — CEFAZOLIN SODIUM/WATER 2 G/20 ML
2 SYRINGE (ML) INTRAVENOUS
Status: DISCONTINUED | OUTPATIENT
Start: 2024-09-12 | End: 2024-09-12 | Stop reason: HOSPADM

## 2024-09-12 RX ORDER — PROTAMINE SULFATE 10 MG/ML
INJECTION, SOLUTION INTRAVENOUS PRN
Status: DISCONTINUED | OUTPATIENT
Start: 2024-09-12 | End: 2024-09-12

## 2024-09-12 RX ORDER — OXYCODONE HYDROCHLORIDE 5 MG/1
10 TABLET ORAL EVERY 4 HOURS PRN
Status: DISCONTINUED | OUTPATIENT
Start: 2024-09-12 | End: 2024-09-12 | Stop reason: HOSPADM

## 2024-09-12 RX ORDER — FENTANYL CITRATE 50 UG/ML
INJECTION, SOLUTION INTRAMUSCULAR; INTRAVENOUS PRN
Status: DISCONTINUED | OUTPATIENT
Start: 2024-09-12 | End: 2024-09-12

## 2024-09-12 RX ORDER — LIDOCAINE 40 MG/G
CREAM TOPICAL
Status: DISCONTINUED | OUTPATIENT
Start: 2024-09-12 | End: 2024-09-12 | Stop reason: HOSPADM

## 2024-09-12 RX ORDER — DEXAMETHASONE SODIUM PHOSPHATE 10 MG/ML
INJECTION, SOLUTION INTRAMUSCULAR; INTRAVENOUS PRN
Status: DISCONTINUED | OUTPATIENT
Start: 2024-09-12 | End: 2024-09-12

## 2024-09-12 RX ORDER — ASPIRIN 81 MG/1
81 TABLET ORAL ONCE
Status: DISCONTINUED | OUTPATIENT
Start: 2024-09-12 | End: 2024-09-12 | Stop reason: HOSPADM

## 2024-09-12 RX ORDER — LIDOCAINE HYDROCHLORIDE 10 MG/ML
INJECTION, SOLUTION INFILTRATION; PERINEURAL PRN
Status: DISCONTINUED | OUTPATIENT
Start: 2024-09-12 | End: 2024-09-12

## 2024-09-12 RX ORDER — HEPARIN SODIUM 1000 [USP'U]/ML
INJECTION, SOLUTION INTRAVENOUS; SUBCUTANEOUS
Status: DISCONTINUED | OUTPATIENT
Start: 2024-09-12 | End: 2024-09-12 | Stop reason: HOSPADM

## 2024-09-12 RX ORDER — LIDOCAINE HYDROCHLORIDE AND EPINEPHRINE 10; 10 MG/ML; UG/ML
INJECTION, SOLUTION INFILTRATION; PERINEURAL
Status: DISCONTINUED | OUTPATIENT
Start: 2024-09-12 | End: 2024-09-12 | Stop reason: HOSPADM

## 2024-09-12 RX ORDER — SODIUM CHLORIDE 9 MG/ML
INJECTION, SOLUTION INTRAVENOUS CONTINUOUS
Status: ACTIVE | OUTPATIENT
Start: 2024-09-12 | End: 2024-09-12

## 2024-09-12 RX ORDER — ONDANSETRON 4 MG/1
4 TABLET, ORALLY DISINTEGRATING ORAL EVERY 6 HOURS PRN
Status: DISCONTINUED | OUTPATIENT
Start: 2024-09-12 | End: 2024-09-12 | Stop reason: HOSPADM

## 2024-09-12 RX ORDER — ONDANSETRON 2 MG/ML
4 INJECTION INTRAMUSCULAR; INTRAVENOUS EVERY 6 HOURS PRN
Status: DISCONTINUED | OUTPATIENT
Start: 2024-09-12 | End: 2024-09-12 | Stop reason: HOSPADM

## 2024-09-12 RX ORDER — ONDANSETRON 2 MG/ML
INJECTION INTRAMUSCULAR; INTRAVENOUS PRN
Status: DISCONTINUED | OUTPATIENT
Start: 2024-09-12 | End: 2024-09-12

## 2024-09-12 RX ORDER — ACETAMINOPHEN 325 MG/1
650 TABLET ORAL EVERY 4 HOURS PRN
Status: DISCONTINUED | OUTPATIENT
Start: 2024-09-12 | End: 2024-09-12 | Stop reason: HOSPADM

## 2024-09-12 RX ORDER — SODIUM CHLORIDE 9 MG/ML
INJECTION, SOLUTION INTRAVENOUS CONTINUOUS PRN
Status: DISCONTINUED | OUTPATIENT
Start: 2024-09-12 | End: 2024-09-12

## 2024-09-12 RX ADMIN — PHENYLEPHRINE HYDROCHLORIDE 0.5 MCG/KG/MIN: 10 INJECTION INTRAVENOUS at 13:45

## 2024-09-12 RX ADMIN — SODIUM CHLORIDE: 0.9 INJECTION, SOLUTION INTRAVENOUS at 13:50

## 2024-09-12 RX ADMIN — SODIUM CHLORIDE 500 ML: 9 INJECTION, SOLUTION INTRAVENOUS at 11:11

## 2024-09-12 RX ADMIN — DEXAMETHASONE SODIUM PHOSPHATE 5 MG: 10 INJECTION, SOLUTION INTRAMUSCULAR; INTRAVENOUS at 13:38

## 2024-09-12 RX ADMIN — PROTAMINE SULFATE 60 MG: 10 INJECTION, SOLUTION INTRAVENOUS at 14:31

## 2024-09-12 RX ADMIN — SODIUM CHLORIDE: 0.9 INJECTION, SOLUTION INTRAVENOUS at 13:30

## 2024-09-12 RX ADMIN — Medication 2 G: at 13:42

## 2024-09-12 RX ADMIN — ROCURONIUM BROMIDE 50 MG: 50 INJECTION, SOLUTION INTRAVENOUS at 13:38

## 2024-09-12 RX ADMIN — SUGAMMADEX 200 MG: 100 INJECTION, SOLUTION INTRAVENOUS at 14:32

## 2024-09-12 RX ADMIN — PROPOFOL 150 MG: 10 INJECTION, EMULSION INTRAVENOUS at 13:38

## 2024-09-12 RX ADMIN — LIDOCAINE HYDROCHLORIDE 5 ML: 10 INJECTION, SOLUTION INFILTRATION; PERINEURAL at 13:38

## 2024-09-12 RX ADMIN — ONDANSETRON 4 MG: 2 INJECTION INTRAMUSCULAR; INTRAVENOUS at 14:26

## 2024-09-12 RX ADMIN — FENTANYL CITRATE 100 MCG: 50 INJECTION INTRAMUSCULAR; INTRAVENOUS at 13:38

## 2024-09-12 ASSESSMENT — ACTIVITIES OF DAILY LIVING (ADL)
ADLS_ACUITY_SCORE: 38

## 2024-09-12 ASSESSMENT — ENCOUNTER SYMPTOMS: DYSRHYTHMIAS: 1

## 2024-09-12 ASSESSMENT — LIFESTYLE VARIABLES: TOBACCO_USE: 1

## 2024-09-12 NOTE — PROGRESS NOTES
Patient is kept comfortable during post-procedure stay. VSS. Denies pain. Right femoral access site remains dry & free from signs of bleeding. Tolerated food and fluids. Ambulated without issues. Appointments made & included in AVS. Dr. Paz was able to speak with patient post procedure. Post-op instructions reviewed and packet given to patient & spouse. Able to ask questions. Verbalized no concerns. Belongings returned. Discharged in stable condition.

## 2024-09-12 NOTE — DISCHARGE INSTRUCTIONS
Instructions  Going home after ATTEMPTED Left Atrial Appendage Occlusion Device Implant     Once Home:  You should arrange for someone to stay with you for the first 24 hours after discharge  Make sure you are taking all of your medications as directed in your discharge instructions.  Do not stop taking these medications without speaking to your provider.   Increase fluid intake for two days     No lifting more than 10 pounds for 5 days  No aggressive exercise for 5 days  No driving x 3 day  You may shower tomorrow; no bath x 5 days  Return to work in 3 days if you have a sedentary job or 5 days if you do manual labor        Care of groin site  It is normal to have a small bruise or lump at the site.  Do not scrub the site.  For the first 2 days: Do not stoop or squat. When you cough, sneeze or move your bowels, hold your hand over the puncture site and press gently.  Do not use lotion or powder near the puncture site for 3 days.  Ok to use ice packs at groin sites 20 minutes at a time for groin discomfort     If you start bleeding from the site in your groin, lie down flat and press firmly on the site. Call your doctor as soon as you can.     Call your doctor if:  You have a large or growing hard lump around the site.  The site is red, swollen, hot or tender.  Blood or fluid is draining from the site.  You have chills or a fever greater than 101 F (38 C).  Your leg or arm feels numb or cool.  You have hives, a rash or unusual itching.     Dial 911 if you have bleeding that is heavy or does not stop OR for any NEW signs/symptoms of a stroke:  Visual disturbance  Problems with talking  Smile only occurs on half your face  Numbness on one side of your face or body  Sudden headache  Confusion  Problems with walking or balance     Follow up appointments:   6 Week Post Implant Visit Date: October 14 at 3:30 pm with Suma Corona PA-C  At [facility] [address]  [address]  [facility] [address]           Your Procedural Physician was: Dr. Paz     To reach the [facility] nurse coordinators please call:   [phone]        If you have issues tonight when you get home:      Call [phone] and tell them you had a Watchman.  Suma, the PA will call you back.       If after 9 pm, call the [facility] at [phone] and you will be connected to the on call doctor                                                                    If you are calling after hours, please listen to the entire voicemail, a live  will answer at the end of the message
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
## 2024-09-12 NOTE — ANESTHESIA CARE TRANSFER NOTE
Patient: García Kitchen    Procedure: Procedure(s):  Left Atrial Appendage Closure - WM       Diagnosis: other  Diagnosis Additional Information: No value filed.    Anesthesia Type:   General     Note:    Oropharynx: oropharynx clear of all foreign objects and spontaneously breathing  Level of Consciousness: awake  Oxygen Supplementation: face mask  Level of Supplemental Oxygen (L/min / FiO2): 6  Independent Airway: airway patency satisfactory and stable  Dentition: dentition unchanged  Vital Signs Stable: post-procedure vital signs reviewed and stable  Report to RN Given: handoff report given  Patient transferred to: Cardiac Special [facility]      Vitals:  Vitals Value Taken Time   /56 09/12/24 1442   Temp     Pulse 68 09/12/24 1444   Resp 14 09/12/24 1444   SpO2 100 % 09/12/24 1444   Vitals shown include unfiled device data.    Electronically Signed By: KARLI Canchola CRNA  September 12, 2024  2:46 PM

## 2024-09-12 NOTE — Clinical Note
0 : 21. 45 : 21. 90 : 23. 135 : 19. 0 : 24. 45 : 23. 90 : 25. 135 : 18. 0 : 20.3 . 45 : 22.8 . 90 : 23.5 . 135 : 24.3 . CHASE type used: Chicken WingPosition: Passed. [place]: Passed. Size: Accepted. Seal: Complete. Jet: 0.EcoDirect FLX  31mm  GTIN: [id number]  REF: [id number]  LOT: [id number]  EXP: 01/09/2027.

## 2024-09-12 NOTE — ANESTHESIA POSTPROCEDURE EVALUATION
Patient: García Kitchen    Procedure: Procedure(s):  Left Atrial Appendage Closure - WM       Anesthesia Type:  General    Note:  Disposition: Outpatient   Postop Pain Control: Uneventful            Sign Out: Well controlled pain   PONV: No   Neuro/Psych: Uneventful            Sign Out: Acceptable/Baseline neuro status   Airway/Respiratory: Uneventful            Sign Out: Acceptable/Baseline resp. status   CV/Hemodynamics: Uneventful            Sign Out: Acceptable CV status; No obvious hypovolemia; No obvious fluid overload   Other NRE: NONE   DID A NON-ROUTINE EVENT OCCUR? No           Last vitals:  Vitals Value Taken Time   /56 09/12/24 1630   Temp     Pulse 72 09/12/24 1641   Resp 16 09/12/24 1641   SpO2 98 % 09/12/24 1641   Vitals shown include unfiled device data.    Electronically Signed By: Santos Asencio MD  September 12, 2024  4:43 PM

## 2024-09-12 NOTE — DISCHARGE SUMMARY
HEART CARE INPATIENT ENCOUNTER NOTE      Structural Discharge Summary    Primary Care Physician:  Riki Martinez    Discharge Provider: ESME SPANGLER PA-C     Admission Date: 9/12/2024. Admission Diagnoses: Persistent atrial fibrillation (H) [I48.19]   Discharge Date: September 12, 2024   Disposition: Home   Condition at Discharge: Stable  Code Status: Prior     Principal Diagnosis:  Persistent atrial fibrillation    Discharge Diagnoses:  Active Problems:    Essential hypertension with goal blood pressure less than 140/90    End stage renal failure on dialysis (H)    Hyperlipidemia LDL goal <70    Coronary artery disease involving native coronary artery of native heart with angina pectoris (H24)    Status post insertion of drug-eluting stent into left anterior descending (LAD) artery for coronary artery disease    Persistent atrial fibrillation (H)    ESRD (end stage renal disease) on dialysis (H)    Ischemic cardiomyopathy    Presence of Watchman left atrial appendage closure device    S/P ablation of atrial fibrillation      Consult/s: none  Significant Diagnostic Studies:   Procedural ONEIDA - Interpretation Summary     Structural ONEIDA for Left Atrial Appendage Occlusion Device     Pre-Device:  1. Normal left ventricular size and systolic function. LVEF: 50-55%  2. No left atrial thrombus or spontaneous contrast. Severe left atrial  enlargement.  3. No ASD or PFO by color flow.  4. No pericardial effusion.     Post Device:  1. Well-seated PINNACLE FLXÂ  Device in left atrial appendage by 2D and 3D  imaging. No color doppler evidence of flow around device at ostium insertion  before or after device release. No change in mitral valve function. No  thrombus noted on device, LA or CHASE.     CHASE device measurements:  Device compression diameter:  Pre-deployment.  0Â : 20.5 mm  45Â : 20.6 mm  90Â : 23.0 mm  135 Â : 19.2 mm     Post-deployment  0Â : 20.3 mm  45Â : 22.8 mm  90Â : 23.5 mm  135 Â : 24.3 mm     2. Normal left  ventricular size and systolic function. LVEF:50-55%  3. Small ASD with unidirectional flow (left to right) by color flow doppler.  4. No post procedural pericardial effusion.       Treatments: procedures: LAAO implant (Watchman FLX)    Discharge Medications:   Discharge Medication List as of 9/12/2024  4:25 PM        CONTINUE these medications which have CHANGED    Details   carvedilol (COREG) 25 MG tablet Take 0.5 tablets (12.5 mg) by mouth 2 times daily. Mon, wed, fri only taking 1 pill, all other days 0.5 a tab, No Print Out           CONTINUE these medications which have NOT CHANGED    Details   apixaban ANTICOAGULANT (ELIQUIS) 5 MG tablet Take 1 tablet (5 mg) by mouth 2 times daily, Disp-60 tablet, R-11, E-Prescribe      cinacalcet (SENSIPAR) 60 MG tablet Take 1 tablet by mouth daily, Historical      clopidogrel (PLAVIX) 75 MG tablet Take 1 tablet (75 mg) by mouth daily, Disp-90 tablet, R-3, E-Prescribe      DIALYVITE 100-1 mg Tab Take 1 tablet by mouth daily , GABRIELA, Historical      famotidine (PEPCID) 20 MG tablet Take 1 tablet by mouth every other day., Historical      ketoconazole (NIZORAL) 2 % external cream Apply topically 2 times daily as needed for itchingHistorical      Multiple Vitamins-Minerals (PRESERVISION AREDS PO) Take 1 tablet by mouth 2 times daily, Historical      polyethylene glycol (MIRALAX) 17 gram packet Take 17 g by mouth daily as needed for constipation, Historical      sevelamer carbonate (RENVELA) 800 mg tablet Take 3,200 mg by mouth 3 times daily (with meals), Historical      simvastatin (ZOCOR) 40 MG tablet Take 1 tablet (40 mg) by mouth At Bedtime, Disp-90 tablet, R-3, E-Prescribe      vitamin D3 (CHOLECALCIFEROL) 50 mcg (2000 units) tablet Take 1 tablet by mouth daily, Historical             Discharge Instructions:  Follow up appointment with Nancie Ramos PA-C in 45 days (November 5)    Follow up ONEIDA in ~3 months (December 3)    Diet: Regular diet. Increase fluids over the next 2  "days.   Activity: Activity as tolerated   Restrictions: No lifting >10 lbs or vigorous exercise for 5 days. No driving for 3 days. May return to work in 5 days  Wound / drain care: OK to shower next day. Keep wound clean and dry. Ok to use Ice packs PRN for discomfort. No baths, hot tubs or swimming pools for 5 days.    Hospital Summary:   Mr. García Kitchen is a 79 year old male who underwent successful LAAO implant with a 31 mm Watchman FLX device. The patient was recovered in [facility], on bedrest for 3 hours.  The patient then dangled at the edge of bed and ambulated; he voided without difficulty.  The vascular access site remained stable prior to discharge.  Post procedure the patient denied chest pain/pressure, palpitations, or shortness of breath.      Repeat labs were reviewed and were stable.  Activity restrictions and reportable signs and symptoms were discussed with the patient who verbalizes understanding.  He will continue taking Eliquis and Plavixw daily for the next 6 weeks.  After 6 weeks, he will come off Eliquis and take DAPT (ASA/Plavix) through March 12. At that time he will be on single antiplatelet therapy indefinitely.     Follow up is arranged as above.        Physical Examination   /56 (BP Location: Left arm)   Pulse 75   Temp 97.7  F (36.5  C) (Oral)   Resp 15   Ht 1.753 m (5' 9\")   Wt 72.6 kg (160 lb)   SpO2 96%   BMI 23.63 kg/m    Body mass index is 23.63 kg/m .  Wt Readings from Last 3 Encounters:   09/12/24 72.6 kg (160 lb)   08/29/24 72.6 kg (160 lb)   08/08/24 73.9 kg (163 lb)       Intake/Output Summary (Last 24 hours) at 9/12/2024 1434  Last data filed at 9/12/2024 1350  Gross per 24 hour   Intake 500 ml   Output --   Net 500 ml     General Appearance:   no distress, normal body habitus   Chest/Lungs:   Normal respiratory effort. Respirations are even and unlabored. Lungs are clear to auscultation, no rales or wheezing. No chest wall tenderness.    Cardiovascular:   " Irregularly irregular. Normal S1, S2 with no murmurs, rubs, or gallops; the carotid, radial, dorsalis pedis and posterior tibial pulses are intact   Abdomen:  obese, soft, non-tender to palpation, non-distended. + bowel sounds.   Extremities: No edema    Skin: Right groin site WNL   Neurologic: Alert and oriented x3, calm and able to move all 4 extremities appropriately            Lab Results    Chemistry/lipid CBC    Creat 9/12/2024 - 5.36 Hgb 9/12/2024 - 7.6

## 2024-09-12 NOTE — ANESTHESIA PROCEDURE NOTES
Airway         Procedure Start/Stop Times: 9/12/2024 1:40 PM  Staff -        CRNA: Alan Hylton APRN CRNA       Performed By: CRNA  Consent for Airway        Urgency: elective  Indications and Patient Condition       Indications for airway management: geri-procedural       Induction type:intravenous       Mask difficulty assessment: 2 - vent by mask + OA or adjuvant +/- NMBA    Final Airway Details       Final airway type: endotracheal airway       Successful airway: ETT - single  Endotracheal Airway Details        ETT size (mm): 7.5       Successful intubation technique: video laryngoscopy       VL Blade Size: Glidescope 4       Grade View of Cords: 1       Adjucts: stylet       Position: Right       Measured from: gums/teeth       Secured at (cm): 24       Bite block used: None    Post intubation assessment        Placement verified by: capnometry, equal breath sounds and chest rise        Number of attempts at approach: 1       Secured with: tape       Ease of procedure: easy       Dentition: Unchanged    Medication(s) Administered   Medication Administration Time: 9/12/2024 1:40 PM

## 2024-09-12 NOTE — INTERVAL H&P NOTE
I have reviewed the surgical (or preoperative) H&P that is linked to this encounter, and examined the patient. There are no significant changes        Clinical Conditions Present on Arrival:  Clinically Significant Risk Factors Present on Admission     # Hyperkalemia: Highest K = 5.5 mmol/L in last 30 days, will monitor as appropriate         # Drug Induced Coagulation Defect: home medication list includes an anticoagulant medication  # Drug Induced Platelet Defect: home medication list includes an antiplatelet medication

## 2024-09-13 ENCOUNTER — VIRTUAL VISIT (OUTPATIENT)
Dept: CARDIOLOGY | Facility: CLINIC | Age: 79
End: 2024-09-13
Payer: COMMERCIAL

## 2024-09-13 ENCOUNTER — PATIENT OUTREACH (OUTPATIENT)
Dept: CARE COORDINATION | Facility: CLINIC | Age: 79
End: 2024-09-13

## 2024-09-13 ENCOUNTER — PREP FOR PROCEDURE (OUTPATIENT)
Dept: CARDIOLOGY | Facility: CLINIC | Age: 79
End: 2024-09-13

## 2024-09-13 DIAGNOSIS — Z95.818 PRESENCE OF WATCHMAN LEFT ATRIAL APPENDAGE CLOSURE DEVICE: Primary | ICD-10-CM

## 2024-09-13 PROCEDURE — 99207 PR NO CHARGE NURSE ONLY: CPT | Mod: 93

## 2024-09-13 RX ORDER — LIDOCAINE 40 MG/G
CREAM TOPICAL
OUTPATIENT
Start: 2024-09-13

## 2024-09-13 NOTE — PROGRESS NOTES
[facility]    Background: Transitional Care Management program identified per system criteria and reviewed by [facility] Resource [place] team for possible outreach.    Assessment: Upon chart review, [facility] Team member will not proceed with patient outreach related to this episode of Transitional Care Management program due to reason below:    Patient has a follow up appointment with an appropriate provider today for hospital discharge    Plan: Transitional Care Management episode addressed appropriately per reason noted above.      MARCELINO Robert  [facility]    *Connected Care Resource Team does NOT follow patient ongoing. Referrals are identified based on internal discharge reports and the outreach is to ensure patient has an understanding of their discharge instructions.

## 2024-09-13 NOTE — PROGRESS NOTES
Phone call to patient for post op 31mm Watchman FLX device placement with Dr Paz on 9/13/2024.    Pt denies CP/SOB.    No peripheral edema noted, no abdominal bloating or discomfort.     Pt denies any neurological changes at this time.    Pt denies generalized or localized pain at this time.   Patient has not yet had a bowel movement since arriving home from procedure. Discussed hydration and use of fiber to prevent constipation. Discussed use of OTC stool softeners if constipation occurs.    Patient has not yet removed bandage and would like to wait until tomorrow. Patient was given explicit instructions on what to monitor for once bandage removed and who to call if there are concerns. Patient has both writer's direct number and number for on call Cardiology service.    Pt continues anticoagulation medications: Eliquis 5 mg BID and daily 75 mg Plavix (Clopidogrel), per post-LAAC Watchman protocol.    Reviewed post op LAAC healing process, f/u appts, physical restrictions, nutritional requirements, when to contact the heart clinic, and contact information was given to the pt for further concerns or questions.  Pt verbalized understanding.     Gina Orosco RN BSN  Structural Heart Coordinator   [facility]  [phone]    The following information was relayed to the patient over the phone / sent via [employer]:    García    Here is some information regarding aftercare following your Watchman implant, including what to monitor for, when to give us a call, and our contact information at the bottom.    Your anticoagulation medication (Eliquis 5 mg twice daily):    It is important to remain on your anticoagulation medication uninterrupted after your Watchman device implant to reduce your risk of a stroke or heart attack, DO NOT STOP THIS MEDICATION.  You will continue your anticoagulation medication until you see the Structural ALE in clinic to discuss further medication  changes.    Healing from your left atrial closure device implant:    Stay well hydrated, it is important to increase your fluid intake during your recovery period.  Eat foods high in protein to help the healing process.  Gradually increase your activity over the several days, back to baseline;  No aggressive exercise for 5 days post-procedure.  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  No lifting more that 10 lb for 5 days after procedure.  No driving for 3 days post-procedure.   Keep your incision site clean, dry and open to air.  Ok to use ice packs at groin sites for 20 minutes at a time for groin discomfort.   Please delay any dental procedures until 6 weeks post implant. You will not require anti-biotic prophylaxis treatment after this time frame.  If you need urgent dental care prior to the 6 week post-procedure restriction, please contact your cardiologist for prophylactic antibiotic.     Please call me if any of the following occur:    Shortness of breath, dizziness, or chest pain.  Changes in your groin sites including:  Swelling, hardening, drainage, increase in bruising or an increase in pain.          Dial 911 for signs/symptoms of a stroke:    New onset visual disturbance.  New problems with talking/speech.  Smile only occurs on half your face.  Numbness on one side of your body or face.  Sudden headache.  New onset of confusion.  New problems with walking or balance.     Important information regarding your future follow up appointments:    45 Day post-implant office visit with our Structural Advance Practice Practitioner (ALE)  3 month post-implant ONEIDA to assess your Watchman Device.  You will be contacted after your ONEIDA is complete to discuss results within 2-3 days by a Structural RN Coordinator.  6 month in clinic office visit with our Structural ALE  1 year post-implant imaging study to assess your Watchman Device  1 year post-implant office visit  with our Structural ALE  2 year post-implant office visit with our Structural ALE    Thank you,    Gina Orosco, Structural Heart Coordinator -[phone]    After hours please contact the on call service at [phone]

## 2024-09-19 ENCOUNTER — HOSPITAL ENCOUNTER (OUTPATIENT)
Dept: ULTRASOUND IMAGING | Facility: CLINIC | Age: 79
Discharge: HOME | End: 2024-09-19
Attending: PHYSICIAN ASSISTANT | Admitting: PHYSICIAN ASSISTANT
Payer: COMMERCIAL

## 2024-09-19 ENCOUNTER — ALLIED HEALTH/NURSE VISIT (OUTPATIENT)
Dept: CARDIOLOGY | Facility: CLINIC | Age: 79
End: 2024-09-19
Payer: COMMERCIAL

## 2024-09-19 ENCOUNTER — TELEPHONE (OUTPATIENT)
Dept: CARDIOLOGY | Facility: CLINIC | Age: 79
End: 2024-09-19

## 2024-09-19 VITALS
DIASTOLIC BLOOD PRESSURE: 48 MMHG | WEIGHT: 165 LBS | HEART RATE: 74 BPM | SYSTOLIC BLOOD PRESSURE: 110 MMHG | RESPIRATION RATE: 16 BRPM | BODY MASS INDEX: 24.44 KG/M2 | HEIGHT: 69 IN

## 2024-09-19 DIAGNOSIS — S75.991A: Primary | ICD-10-CM

## 2024-09-19 DIAGNOSIS — S75.991A: ICD-10-CM

## 2024-09-19 DIAGNOSIS — Z95.818 PRESENCE OF WATCHMAN LEFT ATRIAL APPENDAGE CLOSURE DEVICE: ICD-10-CM

## 2024-09-19 DIAGNOSIS — T81.72XA COMPLICATION OF VEIN FOLLOWING A PROCEDURE, NOT ELSEWHERE CLASSIFIED, INITIAL ENCOUNTER: ICD-10-CM

## 2024-09-19 PROCEDURE — 99207 PR NO CHARGE NURSE ONLY: CPT

## 2024-09-19 PROCEDURE — 93971 EXTREMITY STUDY: CPT

## 2024-09-19 NOTE — TELEPHONE ENCOUNTER
[facility] Call Center    Phone Message    May a detailed message be left on voicemail: yes     Reason for Call: Symptoms or Concerns     If patient has red-flag symptoms, warm transfer to triage line    Current symptom or concern: pain below incision site of watchman    Symptoms have been present for:  2-3 day(s)    Has patient previously been seen for this? No      Action Taken: Other: cardio    Travel Screening: Not Applicable     Date of Service:

## 2024-09-19 NOTE — TELEPHONE ENCOUNTER
Phone call to patient, spoke to patient and wife. Below incision hurts a little bit, more like the leg muscle. Very dizzy the last 3-4 days. No temperature. A little black and blue. Swelling directly over the incision approximately 1/4 - 1/2 inch in size, roughly dime sized. No drainage from the incision. Pain even during rest. He takes tylenol and that helps a little. Pain started 3-4 days ago. No warmth or redness. At dialysis difficulty regulating BP during runs. BP has been below 100 occasionally and bottom number in the 40s. Once or twice top number has been around 80. Dizziness occurs when patient stands. Offered to see patient in clinic for assessment of incision today. Last Hgb at HD on Monday appears to have been 7.6. They do not give patient RBCs at HD. Discussed with ALONSO [place] and she suggests patient be seen in clinic for evaluation of RLE pain. She also recommended PCP work-up for anemia. Patient will be here around 1-1:30 pm. Recent Hgb attached below. Will see patient shortly. [facility]

## 2024-09-19 NOTE — PROGRESS NOTES
Patient in clinic for assessment of RLE pain following LAAC last week. On assessment, patient does have hematoma on right pubic area approximately 5 inches across and 2 inches from top to bottom. There is also a small firm area approximately an inch long and 1/4 inch wide directly under incision. Patient reports he has had pain that has kept him up at night and has requested pain medication twice; once on the phone and once in clinic. He has no pain on palpation or pressing of swollen area. Right side of pubic area is visibly swollen when compared to left pubic/hip area. Writer requested additional assessment of ALONSO Ramos. Nancie assessed and verbal orders placed for post vascular ultrasound to rule out pseudoaneurysm. Order placed and patient scheduled for ultrasound today at [facility], arrive at 3:45pm. Informed patient due to late appointment, writer may not see results until tomorrow morning. Reassured them that if there are concerning findings, a provider will be called. They verbalized understanding.    We additionally discussed patient dizziness and hypotension post-LAAC. Encouraged patient follow-up with PCP regarding continued low Hgb, although stable. They verbalized understanding of plan and had no further questions at this time. Encouraged them to return call to clinic if there were any questions or concerns, or call on call cardiology service if urgent concerns after office hours. Verbalized understanding, no further questions at this time. [facility]

## 2024-09-23 ENCOUNTER — HOSPITAL ENCOUNTER (INPATIENT)
Facility: CLINIC | Age: 79
LOS: 3 days | Discharge: HOME | DRG: 919 | End: 2024-09-26
Admitting: INTERNAL MEDICINE
Payer: COMMERCIAL

## 2024-09-23 ENCOUNTER — TELEPHONE (OUTPATIENT)
Dept: CARDIOLOGY | Facility: CLINIC | Age: 79
End: 2024-09-23
Payer: COMMERCIAL

## 2024-09-23 ENCOUNTER — APPOINTMENT (OUTPATIENT)
Dept: ULTRASOUND IMAGING | Facility: CLINIC | Age: 79
DRG: 919 | End: 2024-09-23
Payer: COMMERCIAL

## 2024-09-23 ENCOUNTER — APPOINTMENT (OUTPATIENT)
Dept: CT IMAGING | Facility: CLINIC | Age: 79
DRG: 919 | End: 2024-09-23
Payer: COMMERCIAL

## 2024-09-23 DIAGNOSIS — D64.9 ANEMIA, UNSPECIFIED TYPE: ICD-10-CM

## 2024-09-23 DIAGNOSIS — I72.4 FEMORAL ARTERY PSEUDO-ANEURYSM, RIGHT (H): ICD-10-CM

## 2024-09-23 DIAGNOSIS — T14.8XXA HEMATOMA: ICD-10-CM

## 2024-09-23 DIAGNOSIS — Z98.890 S/P ABLATION OF ATRIAL FIBRILLATION: ICD-10-CM

## 2024-09-23 DIAGNOSIS — Z95.818 PRESENCE OF WATCHMAN LEFT ATRIAL APPENDAGE CLOSURE DEVICE: Primary | ICD-10-CM

## 2024-09-23 DIAGNOSIS — T81.718A PSEUDOANEURYSM FOLLOWING PROCEDURE (H): ICD-10-CM

## 2024-09-23 DIAGNOSIS — Z86.79 S/P ABLATION OF ATRIAL FIBRILLATION: ICD-10-CM

## 2024-09-23 DIAGNOSIS — I72.9 PSEUDOANEURYSM FOLLOWING PROCEDURE (H): ICD-10-CM

## 2024-09-23 LAB
ABO/RH(D): NORMAL
ANION GAP SERPL CALCULATED.3IONS-SCNC: 17 MMOL/L (ref 7–15)
ANTIBODY SCREEN: NEGATIVE
BLD PROD TYP BPU: NORMAL
BLOOD COMPONENT TYPE: NORMAL
BUN SERPL-MCNC: 23.1 MG/DL (ref 8–23)
CALCIUM SERPL-MCNC: 8.6 MG/DL (ref 8.8–10.4)
CHLORIDE SERPL-SCNC: 102 MMOL/L (ref 98–107)
CK SERPL-CCNC: 1057 U/L (ref 39–308)
CODING SYSTEM: NORMAL
CREAT SERPL-MCNC: 3.41 MG/DL (ref 0.67–1.17)
CROSSMATCH: NORMAL
EGFRCR SERPLBLD CKD-EPI 2021: 18 ML/MIN/1.73M2
ERYTHROCYTE [DISTWIDTH] IN BLOOD BY AUTOMATED COUNT: 21.8 % (ref 10–15)
GLUCOSE SERPL-MCNC: 111 MG/DL (ref 70–99)
HCO3 SERPL-SCNC: 18 MMOL/L (ref 22–29)
HCT VFR BLD AUTO: 20.4 % (ref 40–53)
HGB BLD-MCNC: 6.4 G/DL (ref 13.3–17.7)
INR PPP: 1.25 (ref 0.85–1.15)
ISSUE DATE AND TIME: NORMAL
MCH RBC QN AUTO: 31.7 PG (ref 26.5–33)
MCHC RBC AUTO-ENTMCNC: 31.4 G/DL (ref 31.5–36.5)
MCV RBC AUTO: 101 FL (ref 78–100)
PLATELET # BLD AUTO: 144 10E3/UL (ref 150–450)
POTASSIUM SERPL-SCNC: 4.3 MMOL/L (ref 3.4–5.3)
RBC # BLD AUTO: 2.02 10E6/UL (ref 4.4–5.9)
SODIUM SERPL-SCNC: 137 MMOL/L (ref 135–145)
SPECIMEN EXPIRATION DATE: NORMAL
UNIT ABO/RH: NORMAL
UNIT NUMBER: NORMAL
UNIT STATUS: NORMAL
UNIT TYPE ISBT: 9500
WBC # BLD AUTO: 6.6 10E3/UL (ref 4–11)

## 2024-09-23 PROCEDURE — 36415 COLL VENOUS BLD VENIPUNCTURE: CPT | Performed by: FAMILY MEDICINE

## 2024-09-23 PROCEDURE — 82550 ASSAY OF CK (CPK): CPT

## 2024-09-23 PROCEDURE — 120N000004 HC R&B MS OVERFLOW

## 2024-09-23 PROCEDURE — 99418 PROLNG IP/OBS E/M EA 15 MIN: CPT | Mod: FS

## 2024-09-23 PROCEDURE — 86900 BLOOD TYPING SEROLOGIC ABO: CPT | Performed by: FAMILY MEDICINE

## 2024-09-23 PROCEDURE — 80048 BASIC METABOLIC PNL TOTAL CA: CPT | Performed by: FAMILY MEDICINE

## 2024-09-23 PROCEDURE — P9016 RBC LEUKOCYTES REDUCED: HCPCS

## 2024-09-23 PROCEDURE — 93926 LOWER EXTREMITY STUDY: CPT

## 2024-09-23 PROCEDURE — 99207 PR APP CREDIT; MD BILLING SHARED VISIT: CPT | Mod: FS | Performed by: INTERNAL MEDICINE

## 2024-09-23 PROCEDURE — 85610 PROTHROMBIN TIME: CPT | Performed by: FAMILY MEDICINE

## 2024-09-23 PROCEDURE — 36430 TRANSFUSION BLD/BLD COMPNT: CPT

## 2024-09-23 PROCEDURE — 86923 COMPATIBILITY TEST ELECTRIC: CPT | Performed by: HOSPITALIST

## 2024-09-23 PROCEDURE — 250N000013 HC RX MED GY IP 250 OP 250 PS 637

## 2024-09-23 PROCEDURE — 85027 COMPLETE CBC AUTOMATED: CPT | Performed by: FAMILY MEDICINE

## 2024-09-23 PROCEDURE — 75635 CT ANGIO ABDOMINAL ARTERIES: CPT

## 2024-09-23 PROCEDURE — 86923 COMPATIBILITY TEST ELECTRIC: CPT

## 2024-09-23 PROCEDURE — 250N000011 HC RX IP 250 OP 636

## 2024-09-23 PROCEDURE — 99223 1ST HOSP IP/OBS HIGH 75: CPT | Mod: FS

## 2024-09-23 PROCEDURE — 86901 BLOOD TYPING SEROLOGIC RH(D): CPT | Performed by: FAMILY MEDICINE

## 2024-09-23 PROCEDURE — 99291 CRITICAL CARE FIRST HOUR: CPT | Mod: 25

## 2024-09-23 RX ORDER — IOPAMIDOL 755 MG/ML
100 INJECTION, SOLUTION INTRAVASCULAR ONCE
Status: COMPLETED | OUTPATIENT
Start: 2024-09-23 | End: 2024-09-23

## 2024-09-23 RX ORDER — AMOXICILLIN 250 MG
2 CAPSULE ORAL 2 TIMES DAILY PRN
Status: DISCONTINUED | OUTPATIENT
Start: 2024-09-23 | End: 2024-09-26 | Stop reason: HOSPADM

## 2024-09-23 RX ORDER — ASCORBIC ACID, THIAMINE, RIBOFLAVIN, NIACINAMIDE, PYRIDOXINE, FOLIC ACID, COBALAMIN, BIOTIN, PANTOTHENIC ACID 100; 1.5; 1.7; 20; 10; 1; 6; 300; 1 MG/1; MG/1; MG/1; MG/1; MG/1; MG/1; UG/1; UG/1; MG/1
1 TABLET, COATED ORAL DAILY
Status: DISCONTINUED | OUTPATIENT
Start: 2024-09-24 | End: 2024-09-24

## 2024-09-23 RX ORDER — OXYCODONE HYDROCHLORIDE 5 MG/1
5 TABLET ORAL EVERY 4 HOURS PRN
Status: DISCONTINUED | OUTPATIENT
Start: 2024-09-23 | End: 2024-09-26 | Stop reason: HOSPADM

## 2024-09-23 RX ORDER — LIDOCAINE 40 MG/G
CREAM TOPICAL
Status: DISCONTINUED | OUTPATIENT
Start: 2024-09-23 | End: 2024-09-26 | Stop reason: HOSPADM

## 2024-09-23 RX ORDER — HYDROCODONE BITARTRATE AND ACETAMINOPHEN 5; 325 MG/1; MG/1
1 TABLET ORAL ONCE
Status: COMPLETED | OUTPATIENT
Start: 2024-09-23 | End: 2024-09-23

## 2024-09-23 RX ORDER — ASPIRIN 81 MG/1
81 TABLET ORAL DAILY
COMMUNITY

## 2024-09-23 RX ORDER — AMOXICILLIN 250 MG
1 CAPSULE ORAL 2 TIMES DAILY PRN
Status: DISCONTINUED | OUTPATIENT
Start: 2024-09-23 | End: 2024-09-26 | Stop reason: HOSPADM

## 2024-09-23 RX ORDER — ONDANSETRON 4 MG/1
4 TABLET, ORALLY DISINTEGRATING ORAL EVERY 6 HOURS PRN
Status: DISCONTINUED | OUTPATIENT
Start: 2024-09-23 | End: 2024-09-26 | Stop reason: HOSPADM

## 2024-09-23 RX ORDER — CINACALCET 30 MG/1
60 TABLET, FILM COATED ORAL DAILY
Status: DISCONTINUED | OUTPATIENT
Start: 2024-09-24 | End: 2024-09-26 | Stop reason: HOSPADM

## 2024-09-23 RX ORDER — CALCIUM CARBONATE 750 MG/1
750 TABLET, CHEWABLE ORAL DAILY PRN
COMMUNITY

## 2024-09-23 RX ORDER — ONDANSETRON 2 MG/ML
4 INJECTION INTRAMUSCULAR; INTRAVENOUS EVERY 6 HOURS PRN
Status: DISCONTINUED | OUTPATIENT
Start: 2024-09-23 | End: 2024-09-26 | Stop reason: HOSPADM

## 2024-09-23 RX ORDER — ASPIRIN 81 MG/1
81 TABLET ORAL DAILY
Status: DISCONTINUED | OUTPATIENT
Start: 2024-09-24 | End: 2024-09-26 | Stop reason: HOSPADM

## 2024-09-23 RX ORDER — ACETAMINOPHEN 325 MG/1
650 TABLET ORAL EVERY 4 HOURS PRN
Status: DISCONTINUED | OUTPATIENT
Start: 2024-09-23 | End: 2024-09-26 | Stop reason: HOSPADM

## 2024-09-23 RX ORDER — SIMVASTATIN 40 MG
40 TABLET ORAL AT BEDTIME
Status: DISCONTINUED | OUTPATIENT
Start: 2024-09-23 | End: 2024-09-26 | Stop reason: HOSPADM

## 2024-09-23 RX ORDER — POLYETHYLENE GLYCOL 3350 17 G/17G
17 POWDER, FOR SOLUTION ORAL EVERY OTHER DAY
Status: DISCONTINUED | OUTPATIENT
Start: 2024-09-23 | End: 2024-09-26 | Stop reason: HOSPADM

## 2024-09-23 RX ORDER — ACETAMINOPHEN 650 MG/1
650 SUPPOSITORY RECTAL EVERY 4 HOURS PRN
Status: DISCONTINUED | OUTPATIENT
Start: 2024-09-23 | End: 2024-09-26 | Stop reason: HOSPADM

## 2024-09-23 RX ORDER — CARVEDILOL 6.25 MG/1
12.5 TABLET ORAL 2 TIMES DAILY
Status: DISCONTINUED | OUTPATIENT
Start: 2024-09-24 | End: 2024-09-26 | Stop reason: HOSPADM

## 2024-09-23 RX ORDER — CLOPIDOGREL BISULFATE 75 MG/1
75 TABLET ORAL DAILY
Status: DISCONTINUED | OUTPATIENT
Start: 2024-09-24 | End: 2024-09-26 | Stop reason: HOSPADM

## 2024-09-23 RX ORDER — SEVELAMER CARBONATE 800 MG/1
3200 TABLET, FILM COATED ORAL
Status: DISCONTINUED | OUTPATIENT
Start: 2024-09-24 | End: 2024-09-26 | Stop reason: HOSPADM

## 2024-09-23 RX ADMIN — SIMVASTATIN 40 MG: 20 TABLET, FILM COATED ORAL at 23:46

## 2024-09-23 RX ADMIN — POLYETHYLENE GLYCOL 3350 17 G: 17 POWDER, FOR SOLUTION ORAL at 23:43

## 2024-09-23 RX ADMIN — HYDROCODONE BITARTRATE AND ACETAMINOPHEN 1 TABLET: 5; 325 TABLET ORAL at 18:13

## 2024-09-23 RX ADMIN — IOPAMIDOL 100 ML: 755 INJECTION, SOLUTION INTRAVENOUS at 20:21

## 2024-09-23 ASSESSMENT — ACTIVITIES OF DAILY LIVING (ADL)
ADLS_ACUITY_SCORE: 38
ADLS_ACUITY_SCORE: 36
ADLS_ACUITY_SCORE: 38

## 2024-09-23 ASSESSMENT — COLUMBIA-SUICIDE SEVERITY RATING SCALE - C-SSRS
1. IN THE PAST MONTH, HAVE YOU WISHED YOU WERE DEAD OR WISHED YOU COULD GO TO SLEEP AND NOT WAKE UP?: NO
2. HAVE YOU ACTUALLY HAD ANY THOUGHTS OF KILLING YOURSELF IN THE PAST MONTH?: NO
6. HAVE YOU EVER DONE ANYTHING, STARTED TO DO ANYTHING, OR PREPARED TO DO ANYTHING TO END YOUR LIFE?: NO

## 2024-09-23 NOTE — ED TRIAGE NOTES
Pt presents to the ED with c/o of right leg pain and swelling, and bruising. Pt and spouse endorse pt had a watchmen placed about a week ago. Had an US on 9/19 for the swelling. Spouse talked to heart clinic who stated to come in. Pt reports extreme pain trying to move or ambulate. Denies calf pain.      Triage Assessment (Adult)       Row Name 09/23/24 0424          Triage Assessment    Airway WDL WDL        Respiratory WDL    Respiratory WDL WDL        Skin Circulation/Temperature WDL    Skin Circulation/Temperature WDL WDL        Cardiac WDL    Cardiac WDL WDL        Peripheral/Neurovascular WDL    Peripheral Neurovascular WDL WDL        Cognitive/Neuro/Behavioral WDL    Cognitive/Neuro/Behavioral WDL WDL

## 2024-09-23 NOTE — ED PROVIDER NOTES
NAME: García Kitchen  AGE: 79 year old male  YOB: 1945  MRN: [id number]  EVALUATION DATE & TIME: No admission date for patient encounter.    PCP: Riki Martinez    ED PROVIDER: Flavio Peres MD      Chief Complaint   Patient presents with    Post-op Problem    Leg Pain    Leg Swelling         FINAL IMPRESSION:  1. Anemia, unspecified type    2. Femoral artery pseudo-aneurysm, right (H24)    3. Hematoma          ED COURSE & MEDICAL DECISION MAKING:    Pertinent Labs & Imaging studies reviewed. (See chart for details)  79 year old male presents to the Emergency Department for evaluation of right groin pain.  Differential diagnosis considered pseudoaneurysm, critical limb ischemia, femoral artery dissection, GI bleeding, anemia, fracture, dislocation.     After right groin pain has been present and worsening over the past few days.  He had a Watchman procedure and had arterial access in his right groin on 9/12.  Had a ultrasound outpatient few days ago that did not show pseudoaneurysm.  Today has a palpable hematoma in his right groin and appears to have ecchymosis.  His right lower extremity is warm and well-perfused I doubt critical limb ischemia.  Does not appear to have an infected hematoma or pseudoaneurysm.  Ordered a ultrasound initially which did not show pseudoaneurysm but did show hematoma.    He is hemoglobin had down trended from 12 3 weeks ago now to 6.4.  Gave 1 unit of blood.  This could be from blood loss from hematoma CTA of the abdomen with runoffs to the leg was ordered and did not show pseudoaneurysm approximately 1 cm.  And did have a 6 cm hematoma.  No active extravasation or dissection.  He states he is unclear if he has had dark stools however they may have been black.  Denies hematemesis or vomiting.  He has been on Eliquis as well as Plavix.  Spoke to vascular surgery as outlined below and no active intervention.  Then spoke to cardiology who agrees with holding off  anticoagulation.  Will admit to the hospital for further workup and evaluation.    ED Course as of 09/24/24 0053   Mon Sep 23, 2024   2110 Spoke to radiologist contained pseudoaneurysm.    2130 Spoke to Dr. Jules vascular surgery.  No intervention needed.  Hold Eliquis and speak to cardiology before holding Eliquis for 3 to 5 days.   2159 Spoke to Dr. Sharma, cardiology, agrees with holding Eliquis.  Will admit to the hospital.       5:36 PM I met with the patient, obtained history, performed an initial exam, and discussed options and plan for diagnostics and treatment here in the ED.    I discussed the patient with the hospitalist, Dr. Phillip, who accepts the patient for admission.     At the conclusion of the encounter I discussed the results of all of the tests and the disposition. The questions were answered. The patient or family acknowledged understanding and was agreeable with the care plan.     MEDICATIONS GIVEN IN THE EMERGENCY:  Medications   lidocaine 1 % 0.1-1 mL (has no administration in time range)   lidocaine (LMX4) cream (has no administration in time range)   sodium chloride (PF) 0.9% PF flush 3 mL (3 mLs Intracatheter $Given 9/23/24 2346)   sodium chloride (PF) 0.9% PF flush 3 mL (has no administration in time range)   senna-docusate (SENOKOT-S/PERICOLACE) 8.6-50 MG per tablet 1 tablet (has no administration in time range)     Or   senna-docusate (SENOKOT-S/PERICOLACE) 8.6-50 MG per tablet 2 tablet (has no administration in time range)   acetaminophen (TYLENOL) tablet 650 mg (has no administration in time range)     Or   acetaminophen (TYLENOL) Suppository 650 mg (has no administration in time range)   oxyCODONE IR (ROXICODONE) half-tab 2.5 mg (has no administration in time range)   oxyCODONE (ROXICODONE) tablet 5 mg (has no administration in time range)   polyethylene glycol (MIRALAX) Packet 17 g (17 g Oral $Given 9/23/24 2343)   ondansetron (ZOFRAN ODT) ODT tab 4 mg (has no administration in  time range)     Or   ondansetron (ZOFRAN) injection 4 mg (has no administration in time range)   apixaban ANTICOAGULANT (ELIQUIS) tablet 5 mg ( Oral Automatically Held 9/29/24 2000)   cinacalcet (SENSIPAR) tablet 60 mg (has no administration in time range)   sevelamer carbonate (RENVELA) tablet 3,200 mg (has no administration in time range)   simvastatin (ZOCOR) tablet 40 mg (40 mg Oral $Given 9/23/24 2346)   aspirin EC tablet 81 mg ( Oral Automatically Held 9/27/24 0800)   clopidogrel (PLAVIX) tablet 75 mg ( Oral Automatically Held 9/27/24 0800)   carvedilol (COREG) tablet 12.5 mg (has no administration in time range)   multivitamin RENAL (RENAVITE RX/NEPHROVITE) tablet 1 tablet (has no administration in time range)   HYDROcodone-acetaminophen (NORCO) 5-325 MG per tablet 1 tablet (1 tablet Oral $Given 9/23/24 1813)   iopamidol (ISOVUE-370) solution 100 mL (100 mLs Intravenous $Given 9/23/24 2021)       NEW PRESCRIPTIONS STARTED AT TODAY'S ER VISIT  New Prescriptions    No medications on file     Modified Medications    No medications on file       =================================================================    HPI    Patient information was obtained from: Patient and Patients wife    Use of : N/A         García Kitchen is a 79 year old male with a pertinent history of s/p Cv left atrial appendage closure, type II diabetes, renal dialysis, cystic kidney disease, hypertension, hypoparathyroidism, and memory loss who presents to this ED for evaluation of post op problem.     Per patient,  He has a sore right groin and is unsure if his right leg is numb. He can feel the pain constantly if his leg in in the wrong position.     Per patients wife,   The pain originates at the site of his catheter placement. It has been going on for the last week. 4 days ago (9/19/24) he went to the ED and got diagnosed with a hematoma. He came in today because the pain is worse and he is now more swollen. Pain radiates to  his knee. Ice has been used with no relief. Patient was at dialysis all afternoon and got through his full run. Means though his last tylenol was this morning. Been taking that regularly to help with the pain.       Per chart review patient was seen at [facility] on 9/19/24 for US Post  Vascular access lower ext duplex. Gray scale imagine, spectral wave analysis, and color flow imaging was performed of the right groin. No definitive evidence for a pseudoaneurysm found.       PHYSICAL EXAM    BP (!) 141/63   Pulse 80   Temp 98  F (36.7  C) (Oral)   Resp 16   Wt 74.8 kg (165 lb)   SpO2 100%   BMI 24.37 kg/m    Constitutional: Chronically ill-appearing..  Head: Normocephalic, atraumatic, mucous membranes moist, nose normal.   Neck: Supple, gross ROM intact.   Eyes: Pupils mid-range, sclera white.  Respiratory: Clear to auscultation bilaterally, no respiratory distress, no wheezing, speaks full sentences easily.  Cardiovascular: Normal heart rate, regular rhythm, no murmurs. No lower extremity edema. Strong dorsal pedis pulse.  GI: Soft, no tenderness to deep palpation in all quadrants.  Musculoskeletal: Moving all 4 extremities intentionally and without pain. AV fistula to left upper extremity with audible thrill.Palpable hematoma on right groin.  Surrounding ecchymosis that extends down to his right knee.  Skin: Warm, dry, no rash. L5 S1 with intact sensation, warm and well perfuse. Purple ecchymosis  from back side of right leg to right knee.   Neurologic: Alert & oriented x 3, speech clear, moving all extremities spontaneously 5/5 strength in lower right extremity and intact distal sensation.   Psychiatric: Affect normal, cooperative.       LAB:  All pertinent labs reviewed and interpreted.  Results for orders placed or performed during the hospital encounter of 09/23/24   Us Post Vascular Access Low Ext Duplex    Impression    IMPRESSION:  1. Right groin hematoma, new since the prior  study.  2. No definite findings for pseudoaneurysm.       CTA Abdomen Pelvis Runoff w Contrast    Impression    IMPRESSION:   1.  Right groin pseudoaneurysm with large surrounding hematoma.  2.  Additional findings as above.    Pseudoaneurysm was called to Dr. Peres by Dr. Demarco Arita on 9/23/2024 9:07 PM CDT.    Basic metabolic panel   Result Value Ref Range    Sodium 137 135 - 145 mmol/L    Potassium 4.3 3.4 - 5.3 mmol/L    Chloride 102 98 - 107 mmol/L    Carbon Dioxide (CO2) 18 (L) 22 - 29 mmol/L    Anion Gap 17 (H) 7 - 15 mmol/L    Urea Nitrogen 23.1 (H) 8.0 - 23.0 mg/dL    Creatinine 3.41 (H) 0.67 - 1.17 mg/dL    GFR Estimate 18 (L) >60 mL/min/1.73m2    Calcium 8.6 (L) 8.8 - 10.4 mg/dL    Glucose 111 (H) 70 - 99 mg/dL   Result Value Ref Range    INR 1.25 (H) 0.85 - 1.15   CBC (+ platelets, no diff)   Result Value Ref Range    WBC Count 6.6 4.0 - 11.0 10e3/uL    RBC Count 2.02 (L) 4.40 - 5.90 10e6/uL    Hemoglobin 6.4 (LL) 13.3 - 17.7 g/dL    Hematocrit 20.4 (L) 40.0 - 53.0 %     (H) 78 - 100 fL    MCH 31.7 26.5 - 33.0 pg    MCHC 31.4 (L) 31.5 - 36.5 g/dL    RDW 21.8 (H) 10.0 - 15.0 %    Platelet Count 144 (L) 150 - 450 10e3/uL   Result Value Ref Range    CK 1,057 (HH) 39 - 308 U/L   Result Value Ref Range    Hemoglobin 7.2 (L) 13.3 - 17.7 g/dL   Adult Type and Screen   Result Value Ref Range    ABO/RH(D) O POS     Antibody Screen Negative Negative    SPECIMEN EXPIRATION DATE [id number]    Prepare red blood cells (unit)   Result Value Ref Range    Blood Component Type Red Blood Cells     Product Code C2125O04     Unit Status Transfused     Unit Number [id number]     CROSSMATCH Compatible     CODING SYSTEM [id number]     ISSUE DATE AND TIME [id number]     UNIT ABO/RH O-     UNIT TYPE ISBT [id number]        RADIOLOGY:  Reviewed all pertinent imaging. Please see official radiology report.  CTA Abdomen Pelvis Runoff w Contrast   Final Result   IMPRESSION:    1.  Right groin pseudoaneurysm with large  surrounding hematoma.   2.  Additional findings as above.      Pseudoaneurysm was called to Dr. Peres by Dr. Demarco Arita on 9/23/2024 9:07 PM CDT.       Us Post Vascular Access Low Ext Duplex   Final Result   IMPRESSION:   1. Right groin hematoma, new since the prior study.   2. No definite findings for pseudoaneurysm.                  PROCEDURES:   Critical Care  Performed by: Flavio Peres MD  Authorized by: Flavio Peres MD    Total critical care time: 35 minutes  Critical care time was exclusive of separately billable procedures and treating other patients.  Critical care was necessary to treat or prevent imminent or life-threatening deterioration of the following conditions: Acute blood loss anemia requiring transfusion  Critical care was time spent personally by me on the following activities: development of treatment plan with patient or surrogate, discussions with consultants, examination of patient, evaluation of patient's response to treatment, obtaining history from patient or surrogate, ordering and performing treatments and interventions, ordering and review of laboratory studies, ordering and review of radiographic studies and re-evaluation of patient's condition, this excludes any separately billable procedures.        Flavio Peres MD  [facility] EMERGENCY ROOM  [address] [id number]  [phone]   =================================================================    BILLING:  Data  Category 1  Non-ED record review, if applicable. External record reviewed: Reviewed past admission to the hospital where patient had Watchman procedure and had access in his right groin     Clinical information was obtained from an independent historian. History was obtained from: Patient     The following testing was considered but ultimately not selected after discussion with patient/family: N/A     Category 2  My independent interpretation of EKG, rhythm  strip, radiology study:  CTA of the abdomen with runoff to the leg did not show AAA     Category 3  Discussion of management with other physician/healthcare provider/other source: Spoke to hospitalist regarding patient's ED course and agrees with admission to the hospital , Spoke to cardiologist regarding patient's ED course and agrees to consult, and vascular surgeon agrees to consult        Risk  Prescription medication was considered, but ultimately not given after discussion with patient/family: N/A     Chronic conditions affecting care: Hypertension and Diabetes, ESRD     Care significantly affected by Social Determinants of Health: N/A     Consideration of Admission/Observation: Admitted to hospital       [employer] Documentation:   CMS Diagnoses:               I, Jesus Ames, am serving as a scribe to document services personally performed by Flavio Peres MD based on my observation and the provider's statements to me. I, Flavio Peres ED attest that Jesus Ames  is acting in a scribe capacity, has observed my performance of the services and has documented them in accordance with my direction.     Flavio Peres MD  09/24/24 0053

## 2024-09-23 NOTE — TELEPHONE ENCOUNTER
Phone call to patient, spoke to wife Kirsten. Leg is now swollen, Kirsten reports ankle is swollen, back of leg/thigh above knee is black and blue. Advised Kirsten bring García to ED to have LLE assessed, especially given new swelling and bruise at back of leg accompanied by severe pain. Kirsten verbalized understanding, no further questions at this time. [facility]    ----- Message -----  From: Israel Paz MD  Sent: 9/23/2024  12:22 PM CDT  To: Gina Orosco RN; Nancie Ramos PA-C    Recommend going to the ER if pain is severe  If not severe, recommend cold/ice pack and tylenol as you recommended  Tell them it may take another week for the pain to go away  ----- Message -----  From: Nancie Ramos PA-C  Sent: 9/23/2024  10:52 AM CDT  To: Gina Orosco RN; Israel Paz MD    I am not sure what else would be causing his pain. We could trial a couple days of low dose NSAIDs to see if this helps - I would not do more than that given bleeding risk. Additionally could try warm compresses, and continuing with OTC Tylenol, rest.   Any other thoughts Dr. Paz?  ----- Message -----  From: Gina Orosco RN  Sent: 9/23/2024   9:03 AM CDT  To: Israel Paz MD; Nancie Ramos PA-C    ----- Message from Gina Orosco RN sent at 9/23/2024  9:03 AM CDT -----  This patient is s/p LAAC 9/12/2024 with Dr. Paz and came to clinic last week for assessment of incision site pain. On presentation to clinic, small soft hematoma palpable and patient complaining of moderate to severe pain at incision site. I was able to palpate area without causing any major discomfort, but patient reported to me having taken tylenol round the clock due to pain and even asked me multiple times for pain medication. Nancie assessed and ordered post-vascular access ultrasound. Ultrasound revelead no pseudoaneurysm so I called patient and encouraged rest, ice, tylenol as needed.    They are calling again this morning  complaining of worsening pain at incision site and seeking recommendations.     Gina

## 2024-09-24 ENCOUNTER — APPOINTMENT (OUTPATIENT)
Dept: ULTRASOUND IMAGING | Facility: CLINIC | Age: 79
DRG: 919 | End: 2024-09-24
Attending: NURSE PRACTITIONER
Payer: COMMERCIAL

## 2024-09-24 ENCOUNTER — APPOINTMENT (OUTPATIENT)
Dept: INTERVENTIONAL RADIOLOGY/VASCULAR | Facility: CLINIC | Age: 79
DRG: 919 | End: 2024-09-24
Attending: NURSE PRACTITIONER
Payer: COMMERCIAL

## 2024-09-24 ENCOUNTER — TELEPHONE (OUTPATIENT)
Dept: CARDIOLOGY | Facility: CLINIC | Age: 79
End: 2024-09-24

## 2024-09-24 ENCOUNTER — APPOINTMENT (OUTPATIENT)
Dept: ULTRASOUND IMAGING | Facility: CLINIC | Age: 79
DRG: 919 | End: 2024-09-24
Attending: RADIOLOGY
Payer: COMMERCIAL

## 2024-09-24 LAB
ANION GAP SERPL CALCULATED.3IONS-SCNC: 15 MMOL/L (ref 7–15)
BUN SERPL-MCNC: 33.2 MG/DL (ref 8–23)
CALCIUM SERPL-MCNC: 9.2 MG/DL (ref 8.8–10.4)
CHLORIDE SERPL-SCNC: 101 MMOL/L (ref 98–107)
CREAT SERPL-MCNC: 4.41 MG/DL (ref 0.67–1.17)
EGFRCR SERPLBLD CKD-EPI 2021: 13 ML/MIN/1.73M2
ERYTHROCYTE [DISTWIDTH] IN BLOOD BY AUTOMATED COUNT: 21.7 % (ref 10–15)
FERRITIN SERPL-MCNC: 1795 NG/ML (ref 31–409)
GLUCOSE SERPL-MCNC: 93 MG/DL (ref 70–99)
HCO3 SERPL-SCNC: 19 MMOL/L (ref 22–29)
HCT VFR BLD AUTO: 22.6 % (ref 40–53)
HGB BLD-MCNC: 7 G/DL (ref 13.3–17.7)
HGB BLD-MCNC: 7 G/DL (ref 13.3–17.7)
HGB BLD-MCNC: 7.2 G/DL (ref 13.3–17.7)
HGB BLD-MCNC: 7.2 G/DL (ref 13.3–17.7)
IRON BINDING CAPACITY (ROCHE): 178 UG/DL (ref 240–430)
IRON SATN MFR SERPL: 41 % (ref 15–46)
IRON SERPL-MCNC: 73 UG/DL (ref 61–157)
MCH RBC QN AUTO: 31.3 PG (ref 26.5–33)
MCHC RBC AUTO-ENTMCNC: 31.9 G/DL (ref 31.5–36.5)
MCV RBC AUTO: 98 FL (ref 78–100)
PLATELET # BLD AUTO: 117 10E3/UL (ref 150–450)
POTASSIUM SERPL-SCNC: 4.9 MMOL/L (ref 3.4–5.3)
RBC # BLD AUTO: 2.3 10E6/UL (ref 4.4–5.9)
RETICS # AUTO: 0.11 10E6/UL (ref 0.03–0.1)
RETICS/RBC NFR AUTO: 4.7 % (ref 0.5–2)
SODIUM SERPL-SCNC: 135 MMOL/L (ref 135–145)
WBC # BLD AUTO: 6.7 10E3/UL (ref 4–11)

## 2024-09-24 PROCEDURE — 99222 1ST HOSP IP/OBS MODERATE 55: CPT | Mod: FS | Performed by: INTERNAL MEDICINE

## 2024-09-24 PROCEDURE — 87340 HEPATITIS B SURFACE AG IA: CPT | Performed by: PHYSICIAN ASSISTANT

## 2024-09-24 PROCEDURE — 250N000009 HC RX 250: Performed by: RADIOLOGY

## 2024-09-24 PROCEDURE — 99207 PR APP CREDIT; MD BILLING SHARED VISIT: CPT | Mod: FS | Performed by: PHYSICIAN ASSISTANT

## 2024-09-24 PROCEDURE — 82728 ASSAY OF FERRITIN: CPT | Performed by: PHYSICIAN ASSISTANT

## 2024-09-24 PROCEDURE — 93926 LOWER EXTREMITY STUDY: CPT

## 2024-09-24 PROCEDURE — 85018 HEMOGLOBIN: CPT

## 2024-09-24 PROCEDURE — 36415 COLL VENOUS BLD VENIPUNCTURE: CPT

## 2024-09-24 PROCEDURE — 85045 AUTOMATED RETICULOCYTE COUNT: CPT | Performed by: PHYSICIAN ASSISTANT

## 2024-09-24 PROCEDURE — 36002 PSEUDOANEURYSM INJECTION TRT: CPT | Mod: XS

## 2024-09-24 PROCEDURE — 99223 1ST HOSP IP/OBS HIGH 75: CPT | Performed by: INTERNAL MEDICINE

## 2024-09-24 PROCEDURE — 120N000004 HC R&B MS OVERFLOW

## 2024-09-24 PROCEDURE — 36415 COLL VENOUS BLD VENIPUNCTURE: CPT | Performed by: HOSPITALIST

## 2024-09-24 PROCEDURE — 36002 PSEUDOANEURYSM INJECTION TRT: CPT

## 2024-09-24 PROCEDURE — 272N000187 HC ACCESSORY CR11

## 2024-09-24 PROCEDURE — 83550 IRON BINDING TEST: CPT | Performed by: PHYSICIAN ASSISTANT

## 2024-09-24 PROCEDURE — 85018 HEMOGLOBIN: CPT | Performed by: HOSPITALIST

## 2024-09-24 PROCEDURE — 250N000013 HC RX MED GY IP 250 OP 250 PS 637

## 2024-09-24 PROCEDURE — 80048 BASIC METABOLIC PNL TOTAL CA: CPT

## 2024-09-24 PROCEDURE — 99232 SBSQ HOSP IP/OBS MODERATE 35: CPT | Performed by: HOSPITALIST

## 2024-09-24 PROCEDURE — 3E053GC INTRODUCTION OF OTHER THERAPEUTIC SUBSTANCE INTO PERIPHERAL ARTERY, PERCUTANEOUS APPROACH: ICD-10-PCS | Performed by: RADIOLOGY

## 2024-09-24 RX ORDER — NALOXONE HYDROCHLORIDE 0.4 MG/ML
0.4 INJECTION, SOLUTION INTRAMUSCULAR; INTRAVENOUS; SUBCUTANEOUS
Status: DISCONTINUED | OUTPATIENT
Start: 2024-09-24 | End: 2024-09-26 | Stop reason: HOSPADM

## 2024-09-24 RX ORDER — NALOXONE HYDROCHLORIDE 0.4 MG/ML
0.2 INJECTION, SOLUTION INTRAMUSCULAR; INTRAVENOUS; SUBCUTANEOUS
Status: DISCONTINUED | OUTPATIENT
Start: 2024-09-24 | End: 2024-09-26 | Stop reason: HOSPADM

## 2024-09-24 RX ORDER — VIT B COMP NO.3/FOLIC/C/BIOTIN 1 MG-60 MG
1 TABLET ORAL DAILY
Status: DISCONTINUED | OUTPATIENT
Start: 2024-09-24 | End: 2024-09-26 | Stop reason: HOSPADM

## 2024-09-24 RX ORDER — LIDOCAINE HYDROCHLORIDE 10 MG/ML
1-30 INJECTION, SOLUTION EPIDURAL; INFILTRATION; INTRACAUDAL; PERINEURAL
Status: COMPLETED | OUTPATIENT
Start: 2024-09-24 | End: 2024-09-24

## 2024-09-24 RX ADMIN — CINACALCET HYDROCHLORIDE 60 MG: 30 TABLET, FILM COATED ORAL at 07:48

## 2024-09-24 RX ADMIN — SENNOSIDES AND DOCUSATE SODIUM 2 TABLET: 50; 8.6 TABLET ORAL at 07:59

## 2024-09-24 RX ADMIN — Medication 1 TABLET: at 07:48

## 2024-09-24 RX ADMIN — THROMBIN TOPICAL RECOMBINANT 300 UNITS: KIT at 14:08

## 2024-09-24 RX ADMIN — SEVELAMER CARBONATE 3200 MG: 800 TABLET, FILM COATED ORAL at 07:48

## 2024-09-24 RX ADMIN — SIMVASTATIN 40 MG: 20 TABLET, FILM COATED ORAL at 21:08

## 2024-09-24 RX ADMIN — LIDOCAINE HYDROCHLORIDE 6 ML: 10 INJECTION, SOLUTION INFILTRATION; PERINEURAL at 14:06

## 2024-09-24 RX ADMIN — CARVEDILOL 12.5 MG: 6.25 TABLET, FILM COATED ORAL at 21:08

## 2024-09-24 RX ADMIN — ACETAMINOPHEN 650 MG: 325 TABLET ORAL at 01:12

## 2024-09-24 RX ADMIN — OXYCODONE HYDROCHLORIDE 2.5 MG: 5 TABLET ORAL at 01:12

## 2024-09-24 RX ADMIN — SEVELAMER CARBONATE 3200 MG: 800 TABLET, FILM COATED ORAL at 18:09

## 2024-09-24 RX ADMIN — CARVEDILOL 12.5 MG: 6.25 TABLET, FILM COATED ORAL at 07:54

## 2024-09-24 ASSESSMENT — ACTIVITIES OF DAILY LIVING (ADL)
ADLS_ACUITY_SCORE: 40
ADLS_ACUITY_SCORE: 40
ADLS_ACUITY_SCORE: 38
ADLS_ACUITY_SCORE: 40
ADLS_ACUITY_SCORE: 38
DEPENDENT_IADLS:: CLEANING;COOKING;LAUNDRY;SHOPPING;TRANSPORTATION;MEDICATION MANAGEMENT
ADLS_ACUITY_SCORE: 40
ADLS_ACUITY_SCORE: 40
ADLS_ACUITY_SCORE: 23
ADLS_ACUITY_SCORE: 40
ADLS_ACUITY_SCORE: 38
ADLS_ACUITY_SCORE: 40
ADLS_ACUITY_SCORE: 38
ADLS_ACUITY_SCORE: 40
ADLS_ACUITY_SCORE: 40

## 2024-09-24 NOTE — PLAN OF CARE
Acknowledging IR consultation order.    Case, imaging reviewed with Dr. Lai.  PSA appreciated on CTA, but not on US 9/23/2024.  IR employs US guidance for PSA injection; recommending repeat US to evaluate PSA prior to possible IR intervention.      US ordered.  IR will review when results available for feasibility for possible injection.  If unable to appreciate PSA on US, may need to consider angiogram by IR.      Continue to hold anticoagulation.  Would recommend NPO (patient has a diet order in?)  Patient ate a large breakfast this AM per bedside RN.     Called ER team, who paged attending to this writer to discuss above.      CHELSEA VEGA NP on 9/24/2024 at 8:45 AM

## 2024-09-24 NOTE — PLAN OF CARE
Problem: Adult Inpatient Plan of Care  Goal: Plan of Care Review  Description: The Plan of Care Review/Shift note should be completed every shift.  The Outcome Evaluation is a brief statement about your assessment that the patient is improving, declining, or no change.  This information will be displayed automatically on your shift  note.  Outcome: Progressing   Patient returned from Right groin pseudoaneurysm thrombin injection at 1511. Patient is alert and oriented x4 denies pain. Per IR orders he is to be on strict bedrest for four hours laying flat with legs straight. After 4 hours may have bathroom privileges only until tomorrow AM when he can get up ad-helena. Palpable pulses to BLE. Strict bedrest will end at 1911.   Salvador Campo RN  9/24/2024  6:57 PM
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
## 2024-09-24 NOTE — PROGRESS NOTES
[facility] Medicine PROGRESS NOTE      Identification/Summary:   78 yo male presented with complaints of hip/groin pain. Recently underwent Watchman procedure with groin access. Workup with large right groin hematoma, pseudoaneurysm, acute on chronic anemia. Transfused one unit, IR consulted for thrombin injection. Also hx of ESRD, on dialysis. Hx of A fib, CAD with stent in Dec of 2023. Holding aspirin, plavix, eliquis. Watching hgb, transfusing for Hgb below 7.     Assessment and Plan:  Rt groin hematoma, pseudoaneurysm  Acute on chronic anemia  Appreciate consultants recs  Getting IR thrombin injection  Continue to trend hgb, follow groin site  Transfuse for hgb below 7  Holding ASA, plavix, eliquis     A fib, anticoagulated, CHASE done 12 days ago  CAD, stent in Dec 2023  Chronic CHF, EF 45-50%  Typically on eliquis, underwent CHASE closure device implant on 9/12  Holding eliquis now, appreciate cards help  Continue coreg    ESRD  Anemia of CKD  Appreciate renal consult  Gets dialysis MWF  No heparin with dialysis    Diet: Combination Diet Low Saturated Fat Na <2400mg Diet  Fluids: none  Pain meds:tylenol  Therapy:none  DVT Prophylaxis:  holding  Code Status: No CPR- Do NOT Intubate  Medically Ready for Discharge: Anticipated in 2-4 Days      Interval History/Subjective:  Seen just after procedure. No chest pain, dyspnea, abdominal pain. No n/v/d. No groin pain now while at rest, only with movement.     Physical Exam/Objective:  Gen: no acute distress, comfortable, a little pale  ENT: no scleral icterus  Pulm: breathing comfortably at rest in room air  CV: regular rate and rhythm  Derm: warm, dry, pale  Psych: appropriate affect    Medications:   Current Facility-Administered Medications   Medication Dose Route Frequency Provider Last Rate Last Admin    [Held by provider] apixaban ANTICOAGULANT (ELIQUIS) tablet 5 mg  5 mg Oral BID Josie Ramsey PA-C        [Held by provider] aspirin EC tablet 81 mg  81 mg  Oral Daily Josie Ramsey PA-C        carvedilol (COREG) tablet 12.5 mg  12.5 mg Oral BID Josie Ramsey PA-C   12.5 mg at 09/24/24 0754    cinacalcet (SENSIPAR) tablet 60 mg  60 mg Oral Daily Josie Ramsey PA-C   60 mg at 09/24/24 0748    [Held by provider] clopidogrel (PLAVIX) tablet 75 mg  75 mg Oral Daily Josie Ramsey PA-C        multivitamin RENAL (RENAVITE RX/NEPHROVITE) tablet 1 tablet  1 tablet Oral Daily Josie Ramsey PA-C   1 tablet at 09/24/24 0748    polyethylene glycol (MIRALAX) Packet 17 g  17 g Oral Every Other Day Josie Ramsey PA-C   17 g at 09/23/24 2343    sevelamer carbonate (RENVELA) tablet 3,200 mg  3,200 mg Oral TID w/meals Josie Ramsey PA-C   3,200 mg at 09/24/24 0748    simvastatin (ZOCOR) tablet 40 mg  40 mg Oral At Bedtime Josie Ramsye PA-C   40 mg at 09/23/24 2346    sodium chloride (PF) 0.9% PF flush 3 mL  3 mL Intracatheter Q8H Josie Ramsey PA-C   3 mL at 09/24/24 0755    thrombin 5000 units vial   Intracavitary Once Germán Chang MD         Clinically Significant Risk Factors Present on Admission               # Drug Induced Coagulation Defect: home medication list includes an anticoagulant medication  # Drug Induced Platelet Defect: home medication list includes an antiplatelet medication   # Hypertension: Noted on problem list    # Anemia: based on hgb <11           # Financial/Environmental Concerns: none            Germán Rush DO   Hospitalist  [facility]

## 2024-09-24 NOTE — DISCHARGE INSTRUCTIONS
Pseudoaneurysm Thrombin Injection Procedure Discharge Instructions    Care instructions after thrombin injection procedure:    -  It is normal to have some tenderness and minimal swelling at procedure puncture site. A small area of discoloration may be present. Tenderness typically subsides in 1-2 days. A small knot may also be present at puncture site for 6-8 weeks. This can be a normal part of the healing process.     Follow up:  - [facility] will contact you in the next week to help arrange for additional follow up.    Please seek medical evaluation for:  - If you develop fevers (greater than 101 F (38.3C)).  - If you develop increasing pain, redness, purulent drainage, tenderness, or swelling at procedure site.   - If you experience any bleeding from procedure/puncture site: lie down, firmly apply pressure to puncture site and call 911.  - Seek emergent evaluation if you experience any new leg pain, discoloration or numbness.

## 2024-09-24 NOTE — PROGRESS NOTES
Patient Name: García Kitchen  Medical Record Number: [id number]  Today's Date: 9/24/2024    Procedure: Image Guided Right lower extremity pseudoaneurysm Thrombin injection  Proceduralist: Dr. Gerhard Chang  Pathology present: n/a    Procedure Start: 1400  Procedure end: 1450  Sedation medications administered: n/a, Lidocaine 1% only     Report given to: [facility] ED  : n/a    Other Notes: Pt arrived to IR room #1 from  ED Room #21. Consent reviewed. Pt denies any questions or concerns regarding procedure. Pt positioned supine and monitored per protocol. Pt tolerated procedure without any noted complications. Pt transferred back to  ED Room #21.    Dr. Chang injected Thrombin (recombinant) 300 Units to right femoral pseudoaneurysm under ultrasound guidance.    Dr. Chang ordered to follow up with [facility] in one week (approx. Oct. 1st).
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
## 2024-09-24 NOTE — TELEPHONE ENCOUNTER
Incoming call from wife Kirsten this morning. She reports that after García's HD run yesterday, they reported to ED at advisement of writer. ED assessment revealed palpable hematoma, as noted previously, but patient now has ecchymosis, which was not noted at clinic visit last week. RLE continued to have adequate perfusion as evidenced by warm extremity. Repeat post vascular access ultrasound was completed revealing no concern for pseudoaneurysm. A CT was then ordered revealing 1.2 x 1.1 x 1.6 cm pseudoaneurysm with large hematoma. Patient Hgb was also found to be 6.4 and transfusion was ordered/administered. Vascular Surgery was consulted, as well as Cardiology, and agreement was made to hold Eliquis 3-5 days. Kirsten says they are planning on admitting García to hospital to monitor pseudoaneurysm and possible transfusions if Hgb continues to fall below 7. Will report to [facility] team, as well as [facility] NCDR Registry. Thanked Kirsten for information and extended apologies and well wishes to García. She is appreciative, no further questions at this time. [facility]

## 2024-09-24 NOTE — CONSULTS
RENAL CONSULT NOTE    REQUESTING PHYSICIAN: Josie Ramsey PA-C    REASON FOR CONSULT: ESRD on HD     ASSESSMENT/PLAN:  ESRD: HD MWF schedule [facility] under the care of [facility] [place].  TW 74 kg.  3.25-hour TT, left upper extremity aVF, 450 BFR/700 DFR.  Recs:  Patient is only 0.8 kg above his target weight, he appears compensated from a CHF standpoint, lung sounds are clear and he is on room air, no pressing metabolic derangements, there is no urgent indication for dialysis today off schedule.  Will plan for routine dialysis per usual schedule tomorrow.  NO heparin with dialysis  Thanks for the consult we will follow    ACD: On Mircera 200 mcg Q2W PTA, last dose 9/13/2024.  Acute on chronic anemia now in the setting of blood loss with right groin pseudoaneurysm and large surrounding hematoma after recent watchman's device procedure.  S/p 1 unit PRBCs in the ED.  Will update iron studies, check reticulocyte counts and see if additional GURPREET may be indicated to help him recover from current bleed episode.  Anticoagulation antiplatelet therapy currently on hold under the guidance of cardiology.  Interventional radiology consulted and planning for injection of thrombin to the right pseudoaneurysm.    ESRD MBD: Resume PTA sevelamer 3 times daily AC, PTA Cinacalcet for secondary renal hyperparathyroidism.    Persistent atrial fibrillation: S/p ablation 4/2024, s/p Watchman device placement 9/12/2024, anticoagulation currently on hold in the setting of bleed.  PTA Coreg.    CAD: History of prior PCI 12/2023.  Cardiology recommended holding PTA Plavix and aspirin for now.  Statin has been resumed.    HTN: PTA Coreg        HPI:   García is a very pleasant 79-year-old male ESRD on HD MWF [facility] under the care of [facility] [place].  Also with a past medical history of persistent atrial fibrillation s/p ablation 4/2024 and s/p Watchman device placement 9/12/2024, currently  anticoagulated with Eliquis, also with a past history of hypertension, CAD s/p TEENA 12/2023, HLD, HFrEF, history of RCC s/p right radical nephrectomy 2007, ACD who presented to [facility] emergency department for evaluation of right groin pain and swelling.  Had Watchman device placement 9/12/2024, there is no immediate complications, over the past several days he developed increased pain in his right thigh and groin near catheter insertion site.  She also noted some bruising in the area  Found to have right groin pseudoaneurysm and large hematoma  IR consulted this admit for thrombin injection  Anticoagulation on hold  In terms of dialysis, he reports that has been uneventful, his last hemodialysis treatment was yesterday at his [facility]  Target weight is 74 kg and he has been no issues achieving TW  No recent cramping during treatment  Denies shortness of breath    REVIEW OF SYSTEMS:  Complete 12 point review of systems was negative other than those noted in the HPI      Past Medical History:   Diagnosis Date    A-V fistula (H24) 07/16/2014    Placed November 2013     Anemia, unspecified     Created by Conversion     Atrial fibrillation (H)     Calculus of kidney     Created by Conversion     Cancer (H)     Renal Cell Carcinoma s/p resection    Carcinoma in situ, site unspecified     Created by Conversion     Chronic kidney disease     Congestive heart failure (H)     Coronary artery disease     Diabetes mellitus (H)     ESRD (end stage renal disease) on dialysis (H) 07/16/2014    Currently in transplant work-up     HFrEF (heart failure with reduced ejection fraction) (H) 05/08/2024    History of anesthesia complications     urinary retention which required catheterization    History of transfusion     Hyperlipidemia     Hyperparathyroidism, secondary renal (H24)     Created by Conversion     Inguinal hernia     recurrent right     Nontoxic uninodular goiter     Created by Conversion     Renal cell carcinoma (H)  07/16/2014    S/p right nephrectomy 9/2007     Skin cancer     squamous    Splenomegaly     Created by Conversion [facility] Annotation: Jul 22 2008 12:19PM - Woody Shaffer: mild, noted on  CT     Thrombocytopenia, unspecified (H24)     Created by Conversion     Unspecified essential hypertension     Created by Conversion     Vertigo        Current Facility-Administered Medications   Medication Dose Route Frequency Provider Last Rate Last Admin    acetaminophen (TYLENOL) tablet 650 mg  650 mg Oral Q4H PRN Josie Ramsey PA-C   650 mg at 09/24/24 0112    Or    acetaminophen (TYLENOL) Suppository 650 mg  650 mg Rectal Q4H PRN Josie Ramsey PA-C        [Held by provider] apixaban ANTICOAGULANT (ELIQUIS) tablet 5 mg  5 mg Oral BID Josie Ramsey PA-C        [Held by provider] aspirin EC tablet 81 mg  81 mg Oral Daily Josie Ramsey PA-C        carvedilol (COREG) tablet 12.5 mg  12.5 mg Oral BID Josie Ramsey PA-C   12.5 mg at 09/24/24 0754    cinacalcet (SENSIPAR) tablet 60 mg  60 mg Oral Daily Josie Ramsey PA-C   60 mg at 09/24/24 0748    [Held by provider] clopidogrel (PLAVIX) tablet 75 mg  75 mg Oral Daily Josie Ramsey PA-C        lidocaine (LMX4) cream   Topical Q1H PRN Josie Ramsey PA-C        lidocaine 1 % 0.1-1 mL  0.1-1 mL Other Q1H PRN Josie Ramsey PA-C        multivitamin RENAL (RENAVITE RX/NEPHROVITE) tablet 1 tablet  1 tablet Oral Daily Josie Ramsey PA-C   1 tablet at 09/24/24 0748    naloxone (NARCAN) injection 0.2 mg  0.2 mg Intravenous Q2 Min PRN Germán Rush DO        Or    naloxone (NARCAN) injection 0.4 mg  0.4 mg Intravenous Q2 Min PRN Germán Rush DO        Or    naloxone (NARCAN) injection 0.2 mg  0.2 mg Intramuscular Q2 Min PRN Germán Rush DO        Or    naloxone (NARCAN) injection 0.4 mg  0.4 mg Intramuscular Q2 Min PRN Germán Rush, DO        ondansetron (ZOFRAN ODT) ODT tab 4 mg  4 mg Oral Q6H PRN Josie Ramsey PA-C        Or    ondansetron (ZOFRAN) injection 4 mg  4  mg Intravenous Q6H PRN Josie Ramsey PA-C        oxyCODONE (ROXICODONE) tablet 5 mg  5 mg Oral Q4H PRN Josie Ramsey PA-C        oxyCODONE IR (ROXICODONE) half-tab 2.5 mg  2.5 mg Oral Q4H PRN Josie Ramsey PA-C   2.5 mg at 09/24/24 0112    polyethylene glycol (MIRALAX) Packet 17 g  17 g Oral Every Other Day Josie Ramsey PA-C   17 g at 09/23/24 2343    senna-docusate (SENOKOT-S/PERICOLACE) 8.6-50 MG per tablet 1 tablet  1 tablet Oral BID PRN Josie Ramsey PA-C        Or    senna-docusate (SENOKOT-S/PERICOLACE) 8.6-50 MG per tablet 2 tablet  2 tablet Oral BID PRN Josie Ramsey PA-C   2 tablet at 09/24/24 0759    sevelamer carbonate (RENVELA) tablet 3,200 mg  3,200 mg Oral TID w/meals Josie Ramsey PA-C   3,200 mg at 09/24/24 0748    simvastatin (ZOCOR) tablet 40 mg  40 mg Oral At Bedtime Josie Ramsey PA-C   40 mg at 09/23/24 2346    sodium chloride (PF) 0.9% PF flush 3 mL  3 mL Intracatheter Q8H Josie Ramsey PA-C   3 mL at 09/24/24 0755    sodium chloride (PF) 0.9% PF flush 3 mL  3 mL Intracatheter q1 min prn Josie Ramsey PA-C         Current Outpatient Medications   Medication Sig Dispense Refill    apixaban ANTICOAGULANT (ELIQUIS) 5 MG tablet Take 1 tablet (5 mg) by mouth 2 times daily 60 tablet 11    aspirin 81 MG EC tablet Take 81 mg by mouth daily.      calcium carbonate 750 MG CHEW Take 750 mg by mouth daily as needed.      carvedilol (COREG) 25 MG tablet Take 0.5 tablets (12.5 mg) by mouth 2 times daily. Mon, wed, fri only taking 1 pill, all other days 0.5 a tab      cinacalcet (SENSIPAR) 60 MG tablet Take 1 tablet by mouth daily      clopidogrel (PLAVIX) 75 MG tablet Take 1 tablet (75 mg) by mouth daily 90 tablet 3    DIALYVITE 100-1 mg Tab Take 1 tablet by mouth daily       famotidine (PEPCID) 20 MG tablet Take 1 tablet by mouth daily as needed.      Multiple Vitamins-Minerals (PRESERVISION AREDS PO) Take 1 tablet by mouth 2 times daily      polyethylene glycol (MIRALAX) 17 gram packet Take 17 g  by mouth daily as needed for constipation      sevelamer carbonate (RENVELA) 800 mg tablet Take 3,200 mg by mouth 3 times daily (with meals)      simvastatin (ZOCOR) 40 MG tablet Take 1 tablet (40 mg) by mouth At Bedtime 90 tablet 3    vitamin D3 (CHOLECALCIFEROL) 50 mcg (2000 units) tablet Take 1 tablet by mouth daily         apixaban ANTICOAGULANT (ELIQUIS) 5 MG tablet  aspirin 81 MG EC tablet  calcium carbonate 750 MG CHEW  carvedilol (COREG) 25 MG tablet  cinacalcet (SENSIPAR) 60 MG tablet  clopidogrel (PLAVIX) 75 MG tablet  DIALYVITE 100-1 mg Tab  famotidine (PEPCID) 20 MG tablet  Multiple Vitamins-Minerals (PRESERVISION AREDS PO)  polyethylene glycol (MIRALAX) 17 gram packet  sevelamer carbonate (RENVELA) 800 mg tablet  simvastatin (ZOCOR) 40 MG tablet  vitamin D3 (CHOLECALCIFEROL) 50 mcg (2000 units) tablet        ALLERGIES/SENSITIVITIES:  Allergies   Allergen Reactions    Codeine Nausea and Vomiting     Other Reaction(s): Not available    Lisinopril Cough     Other Reaction(s): Not available     Social History     Tobacco Use    Smoking status: Former     Passive exposure: Never    Smokeless tobacco: Never    Tobacco comments:     6/15/23-Quit smoking over 30 years.    Vaping Use    Vaping status: Never Used   Substance Use Topics    Alcohol use: No    Drug use: No     I have reviewed this patient's family history and updated it with pertinent information if needed.  Family History   Problem Relation Age of Onset    Cancer Mother         Adenocarcinoma Of The Large Intestine     Cancer Father         Adenocarcinoma Of The Large Intestine          PHYSICAL EXAM:  Physical Exam   Temp: 98.7  F (37.1  C) Temp src: Oral BP: 137/65 Pulse: 96   Resp: 16 SpO2: 99 % O2 Device: None (Room air)    Vitals:    09/23/24 1700   Weight: 74.8 kg (165 lb)     Vital Signs with Ranges  Temp:  [97.5  F (36.4  C)-98.7  F (37.1  C)] 98.7  F (37.1  C)  Pulse:  [69-96] 96  Resp:  [16-18] 16  BP: (107-141)/(53-65) 137/65  SpO2:  [97  %-100 %] 99 %  No intake/output data recorded.    Patient Vitals for the past 72 hrs:   Weight   09/23/24 1700 74.8 kg (165 lb)       General: Alert, NAD  HENT: Supple, Non-tender, no obvious JVD  Eyes: No scleral icterus  Cardiovascular: RRR, no rub, gallop, or murmur.   Extremities: No edema.  Right groin bruising noted  Respiratory: Lung sounds are clear anteriorly, respirations are nonlabored, he is on room air.  Gastrointestinal: Soft, non-tender, non-distended  Musculoskeletal: Grossly normal   Integumentary: Warm, dry, no rash  Psychiatric: A bit forgetful, repeats himself a lot  Access: Left upper extremity aVF, aneurysmal expansion noted, good thrill and bruit present  Laboratory:     Recent Labs   Lab 09/24/24  0608 09/24/24  0045 09/23/24  1841   WBC 6.7  --  6.6   RBC 2.30*  --  2.02*   HGB 7.2* 7.2* 6.4*   HCT 22.6*  --  20.4*   *  --  144*       Basic Metabolic Panel:  Recent Labs   Lab 09/24/24  0608 09/23/24  1841    137   POTASSIUM 4.9 4.3   CHLORIDE 101 102   CO2 19* 18*   BUN 33.2* 23.1*   CR 4.41* 3.41*   GLC 93 111*   NAYLA 9.2 8.6*       INR  Recent Labs   Lab 09/23/24  1841   INR 1.25*       Recent Labs   Lab Test 09/24/24  0608 09/23/24  1841   POTASSIUM 4.9 4.3   CHLORIDE 101 102   BUN 33.2* 23.1*      Recent Labs   Lab Test 05/18/24  0936 04/24/24  1638 04/18/22  1012 04/02/22  1512   ALBUMIN 3.2* 3.9  --   --    BILITOTAL 0.4 0.5  --   --    ALT <5 10  --   --    AST 9 14  --   --    PROTEIN  --   --  100* 200*       Personally reviewed today's laboratory studies    This note was dictated using voice recognition       Thank you for involving us in the care of this patient. We will continue to follow along with you.      Glynn Russell PA-C  [facility]  [phone]

## 2024-09-24 NOTE — PROCEDURES
[facility]    Procedure: Right groin pseudoaneurysm thrombin injection    Date/Time: 9/24/2024 2:56 PM    Performed by: Germán Chang MD  Authorized by: Germán Chang MD  IR Fellow Physician:    Pre Procedure Diagnosis: Right groin pseudoaneurysm  Post Procedure Diagnosis: Same    UNIVERSAL PROTOCOL   Site Marked: Yes  Prior Images Obtained and Reviewed:  Yes  Required items: Required blood products, implants, devices and special equipment available    Patient identity confirmed:  Verbally with patient  NA - No sedation, light sedation, or local anesthesia  Confirmation Checklist:  Patient's identity using two indicators  Universal Protocol: the Joint Commission Universal Protocol was followed      SEDATION    Patient Sedated: No    Findings: Successful right groin pseudoaneurysm thrombin injection.      PROCEDURE    Length of time physician/provider present for 1:1 monitoring during sedation:  0 min    Gerhard Chang MD  Interventional Radiology
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
## 2024-09-24 NOTE — MEDICATION SCRIBE - ADMISSION MEDICATION HISTORY
Medication Scribe Admission Medication History    Admission medication history is complete. The information provided in this note is only as accurate as the sources available at the time of the update.    Information Source(s): Patient, Family member, and CareEverywhere/SureScripts via in-person    Pertinent Information: Spoke with the patient and his spouse who assists with medications. Patient is taking aspirin 81 mg, clopidogrel 75 mg, and apixaban 5 mg BID. Recently had Watchman placed.     Has dialysis Monday, Wednesday, Friday.     Changes made to PTA medication list:  Added:   Calcium carbonate PRN  Aspirin 81 mg  Deleted:   Ketoconazole 2% cream  Changed:   Famotidine 20 mg every other day --> every day PRN    Allergies reviewed with patient and updates made in EHR: yes    Medication History Completed By: Babak Crowe 9/23/2024 9:44 PM    PTA Med List   Medication Sig Note Last Dose    apixaban ANTICOAGULANT (ELIQUIS) 5 MG tablet Take 1 tablet (5 mg) by mouth 2 times daily  9/23/2024 at AM; 1 of 2    aspirin 81 MG EC tablet Take 81 mg by mouth daily.  9/23/2024 at AM    calcium carbonate 750 MG CHEW Take 750 mg by mouth daily as needed.  Past Month at over a week ago, but within the past month    carvedilol (COREG) 25 MG tablet Take 0.5 tablets (12.5 mg) by mouth 2 times daily. Mon, wed, fri only taking 1 pill, all other days 0.5 a tab  9/22/2024 at HS    cinacalcet (SENSIPAR) 60 MG tablet Take 1 tablet by mouth daily  9/22/2024 at PM    clopidogrel (PLAVIX) 75 MG tablet Take 1 tablet (75 mg) by mouth daily  9/23/2024 at AM    DIALYVITE 100-1 mg Tab Take 1 tablet by mouth daily   9/23/2024 at AM    famotidine (PEPCID) 20 MG tablet Take 1 tablet by mouth daily as needed.  More than a month at PRN    Multiple Vitamins-Minerals (PRESERVISION AREDS PO) Take 1 tablet by mouth 2 times daily  9/23/2024 at AM; 1 of 2    polyethylene glycol (MIRALAX) 17 gram packet Take 17 g by mouth daily as needed for  constipation  9/22/2024 at AM    sevelamer carbonate (RENVELA) 800 mg tablet Take 3,200 mg by mouth 3 times daily (with meals) 9/23/2024: Taking 2-4 tablets based on the size of the meal. 9/23/2024 at AM; 4 tab    simvastatin (ZOCOR) 40 MG tablet Take 1 tablet (40 mg) by mouth At Bedtime  9/22/2024 at HS    vitamin D3 (CHOLECALCIFEROL) 50 mcg (2000 units) tablet Take 1 tablet by mouth daily  9/22/2024 at PM (dinner)

## 2024-09-24 NOTE — PLAN OF CARE
"  Problem: Adult Inpatient Plan of Care  Goal: Plan of Care Review  Description: The Plan of Care Review/Shift note should be completed every shift.  The Outcome Evaluation is a brief statement about your assessment that the patient is improving, declining, or no change.  This information will be displayed automatically on your shift  note.  Outcome: Progressing  Flowsheets (Taken 9/24/2024 1018)  Outcome Evaluation: awaiting surgical intervention, PT/OT evals post op  Plan of Care Reviewed With: patient  Overall Patient Progress: no change  Goal: Patient-Specific Goal (Individualized)  Description: You can add care plan individualizations to a care plan. Examples of Individualization might be:  \"Parent requests to be called daily at 9am for status\", \"I have a hard time hearing out of my right ear\", or \"Do not touch me to wake me up as it startles  me\".  Outcome: Progressing  Goal: Absence of Hospital-Acquired Illness or Injury  Outcome: Progressing  Goal: Optimal Comfort and Wellbeing  Outcome: Progressing  Goal: Readiness for Transition of Care  Outcome: Progressing  Flowsheets (Taken 9/24/2024 1000)  Transportation Anticipated: family or friend will provide  Concerns to be Addressed: discharge planning  Intervention: Mutually Develop Transition Plan  Recent Flowsheet Documentation  Taken 9/24/2024 1000 by Sara Osborne LSW  Readmission Within the Last 30 Days: no previous admission in last 30 days  Transportation Anticipated: family or friend will provide  Transportation Concerns: none  Concerns to be Addressed: discharge planning  Patient/Family Anticipated Services at Transition: home health care  Patient/Family Anticipates Transition to: (TBD) other (comment)  Offered/Gave Vendor List: no  Equipment Currently Used at Home:   cane, quad   walker, tiffany     Problem: Risk for Delirium  Goal: Optimal Coping  Outcome: Progressing  Goal: Improved Behavioral Control  Outcome: Progressing  Goal: Improved " Attention and Thought Clarity  Outcome: Progressing  Goal: Improved Sleep  Outcome: Progressing   Goal Outcome Evaluation:      Plan of Care Reviewed With: patient    Overall Patient Progress: no changeOverall Patient Progress: no change    Outcome Evaluation: awaiting surgical intervention, PT/OT evals post op

## 2024-09-25 ENCOUNTER — APPOINTMENT (OUTPATIENT)
Dept: PHYSICAL THERAPY | Facility: CLINIC | Age: 79
DRG: 919 | End: 2024-09-25
Payer: COMMERCIAL

## 2024-09-25 LAB
BLD PROD TYP BPU: NORMAL
BLOOD COMPONENT TYPE: NORMAL
CODING SYSTEM: NORMAL
CROSSMATCH: NORMAL
HBV SURFACE AB SERPL IA-ACNC: 31.2 M[IU]/ML
HBV SURFACE AB SERPL IA-ACNC: REACTIVE M[IU]/ML
HBV SURFACE AG SERPL QL IA: NONREACTIVE
HGB BLD-MCNC: 6.9 G/DL (ref 13.3–17.7)
HGB BLD-MCNC: 8.9 G/DL (ref 13.3–17.7)
ISSUE DATE AND TIME: NORMAL
UNIT ABO/RH: NORMAL
UNIT NUMBER: NORMAL
UNIT STATUS: NORMAL
UNIT TYPE ISBT: 5100

## 2024-09-25 PROCEDURE — 5A1D70Z PERFORMANCE OF URINARY FILTRATION, INTERMITTENT, LESS THAN 6 HOURS PER DAY: ICD-10-PCS | Performed by: INTERNAL MEDICINE

## 2024-09-25 PROCEDURE — 97161 PT EVAL LOW COMPLEX 20 MIN: CPT | Mod: GP

## 2024-09-25 PROCEDURE — 90935 HEMODIALYSIS ONE EVALUATION: CPT

## 2024-09-25 PROCEDURE — 36415 COLL VENOUS BLD VENIPUNCTURE: CPT | Performed by: HOSPITALIST

## 2024-09-25 PROCEDURE — 85018 HEMOGLOBIN: CPT | Performed by: HOSPITALIST

## 2024-09-25 PROCEDURE — 99233 SBSQ HOSP IP/OBS HIGH 50: CPT | Performed by: INTERNAL MEDICINE

## 2024-09-25 PROCEDURE — 258N000003 HC RX IP 258 OP 636: Performed by: PHYSICIAN ASSISTANT

## 2024-09-25 PROCEDURE — 36415 COLL VENOUS BLD VENIPUNCTURE: CPT | Performed by: PHYSICIAN ASSISTANT

## 2024-09-25 PROCEDURE — 250N000013 HC RX MED GY IP 250 OP 250 PS 637

## 2024-09-25 PROCEDURE — 120N000004 HC R&B MS OVERFLOW

## 2024-09-25 PROCEDURE — 86706 HEP B SURFACE ANTIBODY: CPT | Performed by: PHYSICIAN ASSISTANT

## 2024-09-25 PROCEDURE — 99232 SBSQ HOSP IP/OBS MODERATE 35: CPT | Performed by: PHYSICIAN ASSISTANT

## 2024-09-25 PROCEDURE — 99232 SBSQ HOSP IP/OBS MODERATE 35: CPT | Performed by: HOSPITALIST

## 2024-09-25 PROCEDURE — 97116 GAIT TRAINING THERAPY: CPT | Mod: GP

## 2024-09-25 PROCEDURE — P9016 RBC LEUKOCYTES REDUCED: HCPCS | Performed by: HOSPITALIST

## 2024-09-25 PROCEDURE — 999N000111 HC STATISTIC OT IP EVAL DEFER

## 2024-09-25 PROCEDURE — 250N000013 HC RX MED GY IP 250 OP 250 PS 637: Performed by: INTERNAL MEDICINE

## 2024-09-25 RX ADMIN — SIMVASTATIN 40 MG: 20 TABLET, FILM COATED ORAL at 22:41

## 2024-09-25 RX ADMIN — CINACALCET HYDROCHLORIDE 60 MG: 30 TABLET, FILM COATED ORAL at 08:19

## 2024-09-25 RX ADMIN — ASPIRIN 81 MG: 81 TABLET, COATED ORAL at 10:46

## 2024-09-25 RX ADMIN — CARVEDILOL 12.5 MG: 6.25 TABLET, FILM COATED ORAL at 19:50

## 2024-09-25 RX ADMIN — SODIUM CHLORIDE 250 ML: 9 INJECTION, SOLUTION INTRAVENOUS at 14:36

## 2024-09-25 RX ADMIN — CARVEDILOL 12.5 MG: 6.25 TABLET, FILM COATED ORAL at 08:00

## 2024-09-25 RX ADMIN — SEVELAMER CARBONATE 3200 MG: 800 TABLET, FILM COATED ORAL at 19:05

## 2024-09-25 RX ADMIN — SEVELAMER CARBONATE 3200 MG: 800 TABLET, FILM COATED ORAL at 08:00

## 2024-09-25 RX ADMIN — CLOPIDOGREL BISULFATE 75 MG: 75 TABLET ORAL at 10:46

## 2024-09-25 RX ADMIN — SODIUM CHLORIDE 300 ML: 9 INJECTION, SOLUTION INTRAVENOUS at 14:36

## 2024-09-25 RX ADMIN — Medication 1 TABLET: at 08:00

## 2024-09-25 ASSESSMENT — ACTIVITIES OF DAILY LIVING (ADL)
ADLS_ACUITY_SCORE: 24
ADLS_ACUITY_SCORE: 23
ADLS_ACUITY_SCORE: 24
ADLS_ACUITY_SCORE: 23
ADLS_ACUITY_SCORE: 24
ADLS_ACUITY_SCORE: 24
ADLS_ACUITY_SCORE: 23
ADLS_ACUITY_SCORE: 24
ADLS_ACUITY_SCORE: 23
ADLS_ACUITY_SCORE: 24
ADLS_ACUITY_SCORE: 23
ADLS_ACUITY_SCORE: 24
ADLS_ACUITY_SCORE: 23
ADLS_ACUITY_SCORE: 24
ADLS_ACUITY_SCORE: 24
ADLS_ACUITY_SCORE: 23

## 2024-09-25 NOTE — PLAN OF CARE
Problem: Anemia  Goal: Anemia Symptom Improvement  Outcome: Progressing    Problem: Skin Injury Risk Increased  Goal: Skin Health and Integrity  Outcome: Progressing    Pt admitted from  ED for pseudoaneurysm of R groin s/p Watchman placement on the 12th. He is A&O x4, makes needs known. C/o pain while moving to R groin, no pain at rest. Weak pulses noted to BLE, denies n/t. Tele reading A Fib, rate controlled. Up w/ assist of 1 w/ belt & walker. Can get up to bathroom tonight, tomorrow he can move about ad helena. Anuric, HD run scheduled for tomorrow. Hgb of 7.0 at 2130, AM recheck. Plan to return home w/ wife at discharge.

## 2024-09-25 NOTE — PLAN OF CARE
VSS on RA. A&ox4. +1 dorsal pulses, no change in CMS on shift. Denies pain. Up ax1 gbw. Hgb pending. Afib on tele. Critical hgb of 6.9. Provider paged. No other acute changes on shift.    Problem: Adult Inpatient Plan of Care  Goal: Optimal Comfort and Wellbeing  Outcome: Progressing     Problem: Risk for Delirium  Goal: Improved Sleep  Outcome: Progressing     Problem: Skin Injury Risk Increased  Goal: Skin Health and Integrity  Outcome: Progressing  Intervention: Plan: Nurse Driven Intervention: Moisture Management  Recent Flowsheet Documentation  Taken 9/25/2024 0400 by Mikayla Astorga RN  Moisture Interventions: Encourage regular toileting    Intervention: Optimize Skin Protection  Recent Flowsheet Documentation  Taken 9/25/2024 0400 by Mikayla Astorga RN  Activity Management: activity adjusted per tolerance  Head of Bed (HOB) Positioning: HOB at 20-30 degrees    Problem: Anemia  Goal: Anemia Symptom Improvement  Outcome: Progressing  Intervention: Monitor and Manage Anemia  Recent Flowsheet Documentation  Taken 9/25/2024 0400 by Mikayla Astorga RN  Safety Promotion/Fall Prevention:   activity supervised   assistive device/personal items within reach   nonskid shoes/slippers when out of bed   room near nurse's station   room organization consistent   safety round/check completed

## 2024-09-25 NOTE — PROGRESS NOTES
Occupational Therapy: Orders received, chart reviewed. Per PT eval, patient does not require skilled OT services at this time - moving SBA/ind and has assist from wife as needed. Will defer to PT for discharge recommendations.  Plan was discussed with PT and patient.  Will complete current OT orders. Thank you.    9/25/2024 by Roxana Christy, OTR, OTR/L

## 2024-09-25 NOTE — PROGRESS NOTES
HEMODIALYSIS TREATMENT NOTE    Date: 9/25/2024  Time: 6:29 PM     Data:  Pre Wt:   75.4 kg (bed scale)  Desired Wt:   To be established  Post Wt:  74.0 kg (bed scale)  Weight change: - 1.4 kg  Ultrafiltration - Post Run Net Total Removed (mL):  1400 ml  Vascular Access Status: Fistula patent  Dialyzer Rinse:  Light  Total Blood Volume Processed: 87.7 L   Total Dialysis (Treatment) Time:   3.25 Hrs (3 Hrs, 15 mins)  Dialysate Bath: K 2, Ca 2.5  Heparin: Heparin: None     Lab:   Yes - Hepatitis B surface Antigen + Antibody   HbsAg - 9/24/2024 (Non Reactive)  HbsAb - 9/25/2024 31.20 (Immune)      Interventions:  Dialysis done through Left upper arm AV Fistula using 15 gauge needles. ,   UF set to 1.7 Liters, accommodating priming and rinse back volumes  See Flowsheet for Crit Profile throughout the run  Treatment has ended safely and blood is rinsed back completely  Decannulation done post HD, hemostasis is achieved in 10 minutes  Pressure dressing is applied, to be removed after 4-6 hours  Post Tx assessment done. Patient's left in his room in stable condition  Report given to Mandy DE LA ROSA RN.     Assessment:  A/O x 4, calm and cooperative. Received transfusion this morning for Hemoglobin of 6.9  Left upper arm AV Fistula has good thrill and bruit                Plan:    Per Renal team

## 2024-09-25 NOTE — PROGRESS NOTES
RENAL PROGRESS NOTE    ASSESSMENT & PLAN:   ESRD: HD MWF schedule [facility] under the care of [facility] [place].  TW 74 kg.  3.25-hour TT, left upper extremity aVF, 450 BFR/700 DFR.  Recs:  Dialysis today per usual schedule, maintain normal MWF schedule inpatient so long as patient is admitted  NO heparin with dialysis  Thanks for the consult we will follow     ACD: On Mircera 200 mcg Q2W PTA, last dose 9/13/2024.  Acute on chronic anemia now in the setting of blood loss with right groin pseudoaneurysm and large surrounding hematoma after recent watchman's device procedure.  S/p interventional radiology injection of thrombin to right pseudoaneurysm.  S/p 1 unit PRBCs in the ED, and planning for additional unit today.iron stores are replete, reticulocyte count is appropriately elevated to degree of anemia.  He would not benefit from additional parenteral iron or GURPREET at present.  Transfusions per primary team.  Anticoagulation currently on hold under the guidance of electrophysiology and cardiology.  DAPT resumed.  Trending hemoglobin.     ESRD MBD: Resume PTA sevelamer 3 times daily AC, PTA Cinacalcet for secondary renal hyperparathyroidism.     Persistent atrial fibrillation: S/p ablation 4/2024, s/p Watchman device placement 9/12/2024, anticoagulation currently on hold in the setting of bleed.  PTA Coreg.     CAD: History of prior PCI 12/2023.  Plavix and aspirin have been resumed under the direction of electrophysiology and cardiology on 9/25/2024.  PTA statin.     HTN: PTA Coreg    SUBJECTIVE:    Patient was seen bedside  His wife is present during encounter  Feels the pain in his right groin is improving  He denies shortness of breath  We discussed plans for dialysis today per usual schedule  Will transfuse on dialysis  OBJECTIVE:  Physical Exam   Temp: 98.3  F (36.8  C) Temp src: Oral BP: 119/56 Pulse: 63   Resp: 18 SpO2: 97 % O2 Device: None (Room air)    Vitals:    09/23/24 1700  09/24/24 2206 09/25/24 0100   Weight: 74.8 kg (165 lb) 73.6 kg (162 lb 4.8 oz) 74.8 kg (164 lb 12.8 oz)     Vital Signs with Ranges  Temp:  [97.5  F (36.4  C)-98.6  F (37  C)] 98.3  F (36.8  C)  Pulse:  [60-83] 63  Resp:  [16-20] 18  BP: (107-151)/(50-69) 119/56  SpO2:  [95 %-100 %] 97 %  No intake/output data recorded.      Patient Vitals for the past 72 hrs:   Weight   09/25/24 0100 74.8 kg (164 lb 12.8 oz)   09/24/24 2206 73.6 kg (162 lb 4.8 oz)   09/23/24 1700 74.8 kg (165 lb)     Intake/Output Summary (Last 24 hours) at 9/25/2024 1155  Last data filed at 9/25/2024 1036  Gross per 24 hour   Intake 320 ml   Output --   Net 320 ml       PHYSICAL EXAM:  General: Alert, NAD  HENT: Supple, Non-tender, no obvious JVD  Eyes: No scleral icterus  Cardiovascular: RRR, no rub, gallop, or murmur.   Extremities: No edema.  Right groin bruising noted  Respiratory: Lung sounds are clear anteriorly, respirations are nonlabored, he is on room air.  Gastrointestinal: Soft, non-tender, non-distended  Musculoskeletal: Grossly normal   Integumentary: Warm, dry, no rash  Psychiatric: A bit forgetful, repeats himself a lot  Access: Left upper extremity aVF, aneurysmal expansion noted, good thrill and bruit present    LABORATORY STUDIES:     Recent Labs   Lab 09/25/24  0633 09/24/24  2131 09/24/24  1152 09/24/24  0608 09/24/24  0045 09/23/24  1841   WBC  --   --   --  6.7  --  6.6   RBC  --   --   --  2.30*  --  2.02*   HGB 6.9* 7.0* 7.0* 7.2* 7.2* 6.4*   HCT  --   --   --  22.6*  --  20.4*   PLT  --   --   --  117*  --  144*       Basic Metabolic Panel:  Recent Labs   Lab 09/24/24  0608 09/23/24  1841    137   POTASSIUM 4.9 4.3   CHLORIDE 101 102   CO2 19* 18*   BUN 33.2* 23.1*   CR 4.41* 3.41*   GLC 93 111*   NAYLA 9.2 8.6*       INR  Recent Labs   Lab 09/23/24  1841   INR 1.25*        Recent Labs   Lab Test 09/25/24  0633 09/24/24  2131 09/24/24  1152 09/24/24  0608 09/24/24  0045 09/23/24  1841 05/18/24  0936 04/29/24  1152    INR  --   --   --   --   --  1.25*  --  1.77*   WBC  --   --   --  6.7  --  6.6   < > 9.2   HGB 6.9* 7.0*   < > 7.2*   < > 6.4*   < > 7.9*   PLT  --   --   --  117*  --  144*   < > 194    < > = values in this interval not displayed.       Personally reviewed current labs    This note was dictated using voice recognition     Glynn Russell PA-C  [facility]  [phone]

## 2024-09-25 NOTE — PROGRESS NOTES
[place] [address], [place] [address]   [email or web address]   OFFICE: [phone]            Impression and Plan     1.  Coronary artery disease.  García has known coronary artery disease having had PCI with stenting of the proximal LAD 19 December 2023 (3.5 x 24 mm Synergy drug-eluting stent).  He is now a little over 9 months since his revascularization procedure.This is stable.      He denies any chest pain or other symptoms concerning for angina.      2.  Atrial fibrillation.  García underwent atrial fibrillation ablation/PVI & posterior wall isolation procedure 18 April 2024.  He has subsequently had recurrent atrial flutter x 2 leading to direct-current cardioversion.  He has had documented recurrent atrial fibrillation, however, though with controlled ventricular response.  García's AMAN?DS?-VASc Score is 5 yielding an annual embolic risk of 7.2-10.0%. García underwent left atrial appendage occlusion device placement 12 September 2024.     As per problem #6, postprocedural course complicated by right groin hematoma/pseudoaneurysm with anticoagulation/antiplatelet therapy on hold.     3.  Hypertension.  Blood pressure this morning 126/58 to 146/63 mmHg.     4.  Dyslipidemia.  Lipid profile 19 December 2023 revealed LDL 26 mg/dL HDL 40 mg/dL.     5.  End-stage renal disease.  García has been on dialysis maintenance MWF.     6. Right groin pseudoaneurysm with large surrounding hematoma.  CT scan 23 September 2024 revealed a right groin pseudoaneurysm with large surrounding hematoma. García underwent pseudoaneurysm thrombin injection yesterday.  Hemoglobin is stable (7 g/dL yesterday and 6.9 g/dL this morning).  Plan to confer once again with Dr. Israel Paz (Electrophysiology) regarding anticoagulation resumption.    My initial thought would be to resume dual antiplatelet therapy, but defer full anticoagulation with apixaban though again we will discuss  "with Dr. Paz.     Primary Cardiologist: Dr. Jimmy Cross      Subjective     Subjectively, right groin hematoma/discomfort perhaps somewhat improved.  Denies chest pain or shortness of breath.  Minimizes any palpitations despite atrial fibrillation.    Cardiac Diagnostics   Telemetry (personally reviewed): Atrial fibrillation    Transesophageal echocardiogram 12 September 2024:  Normal left ventricular size and systolic performance with ejection fraction of 50-55%.  Mild aortic insufficiency.  Normal right ventricular size and systolic performance.  Severe biatrial enlargement.  Successful placement of left atrial appendage occlusion device.  Small residual postprocedural atrial communication.     Coronary angiogram 19 December 2023:  Severe proximal LAD stenosis with sequential dense and eccentric nodular calcium.  Left circumflex is free of any obstructive coronary disease.  Moderate calcification of the right coronary with mild proximal narrowing, and moderate narrowing distally at the takeoff of smaller posterior descending branch.  The small PDA has a moderate ostial stenosis.  Successful complex PCI of proximal LAD with 1.5 Rotablator hira, shockwave lithotripsy, and drug-eluting stent implant x 1.      Twelve-lead ECG (personally reviewed) 12 September 2024: Atrial fibrillation with heart rate of 70 bpm).    Physical Examination       BP (!) 146/63 (BP Location: Right arm)   Pulse 72   Temp 97.5  F (36.4  C) (Oral)   Resp 18   Ht 1.753 m (5' 9\")   Wt 74.8 kg (164 lb 12.8 oz)   SpO2 100%   BMI 24.34 kg/m          Vitals:    09/23/24 1700 09/24/24 2206 09/25/24 0100   Weight: 74.8 kg (165 lb) 73.6 kg (162 lb 4.8 oz) 74.8 kg (164 lb 12.8 oz)          No intake or output data in the 24 hours ending 09/25/24 0644    The patient is alert and oriented times three. Sclerae are anicteric. Mucosal membranes are moist. Jugular venous pressure is normal. No significant adenopathy/thyromegally " "appreciated. Lungs are clear with good expansion. On cardiovascular exam, the patient has a regular S1 and S2. Abdomen is soft and non-tender. Extremities reveal no clubbing, cyanosis, or edema.  Right groin hematoma again noted.         Imaging      CT scan of abdomen/pelvis 23 September 2024:  Right groin pseudoaneurysm with large surrounding hematoma.  Additional findings as above.     Lower extremity Doppler 23 September 2024:  Right groin hematoma, new since the prior study.  No definite findings for pseudoaneurysm.     Lower extremity Doppler 19 September 2024:  The right common femoral artery and proximal femoral artery are patent with multiphasic waveforms. The adjacent veins are patent.   No definite evidence for a pseudoaneurysm.     Lab Results     Recent Labs   Lab 09/25/24  0633 09/24/24  2131 09/24/24  1152 09/24/24  0608 09/24/24  0045 09/23/24  1841   WBC  --   --   --  6.7  --  6.6   HGB 6.9* 7.0* 7.0* 7.2*   < > 6.4*   MCV  --   --   --  98  --  101*   PLT  --   --   --  117*  --  144*   INR  --   --   --   --   --  1.25*   NA  --   --   --  135  --  137   POTASSIUM  --   --   --  4.9  --  4.3   CHLORIDE  --   --   --  101  --  102   CO2  --   --   --  19*  --  18*   BUN  --   --   --  33.2*  --  23.1*   CR  --   --   --  4.41*  --  3.41*   ANIONGAP  --   --   --  15  --  17*   NAYLA  --   --   --  9.2  --  8.6*   GLC  --   --   --  93  --  111*    < > = values in this interval not displayed.     Recent Labs   Lab Test 04/24/24  1904   NTBNPI >70,000*     No lab results found in last 7 days.    Invalid input(s): \"LDLCALC\"  Lab Results   Component Value Date     09/24/2024    CO2 19 09/24/2024    CO2 21 05/03/2022    BUN 33.2 09/24/2024    BUN 37 05/03/2022     Lab Results   Component Value Date    WBC 6.7 09/24/2024    HGB 6.9 09/25/2024    HCT 22.6 09/24/2024    MCV 98 09/24/2024     09/24/2024     Lab Results   Component Value Date    CHOL 91 12/19/2023    TRIG 126 12/19/2023    HDL 40 " "12/19/2023     Lab Results   Component Value Date    INR 1.25 09/23/2024     No results found for: \"CKTOTAL\", \"CKMB\", \"TROPONINI\"  Lab Results   Component Value Date    TSH 1.31 04/26/2024    TSH 1.80 04/20/2021           Current Inpatient Scheduled Medications   Scheduled Meds:  Current Facility-Administered Medications   Medication Dose Route Frequency Provider Last Rate Last Admin    [Held by provider] apixaban ANTICOAGULANT (ELIQUIS) tablet 5 mg  5 mg Oral BID Josie Ramsey PA-C        [Held by provider] aspirin EC tablet 81 mg  81 mg Oral Daily Josie Ramsey PA-C        carvedilol (COREG) tablet 12.5 mg  12.5 mg Oral BID Josie Ramsey PA-C   12.5 mg at 09/24/24 2108    cinacalcet (SENSIPAR) tablet 60 mg  60 mg Oral Daily Josie Ramsey PA-C   60 mg at 09/24/24 0748    [Held by provider] clopidogrel (PLAVIX) tablet 75 mg  75 mg Oral Daily Josie Ramsey PA-C        multivitamin RENAL (RENAVITE RX/NEPHROVITE) tablet 1 tablet  1 tablet Oral Daily Josie Ramsey PA-C   1 tablet at 09/24/24 0748    polyethylene glycol (MIRALAX) Packet 17 g  17 g Oral Every Other Day Josie Ramsey PA-C   17 g at 09/23/24 2343    sevelamer carbonate (RENVELA) tablet 3,200 mg  3,200 mg Oral TID w/meals Josie Ramsey PA-C   3,200 mg at 09/24/24 1809    simvastatin (ZOCOR) tablet 40 mg  40 mg Oral At Bedtime Josie Ramsey PA-C   40 mg at 09/24/24 2108    sodium chloride (PF) 0.9% PF flush 3 mL  3 mL Intracatheter Q8H Josie Ramsey PA-C   3 mL at 09/24/24 1404     Continuous Infusions:  Current Facility-Administered Medications   Medication Dose Route Frequency Provider Last Rate Last Admin            Medications Prior to Admission   Prior to Admission medications    Medication Sig Start Date End Date Taking? Authorizing Provider   apixaban ANTICOAGULANT (ELIQUIS) 5 MG tablet Take 1 tablet (5 mg) by mouth 2 times daily 11/28/23  Yes Kathryn Marques PA-C   aspirin 81 MG EC tablet Take 81 mg by mouth daily.   Yes Reported, " Patient   calcium carbonate 750 MG CHEW Take 750 mg by mouth daily as needed.   Yes Reported, Patient   carvedilol (COREG) 25 MG tablet Take 0.5 tablets (12.5 mg) by mouth 2 times daily. Mon, wed, fri only taking 1 pill, all other days 0.5 a tab 9/12/24  Yes Suma Corona PA-C   cinacalcet (SENSIPAR) 60 MG tablet Take 1 tablet by mouth daily 7/7/23  Yes Reported, Patient   clopidogrel (PLAVIX) 75 MG tablet Take 1 tablet (75 mg) by mouth daily 12/20/23  Yes Jennifer Ventura, CNP   DIALYVITE 100-1 mg Tab Take 1 tablet by mouth daily  2/17/21  Yes Provider, Historical   famotidine (PEPCID) 20 MG tablet Take 1 tablet by mouth daily as needed.   Yes Reported, Patient   Multiple Vitamins-Minerals (PRESERVISION AREDS PO) Take 1 tablet by mouth 2 times daily   Yes Reported, Patient   polyethylene glycol (MIRALAX) 17 gram packet Take 17 g by mouth daily as needed for constipation 5/23/19  Yes Provider, Historical   sevelamer carbonate (RENVELA) 800 mg tablet Take 3,200 mg by mouth 3 times daily (with meals) 1/9/18  Yes Provider, Historical   simvastatin (ZOCOR) 40 MG tablet Take 1 tablet (40 mg) by mouth At Bedtime 7/27/23  Yes Riki Martinez MD   vitamin D3 (CHOLECALCIFEROL) 50 mcg (2000 units) tablet Take 1 tablet by mouth daily   Yes Reported, Patient

## 2024-09-25 NOTE — PROGRESS NOTES
Physical Therapy Discharge Summary    Reason for therapy discharge:    All goals and outcomes met, no further needs identified.    Progress towards therapy goal(s). See goals on Care Plan in [facility] electronic health record for goal details.  Goals met    Therapy recommendation(s):    No further therapy is recommended.

## 2024-09-25 NOTE — PROGRESS NOTES
[facility] Medicine PROGRESS NOTE      Identification/Summary:   78 yo male presented with complaints of hip/groin pain. Recently underwent Watchman procedure with groin access. Workup with large right groin hematoma, pseudoaneurysm, acute on chronic anemia. Transfused one unit, IR consulted for thrombin injection. Also hx of ESRD, on dialysis. Hx of A fib, CAD with stent in Dec of 2023. Holding aspirin, plavix, eliquis. Watching hgb, transfusing for Hgb below 7.    Hemoglobin low this morning, gave another unit of blood.    Assessment and Plan:  Rt groin hematoma, pseudoaneurysm  Acute on chronic anemia  Appreciate consultants recs  Status post IR thrombin injection  Continue to trend hgb, follow groin site  Transfuse for hgb below 7  Resuming aspirin, Plavix  Continue to hold Eliquis, appreciate cardiology help    A fib, anticoagulated, CHASE done 12 days ago  CAD, stent in Dec 2023  Chronic CHF, EF 45-50%  Typically on eliquis, underwent CHASE closure device implant on 9/12  Holding eliquis now, appreciate cards help  Continue coreg  Continue aspirin and Plavix    ESRD  Anemia of CKD  Appreciate renal consult  Gets dialysis MWF  No heparin with dialysis    Diet: Combination Diet Low Saturated Fat Na <2400mg Diet  Fluids: none  Pain meds:tylenol  Therapy:none  DVT Prophylaxis:  holding  Code Status: No CPR- Do NOT Intubate  Medically Ready for Discharge: Anticipated Tomorrow        Interval History/Subjective:  No chest pain, shortness of breath, groin pain is about the same but not worse.    Physical Exam/Objective:  Gen: no acute distress, comfortable, a little pale  ENT: no scleral icterus  Pulm: breathing comfortably at rest in room air, lungs clear  CV: regular rate and rhythm, minimal lower extremity edema  Derm: warm, dry, mildly pale  Psych: appropriate affect    Medications:   Current Facility-Administered Medications   Medication Dose Route Frequency Provider Last Rate Last Admin    aspirin EC tablet  81 mg  81 mg Oral Daily Mary Montgomery MD   81 mg at 09/25/24 1046    carvedilol (COREG) tablet 12.5 mg  12.5 mg Oral BID Josie Ramsey PA-C   12.5 mg at 09/25/24 0800    cinacalcet (SENSIPAR) tablet 60 mg  60 mg Oral Daily Josie Ramsey PA-C   60 mg at 09/25/24 0819    clopidogrel (PLAVIX) tablet 75 mg  75 mg Oral Daily Mary Montgomery MD   75 mg at 09/25/24 1046    multivitamin RENAL (RENAVITE RX/NEPHROVITE) tablet 1 tablet  1 tablet Oral Daily Josie Ramsey PA-C   1 tablet at 09/25/24 0800    polyethylene glycol (MIRALAX) Packet 17 g  17 g Oral Every Other Day Josie Ramsey PA-C   17 g at 09/23/24 2343    sevelamer carbonate (RENVELA) tablet 3,200 mg  3,200 mg Oral TID w/meals Josie Ramsey PA-C   3,200 mg at 09/25/24 0800    simvastatin (ZOCOR) tablet 40 mg  40 mg Oral At Bedtime Josie Ramsey PA-C   40 mg at 09/24/24 2108    sodium chloride (PF) 0.9% PF flush 3 mL  3 mL Intracatheter Q8H Josie Ramsey PA-C   3 mL at 09/25/24 0822     Clinically Significant Risk Factors               # Coagulation Defect: INR = 1.25 (Ref range: 0.85 - 1.15) and/or PTT = N/A, will monitor for bleeding  # Thrombocytopenia: Lowest platelets = 117 in last 2 days, will monitor for bleeding   # Hypertension: Noted on problem list               # Financial/Environmental Concerns: none            Germán Rush DO   Hospitalist  [facility]

## 2024-09-25 NOTE — PROGRESS NOTES
[facility] HEART [facility]   [address], [place] [address]   [email or web address]   OFFICE: [phone]            Cardiology Addendum  (Please see full Progress Note from earlier today as well)     Discussed with Dr. Israel Paz (Electrophysiology).  We jointly decided to recommend resumption of aspirin & clopidogrel (ordered), but defer full anticoagulation with apixaban for now.    García could perhaps be dismissed from hospital later today if other providers in agreement though would not be opposed to monitoring another 24  post resumption of dual antiplatelet therapy.

## 2024-09-25 NOTE — PROGRESS NOTES
09/25/24 1100   Appointment Info   Signing Clinician's Name / Credentials (PT) YANIRA Jeronimo   Student Supervision Therapy services provided with the co-signing licensed therapist guiding and directing the services, and providing the skilled judgement and assessment throughout the session;Direct supervision provided   Living Environment   People in Home spouse   Current Living Arrangements house  ([facility])   Home Accessibility no concerns   Self-Care   Usual Activity Tolerance moderate   Current Activity Tolerance fair   Equipment Currently Used at Home walker, rolling;other (see comments)  (FWW and 4WW)   Fall history within last six months no   Activity/Exercise/Self-Care Comment Pt and wife report pt uses FWW inside the home and 4WW in the community. Wife able to assist with ADLs/IADLs as needed.   General Information   Onset of Illness/Injury or Date of Surgery 09/23/24   Referring Physician Dr. Germán Rush   Patient/Family Therapy Goals Statement (PT) go home   Pertinent History of Current Problem (include personal factors and/or comorbidities that impact the POC) R LE hematoma s/p Watchman's procedure 9/12, end stage renal disease   Existing Precautions/Restrictions no known precautions/restrictions   Weight-Bearing Status - LLE full weight-bearing   Weight-Bearing Status - RLE weight-bearing as tolerated   Bed Mobility   Bed Mobility supine-sit   Supine-Sit [place] (Bed Mobility) supervision   Assistive Device (Bed Mobility) bed rails   Transfers   Transfers sit-stand transfer   Maintains Weight-bearing Status (Transfers) able to maintain   Sit-Stand Transfer   Sit-Stand [place] (Transfers) contact guard   Assistive Device (Sit-Stand Transfers) walker, front-wheeled   Gait/Stairs (Locomotion)   [place] Level (Gait) contact guard   Assistive Device (Gait) walker, front-wheeled   Distance in Feet (Gait) 10'   Pattern (Gait) step-through   Deviations/Abnormal Patterns (Gait) yun  decreased;stride length decreased   Maintains Weight-bearing Status (Gait) able to maintain   Clinical Impression   Criteria for Skilled Therapeutic Intervention Yes, treatment indicated   PT Diagnosis (PT) impaired functional mobility   Influenced by the following impairments weakness, pain   Functional limitations due to impairments transfers, gait   Clinical Presentation (PT Evaluation Complexity) stable   Clinical Presentation Rationale pt presents as medically diagnosed   Clinical Decision Making (Complexity) low complexity   Planned Therapy Interventions (PT) gait training;patient/family education;strengthening;transfer training   Risk & Benefits of therapy have been explained evaluation/treatment results reviewed;care plan/treatment goals reviewed;patient;spouse/significant other   PT Total Evaluation Time   PT Eval, Low Complexity Minutes (49324) 10   Physical Therapy Goals   PT Frequency One time eval and treatment only   PT Predicted Duration/Target Date for Goal Attainment 09/25/24   PT Goals Transfers;Gait   PT: Transfers Supervision/stand-by assist;Sit to/from stand;Assistive device;Goal Met  (CGA)   PT: Gait Supervision/stand-by assist;Rolling walker;150 feet;Goal Met   Interventions   Interventions Quick Adds Gait Training   Gait Training   Gait Training Minutes (46785) 10   Symptoms Noted During/After Treatment (Gait Training) fatigue   Treatment Detail/Skilled Intervention STS to FWW, CGA. Pt amb with FWW and CGA progressing to SBA. Verbal cues for safety and navigation, pt took a few standing breaks for ~30 seconds throughout amb. Pt educated on use of FWW in house, having spouse present while showering, safety with car transfers and encouraged pt to have spouse continue with driving due to patient reports of cognitive decline, having spouse assist with ADLs in the home as needed.   Distance in Feet 200'   [place] Level (Gait Training) stand-by assist   Physical Assistance Level (Gait  Training) supervision   Weight Bearing (Gait Training) weight-bearing as tolerated   Assistive Device (Gait Training) rolling walker   Pattern Analysis (Gait Training) swing-through gait   Gait Analysis Deviations decreased yun;decreased step length   Impairments (Gait Analysis/Training) strength decreased   PT Discharge Planning   PT Plan d/c PT   PT Discharge Recommendation (DC Rec) (S)  home with assist   PT Rationale for DC Rec Pt safe and SBA with all mobility. Spouse able to assist at home as needed, good home setup.   PT Brief overview of current status Pt amb 200' with FWW and SBA.   PT Equipment Needed at Discharge walker, rolling  (pt owns)   Total Session Time   Timed Code Treatment Minutes 10   Total Session Time (sum of timed and untimed services) 20

## 2024-09-26 VITALS
HEART RATE: 65 BPM | BODY MASS INDEX: 24.16 KG/M2 | WEIGHT: 163.14 LBS | OXYGEN SATURATION: 98 % | TEMPERATURE: 98.2 F | HEIGHT: 69 IN | RESPIRATION RATE: 16 BRPM | DIASTOLIC BLOOD PRESSURE: 62 MMHG | SYSTOLIC BLOOD PRESSURE: 133 MMHG

## 2024-09-26 DIAGNOSIS — I72.4 PSEUDOANEURYSM OF FEMORAL ARTERY FOLLOWING PROCEDURE (H): Primary | ICD-10-CM

## 2024-09-26 DIAGNOSIS — T81.718A PSEUDOANEURYSM OF FEMORAL ARTERY FOLLOWING PROCEDURE (H): Primary | ICD-10-CM

## 2024-09-26 LAB — HGB BLD-MCNC: 8.5 G/DL (ref 13.3–17.7)

## 2024-09-26 PROCEDURE — 250N000013 HC RX MED GY IP 250 OP 250 PS 637

## 2024-09-26 PROCEDURE — 99239 HOSP IP/OBS DSCHRG MGMT >30: CPT | Performed by: HOSPITALIST

## 2024-09-26 PROCEDURE — 250N000013 HC RX MED GY IP 250 OP 250 PS 637: Performed by: INTERNAL MEDICINE

## 2024-09-26 PROCEDURE — 36415 COLL VENOUS BLD VENIPUNCTURE: CPT | Performed by: HOSPITALIST

## 2024-09-26 PROCEDURE — 85018 HEMOGLOBIN: CPT | Performed by: HOSPITALIST

## 2024-09-26 PROCEDURE — 99232 SBSQ HOSP IP/OBS MODERATE 35: CPT | Performed by: PHYSICIAN ASSISTANT

## 2024-09-26 PROCEDURE — 99233 SBSQ HOSP IP/OBS HIGH 50: CPT | Performed by: INTERNAL MEDICINE

## 2024-09-26 RX ORDER — LANOLIN ALCOHOL/MO/W.PET/CERES
3 CREAM (GRAM) TOPICAL ONCE
Status: COMPLETED | OUTPATIENT
Start: 2024-09-26 | End: 2024-09-26

## 2024-09-26 RX ADMIN — SEVELAMER CARBONATE 3200 MG: 800 TABLET, FILM COATED ORAL at 08:18

## 2024-09-26 RX ADMIN — Medication 1 TABLET: at 08:17

## 2024-09-26 RX ADMIN — CARVEDILOL 12.5 MG: 6.25 TABLET, FILM COATED ORAL at 08:17

## 2024-09-26 RX ADMIN — Medication 3 MG: at 00:44

## 2024-09-26 RX ADMIN — CLOPIDOGREL BISULFATE 75 MG: 75 TABLET ORAL at 08:17

## 2024-09-26 RX ADMIN — ASPIRIN 81 MG: 81 TABLET, COATED ORAL at 08:17

## 2024-09-26 ASSESSMENT — ACTIVITIES OF DAILY LIVING (ADL)
ADLS_ACUITY_SCORE: 23

## 2024-09-26 NOTE — CONSULTS
CM consult done 09/24/24.   Discharge this am by bedside RN prior to being seen by CM this morning.       Clinic care coordination referral sent per protocol.      Kimber Cannon RN  Care Coordinator

## 2024-09-26 NOTE — PLAN OF CARE
Goal Outcome Evaluation:       HLM Admission: 1- Lying in Bed  HLM Daily7-Walk 25 feet or more

## 2024-09-26 NOTE — PLAN OF CARE
Neuro: A&Ox4.   Cardiac: Afib. VSS.   Respiratory: Sating >92% on RA.  GI/: Adequate urine output.  Diet/appetite: Tolerating Low fat Low NA diet.  Activity:  SBA w/walker.  Pain: Denies  Skin: No new deficits noted.  LDA's: R PIV, L upper arm fistula    Plan: Continue with POC. Notify primary team with changes.  Goal Outcome Evaluation:    Problem: Anemia  Goal: Anemia Symptom Improvement  Intervention: Monitor and Manage Anemia  Recent Flowsheet Documentation  Taken 9/26/2024 0332 by Alcides Rodríguez, RN  Safety Promotion/Fall Prevention:   activity supervised   clutter free environment maintained   mobility aid in reach   nonskid shoes/slippers when out of bed   room near nurse's station  Taken 9/26/2024 0050 by Alcides Rodríguez, RN  Safety Promotion/Fall Prevention:   activity supervised   clutter free environment maintained   mobility aid in reach   nonskid shoes/slippers when out of bed   room near nurse's station     Problem: Hemodialysis  Goal: Effective Tissue Perfusion  Outcome: Progressing

## 2024-09-26 NOTE — PROGRESS NOTES
[facility] HEART [place] [address], [place] [address]   [email or web address]   OFFICE: [phone]            Impression and Plan     1.  Coronary artery disease.  García has known coronary artery disease having had PCI with stenting of the proximal LAD 19 December 2023 (3.5 x 24 mm Synergy drug-eluting stent).  He is now a little over 9 months since his revascularization procedure.This is stable.      He denies any chest pain or other symptoms concerning for angina.      2.  Atrial fibrillation.  García underwent atrial fibrillation ablation/PVI & posterior wall isolation procedure 18 April 2024.  He has subsequently had recurrent atrial flutter x 2 leading to direct-current cardioversion.  He has had documented recurrent atrial fibrillation, however, though with controlled ventricular response.  García's AMAN?DS?-VASc Score is 5 yielding an annual embolic risk of 7.2-10.0%. Gracía underwent left atrial appendage occlusion device placement 12 September 2024.     As per problem #6, postprocedural course complicated by right groin hematoma/pseudoaneurysm with anticoagulation/antiplatelet therapy on hold.     3.  Hypertension.  Blood pressure this morning 126/58 to 146/63 mmHg.     4.  Dyslipidemia.  Lipid profile 19 December 2023 revealed LDL 26 mg/dL HDL 40 mg/dL.     5.  End-stage renal disease.  García has been on dialysis maintenance MWF.     6. Right groin pseudoaneurysm with large surrounding hematoma.  CT scan 23 September 2024 revealed a right groin pseudoaneurysm with large surrounding hematoma. García underwent pseudoaneurysm thrombin injection this admission.  Hemoglobin would appear to be fairly stable (8.9 g/dL yesterday and 8.5 g/dL this morning).  Continue with 81 mg aspirin & clopidogrel.  Continue to DEFER full anticoagulation with apixaban.  Anticipate possible dismissal from hospital today if other providers in agreement.  Would recommend repeat hemoglobin on  "Monday, 30 September 2024.    This is his hemodialysis day & suspect he could have that drawn at dialysis (discussed this with García this morning).  I will make arrangements for García to follow-up in the At[facility] Fibrillation Clinic (request/order already submitted through Epic).    Primary Cardiologist: Dr. Jimmy Cross      Subjective     García denies chest pain or shortness of breath.  Minimizes any palpitations despite atrial fibrillation.  Discomfort in right groin is stable.    Cardiac Diagnostics   Telemetry (personally reviewed): Atrial fibrillation    Transesophageal echocardiogram 12 September 2024:  Normal left ventricular size and systolic performance with ejection fraction of 50-55%.  Mild aortic insufficiency.  Normal right ventricular size and systolic performance.  Severe biatrial enlargement.  Successful placement of left atrial appendage occlusion device.  Small residual postprocedural atrial communication.     Coronary angiogram 19 December 2023:  Severe proximal LAD stenosis with sequential dense and eccentric nodular calcium.  Left circumflex is free of any obstructive coronary disease.  Moderate calcification of the right coronary with mild proximal narrowing, and moderate narrowing distally at the takeoff of smaller posterior descending branch.  The small PDA has a moderate ostial stenosis.  Successful complex PCI of proximal LAD with 1.5 Rotablator hira, shockwave lithotripsy, and drug-eluting stent implant x 1.      Twelve-lead ECG (personally reviewed) 12 September 2024: Atrial fibrillation with heart rate of 70 bpm).    Physical Examination       /60 (BP Location: Right arm)   Pulse 73   Temp 97.6  F (36.4  C) (Oral)   Resp 16   Ht 1.753 m (5' 9\")   Wt 74 kg (163 lb 2.3 oz)   SpO2 99%   BMI 24.09 kg/m          Vitals:    09/23/24 1700 09/24/24 2206 09/25/24 0100 09/25/24 1400   Weight: 74.8 kg (165 lb) 73.6 kg (162 lb 4.8 oz) 74.8 kg (164 lb 12.8 oz) 75.4 kg (166 lb 3.6 oz)    " "09/25/24 1735   Weight: 74 kg (163 lb 2.3 oz)          No intake or output data in the 24 hours ending 09/25/24 0644    The patient is alert and oriented times three. Sclerae are anicteric. Mucosal membranes are moist. Jugular venous pressure is normal. No significant adenopathy/thyromegally appreciated. Lungs are clear with good expansion. On cardiovascular exam, the patient has a regular S1 and S2. Abdomen is soft and non-tender. Extremities reveal no clubbing, cyanosis, or edema.  Right groin hematoma again noted.         Imaging      CT scan of abdomen/pelvis 23 September 2024:  Right groin pseudoaneurysm with large surrounding hematoma.  Additional findings as above.     Lower extremity Doppler 23 September 2024:  Right groin hematoma, new since the prior study.  No definite findings for pseudoaneurysm.     Lower extremity Doppler 19 September 2024:  The right common femoral artery and proximal femoral artery are patent with multiphasic waveforms. The adjacent veins are patent.   No definite evidence for a pseudoaneurysm.     Lab Results     Recent Labs   Lab 09/26/24  0558 09/25/24  1726 09/25/24  0633 09/24/24  1152 09/24/24  0608 09/24/24  0045 09/23/24  1841   WBC  --   --   --   --  6.7  --  6.6   HGB 8.5* 8.9* 6.9*   < > 7.2*   < > 6.4*   MCV  --   --   --   --  98  --  101*   PLT  --   --   --   --  117*  --  144*   INR  --   --   --   --   --   --  1.25*   NA  --   --   --   --  135  --  137   POTASSIUM  --   --   --   --  4.9  --  4.3   CHLORIDE  --   --   --   --  101  --  102   CO2  --   --   --   --  19*  --  18*   BUN  --   --   --   --  33.2*  --  23.1*   CR  --   --   --   --  4.41*  --  3.41*   ANIONGAP  --   --   --   --  15  --  17*   NAYLA  --   --   --   --  9.2  --  8.6*   GLC  --   --   --   --  93  --  111*    < > = values in this interval not displayed.     Recent Labs   Lab Test 04/24/24  1904   NTBNPI >70,000*     No lab results found in last 7 days.    Invalid input(s): \"LDLCALC\"  Lab " "Results   Component Value Date     09/24/2024    CO2 19 09/24/2024    CO2 21 05/03/2022    BUN 33.2 09/24/2024    BUN 37 05/03/2022     Lab Results   Component Value Date    WBC 6.7 09/24/2024    HGB 6.9 09/25/2024    HCT 22.6 09/24/2024    MCV 98 09/24/2024     09/24/2024     Lab Results   Component Value Date    CHOL 91 12/19/2023    TRIG 126 12/19/2023    HDL 40 12/19/2023     Lab Results   Component Value Date    INR 1.25 09/23/2024     No results found for: \"CKTOTAL\", \"CKMB\", \"TROPONINI\"  Lab Results   Component Value Date    TSH 1.31 04/26/2024    TSH 1.80 04/20/2021           Current Inpatient Scheduled Medications   Scheduled Meds:  Current Facility-Administered Medications   Medication Dose Route Frequency Provider Last Rate Last Admin    aspirin EC tablet 81 mg  81 mg Oral Daily Mary Montgomery MD   81 mg at 09/25/24 1046    carvedilol (COREG) tablet 12.5 mg  12.5 mg Oral BID Josie Ramsey PA-C   12.5 mg at 09/25/24 1950    cinacalcet (SENSIPAR) tablet 60 mg  60 mg Oral Daily Josie Ramsey PA-C   60 mg at 09/25/24 0819    clopidogrel (PLAVIX) tablet 75 mg  75 mg Oral Daily Mary Montgomery MD   75 mg at 09/25/24 1046    multivitamin RENAL (RENAVITE RX/NEPHROVITE) tablet 1 tablet  1 tablet Oral Daily Josie Ramsey PA-C   1 tablet at 09/25/24 0800    polyethylene glycol (MIRALAX) Packet 17 g  17 g Oral Every Other Day Josie Ramsey PA-C   17 g at 09/23/24 2343    sevelamer carbonate (RENVELA) tablet 3,200 mg  3,200 mg Oral TID w/meals Josie Ramsey PA-C   3,200 mg at 09/25/24 1905    simvastatin (ZOCOR) tablet 40 mg  40 mg Oral At Bedtime Josie Ramsey PA-C   40 mg at 09/25/24 2241    sodium chloride (PF) 0.9% PF flush 3 mL  3 mL Intracatheter Q8H Josie Ramsey PA-C   3 mL at 09/26/24 0044     Continuous Infusions:  Current Facility-Administered Medications   Medication Dose Route Frequency Provider Last Rate Last Admin    Stop Heparin 60 minutes before end of treatment   " Does not apply Continuous PRN Glynn Russell PA-C                Medications Prior to Admission   Prior to Admission medications    Medication Sig Start Date End Date Taking? Authorizing Provider   apixaban ANTICOAGULANT (ELIQUIS) 5 MG tablet Take 1 tablet (5 mg) by mouth 2 times daily 11/28/23  Yes Kathryn Marques PA-C   aspirin 81 MG EC tablet Take 81 mg by mouth daily.   Yes Reported, Patient   calcium carbonate 750 MG CHEW Take 750 mg by mouth daily as needed.   Yes Reported, Patient   carvedilol (COREG) 25 MG tablet Take 0.5 tablets (12.5 mg) by mouth 2 times daily. Mon, wed, fri only taking 1 pill, all other days 0.5 a tab 9/12/24  Yes Suma Corona PA-C   cinacalcet (SENSIPAR) 60 MG tablet Take 1 tablet by mouth daily 7/7/23  Yes Reported, Patient   clopidogrel (PLAVIX) 75 MG tablet Take 1 tablet (75 mg) by mouth daily 12/20/23  Yes Jennifer Ventura CNP   DIALYVITE 100-1 mg Tab Take 1 tablet by mouth daily  2/17/21  Yes Provider, Historical   famotidine (PEPCID) 20 MG tablet Take 1 tablet by mouth daily as needed.   Yes Reported, Patient   Multiple Vitamins-Minerals (PRESERVISION AREDS PO) Take 1 tablet by mouth 2 times daily   Yes Reported, Patient   polyethylene glycol (MIRALAX) 17 gram packet Take 17 g by mouth daily as needed for constipation 5/23/19  Yes Provider, Historical   sevelamer carbonate (RENVELA) 800 mg tablet Take 3,200 mg by mouth 3 times daily (with meals) 1/9/18  Yes Provider, Historical   simvastatin (ZOCOR) 40 MG tablet Take 1 tablet (40 mg) by mouth At Bedtime 7/27/23  Yes Riki Martinez MD   vitamin D3 (CHOLECALCIFEROL) 50 mcg (2000 units) tablet Take 1 tablet by mouth daily   Yes Reported, Patient

## 2024-09-26 NOTE — PROGRESS NOTES
RENAL PROGRESS NOTE    ASSESSMENT & PLAN:   ESRD: HD MWF schedule [facility] under the care of [facility] [place].  TW 74 kg.  3.25-hour TT, left upper extremity aVF, 450 BFR/700 DFR.  Recs:  Dialysis today per usual schedule, maintain normal MWF schedule inpatient so long as patient is admitted  NO heparin with dialysis  Anemia as below  Spoke with CDU RN, faxed over note to [facility], they will place in Dr Caballero's basket (primary nephrologist)  Thanks for the consult we will follow     ACD: On Mircera 200 mcg Q2W PTA, last dose 9/13/2024.  Acute on chronic anemia now in the setting of blood loss with right groin pseudoaneurysm and large surrounding hematoma after recent watchman's device procedure.  S/p interventional radiology injection of thrombin to right pseudoaneurysm.  S/p 1 unit PRBCs in the ED, and planning for additional unit today.iron stores are replete, reticulocyte count is appropriately elevated to degree of anemia.  He would not benefit from additional parenteral iron or UGRPREET at present.  Transfusions per primary team.  Anticoagulation currently on hold under the guidance of electrophysiology and cardiology.  DAPT resumed.  Hgb stable today. Will ask for weekly hgb x 2 weeks on Mondays at CDU     ESRD MBD: Resume PTA sevelamer 3 times daily AC, PTA Cinacalcet for secondary renal hyperparathyroidism.     Persistent atrial fibrillation: S/p ablation 4/2024, s/p Watchman device placement 9/12/2024, anticoagulation currently on hold in the setting of bleed.  PTA Coreg.     CAD: History of prior PCI 12/2023.  Plavix and aspirin have been resumed under the direction of electrophysiology and cardiology on 9/25/2024.  PTA statin.     HTN: PTA Coreg    SUBJECTIVE:    Patient was seen bedside  Showered this morning  Dialysis was uneventful yesterday  1.4L UFR  Patient discharging today  Spoke with CDU charge RN, ordered weekly hemoglobin x 2 to ensure stability as above  Otherwise no  changes to dialysis Rx  They will be expecting him for his usual treatment tomorrow outpatient  All questions answered  OBJECTIVE:  Physical Exam   Temp: 98.2  F (36.8  C) Temp src: Oral BP: 133/62 Pulse: 73   Resp: 16 SpO2: 98 % O2 Device: None (Room air)    Vitals:    09/25/24 0100 09/25/24 1400 09/25/24 1735   Weight: 74.8 kg (164 lb 12.8 oz) 75.4 kg (166 lb 3.6 oz) 74 kg (163 lb 2.3 oz)     Vital Signs with Ranges  Temp:  [97.6  F (36.4  C)-98.3  F (36.8  C)] 98.2  F (36.8  C)  Pulse:  [61-77] 73  Resp:  [16-20] 16  BP: (118-141)/(56-62) 133/62  SpO2:  [97 %-100 %] 98 %  I/O last 3 completed shifts:  In: 320   Out: 1400 [Other:1400]      Patient Vitals for the past 72 hrs:   Weight   09/25/24 1735 74 kg (163 lb 2.3 oz)   09/25/24 1400 75.4 kg (166 lb 3.6 oz)   09/25/24 0100 74.8 kg (164 lb 12.8 oz)   09/24/24 2206 73.6 kg (162 lb 4.8 oz)   09/23/24 1700 74.8 kg (165 lb)     Intake/Output Summary (Last 24 hours) at 9/25/2024 1155  Last data filed at 9/25/2024 1036  Gross per 24 hour   Intake 320 ml   Output --   Net 320 ml       PHYSICAL EXAM:  General: Alert, NAD  HENT: Supple, Non-tender, no obvious JVD  Eyes: No scleral icterus  Cardiovascular: RRR, no rub, gallop, or murmur.   Extremities: No edema.  Right groin bruising noted  Respiratory: Lung sounds are clear anteriorly, respirations are nonlabored, he is on room air.  Gastrointestinal: Soft, non-tender, non-distended  Musculoskeletal: Grossly normal   Integumentary: Warm, dry, no rash  Psychiatric: A bit forgetful, repeats himself a lot  Access: Left upper extremity aVF, aneurysmal expansion noted, good thrill and bruit present    LABORATORY STUDIES:     Recent Labs   Lab 09/26/24  0558 09/25/24  1726 09/25/24  0633 09/24/24  2131 09/24/24  1152 09/24/24  0608 09/24/24  0045 09/23/24  1841   WBC  --   --   --   --   --  6.7  --  6.6   RBC  --   --   --   --   --  2.30*  --  2.02*   HGB 8.5* 8.9* 6.9* 7.0* 7.0* 7.2*   < > 6.4*   HCT  --   --   --   --   --   22.6*  --  20.4*   PLT  --   --   --   --   --  117*  --  144*    < > = values in this interval not displayed.       Basic Metabolic Panel:  Recent Labs   Lab 09/24/24  0608 09/23/24 1841    137   POTASSIUM 4.9 4.3   CHLORIDE 101 102   CO2 19* 18*   BUN 33.2* 23.1*   CR 4.41* 3.41*   GLC 93 111*   NAYLA 9.2 8.6*       INR  Recent Labs   Lab 09/23/24  1841   INR 1.25*        Recent Labs   Lab Test 09/26/24  0558 09/25/24  1726 09/24/24  1152 09/24/24  0608 09/24/24  0045 09/23/24  1841 05/18/24  0936 04/29/24  1152   INR  --   --   --   --   --  1.25*  --  1.77*   WBC  --   --   --  6.7  --  6.6   < > 9.2   HGB 8.5* 8.9*   < > 7.2*   < > 6.4*   < > 7.9*   PLT  --   --   --  117*  --  144*   < > 194    < > = values in this interval not displayed.       Personally reviewed current labs    This note was dictated using voice recognition     Glynn Russell PA-C  [facility]  [phone]

## 2024-09-26 NOTE — PLAN OF CARE
Patient is alert and oriented. Pt endorses pain in his groin/leg only when he is up moving. Declines pain medications. Pt is a fib on tele. Pt VSS. Pt received 1 U PRBC, repeated HgB 8.9. Pt ambulated x4 without issue.    Mandy Snow, RN    Problem: Skin Injury Risk Increased  Goal: Skin Health and Integrity  Outcome: Progressing  Intervention: Plan: Nurse Driven Intervention: Moisture Management  Recent Flowsheet Documentation  Taken 9/25/2024 1600 by Mandy Snow, RN  Moisture Interventions: Incontinence pad  Taken 9/25/2024 0800 by Mandy Snow, RN  Moisture Interventions: Incontinence pad   Goal Outcome Evaluation:      Plan of Care Reviewed With: patient, spouse    Overall Patient Progress: improvingOverall Patient Progress: improving

## 2024-09-26 NOTE — DISCHARGE SUMMARY
[facility] MEDICINE  DISCHARGE SUMMARY     Primary Care Physician: Riki Martinez  Admission Date: 9/23/2024   Discharge Provider: Germán Rush DO Discharge Date: 9/26/2024   Diet:   Active Diet and Nourishment Order   Procedures    Combination Diet Low Saturated Fat Na <2400mg Diet    Diet       Code Status: No CPR- Do NOT Intubate   Activity: As tolerated        Condition at Discharge: Stable     REASON FOR PRESENTATION(See Admission Note for Details)   Right groin pain  PRINCIPAL & ACTIVE DISCHARGE DIAGNOSES   Right groin hematoma  Acute on chronic anemia  Chronic kidney disease, on dialysis  Atrial fibrillation, recent Watchman device  Coronary artery disease, drug-eluting stent in December 2023  Chronic just of heart failure, ejection fraction 45 to 50%  PENDING LABS     Unresulted Labs Ordered in the Past 30 Days of this Admission       Date and Time Order Name Status Description    9/12/2024 10:25 AM Prepare red blood cells (unit) Preliminary     9/12/2024 10:25 AM Prepare red blood cells (unit) Preliminary           PROCEDURES ( this hospitalization only)      RECOMMENDATIONS TO OUTPATIENT PROVIDER FOR F/U VISIT   Follow-up Appointments     Follow-up and recommended labs and tests       Follow up with primary care provider, Riki Martinez, within 7 days for   hospital follow- up.  The following labs/tests are recommended: CBC, BMP.            Will have follow-up with cardiology in the near future  DISPOSITION   Home  SUMMARY OF HOSPITAL COURSE:    78 yo male presented with complaints of hip/groin pain. Recently underwent Watchman procedure with groin access. Workup with large right groin hematoma, pseudoaneurysm, acute on chronic anemia. Transfused one unit, IR consulted for thrombin injection which was completed. Also hx of ESRD, on dialysis. Hx of A fib, CAD with stent in Dec of 2023.  Initially held aspirin, Plavix, Eliquis.  Required 2 blood transfusions during his  hospital stay.  Resumed aspirin and Plavix, Eliquis continued to be held.  Hemoglobin remained stable, discharged in good condition on the morning of 9/26.  Follow-up recommended with hemoglobin on Monday 9/30, A-fib clinic follow-up per cardiology recommendations.     Discharge Medications with Med changes:     Current Discharge Medication List        CONTINUE these medications which have NOT CHANGED    Details   aspirin 81 MG EC tablet Take 81 mg by mouth daily.      calcium carbonate 750 MG CHEW Take 750 mg by mouth daily as needed.      carvedilol (COREG) 25 MG tablet Take 0.5 tablets (12.5 mg) by mouth 2 times daily. Mon, wed, fri only taking 1 pill, all other days 0.5 a tab    Associated Diagnoses: Congestive heart failure, unspecified HF chronicity, unspecified heart failure type      cinacalcet (SENSIPAR) 60 MG tablet Take 1 tablet by mouth daily      clopidogrel (PLAVIX) 75 MG tablet Take 1 tablet (75 mg) by mouth daily  Qty: 90 tablet, Refills: 3    Associated Diagnoses: Status post insertion of drug-eluting stent into left anterior descending (LAD) artery for coronary artery disease      DIALYVITE 100-1 mg Tab Take 1 tablet by mouth daily       famotidine (PEPCID) 20 MG tablet Take 1 tablet by mouth daily as needed.      Multiple Vitamins-Minerals (PRESERVISION AREDS PO) Take 1 tablet by mouth 2 times daily      polyethylene glycol (MIRALAX) 17 gram packet Take 17 g by mouth daily as needed for constipation      sevelamer carbonate (RENVELA) 800 mg tablet Take 3,200 mg by mouth 3 times daily (with meals)      simvastatin (ZOCOR) 40 MG tablet Take 1 tablet (40 mg) by mouth At Bedtime  Qty: 90 tablet, Refills: 3      vitamin D3 (CHOLECALCIFEROL) 50 mcg (2000 units) tablet Take 1 tablet by mouth daily           STOP taking these medications       apixaban ANTICOAGULANT (ELIQUIS) 5 MG tablet Comments:   Reason for Stopping:             Rationale for medication changes:    As above  Consults   Cardiology,  interventional radiology  Anticoagulation Information    Continue on aspirin and Plavix, Eliquis held at this time  SIGNIFICANT IMAGING FINDINGS     Results for orders placed or performed during the hospital encounter of 09/23/24   Us Post Vascular Access Low Ext Duplex    Impression    IMPRESSION:  1. Right groin hematoma, new since the prior study.  2. No definite findings for pseudoaneurysm.       CTA Abdomen Pelvis Runoff w Contrast    Impression    IMPRESSION:   1.  Right groin pseudoaneurysm with large surrounding hematoma.  2.  Additional findings as above.    Pseudoaneurysm was called to Dr. Peres by Dr. Demarco Arita on 9/23/2024 9:07 PM CDT.    Us Post Vascular Access Low Ext Duplex    Impression    IMPRESSION:  1.  RIGHT MEDIAL THIGH PSEUDOANEURYSM MEASURING 2.3 X 1.5 X 2 CM.   IR Pseudoaneurysm Injection    Impression    IMPRESSION:    1. Successful ultrasound-guided right groin pseudoaneurysm thrombin injection.    PLAN:  1. Patient to be recovered and discharged from IR following 4 hours of bedrest.  2. Pulse checks [id number] while recovering.  3. Follow-up with repeat limited right lower extremity ultrasound in 1 week.   US Pseudoaneurysm Thrombin Inj Repair    Impression    IMPRESSION:    1. Successful ultrasound-guided right groin pseudoaneurysm thrombin injection.    PLAN:  1. Patient to be recovered and discharged from IR following 4 hours of bedrest.  2. Pulse checks [id number] while recovering.  3. Follow-up with repeat limited right lower extremity ultrasound in 1 week.     SIGNIFICANT LABORATORY FINDINGS   Hemoglobin remained stable at 8.5 this morning  Discharge Orders        Follow-Up with Cardiology [facility]      Reason for your hospital stay    Hematoma in right groin due to procedure, anticoagulation. Associated anemia.     Follow-up and recommended labs and tests     Follow up with primary care provider, Riki Martinez, within 7 days for hospital follow- up.  The following labs/tests are  recommended: CBC, BMP.     Activity    Your activity upon discharge: activity as tolerated     Diet    Follow this diet upon discharge: Current Diet:Orders Placed This Encounter      Combination Diet Low Saturated Fat Na <2400mg Diet     Examination   Physical Exam   Temp:  [97.6  F (36.4  C)-98.6  F (37  C)] 98.2  F (36.8  C)  Pulse:  [60-77] 73  Resp:  [16-20] 16  BP: (107-141)/(50-62) 133/62  SpO2:  [97 %-100 %] 98 %  Wt Readings from Last 1 Encounters:   09/25/24 74 kg (163 lb 2.3 oz)       Subjective: Denies chest pain, shortness of breath, abdominal pain, vomiting.  Groin pain is not present when he is at rest, only hurts when he is moving.    Physical Exam:    Gen: no acute distress, comfortable, alert, pleasant  ENT: no scleral icterus  Pulm: lungs are clear without wheezing, crackles  CV: Normal heart rate, slightly irregular, minimal right groin swelling, no significant lower extremity pitting edema  Derm: Warm, dry, mildly pale  Psych: appropriate affect       Please see EMR for more detailed significant labs, imaging, consultant notes etc.    IGermán DO, personally saw the patient today and spent greater than 30 minutes discharging this patient.    Germán Rush DO  [facility]    CC:Riki Martinez

## 2024-09-27 ENCOUNTER — PATIENT OUTREACH (OUTPATIENT)
Dept: CARE COORDINATION | Facility: CLINIC | Age: 79
End: 2024-09-27
Payer: COMMERCIAL

## 2024-09-27 NOTE — PROGRESS NOTES
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  She stated that García's pain is getting much better.  She stated that the bruising and swelling is possibly improving it was 'hard to tell'.  She declined any CCC needs.  Gave her information about nurse triage 24/7 and she stated an understanding.  How are your symptoms? (Red Flag symptoms escalate to triage hotline per guidelines): Improved  Do you know how to contact your clinic care team if you have future questions or changes to your health status? : Yes  Does the patient have their discharge instructions? : Yes  Does the patient have questions regarding their discharge instructions? : No  Were you started on any new medications or were there changes to any of your previous medications? : Yes  Does the patient have all of their medications?: Yes  Do you have questions regarding any of your medications? : No  Do you have all of your needed medical supplies or equipment (DME)?  (i.e. oxygen tank, CPAP, cane, etc.): Yes                  Follow up Plan     Discharge Follow-Up  Discharge follow up appointment scheduled in alignment with recommended follow up timeframe or Transitions of Risk Category? (Low =  within 30 days; Moderate= within 14 days; High= within 7 days): Yes  Discharge Follow Up Appointment Date: 10/03/24  Discharge Follow Up Appointment Scheduled with?: Primary Care Provider    Future Appointments   Date Time Provider Department Center   10/3/2024  8:30 AM Jaquelin Gould PA-C [id number]   11/5/2024  1:30 PM Nancie Ramos PA-C [facility]   12/3/2024  7:30 AM JN [facility] ECHO STAFF [facility]       Outpatient Plan as outlined on AVS reviewed with patient.    For any urgent concerns, please contact our 24 hour nurse triage line: [phone] ([phone])       Neyda Garcia RN

## 2024-09-28 ENCOUNTER — HEALTH MAINTENANCE LETTER (OUTPATIENT)
Age: 79
End: 2024-09-28

## 2024-10-01 ENCOUNTER — TELEPHONE (OUTPATIENT)
Dept: CARDIOLOGY | Facility: CLINIC | Age: 79
End: 2024-10-01
Payer: COMMERCIAL

## 2024-10-01 NOTE — TELEPHONE ENCOUNTER
Wife Kirsten called to find out if / when pt is supposed to restart Eliquis since discharge from hospital.    Chart reviewed, 9/26/24 cardiology progress note:  6. Right groin pseudoaneurysm with large surrounding hematoma.  CT scan 23 September 2024 revealed a right groin pseudoaneurysm with large surrounding hematoma. García underwent pseudoaneurysm thrombin injection this admission.  Hemoglobin would appear to be fairly stable (8.9 g/dL yesterday and 8.5 g/dL this morning).  Continue with 81 mg aspirin & clopidogrel.  Continue to DEFER full anticoagulation with apixaban.  Anticipate possible dismissal from hospital today if other providers in agreement.  Would recommend repeat hemoglobin on Monday, 30 September 2024.    This is his hemodialysis day & suspect he could have that drawn at dialysis (discussed this with García this morning).  I will make arrangements for García to follow-up in the At[facility] Fibrillation [facility] (request/order already submitted through Epic).    Wife Kirsten said HgB was drawn at dialysis 9/30/24, she does not have results yet.  Instructed her to make follow-up appt with a-fib clinic, and continue to hold Eliquis as pt has been until further notice.  Call transferred to scheduling - pt will see Glendy 10/15/24.  -susan

## 2024-10-03 ENCOUNTER — OFFICE VISIT (OUTPATIENT)
Dept: FAMILY MEDICINE | Facility: CLINIC | Age: 79
End: 2024-10-03
Payer: COMMERCIAL

## 2024-10-03 VITALS
OXYGEN SATURATION: 99 % | HEART RATE: 75 BPM | SYSTOLIC BLOOD PRESSURE: 112 MMHG | DIASTOLIC BLOOD PRESSURE: 52 MMHG | BODY MASS INDEX: 23.92 KG/M2 | WEIGHT: 162 LBS

## 2024-10-03 DIAGNOSIS — D69.6 THROMBOCYTOPENIA (H): ICD-10-CM

## 2024-10-03 DIAGNOSIS — N25.81 HYPERPARATHYROIDISM, SECONDARY RENAL (H): ICD-10-CM

## 2024-10-03 DIAGNOSIS — Z95.818 PRESENCE OF WATCHMAN LEFT ATRIAL APPENDAGE CLOSURE DEVICE: ICD-10-CM

## 2024-10-03 DIAGNOSIS — D64.9 ANEMIA, UNSPECIFIED TYPE: Primary | Chronic | ICD-10-CM

## 2024-10-03 DIAGNOSIS — C64.1 RENAL CELL CARCINOMA OF RIGHT KIDNEY (H): ICD-10-CM

## 2024-10-03 LAB
ANION GAP SERPL CALCULATED.3IONS-SCNC: 14 MMOL/L (ref 7–15)
BUN SERPL-MCNC: 35.7 MG/DL (ref 8–23)
CALCIUM SERPL-MCNC: 9.3 MG/DL (ref 8.8–10.4)
CHLORIDE SERPL-SCNC: 102 MMOL/L (ref 98–107)
CREAT SERPL-MCNC: 4.83 MG/DL (ref 0.67–1.17)
EGFRCR SERPLBLD CKD-EPI 2021: 12 ML/MIN/1.73M2
ERYTHROCYTE [DISTWIDTH] IN BLOOD BY AUTOMATED COUNT: 18.8 % (ref 10–15)
GLUCOSE SERPL-MCNC: 93 MG/DL (ref 70–99)
HCO3 SERPL-SCNC: 21 MMOL/L (ref 22–29)
HCT VFR BLD AUTO: 29.8 % (ref 40–53)
HGB BLD-MCNC: 9.4 G/DL (ref 13.3–17.7)
MCH RBC QN AUTO: 32.4 PG (ref 26.5–33)
MCHC RBC AUTO-ENTMCNC: 31.5 G/DL (ref 31.5–36.5)
MCV RBC AUTO: 103 FL (ref 78–100)
PLATELET # BLD AUTO: 155 10E3/UL (ref 150–450)
POTASSIUM SERPL-SCNC: 5.2 MMOL/L (ref 3.4–5.3)
RBC # BLD AUTO: 2.9 10E6/UL (ref 4.4–5.9)
SODIUM SERPL-SCNC: 137 MMOL/L (ref 135–145)
WBC # BLD AUTO: 6.7 10E3/UL (ref 4–11)

## 2024-10-03 PROCEDURE — 99495 TRANSJ CARE MGMT MOD F2F 14D: CPT | Performed by: PHYSICIAN ASSISTANT

## 2024-10-03 PROCEDURE — 85027 COMPLETE CBC AUTOMATED: CPT | Performed by: PHYSICIAN ASSISTANT

## 2024-10-03 PROCEDURE — 36415 COLL VENOUS BLD VENIPUNCTURE: CPT | Performed by: PHYSICIAN ASSISTANT

## 2024-10-03 PROCEDURE — 80048 BASIC METABOLIC PNL TOTAL CA: CPT | Performed by: PHYSICIAN ASSISTANT

## 2024-10-03 NOTE — PROGRESS NOTES
Assessment & Plan     Anemia, unspecified type  Acute on chronic s/p watchman procedure with large hematoma  Feels will today will check cbc  - Basic metabolic panel  (Ca, Cl, CO2, Creat, Gluc, K, Na, BUN)  - CBC with platelets  - Basic metabolic panel  (Ca, Cl, CO2, Creat, Gluc, K, Na, BUN)  - CBC with platelets    Renal cell carcinoma of right kidney (H)  Follows with renal    Hyperparathyroidism, secondary renal (H)  Follows with renal    Thrombocytopenia (H)  Follows with renal    Presence of Watchman left atrial appendage closure device  Follows with cardiology          MED REC REQUIRED  Post Medication Reconciliation Status:  Discharge medications reconciled, continue medications without change        Brandee Mariano is a 79 year old, presenting for the following health issues:  Hospital F/U (Feeling all right today)        10/3/2024     8:21 AM   Additional Questions   Roomed by Priyanka   Accompanied by wife     HPI        Hospital Follow-up Visit:    Hospital/Nursing Home/IP Rehab Facility: [facility]  Date of Admission: 9/23/24  Date of Discharge: 9/26/24  Reason(s) for Admission:  right groin pain  Was the patient in the ICU or did the patient experience delirium during hospitalization?  No  Do you have any other stressors you would like to discuss with your provider? No    Problems taking medications regularly:  None  Medication changes since discharge: None  Problems adhering to non-medication therapy:  None    Summary of hospitalization:  [facility] discharge summary reviewed  Diagnostic Tests/Treatments reviewed.  Follow up needed: labs  Other Healthcare Providers Involved in Patient s Care:         Specialist appointment - cardiology  Update since discharge: improved.       Discharge summary:   78 yo male presented with complaints of hip/groin pain. Recently underwent Watchman procedure with groin access. Workup with large right groin hematoma,  pseudoaneurysm, acute on chronic anemia. Transfused one unit, IR consulted for thrombin injection which was completed. Also hx of ESRD, on dialysis. Hx of A fib, CAD with stent in Dec of 2023.  Initially held aspirin, Plavix, Eliquis.  Required 2 blood transfusions during his hospital stay.  Resumed aspirin and Plavix, Eliquis continued to be held.  Hemoglobin remained stable, discharged in good condition on the morning of 9/26.  Follow-up recommended with hemoglobin on Monday 9/30, A-fib clinic follow-up per cardiology recommendations.     Reports he feels well today and ecchymosis is improving.   denies cp or sob.  Denies fevers chills or body aches, denies bleeding or pain in right groin.     Plan of care communicated with patient and family             Review of Systems  Constitutional, HEENT, cardiovascular, pulmonary, gi and gu systems are negative, except as otherwise noted.      Objective    /52 (BP Location: Left arm, Patient Position: Sitting, Cuff Size: Adult Large)   Pulse 75   Wt 73.5 kg (162 lb)   SpO2 99%   BMI 23.92 kg/m    Body mass index is 23.92 kg/m .  Physical Exam   GENERAL: alert and no distress  NECK: no adenopathy, no asymmetry, masses, or scars  RESP: lungs clear to auscultation - no rales, rhonchi or wheezes  CV: regular rate  no peripheral edema  ABDOMEN: right groin with mild eccymosis  MS: no gross musculoskeletal defects noted, no edema            Signed Electronically by: Jaquelin Gould PA-C

## 2024-10-04 DIAGNOSIS — E78.5 HYPERLIPIDEMIA LDL GOAL <70: Primary | ICD-10-CM

## 2024-10-04 RX ORDER — SIMVASTATIN 40 MG
40 TABLET ORAL AT BEDTIME
Qty: 90 TABLET | Refills: 2 | Status: SHIPPED | OUTPATIENT
Start: 2024-10-04
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
## 2024-10-15 ENCOUNTER — OFFICE VISIT (OUTPATIENT)
Dept: CARDIOLOGY | Facility: CLINIC | Age: 79
End: 2024-10-15
Payer: COMMERCIAL

## 2024-10-15 ENCOUNTER — PATIENT OUTREACH (OUTPATIENT)
Dept: CARE COORDINATION | Facility: CLINIC | Age: 79
End: 2024-10-15

## 2024-10-15 VITALS
RESPIRATION RATE: 16 BRPM | DIASTOLIC BLOOD PRESSURE: 48 MMHG | WEIGHT: 163 LBS | HEART RATE: 70 BPM | SYSTOLIC BLOOD PRESSURE: 108 MMHG | BODY MASS INDEX: 24.14 KG/M2 | HEIGHT: 69 IN

## 2024-10-15 DIAGNOSIS — Z86.79 S/P ABLATION OF ATRIAL FIBRILLATION: ICD-10-CM

## 2024-10-15 DIAGNOSIS — I72.4 FEMORAL ARTERY PSEUDO-ANEURYSM, RIGHT (H): ICD-10-CM

## 2024-10-15 DIAGNOSIS — Z98.890 S/P ABLATION OF ATRIAL FIBRILLATION: ICD-10-CM

## 2024-10-15 DIAGNOSIS — I48.19 PERSISTENT ATRIAL FIBRILLATION (H): Primary | ICD-10-CM

## 2024-10-15 DIAGNOSIS — Z95.818 PRESENCE OF WATCHMAN LEFT ATRIAL APPENDAGE CLOSURE DEVICE: ICD-10-CM

## 2024-10-15 PROCEDURE — 99215 OFFICE O/P EST HI 40 MIN: CPT | Performed by: NURSE PRACTITIONER

## 2024-10-15 PROCEDURE — G2211 COMPLEX E/M VISIT ADD ON: HCPCS | Performed by: NURSE PRACTITIONER

## 2024-10-15 NOTE — PATIENT INSTRUCTIONS
García Kitchen,    It was a pleasure to see you today at the [facility].     My recommendations after this visit include:    --Continue carvedilol 12.5 mg twice daily due to persistent AF   --Follow up with Dr Cross in February  --Follow up with EP 1 year post ablation in April  --Continue DAPT (ASA + Plavix) x6 months post Watchman    --Follow up with Nancie Ramos PA-C on 11/5/24    Glendy Mcadams CNP  [facility], Electrophysiology  [phone]  EP nurses [phone]

## 2024-10-15 NOTE — PROGRESS NOTES
Clinical Product Navigator RN reviewed chart; patient on payer product coverage.  Review results:   CPN Initial Information Gathering  Referral Source: Health Plan    Patient identified by their health plan for RN Clinical Product Navigator review.  Patient has had 2 ED visits and 3 inpatient admissions in the past 12 months.  PMHx ESRD on dialysis, a-fib, HFrEF, presence of Watchman, CAD w/p TEENA, HTN, SCC, renal cell carcinoma, HLD, memory loss.  Care Team: [facility]; [facility]. Has had outreach by CC in the last month following a hospitalization.  The patient was provided resources & declined need for ongoing CC.  Followed very closely by her care team.  Per recent in-patient social work notes, patient lives at home with spouse who is his caregiver.  No additional action by writer at this time due to recent care coordination outreach.    Melissa Behl BSN, RN, PHN, CCM  RN Clinical Product Navigator  [phone]
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
## 2024-11-05 ENCOUNTER — OFFICE VISIT (OUTPATIENT)
Dept: CARDIOLOGY | Facility: CLINIC | Age: 79
End: 2024-11-05
Payer: COMMERCIAL

## 2024-11-05 VITALS
HEART RATE: 69 BPM | WEIGHT: 165 LBS | BODY MASS INDEX: 24.37 KG/M2 | SYSTOLIC BLOOD PRESSURE: 134 MMHG | DIASTOLIC BLOOD PRESSURE: 48 MMHG | RESPIRATION RATE: 16 BRPM

## 2024-11-05 DIAGNOSIS — E78.5 HYPERLIPIDEMIA LDL GOAL <70: ICD-10-CM

## 2024-11-05 DIAGNOSIS — I48.19 PERSISTENT ATRIAL FIBRILLATION (H): ICD-10-CM

## 2024-11-05 DIAGNOSIS — I10 ESSENTIAL HYPERTENSION WITH GOAL BLOOD PRESSURE LESS THAN 140/90: Chronic | ICD-10-CM

## 2024-11-05 DIAGNOSIS — Z95.818 PRESENCE OF WATCHMAN LEFT ATRIAL APPENDAGE CLOSURE DEVICE: Primary | ICD-10-CM

## 2024-11-05 DIAGNOSIS — I25.10 CORONARY ARTERY DISEASE INVOLVING NATIVE CORONARY ARTERY OF NATIVE HEART WITHOUT ANGINA PECTORIS: ICD-10-CM

## 2024-11-05 DIAGNOSIS — I25.5 ISCHEMIC CARDIOMYOPATHY: ICD-10-CM

## 2024-11-05 PROCEDURE — 99213 OFFICE O/P EST LOW 20 MIN: CPT | Performed by: PHYSICIAN ASSISTANT

## 2024-11-05 PROCEDURE — G2211 COMPLEX E/M VISIT ADD ON: HCPCS | Performed by: PHYSICIAN ASSISTANT
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
## 2024-11-05 NOTE — PATIENT INSTRUCTIONS
García Kitchen,    It was a pleasure to see you today in the clinic regarding your Watchman follow-up.     My recommendations after this visit include:     - no medication changes today   - continue aspirin 81 mg daily indefinitely and Plavix 75 mg daily until March 12   - ONEIDA is scheduled for December 3   - we will see you again March for Watchman follow-up      If you have questions or concerns, please call using the numbers below:    After Hours/Scheduling  [phone]    Otherwise you can dial the nurse directly at:                LB Duran RN  [phone]    Nancie Ramos PA-C  Structural Heart Program  [facility]

## 2024-11-14 ENCOUNTER — TELEPHONE (OUTPATIENT)
Dept: FAMILY MEDICINE | Facility: CLINIC | Age: 79
End: 2024-11-14
Payer: COMMERCIAL

## 2024-11-14 DIAGNOSIS — R07.9 CHEST PAIN, UNSPECIFIED TYPE: ICD-10-CM

## 2024-11-14 NOTE — TELEPHONE ENCOUNTER
Medication Question or Refill    What medication are you calling about (include dose and sig)?: nitroGLYcerin (NITROSTAT) sublingual tablet 0.4 mg     Preferred Pharmacy:   [facility] [address] [place], [place] [address] [address] [address]  Phone: [phone] Fax: [phone]    Controlled Substance Agreement on file:   CSA -- Patient Level:    CSA: None found at the patient level.       Who prescribed the medication?: Riki Martinez    Do you need a refill? Yes    Do you have any questions or concerns?  Yes: Med discontinued 12/20/23- Please advise    Could we send this information to you in [place] or would you prefer to receive a phone call?:   Patient would prefer a phone call   Okay to leave a detailed message?: Yes at Cell number on file:    Telephone Information:   Mobile [phone]

## 2024-11-15 RX ORDER — NITROGLYCERIN 0.4 MG/1
0.4 TABLET SUBLINGUAL EVERY 5 MIN PRN
Qty: 50 TABLET | Refills: 3 | Status: SHIPPED | OUTPATIENT
Start: 2024-11-15

## 2024-11-30 ENCOUNTER — HOSPITAL ENCOUNTER (OUTPATIENT)
Dept: ULTRASOUND IMAGING | Facility: CLINIC | Age: 79
Discharge: HOME | End: 2024-11-30
Attending: PHYSICIAN ASSISTANT | Admitting: PHYSICIAN ASSISTANT
Payer: COMMERCIAL

## 2024-11-30 DIAGNOSIS — T81.718A PSEUDOANEURYSM FOLLOWING PROCEDURE (H): ICD-10-CM

## 2024-11-30 DIAGNOSIS — I72.9 PSEUDOANEURYSM FOLLOWING PROCEDURE (H): ICD-10-CM

## 2024-11-30 PROCEDURE — 93971 EXTREMITY STUDY: CPT

## 2024-11-30 PROCEDURE — 93926 LOWER EXTREMITY STUDY: CPT

## 2024-12-03 ENCOUNTER — HOSPITAL ENCOUNTER (OUTPATIENT)
Dept: CARDIOLOGY | Facility: HOSPITAL | Age: 79
Discharge: HOME | End: 2024-12-03
Attending: NURSE PRACTITIONER | Admitting: INTERNAL MEDICINE
Payer: COMMERCIAL

## 2024-12-03 VITALS
SYSTOLIC BLOOD PRESSURE: 131 MMHG | HEART RATE: 66 BPM | OXYGEN SATURATION: 99 % | RESPIRATION RATE: 27 BRPM | TEMPERATURE: 97.2 F | DIASTOLIC BLOOD PRESSURE: 68 MMHG

## 2024-12-03 DIAGNOSIS — I48.19 PERSISTENT ATRIAL FIBRILLATION (H): ICD-10-CM

## 2024-12-03 LAB — LVEF ECHO: NORMAL

## 2024-12-03 PROCEDURE — 93320 DOPPLER ECHO COMPLETE: CPT | Mod: 26 | Performed by: INTERNAL MEDICINE

## 2024-12-03 PROCEDURE — 99152 MOD SED SAME PHYS/QHP 5/>YRS: CPT | Performed by: INTERNAL MEDICINE

## 2024-12-03 PROCEDURE — 250N000011 HC RX IP 250 OP 636: Performed by: INTERNAL MEDICINE

## 2024-12-03 PROCEDURE — 93312 ECHO TRANSESOPHAGEAL: CPT | Mod: 26 | Performed by: INTERNAL MEDICINE

## 2024-12-03 PROCEDURE — 93320 DOPPLER ECHO COMPLETE: CPT

## 2024-12-03 PROCEDURE — 93325 DOPPLER ECHO COLOR FLOW MAPG: CPT | Mod: 26 | Performed by: INTERNAL MEDICINE

## 2024-12-03 PROCEDURE — 93325 DOPPLER ECHO COLOR FLOW MAPG: CPT

## 2024-12-03 PROCEDURE — 250N000009 HC RX 250: Performed by: INTERNAL MEDICINE

## 2024-12-03 RX ORDER — MAGNESIUM HYDROXIDE/ALUMINUM HYDROXICE/SIMETHICONE 120; 1200; 1200 MG/30ML; MG/30ML; MG/30ML
30 SUSPENSION ORAL EVERY 8 HOURS PRN
Status: DISCONTINUED | OUTPATIENT
Start: 2024-12-03 | End: 2024-12-03 | Stop reason: HOSPADM

## 2024-12-03 RX ORDER — FENTANYL CITRATE 50 UG/ML
INJECTION, SOLUTION INTRAMUSCULAR; INTRAVENOUS
Status: COMPLETED | OUTPATIENT
Start: 2024-12-03 | End: 2024-12-03

## 2024-12-03 RX ORDER — BENZOCAINE/MENTHOL 6 MG-10 MG
1 LOZENGE MUCOUS MEMBRANE 3 TIMES DAILY PRN
Status: DISCONTINUED | OUTPATIENT
Start: 2024-12-03 | End: 2024-12-03 | Stop reason: HOSPADM

## 2024-12-03 RX ORDER — ACETAMINOPHEN 325 MG/1
650 TABLET ORAL EVERY 4 HOURS PRN
Status: DISCONTINUED | OUTPATIENT
Start: 2024-12-03 | End: 2024-12-03 | Stop reason: HOSPADM

## 2024-12-03 RX ADMIN — TOPICAL ANESTHETIC 0.5 ML: 200 SPRAY DENTAL; PERIODONTAL at 08:41

## 2024-12-03 RX ADMIN — FENTANYL CITRATE 50 MCG: 50 INJECTION, SOLUTION INTRAMUSCULAR; INTRAVENOUS at 08:43

## 2024-12-03 RX ADMIN — MIDAZOLAM HYDROCHLORIDE 2 MG: 1 INJECTION, SOLUTION INTRAMUSCULAR; INTRAVENOUS at 08:43

## 2024-12-03 ASSESSMENT — ACTIVITIES OF DAILY LIVING (ADL)
ADLS_ACUITY_SCORE: 58
ADLS_ACUITY_SCORE: 58

## 2024-12-03 NOTE — DISCHARGE INSTRUCTIONS
1. You are required to have someone accompany you home.    2. Rest today. Do not drive or operate machinery today. Over-activity may produce nausea and dizziness.    3. You should follow your normal diet. Drink plenty of fluids. Do not drink any alcoholic beverages for 24 hours. *(Alcohol may interact with the medications you received today).  Cold food and liquids at 1000 this morning.    4. NO HOT FOODS or LIQUIDS FOR 6 HOURS after the procedure.  Not until 3:00 PM this afternoon.    5. You may have a sore throat or cough. This is normal. These symptoms should resolve in 24 hours.     6. If you have further questions call your doctor:

## 2024-12-04 ENCOUNTER — TELEPHONE (OUTPATIENT)
Dept: CARDIOLOGY | Facility: CLINIC | Age: 79
End: 2024-12-04
Payer: COMMERCIAL

## 2024-12-04 DIAGNOSIS — Z95.818 PRESENCE OF WATCHMAN LEFT ATRIAL APPENDAGE CLOSURE DEVICE: Primary | ICD-10-CM

## 2024-12-04 DIAGNOSIS — Z95.5 STATUS POST INSERTION OF DRUG-ELUTING STENT INTO LEFT ANTERIOR DESCENDING (LAD) ARTERY FOR CORONARY ARTERY DISEASE: ICD-10-CM

## 2024-12-04 RX ORDER — CLOPIDOGREL BISULFATE 75 MG/1
75 TABLET ORAL DAILY
Qty: 98 TABLET | Refills: 0 | Status: SHIPPED | OUTPATIENT
Start: 2024-12-04 | End: 2025-03-12

## 2024-12-04 NOTE — TELEPHONE ENCOUNTER
[facility]    Phone Message    May a detailed message be left on voicemail: yes     Reason for Call: Medication Refill Request    Has the patient contacted the pharmacy for the refill? Yes   Name of medication being requested: Plavix  Provider who prescribed the medication: Dr Paz   Pharmacy: [facility] pharmacy   Date medication is needed: 12/04/2024       Action Taken: Other: Cardio    Travel Screening: Not Applicable     Date of Service:

## 2024-12-04 NOTE — TELEPHONE ENCOUNTER
"Patient s/p LAAC BSCI - WM FLX, 9/12/2024. Per post-LAAC medication protocol, patient to remain on once daily 81 mg ASA for life and once daily 75 mg Plavix until six months post-LAAC, which will be 3/12/2025. Imaging interpretation summary shows:    \"Left ventricular function is decreased. The ejection fraction is 45-50%  (mildly reduced).  There is mild global hypokinesia of the left ventricle.  Normal right ventricle size and systolic function.  Watchman device noted in left atrial appendage. The device is well seated with  no leakage by color flow Doppler imaging.  Thrombus absent on left atrial appendage occlusion device.  Atrial septal defect in mid septum with continuous left-to-right shunting  identified.\"    New residual ASD with continuous L to R shunting identified on imaging. Sent to implanter for review. [facility]  "

## 2024-12-07 ENCOUNTER — HEALTH MAINTENANCE LETTER (OUTPATIENT)
Age: 79
End: 2024-12-07

## 2024-12-10 NOTE — TELEPHONE ENCOUNTER
Phone call to patient, spoke with wife Kirsten (C2C on file). Discussed results and recommendations. Confirmed medication plan moving forward, patient will see Dr. Cross in February 2025. Will provide follow up phone call in Feb/March to complete landon and discuss medication plan. Kirsten is appreciative. Encouraged her to return call if any further questions or concerns. No further questions at this time. [facility]    ----- Message from Israel Paz sent at 12/9/2024  4:37 PM CST -----  No further recommendations,  It should heal ovet the next several weeks. No repeat imaging needed.  ----- Message -----  From: Gina Orosco RN  Sent: 12/4/2024  10:25 AM CST  To: Israel Paz MD; #    Any concern for ASD with continuous L to R shunting identified on post-LAAC imaging? He is post-LAAC with you 9/12/2024.    Gina

## 2025-01-02 ENCOUNTER — OFFICE VISIT (OUTPATIENT)
Dept: FAMILY MEDICINE | Facility: CLINIC | Age: 80
End: 2025-01-02
Payer: COMMERCIAL

## 2025-01-02 VITALS
WEIGHT: 168 LBS | HEIGHT: 69 IN | TEMPERATURE: 97.6 F | OXYGEN SATURATION: 100 % | HEART RATE: 62 BPM | RESPIRATION RATE: 18 BRPM | SYSTOLIC BLOOD PRESSURE: 113 MMHG | DIASTOLIC BLOOD PRESSURE: 67 MMHG | BODY MASS INDEX: 24.88 KG/M2

## 2025-01-02 DIAGNOSIS — R68.84 JAW PAIN: Primary | ICD-10-CM

## 2025-01-02 NOTE — PROGRESS NOTES
"  Assessment & Plan   Assessment & Plan  Jaw pain  Patient presents today with complaints of right sided jaw pain and a sensation of malalignment over the last month. He denies any preceeding injury. Today, pain has essentially resolved but he is most concerned about the fact his jaw continues to seem 'off' and that his bite seems abnormal. 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 Given his change in bite, XR TMJ was completed today in clinic and final radiology read is pending. We reviewed supportive cares of this diagnosis including soft foods, work with dentistry for mouth guards, and more aggressive management such as referral to TMJ specialist and muscle relaxants but I believe these things can be deferred given the improvement in symptoms today. Discussed reasons to return to care. Patient and spouse expressed understanding of and agreement with this plan. All questions were answered.  Orders:    XR TMJ Open/Closed Right; Future    Subjective   García is a 79 year old, presenting for the following health issues:  jaw seems out of line        1/2/2025     1:49 PM   Additional Questions   Roomed by as   Accompanied by wife         1/2/2025     1:46 PM   Patient Reported Additional Medications   Patient reports taking the following new medications no     Right jaw pain and 'malalignment; x1 month   Pain has now resolved  Aggravated by chewing  Alignment does not seem to be where it was previously  Grinding sensation on the right side   Home care has included APAP.    History of Present Illness       Reason for visit:  Jaw not lining up. Pain   He is taking medications regularly.         Objective    /67 (BP Location: Left arm, Patient Position: Left side, Cuff Size: Adult Large)   Pulse 62   Temp 97.6  F (36.4  C) (Oral)   Resp 18   Ht 1.753 m (5' 9\")   Wt 76.2 kg (168 lb)   SpO2 100%   BMI 24.81 kg/m    Body mass index is " 24.81 kg/m .    Physical Exam  Vitals and nursing note reviewed.   Constitutional:       General: He is not in acute distress.     Appearance: Normal appearance.   HENT:      Head:      Jaw: Tenderness and pain on movement (right sided TMJ tenderness with opening of jaw and biting. No palpable crepitis or popping.) present. No swelling or malocclusion.      Salivary Glands: Right salivary gland is not diffusely enlarged or tender. Left salivary gland is not diffusely enlarged or tender.   Cardiovascular:      Rate and Rhythm: Normal rate and regular rhythm.   Pulmonary:      Effort: Pulmonary effort is normal.   Musculoskeletal:      Cervical back: Normal range of motion and neck supple. No rigidity or tenderness.   Lymphadenopathy:      Cervical: No cervical adenopathy.   Neurological:      Mental Status: He is alert.   Psychiatric:         Mood and Affect: Mood normal.         Behavior: Behavior normal.         Thought Content: Thought content normal.     Right XR TMJ: PENDING         Signed Electronically by: KARLI Leal CNP

## 2025-01-16 ENCOUNTER — OFFICE VISIT (OUTPATIENT)
Dept: PODIATRY | Facility: CLINIC | Age: 80
End: 2025-01-16
Attending: FAMILY MEDICINE
Payer: COMMERCIAL

## 2025-01-16 VITALS
HEART RATE: 68 BPM | DIASTOLIC BLOOD PRESSURE: 69 MMHG | RESPIRATION RATE: 16 BRPM | SYSTOLIC BLOOD PRESSURE: 124 MMHG | OXYGEN SATURATION: 100 %

## 2025-01-16 DIAGNOSIS — I73.9 PAD (PERIPHERAL ARTERY DISEASE): ICD-10-CM

## 2025-01-16 DIAGNOSIS — B35.1 ONYCHOMYCOSIS: Primary | ICD-10-CM

## 2025-01-16 DIAGNOSIS — L60.0 INGROWN TOENAIL: ICD-10-CM

## 2025-01-16 ASSESSMENT — PAIN SCALES - GENERAL: PAINLEVEL_OUTOF10: NO PAIN (0)

## 2025-01-16 NOTE — LETTER
1/16/2025      García Kitchen  [address] [facility] [address]      Dear Colleague,    Thank you for referring your patient, García Kitchen, to the [facility]. Please see a copy of my visit note below.          FOOT AND ANKLE SURGERY/PODIATRY CONSULT NOTE         ASSESSMENT:   Ingrown nail  Onychomycosis  PAD      TREATMENT:  -I discussed with the patient that on exam today he does have mild incurvation along the medial border the right hallux without signs of infection.    -We reviewed permanent nail avulsion procedure including chance of reoccurrence of 2%.  After discussion patient would like to proceed with this procedure.    -I discussed with the patient that I was unable to palpate pedal pulses on exam today and prior to performing the nail avulsion procedure I recommended have referred him for ABIs with toe pressures.  We will plan to proceed with this procedure after adequate perfusion to the right foot has been demonstrated on ROLO report.    -I debrided nails 1 through 10 in length and thickness per patient request.  ABN form signed.    -Patient's questions invited and answered. He was encouraged to call my office with any further questions or concerns. I will contact the patient with the ROLO report when available and we will be guided by the results.     Terrell Gould DPM  [facility] Podiatry/Foot & Ankle Surgery      HPI: I was asked to see García Kitchen today complaining of a painful ingrown nail on his right great toe.  Patient reports he like to discuss permanent nail avulsion procedure along the medial border of the right hallux.  He would also like his nails trimmed today.    Past Medical History:   Diagnosis Date     A-V fistula 07/16/2014    Placed November 2013      Anemia, unspecified     Created by Conversion      Atrial fibrillation (H)      Calculus of kidney     Created by Conversion      Cancer (H)     Renal Cell Carcinoma s/p resection     Carcinoma  in situ, site unspecified     Created by Conversion      Chronic kidney disease      Congestive heart failure (H)      Coronary artery disease      Diabetes mellitus (H)      ESRD (end stage renal disease) on dialysis (H) 07/16/2014    Currently in transplant work-up      HFrEF (heart failure with reduced ejection fraction) (H) 05/08/2024     History of anesthesia complications     urinary retention which required catheterization     History of transfusion      Hyperlipidemia      Hyperparathyroidism, secondary renal     Created by Conversion      Inguinal hernia     recurrent right      Nontoxic uninodular goiter     Created by Conversion      Renal cell carcinoma (H) 07/16/2014    S/p right nephrectomy 9/2007      Skin cancer     squamous     Splenomegaly     Created by Conversion [facility] Annotation: Jul 22 2008 12:19PM - Woody Shaffer: mild, noted on  CT      Thrombocytopenia, unspecified     Created by Conversion      Unspecified essential hypertension     Created by Conversion      Vertigo          Social History     Socioeconomic History     Marital status:      Spouse name: Not on file     Number of children: Not on file     Years of education: Not on file     Highest education level: Not on file   Occupational History     Not on file   Tobacco Use     Smoking status: Former     Passive exposure: Never     Smokeless tobacco: Never     Tobacco comments:     6/15/23-Quit smoking over 30 years.    Vaping Use     Vaping status: Never Used   Substance and Sexual Activity     Alcohol use: No     Drug use: No     Sexual activity: Not Currently     Partners: Female   Other Topics Concern     Not on file   Social History Narrative     Not on file     Social Drivers of Health     Financial Resource Strain: Low Risk  (9/24/2024)    Financial Resource Strain      Within the past 12 months, have you or your family members you live with been unable to get utilities (heat, electricity) when it was really  needed?: No   Food Insecurity: Low Risk  (9/24/2024)    Food Insecurity      Within the past 12 months, did you worry that your food would run out before you got money to buy more?: No      Within the past 12 months, did the food you bought just not last and you didn t have money to get more?: No   Transportation Needs: Low Risk  (9/24/2024)    Transportation Needs      Within the past 12 months, has lack of transportation kept you from medical appointments, getting your medicines, non-medical meetings or appointments, work, or from getting things that you need?: No   Physical Activity: Not on file   Stress: Not on file   Social Connections: Not on file   Interpersonal Safety: Low Risk  (12/3/2024)    Interpersonal Safety      Do you feel physically and emotionally safe where you currently live?: Yes      Within the past 12 months, have you been hit, slapped, kicked or otherwise physically hurt by someone?: No      Within the past 12 months, have you been humiliated or emotionally abused in other ways by your partner or ex-partner?: No   Recent Concern: Interpersonal Safety - High Risk (9/24/2024)    Interpersonal Safety      Do you feel physically and emotionally safe where you currently live?: No      Within the past 12 months, have you been hit, slapped, kicked or otherwise physically hurt by someone?: No      Within the past 12 months, have you been humiliated or emotionally abused in other ways by your partner or ex-partner?: No   Housing Stability: Low Risk  (9/24/2024)    Housing Stability      Do you have housing? : Yes      Are you worried about losing your housing?: No            Allergies   Allergen Reactions     Codeine Nausea and Vomiting     Other Reaction(s): Not available     Lisinopril Cough     Other Reaction(s): Not available         MEDICATIONS:   Current Outpatient Medications   Medication Sig Dispense Refill     aspirin 81 MG EC tablet Take 81 mg by mouth daily.       calcium carbonate 750 MG  CHEW Take 750 mg by mouth daily as needed.       carvedilol (COREG) 25 MG tablet Take 0.5 tablets (12.5 mg) by mouth 2 times daily. Mon, wed, fri only taking 1 pill, all other days 0.5 a tab       cinacalcet (SENSIPAR) 60 MG tablet Take 1 tablet by mouth daily       clopidogrel (PLAVIX) 75 MG tablet Take 1 tablet (75 mg) by mouth daily. LAST DOSE 3/12/2025 98 tablet 0     DIALYVITE 100-1 mg Tab Take 1 tablet by mouth daily        famotidine (PEPCID) 20 MG tablet Take 1 tablet by mouth daily as needed.       Multiple Vitamins-Minerals (PRESERVISION AREDS PO) Take 1 tablet by mouth 2 times daily       nitroGLYcerin (NITROSTAT) 0.4 MG sublingual tablet Place 1 tablet (0.4 mg) under the tongue every 5 minutes as needed. 50 tablet 3     polyethylene glycol (MIRALAX) 17 gram packet Take 17 g by mouth daily as needed for constipation       sevelamer carbonate (RENVELA) 800 mg tablet Take 3,200 mg by mouth 3 times daily (with meals)       simvastatin (ZOCOR) 40 MG tablet TAKE ONE TABLET BY MOUTH ONCE DAILY AT BEDTIME 90 tablet 2     vitamin D3 (CHOLECALCIFEROL) 50 mcg (2000 units) tablet Take 1 tablet by mouth daily       No current facility-administered medications for this visit.        Family History   Problem Relation Age of Onset     Cancer Mother         Adenocarcinoma Of The Large Intestine      Cancer Father         Adenocarcinoma Of The Large Intestine           Review of Systems - 10 point Review of Systems is negative except for ingrown nail which is noted in HPI.    OBJECTIVE:  Appearance: alert, well appearing, and in no distress.    VITAL SIGNS: /69   Pulse 68   Resp 16   SpO2 100%       General appearance: Patient is alert and fully cooperative with history & exam.  No sign of distress is noted during the visit.     Psychiatric: Affect is pleasant & appropriate.  Patient appears motivated to improve health.     Respiratory: Breathing is regular & unlabored while sitting.     HEENT: Hearing is intact  to spoken word.  Speech is clear.  No gross evidence of visual impairment that would impact ambulation.      Vascular: Dorsalis pedis and posterior tibial pulses are non-palpable.   Dermatologic: Nails 1-10 elongated, thick, yellow with subungal debris. Trophic changes noted including diminished hair growth and shiny skin.  No erythema bilateral feet.  Neurologic: All epicritic and proprioceptive sensations are grossly intact bilateral.  Musculoskeletal: Mild pain to palpation medial border right hallux.  Contracted digits bilateral feet.          Again, thank you for allowing me to participate in the care of your patient.        Sincerely,        Terrell Gould DPM    Electronically signed

## 2025-01-16 NOTE — PROGRESS NOTES
FOOT AND ANKLE SURGERY/PODIATRY CONSULT NOTE         ASSESSMENT:   Ingrown nail  Onychomycosis  PAD      TREATMENT:  -I discussed with the patient that on exam today he does have mild incurvation along the medial border the right hallux without signs of infection.    -We reviewed permanent nail avulsion procedure including chance of reoccurrence of 2%.  After discussion patient would like to proceed with this procedure.    -I discussed with the patient that I was unable to palpate pedal pulses on exam today and prior to performing the nail avulsion procedure I recommended have referred him for ABIs with toe pressures.  We will plan to proceed with this procedure after adequate perfusion to the right foot has been demonstrated on ROLO report.    -I debrided nails 1 through 10 in length and thickness per patient request.  ABN form signed.    -Patient's questions invited and answered. He was encouraged to call my office with any further questions or concerns. I will contact the patient with the ROLO report when available and we will be guided by the results.     Terrell Gould DPM  [facility] Podiatry/Foot & Ankle Surgery      HPI: I was asked to see García Kitchen today complaining of a painful ingrown nail on his right great toe.  Patient reports he like to discuss permanent nail avulsion procedure along the medial border of the right hallux.  He would also like his nails trimmed today.    Past Medical History:   Diagnosis Date    A-V fistula 07/16/2014    Placed November 2013     Anemia, unspecified     Created by Conversion     Atrial fibrillation (H)     Calculus of kidney     Created by Conversion     Cancer (H)     Renal Cell Carcinoma s/p resection    Carcinoma in situ, site unspecified     Created by Conversion     Chronic kidney disease     Congestive heart failure (H)     Coronary artery disease     Diabetes mellitus (H)     ESRD (end stage renal disease) on dialysis (H) 07/16/2014    Currently  in transplant work-up     HFrEF (heart failure with reduced ejection fraction) (H) 05/08/2024    History of anesthesia complications     urinary retention which required catheterization    History of transfusion     Hyperlipidemia     Hyperparathyroidism, secondary renal     Created by Conversion     Inguinal hernia     recurrent right     Nontoxic uninodular goiter     Created by Conversion     Renal cell carcinoma (H) 07/16/2014    S/p right nephrectomy 9/2007     Skin cancer     squamous    Splenomegaly     Created by Conversion [employer] [facility] Annotation: Jul 22 2008 12:19PM - oWody Shaffer: mild, noted on  CT     Thrombocytopenia, unspecified     Created by Conversion     Unspecified essential hypertension     Created by Conversion     Vertigo          Social History     Socioeconomic History    Marital status:      Spouse name: Not on file    Number of children: Not on file    Years of education: Not on file    Highest education level: Not on file   Occupational History    Not on file   Tobacco Use    Smoking status: Former     Passive exposure: Never    Smokeless tobacco: Never    Tobacco comments:     6/15/23-Quit smoking over 30 years.    Vaping Use    Vaping status: Never Used   Substance and Sexual Activity    Alcohol use: No    Drug use: No    Sexual activity: Not Currently     Partners: Female   Other Topics Concern    Not on file   Social History Narrative    Not on file     Social Drivers of Health     Financial Resource Strain: Low Risk  (9/24/2024)    Financial Resource Strain     Within the past 12 months, have you or your family members you live with been unable to get utilities (heat, electricity) when it was really needed?: No   Food Insecurity: Low Risk  (9/24/2024)    Food Insecurity     Within the past 12 months, did you worry that your food would run out before you got money to buy more?: No     Within the past 12 months, did the food you bought just not last and you didn t have money to  get more?: No   Transportation Needs: Low Risk  (9/24/2024)    Transportation Needs     Within the past 12 months, has lack of transportation kept you from medical appointments, getting your medicines, non-medical meetings or appointments, work, or from getting things that you need?: No   Physical Activity: Not on file   Stress: Not on file   Social Connections: Not on file   Interpersonal Safety: Low Risk  (12/3/2024)    Interpersonal Safety     Do you feel physically and emotionally safe where you currently live?: Yes     Within the past 12 months, have you been hit, slapped, kicked or otherwise physically hurt by someone?: No     Within the past 12 months, have you been humiliated or emotionally abused in other ways by your partner or ex-partner?: No   Recent Concern: Interpersonal Safety - High Risk (9/24/2024)    Interpersonal Safety     Do you feel physically and emotionally safe where you currently live?: No     Within the past 12 months, have you been hit, slapped, kicked or otherwise physically hurt by someone?: No     Within the past 12 months, have you been humiliated or emotionally abused in other ways by your partner or ex-partner?: No   Housing Stability: Low Risk  (9/24/2024)    Housing Stability     Do you have housing? : Yes     Are you worried about losing your housing?: No            Allergies   Allergen Reactions    Codeine Nausea and Vomiting     Other Reaction(s): Not available    Lisinopril Cough     Other Reaction(s): Not available         MEDICATIONS:   Current Outpatient Medications   Medication Sig Dispense Refill    aspirin 81 MG EC tablet Take 81 mg by mouth daily.      calcium carbonate 750 MG CHEW Take 750 mg by mouth daily as needed.      carvedilol (COREG) 25 MG tablet Take 0.5 tablets (12.5 mg) by mouth 2 times daily. Mon, wed, fri only taking 1 pill, all other days 0.5 a tab      cinacalcet (SENSIPAR) 60 MG tablet Take 1 tablet by mouth daily      clopidogrel (PLAVIX) 75 MG tablet  Take 1 tablet (75 mg) by mouth daily. LAST DOSE 3/12/2025 98 tablet 0    DIALYVITE 100-1 mg Tab Take 1 tablet by mouth daily       famotidine (PEPCID) 20 MG tablet Take 1 tablet by mouth daily as needed.      Multiple Vitamins-Minerals (PRESERVISION AREDS PO) Take 1 tablet by mouth 2 times daily      nitroGLYcerin (NITROSTAT) 0.4 MG sublingual tablet Place 1 tablet (0.4 mg) under the tongue every 5 minutes as needed. 50 tablet 3    polyethylene glycol (MIRALAX) 17 gram packet Take 17 g by mouth daily as needed for constipation      sevelamer carbonate (RENVELA) 800 mg tablet Take 3,200 mg by mouth 3 times daily (with meals)      simvastatin (ZOCOR) 40 MG tablet TAKE ONE TABLET BY MOUTH ONCE DAILY AT BEDTIME 90 tablet 2    vitamin D3 (CHOLECALCIFEROL) 50 mcg (2000 units) tablet Take 1 tablet by mouth daily       No current facility-administered medications for this visit.        Family History   Problem Relation Age of Onset    Cancer Mother         Adenocarcinoma Of The Large Intestine     Cancer Father         Adenocarcinoma Of The Large Intestine           Review of Systems - 10 point Review of Systems is negative except for ingrown nail which is noted in HPI.    OBJECTIVE:  Appearance: alert, well appearing, and in no distress.    VITAL SIGNS: /69   Pulse 68   Resp 16   SpO2 100%       General appearance: Patient is alert and fully cooperative with history & exam.  No sign of distress is noted during the visit.     Psychiatric: Affect is pleasant & appropriate.  Patient appears motivated to improve health.     Respiratory: Breathing is regular & unlabored while sitting.     HEENT: Hearing is intact to spoken word.  Speech is clear.  No gross evidence of visual impairment that would impact ambulation.      Vascular: Dorsalis pedis and posterior tibial pulses are non-palpable.   Dermatologic: Nails 1-10 elongated, thick, yellow with subungal debris. Trophic changes noted including diminished hair growth  and shiny skin.  No erythema bilateral feet.  Neurologic: All epicritic and proprioceptive sensations are grossly intact bilateral.  Musculoskeletal: Mild pain to palpation medial border right hallux.  Contracted digits bilateral feet.

## 2025-01-18 ENCOUNTER — HEALTH MAINTENANCE LETTER (OUTPATIENT)
Age: 80
End: 2025-01-18

## 2025-01-23 ENCOUNTER — PATIENT OUTREACH (OUTPATIENT)
Dept: CARE COORDINATION | Facility: CLINIC | Age: 80
End: 2025-01-23
Payer: COMMERCIAL

## 2025-02-10 ENCOUNTER — OFFICE VISIT (OUTPATIENT)
Dept: CARDIOLOGY | Facility: CLINIC | Age: 80
End: 2025-02-10
Attending: INTERNAL MEDICINE
Payer: COMMERCIAL

## 2025-02-10 VITALS
HEART RATE: 83 BPM | DIASTOLIC BLOOD PRESSURE: 63 MMHG | BODY MASS INDEX: 23.92 KG/M2 | OXYGEN SATURATION: 100 % | SYSTOLIC BLOOD PRESSURE: 116 MMHG | WEIGHT: 162 LBS

## 2025-02-10 DIAGNOSIS — Z95.818 PRESENCE OF WATCHMAN LEFT ATRIAL APPENDAGE CLOSURE DEVICE: ICD-10-CM

## 2025-02-10 DIAGNOSIS — Z99.2 ESRD (END STAGE RENAL DISEASE) ON DIALYSIS (H): ICD-10-CM

## 2025-02-10 DIAGNOSIS — I25.5 ISCHEMIC CARDIOMYOPATHY: ICD-10-CM

## 2025-02-10 DIAGNOSIS — N18.6 ESRD (END STAGE RENAL DISEASE) ON DIALYSIS (H): ICD-10-CM

## 2025-02-10 DIAGNOSIS — I48.19 PERSISTENT ATRIAL FIBRILLATION (H): ICD-10-CM

## 2025-02-10 DIAGNOSIS — I10 ESSENTIAL HYPERTENSION WITH GOAL BLOOD PRESSURE LESS THAN 140/90: ICD-10-CM

## 2025-02-10 DIAGNOSIS — I50.20 HFREF (HEART FAILURE WITH REDUCED EJECTION FRACTION) (H): ICD-10-CM

## 2025-02-10 DIAGNOSIS — I25.119 CORONARY ARTERY DISEASE INVOLVING NATIVE CORONARY ARTERY OF NATIVE HEART WITH ANGINA PECTORIS: ICD-10-CM

## 2025-02-10 PROCEDURE — G2211 COMPLEX E/M VISIT ADD ON: HCPCS | Performed by: INTERNAL MEDICINE

## 2025-02-10 PROCEDURE — 99214 OFFICE O/P EST MOD 30 MIN: CPT | Performed by: INTERNAL MEDICINE
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
## 2025-02-10 NOTE — PATIENT INSTRUCTIONS
It was a pleasure to meet with you today.      Below is a summary of your visit.   Continue your medications without changes. You will be scheduled to stop clopidogrel in mid/late March  I encourage as much exercise as you can tolerate  I will echo the recommendations from your kidney specialists and encourage increased protein in your diet.  Follow up in a year.    Please do not hesitate to call the [facility] with any questions or concerns at [phone]. You can also reach my nurse, Angy, at [phone].    Sincerely,
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
## 2025-02-11 ENCOUNTER — PATIENT OUTREACH (OUTPATIENT)
Dept: CARE COORDINATION | Facility: CLINIC | Age: 80
End: 2025-02-11
Payer: COMMERCIAL

## 2025-02-11 NOTE — PROGRESS NOTES
Clinical Product Navigator RN reviewed chart; patient on payer product coverage.  Review results:   CPN Initial Information Gathering  Referral Source: Health Plan    Patient identified by their health plan for RN Clinical Product Navigator review.  Patient last reviewed by RN Clinical Product Navigator October 2024.  No new needs identified since last review.  Patient remains connected to his primary and specialty care teams.    Melissa Behl BSN, RN, PHN, CCM  RN Clinical Product Navigator  [phone]

## 2025-02-27 ENCOUNTER — TRANSFERRED RECORDS (OUTPATIENT)
Dept: HEALTH INFORMATION MANAGEMENT | Facility: CLINIC | Age: 80
End: 2025-02-27
Payer: COMMERCIAL

## 2025-04-02 ENCOUNTER — TELEPHONE (OUTPATIENT)
Dept: CARDIOLOGY | Facility: CLINIC | Age: 80
End: 2025-04-02
Payer: COMMERCIAL

## 2025-04-02 NOTE — TELEPHONE ENCOUNTER
MODIFIED CESILIA SCALE   Timepoint: 6mo Post-LAAC    Previous score: 0    Score Description   0 No symptoms at all   1 No significant disability despite symptoms; able to carry out all usual duties and activities   2 Slight disability; unable to carry out all previous activities, but able to look after own affairs without assistance   3 Moderate disability; requiring some help, but able to walk without assistance   4 Moderately severe disability; unable to walk without assistance and unable to attend to own bodily needs without assistance   5 Severe disability; bedridden, incontinent and requiring constant nursing care and attention   6 Dead    Total score (0 - 6):  0    Change in score if s/p LAAC? No  If yes, notify implanting cardiologist.    Rosa Jacques RN  Structural Heart Coordinator   [facility]  [phone]

## 2025-05-06 ENCOUNTER — TELEPHONE (OUTPATIENT)
Dept: FAMILY MEDICINE | Facility: CLINIC | Age: 80
End: 2025-05-06
Payer: COMMERCIAL

## 2025-05-06 NOTE — TELEPHONE ENCOUNTER
Patient Quality Outreach    Patient is due for the following:   Physical Preventive Adult Physical    Action(s) Taken:   Schedule a Annual Wellness Visit    Type of outreach:    Sent [employer] message.    Questions for provider review:    None         Nevaeh Bishop CMA  Chart routed to None.

## 2025-05-07 ENCOUNTER — APPOINTMENT (OUTPATIENT)
Dept: RADIOLOGY | Facility: CLINIC | Age: 80
End: 2025-05-07
Attending: EMERGENCY MEDICINE
Payer: COMMERCIAL

## 2025-05-07 ENCOUNTER — HOSPITAL ENCOUNTER (EMERGENCY)
Facility: CLINIC | Age: 80
Discharge: HOME | End: 2025-05-07
Attending: EMERGENCY MEDICINE
Payer: COMMERCIAL

## 2025-05-07 VITALS
BODY MASS INDEX: 23.45 KG/M2 | SYSTOLIC BLOOD PRESSURE: 141 MMHG | HEART RATE: 78 BPM | DIASTOLIC BLOOD PRESSURE: 63 MMHG | RESPIRATION RATE: 17 BRPM | TEMPERATURE: 97 F | WEIGHT: 163.8 LBS | HEIGHT: 70 IN | OXYGEN SATURATION: 99 %

## 2025-05-07 DIAGNOSIS — R07.9 CHEST PAIN, UNSPECIFIED TYPE: ICD-10-CM

## 2025-05-07 LAB
ANION GAP SERPL CALCULATED.3IONS-SCNC: 15 MMOL/L (ref 7–15)
ATRIAL RATE - MUSE: 82 BPM
BASOPHILS # BLD AUTO: 0 10E3/UL (ref 0–0.2)
BASOPHILS NFR BLD AUTO: 1 %
BUN SERPL-MCNC: 28.3 MG/DL (ref 8–23)
CALCIUM SERPL-MCNC: 9.5 MG/DL (ref 8.8–10.4)
CHLORIDE SERPL-SCNC: 99 MMOL/L (ref 98–107)
CREAT SERPL-MCNC: 4.23 MG/DL (ref 0.67–1.17)
DIASTOLIC BLOOD PRESSURE - MUSE: NORMAL MMHG
EGFRCR SERPLBLD CKD-EPI 2021: 14 ML/MIN/1.73M2
EOSINOPHIL # BLD AUTO: 0.1 10E3/UL (ref 0–0.7)
EOSINOPHIL NFR BLD AUTO: 2 %
ERYTHROCYTE [DISTWIDTH] IN BLOOD BY AUTOMATED COUNT: 17.3 % (ref 10–15)
GLUCOSE SERPL-MCNC: 116 MG/DL (ref 70–99)
HCO3 SERPL-SCNC: 25 MMOL/L (ref 22–29)
HCT VFR BLD AUTO: 35.5 % (ref 40–53)
HGB BLD-MCNC: 11.7 G/DL (ref 13.3–17.7)
HOLD SPECIMEN: NORMAL
IMM GRANULOCYTES # BLD: 0 10E3/UL
IMM GRANULOCYTES NFR BLD: 0 %
INTERPRETATION ECG - MUSE: NORMAL
LYMPHOCYTES # BLD AUTO: 1.1 10E3/UL (ref 0.8–5.3)
LYMPHOCYTES NFR BLD AUTO: 19 %
MAGNESIUM SERPL-MCNC: 2.4 MG/DL (ref 1.7–2.3)
MCH RBC QN AUTO: 33 PG (ref 26.5–33)
MCHC RBC AUTO-ENTMCNC: 33 G/DL (ref 31.5–36.5)
MCV RBC AUTO: 100 FL (ref 78–100)
MONOCYTES # BLD AUTO: 0.9 10E3/UL (ref 0–1.3)
MONOCYTES NFR BLD AUTO: 16 %
NEUTROPHILS # BLD AUTO: 3.5 10E3/UL (ref 1.6–8.3)
NEUTROPHILS NFR BLD AUTO: 62 %
NRBC # BLD AUTO: 0 10E3/UL
NRBC BLD AUTO-RTO: 0 /100
NT-PROBNP SERPL-MCNC: NORMAL PG/ML
P AXIS - MUSE: NORMAL DEGREES
PLATELET # BLD AUTO: 179 10E3/UL (ref 150–450)
POTASSIUM SERPL-SCNC: 4.2 MMOL/L (ref 3.4–5.3)
PR INTERVAL - MUSE: NORMAL MS
QRS DURATION - MUSE: 90 MS
QT - MUSE: 430 MS
QTC - MUSE: 505 MS
R AXIS - MUSE: 26 DEGREES
RBC # BLD AUTO: 3.55 10E6/UL (ref 4.4–5.9)
SODIUM SERPL-SCNC: 139 MMOL/L (ref 135–145)
SYSTOLIC BLOOD PRESSURE - MUSE: NORMAL MMHG
T AXIS - MUSE: 91 DEGREES
TROPONIN T SERPL HS-MCNC: 157 NG/L
TROPONIN T SERPL HS-MCNC: 171 NG/L
VENTRICULAR RATE- MUSE: 83 BPM
WBC # BLD AUTO: 5.6 10E3/UL (ref 4–11)

## 2025-05-07 PROCEDURE — 80048 BASIC METABOLIC PNL TOTAL CA: CPT | Performed by: EMERGENCY MEDICINE

## 2025-05-07 PROCEDURE — 85004 AUTOMATED DIFF WBC COUNT: CPT | Performed by: EMERGENCY MEDICINE

## 2025-05-07 PROCEDURE — 84484 ASSAY OF TROPONIN QUANT: CPT | Performed by: EMERGENCY MEDICINE

## 2025-05-07 PROCEDURE — 36415 COLL VENOUS BLD VENIPUNCTURE: CPT | Performed by: EMERGENCY MEDICINE

## 2025-05-07 PROCEDURE — 71045 X-RAY EXAM CHEST 1 VIEW: CPT

## 2025-05-07 PROCEDURE — 83735 ASSAY OF MAGNESIUM: CPT | Performed by: EMERGENCY MEDICINE

## 2025-05-07 PROCEDURE — 93005 ELECTROCARDIOGRAM TRACING: CPT | Performed by: EMERGENCY MEDICINE

## 2025-05-07 PROCEDURE — 83880 ASSAY OF NATRIURETIC PEPTIDE: CPT | Performed by: EMERGENCY MEDICINE

## 2025-05-07 ASSESSMENT — ACTIVITIES OF DAILY LIVING (ADL)
ADLS_ACUITY_SCORE: 58

## 2025-05-07 ASSESSMENT — ENCOUNTER SYMPTOMS
DIZZINESS: 1
LIGHT-HEADEDNESS: 1
HEADACHES: 1
DIAPHORESIS: 1
SHORTNESS OF BREATH: 1

## 2025-05-07 ASSESSMENT — COLUMBIA-SUICIDE SEVERITY RATING SCALE - C-SSRS
2. HAVE YOU ACTUALLY HAD ANY THOUGHTS OF KILLING YOURSELF IN THE PAST MONTH?: NO
6. HAVE YOU EVER DONE ANYTHING, STARTED TO DO ANYTHING, OR PREPARED TO DO ANYTHING TO END YOUR LIFE?: NO
1. IN THE PAST MONTH, HAVE YOU WISHED YOU WERE DEAD OR WISHED YOU COULD GO TO SLEEP AND NOT WAKE UP?: NO

## 2025-05-07 NOTE — ED TRIAGE NOTES
Patient arrives for chest pain that started within the last hour. Took 3 nitroglycerin which helped with pain. Shortness of breath as well per patient. Patient was at dialysis today which finished around 1420. Patient unsteady since event. Confusion noted per wife.      Triage Assessment (Adult)       Row Name 05/07/25 1640          Triage Assessment    Airway WDL WDL        Respiratory WDL    Respiratory WDL rhythm/pattern     Rhythm/Pattern, Respiratory shortness of breath        Skin Circulation/Temperature WDL    Skin Circulation/Temperature WDL WDL        Cardiac WDL    Cardiac WDL chest pain        Chest Pain Assessment    Chest Pain Location midsternal     Character pressure     Duration 60 minutes ago        Peripheral/Neurovascular WDL    Peripheral Neurovascular WDL WDL        Cognitive/Neuro/Behavioral WDL    Cognitive/Neuro/Behavioral WDL WDL

## 2025-05-08 ENCOUNTER — TELEPHONE (OUTPATIENT)
Dept: CARDIOLOGY | Facility: CLINIC | Age: 80
End: 2025-05-08
Payer: COMMERCIAL

## 2025-05-08 NOTE — ED NOTES
Pt feeling better. Wife and him wondering what the next steps are and if it's possible to go home, notified provider.

## 2025-05-08 NOTE — TELEPHONE ENCOUNTER
"Return call to wife - she said pt was in ED yesterday for chest pain, diaphoresis, vacant staring.  Pt had one other episode, both happened after dialysis.  Wife is asking if this is related to LAAC-WM.  Pt s/p LAAC-WM device placed 9/12/24.  Reassured wife that follow-up imaging showed \"Left ventricular function is decreased. The ejection fraction is 45-50% (mildly reduced).  There is mild global hypokinesia of the left ventricle.  Normal right ventricle size and systolic function.  Watchman device noted in left atrial appendage. The device is well seated with no leakage by color flow Doppler imaging.  Thrombus absent on left atrial appendage occlusion device.  Atrial septal defect in mid septum with continuous left-to-right shunting  identified.\"    Suggested they speak to nephrology as well regarding episodes happening after dialysis.  Transferred wife to scheduling as ED placed R[facility] referral.  Pt will see Dr Lopez 5/09/25.  Wife had no additional questions for LAAC team.  -ral  "

## 2025-05-08 NOTE — ED NOTES
Pt walked to restroom with assistance and use of walker. He uses a cane at home. No desaturations, dizziness, shortness of breath or chest pain.

## 2025-05-08 NOTE — ED NOTES
Pt able to ambulate to bathroom using a walker, and a portable pulse oximeter, with standby assist. No episodes of desaturations, shortness of breath, dizziness, chest pain per pt.

## 2025-05-08 NOTE — DISCHARGE INSTRUCTIONS
Continue your current medications    Continue using your nitroglycerin as needed for chest pain and your daily baby aspirin    Avoid strenuous  activities or exertion.

## 2025-06-05 ENCOUNTER — PATIENT OUTREACH (OUTPATIENT)
Dept: CARE COORDINATION | Facility: CLINIC | Age: 80
End: 2025-06-05
Payer: COMMERCIAL

## 2025-06-09 ENCOUNTER — TRANSFERRED RECORDS (OUTPATIENT)
Dept: HEALTH INFORMATION MANAGEMENT | Facility: CLINIC | Age: 80
End: 2025-06-09
Payer: COMMERCIAL

## 2025-06-12 ENCOUNTER — HOSPITAL ENCOUNTER (OUTPATIENT)
Dept: CARDIOLOGY | Facility: CLINIC | Age: 80
End: 2025-06-12
Attending: STUDENT IN AN ORGANIZED HEALTH CARE EDUCATION/TRAINING PROGRAM
Payer: COMMERCIAL

## 2025-06-12 DIAGNOSIS — R07.9 CHEST PAIN, UNSPECIFIED TYPE: ICD-10-CM

## 2025-06-12 LAB — LVEF ECHO: NORMAL

## 2025-06-12 PROCEDURE — 93306 TTE W/DOPPLER COMPLETE: CPT

## 2025-06-17 ENCOUNTER — TELEPHONE (OUTPATIENT)
Dept: CARDIOLOGY | Facility: CLINIC | Age: 80
End: 2025-06-17
Payer: COMMERCIAL

## 2025-06-17 ENCOUNTER — RESULTS FOLLOW-UP (OUTPATIENT)
Dept: CARDIOLOGY | Facility: CLINIC | Age: 80
End: 2025-06-17

## 2025-06-17 NOTE — TELEPHONE ENCOUNTER
Spoke with pt and his wife regarding ECHO. Discussed that [facility] is out of the office until next week but that Dr. Wen reviewed it quickly and advised that the result is stable enough to wait for [facility]'s return. They verbalized understanding and had no other needs at this time. aa

## 2025-06-17 NOTE — TELEPHONE ENCOUNTER
[facility]    Phone Message    May a detailed message be left on voicemail: yes     Reason for Call: Requesting Results     Name/type of test: Echocardiogram   Date of test: 6/12/25  Was test done at a location other than [facility] (Please fill in the location if not [facility])?: No    Action Taken: Other: cardiology    Travel Screening: Not Applicable  Thank you!  [facility]       Date of Service:

## 2025-06-28 SDOH — HEALTH STABILITY: PHYSICAL HEALTH: ON AVERAGE, HOW MANY MINUTES DO YOU ENGAGE IN EXERCISE AT THIS LEVEL?: 10 MIN

## 2025-06-28 SDOH — HEALTH STABILITY: PHYSICAL HEALTH: ON AVERAGE, HOW MANY DAYS PER WEEK DO YOU ENGAGE IN MODERATE TO STRENUOUS EXERCISE (LIKE A BRISK WALK)?: 1 DAY

## 2025-06-28 ASSESSMENT — SOCIAL DETERMINANTS OF HEALTH (SDOH): HOW OFTEN DO YOU GET TOGETHER WITH FRIENDS OR RELATIVES?: PATIENT DECLINED

## 2025-07-03 ENCOUNTER — OFFICE VISIT (OUTPATIENT)
Dept: FAMILY MEDICINE | Facility: CLINIC | Age: 80
End: 2025-07-03
Payer: COMMERCIAL

## 2025-07-03 VITALS
OXYGEN SATURATION: 96 % | RESPIRATION RATE: 12 BRPM | HEART RATE: 75 BPM | HEIGHT: 70 IN | SYSTOLIC BLOOD PRESSURE: 121 MMHG | DIASTOLIC BLOOD PRESSURE: 63 MMHG | BODY MASS INDEX: 23.39 KG/M2 | TEMPERATURE: 97.7 F | WEIGHT: 163.4 LBS

## 2025-07-03 DIAGNOSIS — R41.3 MEMORY LOSS: ICD-10-CM

## 2025-07-03 DIAGNOSIS — N18.6 END STAGE RENAL FAILURE ON DIALYSIS (H): Chronic | ICD-10-CM

## 2025-07-03 DIAGNOSIS — Z23 IMMUNIZATION DUE: ICD-10-CM

## 2025-07-03 DIAGNOSIS — E78.5 HYPERLIPIDEMIA LDL GOAL <70: ICD-10-CM

## 2025-07-03 DIAGNOSIS — Z85.528 HISTORY OF PRIMARY MALIGNANT NEOPLASM OF KIDNEY: ICD-10-CM

## 2025-07-03 DIAGNOSIS — Z00.00 MEDICARE ANNUAL WELLNESS VISIT, SUBSEQUENT: Primary | ICD-10-CM

## 2025-07-03 DIAGNOSIS — R26.89 POOR BALANCE: ICD-10-CM

## 2025-07-03 DIAGNOSIS — I25.119 CORONARY ARTERY DISEASE INVOLVING NATIVE CORONARY ARTERY OF NATIVE HEART WITH ANGINA PECTORIS: ICD-10-CM

## 2025-07-03 DIAGNOSIS — H61.23 BILATERAL IMPACTED CERUMEN: ICD-10-CM

## 2025-07-03 DIAGNOSIS — Z99.2 END STAGE RENAL FAILURE ON DIALYSIS (H): Chronic | ICD-10-CM

## 2025-07-03 PROBLEM — R31.29 MICROSCOPIC HEMATURIA: Status: RESOLVED | Noted: 2018-01-31 | Resolved: 2025-07-03

## 2025-07-03 PROBLEM — T14.8XXA HEMATOMA: Status: RESOLVED | Noted: 2024-09-23 | Resolved: 2025-07-03

## 2025-07-03 PROBLEM — R07.9 CHEST PAIN, UNSPECIFIED TYPE: Status: RESOLVED | Noted: 2024-04-24 | Resolved: 2025-07-03

## 2025-07-03 RX ORDER — FERRIC CITRATE 210 MG/1
TABLET, COATED ORAL
COMMUNITY
Start: 2025-06-16

## 2025-07-03 NOTE — PROGRESS NOTES
Preventive Care Visit  [facility] HERIBERTO Martinez MD, Family Medicine  Jul 3, 2025      Assessment & Plan     Medicare annual wellness visit, subsequent:  - Advised healthy lifestyle.    End-stage renal failure on dialysis:  - Undergoing dialysis three times a week since 2013 due to previous kidney issues.  - Continue with current dialysis schedule. Sign a release for lab records from [facility].    History of diabetes:  - Previously labeled as diabetic 15 years ago, but A1c levels last year were normal, not even pre-diabetic.  - Monitor A1c levels during dialysis blood checks. No current need for diabetes management.    History of primary malignant neoplasm of kidney:  - History of right kidney removal due to primary malignant neoplasm approximately 15 years ago. Currently functioning with one kidney, which is monitored annually.  - Continue annual testing of the remaining kidney.    Memory loss:  - Scored 0 on memory test, suggestive of possible dementia. No prior evaluation by a neurologist.  Wife is also concerned about dementia, her mother had Alzheimer's.  - Referral to a neurologist for further evaluation. Neurology office to assess and determine the stage of dementia and appropriate treatments.    Poor balance and risk of falls:  - Slower walking speed noted, indicating potential balance issues and increased fall risk.  - Continue using a rollator for long distances and a cane for short distances.    Blood pressure management:  - Blood pressure currently well-controlled with just him taking carvedilol 12.5 mg twice daily for A-fib.  - Continue current management.    COVID booster:  - Due for COVID booster.  - Obtain COVID booster at [facility], as they do not charge for it.    Immunization:  - Immunizations need to be reviewed for updates.  - Review and update immunizations as needed.    Cerumen impaction:  - Lavaged in clinic today.    Poor balance:  - Advised patient to use walker or cane  whenever leaving the house.    Consent was obtained from the patient to use an AI documentation tool in the creation of this note.      The longitudinal plan of care for the diagnosis(es)/condition(s) as documented were addressed during this visit. Due to the added complexity in care, I will continue to support García in the subsequent management and with ongoing continuity of care.      Counseling  Appropriate preventive services were addressed with this patient via screening, questionnaire, or discussion as appropriate for fall prevention, physical activity and cognition.  Checklist reviewing preventive services available has been given to the patient.  Reviewed patient's diet, addressing concerns and/or questions.   He is at risk for lack of exercise and has been provided with information to increase physical activity for the benefit of his well-being.   Discussed possible causes of fatigue. Patient reported safety concerns were addressed today.Addressed any concerns about safety while driving.  The patient was provided with written information regarding signs of hearing loss.             Subjective   García is a 80 year old, presenting for the following:  Physical (Patient is here for a physical. Not fasting) and Cerumen Impaction        7/3/2025    12:52 PM   Additional Questions   Roomed by greg gutierrez cma   Accompanied by spouse          HPI    History of Present Illness-  García Kitchen, 80 years    Dialysis  - On dialysis since 2013 or 2014, approximately 11 to 12 years  - Underwent right nephrectomy in 2007 due to kidney cancer  - Managed with one kidney for 6-7 years before starting dialysis  - History of blood pressure issues contributing to dialysis need    Memory Concerns  - Short-term memory issues reported  - Long-term memory remains relatively intact  - Recent memory test results suggestive of possible dementia    Fatigue  - Increased fatigue noted, possibly related to dialysis  - More pronounced  decline in energy over the past three months    Chest Pain  - Experienced chest pain a month or two ago, post-dialysis  - Took nitroglycerin, which led to a significant drop in blood pressure and ER visit    Hearing  - Hearing issues noted, potentially due to earwax buildup    Gout  - No recent gout problems reported  - Not on medication for gout    Blood Pressure  - History of blood pressure issues, but currently well-managed without medication after starting dialysis.    Hemoglobin Levels  - Hemoglobin typically between 9 and 11, considered stable for dialysis  - Previously dropped to 5 due to internal bleeding, resolved after Watchman procedure    Mobility  - Slower walking speed noted, uses a walker and rollator for support          Advance Care Planning    Document on file is a Health Care Directive or POLST.        6/28/2025   General Health   How would you rate your overall physical health? (!) POOR   Feel stress (tense, anxious, or unable to sleep) Not at all         6/28/2025   Nutrition   Diet: Other   If other, please elaborate: Diet for kidbey failure         6/28/2025   Exercise   Days per week of moderate/strenous exercise 1 day   Average minutes spent exercising at this level 10 min   (!) EXERCISE CONCERN      6/28/2025   Social Factors   Frequency of gathering with friends or relatives Patient declined   Worry food won't last until get money to buy more No   Food not last or not have enough money for food? No   Do you have housing? (Housing is defined as stable permanent housing and does not include staying outside in a car, in a tent, in an abandoned building, in an overnight shelter, or couch-surfing.) Yes   Are you worried about losing your housing? No   Lack of transportation? No   Unable to get utilities (heat,electricity)? Yes   Want help with housing or utility concern? No   (!) FINANCIAL RESOURCE STRAIN CONCERN        7/3/2025   Fall Risk   Reason Gait Speed Test Not Completed Unable to  complete test, patient reported symptoms   Gait Speed Test Interpretation Greater than 5.01 seconds - ABNORMAL          6/28/2025   Activities of Daily Living- Home Safety   Needs help with the following daily activites None of the above   Safety concerns in the home No grab bars in the bathroom         6/28/2025   Dental   Dentist two times every year? Yes         6/28/2025   Hearing Screening   Hearing concerns? (!) I NEED TO ASK PEOPLE TO SPEAK UP OR REPEAT THEMSELVES.   Would you like a referral for hearing testing? No         6/28/2025   Driving Risk Screening   Patient/family members have concerns about driving (!) YES - does not drive anymore         6/28/2025   General Alertness/Fatigue Screening   Have you been more tired than usual lately? (!) YES - Due to dialysis         6/28/2025   Urinary Incontinence Screening   Bothered by leaking urine in past 6 months No         Today's PHQ-2 Score:       7/2/2025     3:52 PM   PHQ-2 ( 1999 [employer])   Q1: Little interest or pleasure in doing things 0   Q2: Feeling down, depressed or hopeless 0   PHQ-2 Score 0    Q1: Little interest or pleasure in doing things Not at all   Q2: Feeling down, depressed or hopeless Not at all   PHQ-2 Score 0       Patient-reported           6/28/2025   Substance Use   Alcohol more than 3/day or more than 7/wk Not Applicable   Do you have a current opioid prescription? No   How severe/bad is pain from 1 to 10? 0/10 (No Pain)   Do you use any other substances recreationally? No     Social History     Tobacco Use    Smoking status: Former     Passive exposure: Never    Smokeless tobacco: Never    Tobacco comments:     6/15/23-Quit smoking over 30 years.    Vaping Use    Vaping status: Never Used   Substance Use Topics    Alcohol use: No    Drug use: No       ASCVD Risk   The ASCVD Risk score (Cassidy DK, et al., 2019) failed to calculate for the following reasons:    The valid total cholesterol range is 130 to 320  "mg/dL            Reviewed and updated as needed this visit by Provider                      Current providers sharing in care for this patient include:  Patient Care Team:  Riki Martinez MD as PCP - General (Family Medicine)  Riki Martinez MD as Assigned PCP  Kathryn Marques PA-C as Physician Assistant (Physician Assistant - Medical)  Jimmy Cross MD as Assigned Heart and Vascular Provider  Terrell Gould DPM as Assigned Surgical Provider    The following health maintenance items are reviewed in Epic and correct as of today:  Health Maintenance   Topic Date Due    HF ACTION PLAN  Never done    HEPATITIS B VACCINE (14 of 14 - Risk Dialysis 4-dose series) 06/19/2021    DIABETIC FOOT EXAM  05/03/2023    MEDICARE ANNUAL WELLNESS VISIT  07/27/2024    ANNUAL REVIEW OF HM ORDERS  07/27/2024    A1C  12/18/2024    LIPID  12/19/2024    EYE EXAM  02/12/2025    COVID-19 VACCINE (10 - 2024-25 season) 04/17/2025    ALT  05/18/2025    BMP  08/07/2025    INFLUENZA VACCINE (1) 09/01/2025    HEMOGLOBIN  11/07/2025    CBC  05/07/2026    FALL RISK ASSESSMENT  07/03/2026    ADVANCE CARE PLANNING  07/27/2028    COLORECTAL CANCER SCREENING  09/10/2029    DTAP/TDAP/TD VACCINE (6 - Td or Tdap) 05/03/2032    PARATHYROID  Completed    PHOSPHORUS  Completed    TSH W/FREE T4 REFLEX  Completed    HEPATITIS C SCREENING  Completed    PHQ-2 (once per calendar year)  Completed    PNEUMOCOCCAL VACCINE 50+ YEARS  Completed    ALK PHOS  Completed    ZOSTER VACCINE  Completed    RSV VACCINE  Completed    HPV VACCINE  Aged Out    MENINGITIS VACCINE  Aged Out    MICROALBUMIN  Discontinued    LUNG CANCER SCREENING  Discontinued         Review of Systems  + Fatigue.       Objective    Exam  /63 (BP Location: Right arm, Patient Position: Sitting, Cuff Size: Adult Regular)   Pulse 75   Temp 97.7  F (36.5  C) (Temporal)   Resp 12   Ht 1.778 m (5' 10\")   Wt 74.1 kg (163 lb 6.4 oz)   SpO2 96%   BMI 23.45 kg/m     Estimated body mass " "index is 23.45 kg/m  as calculated from the following:    Height as of this encounter: 1.778 m (5' 10\").    Weight as of this encounter: 74.1 kg (163 lb 6.4 oz).    Physical Exam  GENERAL: alert and no distress  EYES: Eyes grossly normal to inspection, PERRL and conjunctivae and sclerae normal  HENT: normal cephalic/atraumatic and both ears: occluded with wax  PSYCH: mentation appears normal, affect normal/bright  Gait and balance assessed per Gait Speed Test.  Result as above.        7/3/2025   Mini Cog   Clock Draw Score 0 Abnormal   3 Item Recall 0 objects recalled   Mini Cog Total Score 0              Signed Electronically by: Riki Martinez MD    "

## 2025-07-05 ENCOUNTER — PATIENT OUTREACH (OUTPATIENT)
Dept: CARE COORDINATION | Facility: CLINIC | Age: 80
End: 2025-07-05
Payer: COMMERCIAL

## 2025-07-07 ENCOUNTER — PATIENT OUTREACH (OUTPATIENT)
Dept: CARE COORDINATION | Facility: CLINIC | Age: 80
End: 2025-07-07
Payer: COMMERCIAL

## 2025-07-10 DIAGNOSIS — E78.5 HYPERLIPIDEMIA LDL GOAL <70: ICD-10-CM

## 2025-07-10 RX ORDER — SIMVASTATIN 40 MG
40 TABLET ORAL AT BEDTIME
Qty: 90 TABLET | Refills: 0 | Status: SHIPPED | OUTPATIENT
Start: 2025-07-10

## 2025-07-19 ENCOUNTER — HEALTH MAINTENANCE LETTER (OUTPATIENT)
Age: 80
End: 2025-07-19

## 2025-08-28 ENCOUNTER — TELEPHONE (OUTPATIENT)
Dept: CARDIOLOGY | Facility: CLINIC | Age: 80
End: 2025-08-28

## 2025-08-28 ENCOUNTER — OFFICE VISIT (OUTPATIENT)
Dept: CARDIOLOGY | Facility: CLINIC | Age: 80
End: 2025-08-28
Attending: STUDENT IN AN ORGANIZED HEALTH CARE EDUCATION/TRAINING PROGRAM
Payer: COMMERCIAL

## 2025-08-28 VITALS
HEIGHT: 69 IN | SYSTOLIC BLOOD PRESSURE: 110 MMHG | WEIGHT: 164 LBS | DIASTOLIC BLOOD PRESSURE: 42 MMHG | BODY MASS INDEX: 24.29 KG/M2 | HEART RATE: 71 BPM | RESPIRATION RATE: 16 BRPM

## 2025-08-28 DIAGNOSIS — N18.6 ESRD (END STAGE RENAL DISEASE) ON DIALYSIS (H): ICD-10-CM

## 2025-08-28 DIAGNOSIS — Z99.2 ESRD (END STAGE RENAL DISEASE) ON DIALYSIS (H): ICD-10-CM

## 2025-08-28 DIAGNOSIS — I10 ESSENTIAL HYPERTENSION WITH GOAL BLOOD PRESSURE LESS THAN 140/90: ICD-10-CM

## 2025-08-28 DIAGNOSIS — I35.1 NONRHEUMATIC AORTIC VALVE INSUFFICIENCY: Primary | ICD-10-CM

## 2025-08-28 DIAGNOSIS — I50.20 HFREF (HEART FAILURE WITH REDUCED EJECTION FRACTION) (H): ICD-10-CM

## 2025-08-28 DIAGNOSIS — Z95.818 PRESENCE OF WATCHMAN LEFT ATRIAL APPENDAGE CLOSURE DEVICE: ICD-10-CM

## 2025-08-28 DIAGNOSIS — R07.9 CHEST PAIN, UNSPECIFIED TYPE: ICD-10-CM

## 2025-08-28 DIAGNOSIS — I34.0 NONRHEUMATIC MITRAL VALVE REGURGITATION: ICD-10-CM

## 2025-08-28 DIAGNOSIS — I48.19 PERSISTENT ATRIAL FIBRILLATION (H): ICD-10-CM

## 2025-08-28 DIAGNOSIS — I25.119 CORONARY ARTERY DISEASE INVOLVING NATIVE CORONARY ARTERY OF NATIVE HEART WITH ANGINA PECTORIS: ICD-10-CM

## 2025-08-28 DIAGNOSIS — I25.5 ISCHEMIC CARDIOMYOPATHY: ICD-10-CM

## 2025-09-02 ENCOUNTER — PREP FOR PROCEDURE (OUTPATIENT)
Dept: CARDIOLOGY | Facility: CLINIC | Age: 80
End: 2025-09-02
Payer: COMMERCIAL

## 2025-09-02 DIAGNOSIS — I35.1 NONRHEUMATIC AORTIC VALVE INSUFFICIENCY: Primary | ICD-10-CM

## 2025-09-02 DIAGNOSIS — I34.0 NONRHEUMATIC MITRAL VALVE REGURGITATION: ICD-10-CM

## 2025-09-02 RX ORDER — LIDOCAINE 40 MG/G
CREAM TOPICAL
OUTPATIENT
Start: 2025-09-02

## 2025-09-02 RX ORDER — SODIUM CHLORIDE 9 MG/ML
30 INJECTION, SOLUTION INTRAVENOUS CONTINUOUS
OUTPATIENT
Start: 2025-09-02

## (undated) DEVICE — SYR ANGIOGRAPHY MULTIUSE KIT ACIST 014612

## (undated) DEVICE — ELECTRODE DEFIB CADENCE 22550R

## (undated) DEVICE — CATH IVUS OPTICROSS HD 3FR 1MM 135CML H74939352040

## (undated) DEVICE — SHEATH GUIDING VERSACROSS D1 CURVE L85 CM L180 CBL VXAK0003

## (undated) DEVICE — CATH DIAGNOSTIC RADIAL 5FR TIG 4.0

## (undated) DEVICE — BURR ROTAPRO OS 2.0 1.50MM H749394671500

## (undated) DEVICE — KIT HAND CONTROL ACIST 014644 AR-P54

## (undated) DEVICE — CATH BALLOON NC EMERGE 4.00X12MM H7493926712400

## (undated) DEVICE — PROBE TEMP CIRCA S-CATH M ESPH 12-SNSR 3D MAP CS-21EP

## (undated) DEVICE — OCCLUDER CV WATCHMAN FLX OD27 MM M635WU50270
Type: IMPLANTABLE DEVICE | Status: NON-FUNCTIONAL
Removed: 2024-09-12

## (undated) DEVICE — INTRO MICRO MINI STICK 4FR STD NITINOL

## (undated) DEVICE — GUIDEWIRE FORTE FLOPPY J TOP 34949-05J

## (undated) DEVICE — EXCHANGE WIRE .035 260 STAR/JFC/035/260/ M001491681

## (undated) DEVICE — INTRODUCER CHECK FLO 16FRX30CM .038

## (undated) DEVICE — CATH ABLATION 8FR 115CM D-F CURVE BI-DIR QDOT MICRO D139505

## (undated) DEVICE — GUIDEWIRE JTIP 3MM .035 180CM IQ35F180J3

## (undated) DEVICE — CATH TRANSSEPTAL VERSACROSS 8.5FR 180-J RF 63CM 45DEG

## (undated) DEVICE — LUBRICANT VIPERSLIDE 100ML BAG

## (undated) DEVICE — TRANSDUCER TRAY ARTERIAL 42646-06

## (undated) DEVICE — CATH BALLO0N SHOCKWAVE C2+ 3.5 X12MM CORONARY C2PIVL3512

## (undated) DEVICE — TUBE SET SMARKABLATE IRRIGATION

## (undated) DEVICE — CATH ANGIO INFINITI JL5 4FRX100CM 538422

## (undated) DEVICE — INTRO TERUMO 8FRX25CM W/MARKER RSB803

## (undated) DEVICE — CATH EP 7FR X 115CM DECANAV CA

## (undated) DEVICE — 8F SOUNDSTAR ECO ULTRASOUND CATHETER

## (undated) DEVICE — CATHETER OCTARAY STANDARD SPLINE CURVE F 2-2-2-2-2 D160904

## (undated) DEVICE — INTRO SHEATH 4FRX10CM PINNACLE RSS402

## (undated) DEVICE — SHEATH PINNACLE 9/25/038 RSS905

## (undated) DEVICE — SHTH INTRO 0.021IN ID 6FR DIA

## (undated) DEVICE — CATH LAUNCHER 6FR EBU 3.5 LA6EBU35

## (undated) DEVICE — DEVICE INFLATION SYR W/ HEMOSTASIS VALVE 12IN EXT IN4904

## (undated) DEVICE — INTRO MICRO MINI STICK 4FR STIFF NITINOL 45-753

## (undated) DEVICE — PATCH CARTO 3 EXTERNAL REFERENCE 3D MAPPING CREFP6

## (undated) DEVICE — SHEATH WITH DILATOR ACCESS SYSTEM FXD CRV DBL M635TU80020

## (undated) DEVICE — MANIFOLD KIT ANGIO AUTOMATED 014613

## (undated) DEVICE — CATH ANGIO INFINITI 3DRC 4FRX100CM 538476

## (undated) DEVICE — GUIDEWIRE ROTAWIRE DRIVE FLOPPY H74939462005

## (undated) DEVICE — SLEEVE TR BAND RADIAL COMPRESSION DEVICE 24CM TRB24-REG

## (undated) DEVICE — CUSTOM PACK EP

## (undated) DEVICE — INTRODUCER SHEATH VASC CATH 8.5FR CARTO GIDE STH MED D138502

## (undated) DEVICE — CUSTOM PACK CORONARY SAN5BCRHEA

## (undated) DEVICE — CATH ANGIO INFINITI PIGTAIL 155 6 SH 6FRX110CM 534654S

## (undated) RX ORDER — PROPOFOL 10 MG/ML
INJECTION, EMULSION INTRAVENOUS
Status: DISPENSED
Start: 2024-04-18

## (undated) RX ORDER — DEXAMETHASONE SODIUM PHOSPHATE 10 MG/ML
INJECTION, SOLUTION INTRAMUSCULAR; INTRAVENOUS
Status: DISPENSED
Start: 2024-04-18

## (undated) RX ORDER — METHOHEXITAL IN WATER/PF 100MG/10ML
SYRINGE (ML) INTRAVENOUS
Status: DISPENSED
Start: 2024-04-26

## (undated) RX ORDER — VASOPRESSIN 20 U/ML
INJECTION PARENTERAL
Status: DISPENSED
Start: 2024-04-18

## (undated) RX ORDER — ONDANSETRON 2 MG/ML
INJECTION INTRAMUSCULAR; INTRAVENOUS
Status: DISPENSED
Start: 2024-04-18

## (undated) RX ORDER — METOPROLOL TARTRATE 1 MG/ML
INJECTION, SOLUTION INTRAVENOUS
Status: DISPENSED
Start: 2023-12-19

## (undated) RX ORDER — BUPIVACAINE HYDROCHLORIDE 5 MG/ML
INJECTION, SOLUTION EPIDURAL; INTRACAUDAL
Status: DISPENSED
Start: 2024-04-18

## (undated) RX ORDER — CLOPIDOGREL 300 MG/1
TABLET, FILM COATED ORAL
Status: DISPENSED
Start: 2023-12-19

## (undated) RX ORDER — PROTAMINE SULFATE 10 MG/ML
INJECTION, SOLUTION INTRAVENOUS
Status: DISPENSED
Start: 2024-09-12

## (undated) RX ORDER — FENTANYL CITRATE 50 UG/ML
INJECTION, SOLUTION INTRAMUSCULAR; INTRAVENOUS
Status: DISPENSED
Start: 2024-04-18

## (undated) RX ORDER — LIDOCAINE HYDROCHLORIDE 10 MG/ML
INJECTION, SOLUTION INFILTRATION; PERINEURAL
Status: DISPENSED
Start: 2024-09-24

## (undated) RX ORDER — LIDOCAINE HYDROCHLORIDE 10 MG/ML
INJECTION, SOLUTION EPIDURAL; INFILTRATION; INTRACAUDAL; PERINEURAL
Status: DISPENSED
Start: 2024-09-12

## (undated) RX ORDER — DIAZEPAM 5 MG
TABLET ORAL
Status: DISPENSED
Start: 2023-12-19

## (undated) RX ORDER — METHOHEXITAL IN WATER/PF 100MG/10ML
SYRINGE (ML) INTRAVENOUS
Status: DISPENSED
Start: 2024-06-20

## (undated) RX ORDER — LIDOCAINE HYDROCHLORIDE AND EPINEPHRINE 10; 10 MG/ML; UG/ML
INJECTION, SOLUTION INFILTRATION; PERINEURAL
Status: DISPENSED
Start: 2024-04-18

## (undated) RX ORDER — HYDRALAZINE HYDROCHLORIDE 20 MG/ML
INJECTION INTRAMUSCULAR; INTRAVENOUS
Status: DISPENSED
Start: 2023-12-19

## (undated) RX ORDER — FENTANYL CITRATE 50 UG/ML
INJECTION, SOLUTION INTRAMUSCULAR; INTRAVENOUS
Status: DISPENSED
Start: 2024-09-12

## (undated) RX ORDER — FENTANYL CITRATE 50 UG/ML
INJECTION, SOLUTION INTRAMUSCULAR; INTRAVENOUS
Status: DISPENSED
Start: 2023-12-19

## (undated) RX ORDER — HEPARIN SODIUM 10000 [USP'U]/100ML
INJECTION, SOLUTION INTRAVENOUS
Status: DISPENSED
Start: 2024-04-18